# Patient Record
Sex: FEMALE | Race: BLACK OR AFRICAN AMERICAN | Employment: OTHER | URBAN - METROPOLITAN AREA
[De-identification: names, ages, dates, MRNs, and addresses within clinical notes are randomized per-mention and may not be internally consistent; named-entity substitution may affect disease eponyms.]

---

## 2017-09-14 ENCOUNTER — APPOINTMENT (OUTPATIENT)
Dept: GENERAL RADIOLOGY | Age: 77
DRG: 871 | End: 2017-09-14
Attending: EMERGENCY MEDICINE
Payer: MEDICARE

## 2017-09-14 ENCOUNTER — HOSPITAL ENCOUNTER (INPATIENT)
Age: 77
LOS: 3 days | Discharge: HOME HEALTH | DRG: 871 | End: 2017-09-19
Attending: EMERGENCY MEDICINE | Admitting: SURGERY
Payer: MEDICARE

## 2017-09-14 ENCOUNTER — APPOINTMENT (OUTPATIENT)
Dept: CT IMAGING | Age: 77
DRG: 871 | End: 2017-09-14
Attending: EMERGENCY MEDICINE
Payer: MEDICARE

## 2017-09-14 DIAGNOSIS — L02.91 ABSCESS: Primary | ICD-10-CM

## 2017-09-14 PROBLEM — E66.9 OBESE: Chronic | Status: ACTIVE | Noted: 2017-09-14

## 2017-09-14 PROBLEM — N18.6 ESRD (END STAGE RENAL DISEASE) (HCC): Status: ACTIVE | Noted: 2017-09-14

## 2017-09-14 PROBLEM — L02.211 ABDOMINAL WALL ABSCESS: Status: ACTIVE | Noted: 2017-09-14

## 2017-09-14 PROBLEM — I10 HTN (HYPERTENSION): Chronic | Status: ACTIVE | Noted: 2017-09-14

## 2017-09-14 PROBLEM — N18.6 ESRD (END STAGE RENAL DISEASE) (HCC): Chronic | Status: ACTIVE | Noted: 2017-09-14

## 2017-09-14 PROBLEM — D72.829 LEUKOCYTOSIS: Status: ACTIVE | Noted: 2017-09-14

## 2017-09-14 PROBLEM — R79.89 ELEVATED LACTIC ACID LEVEL: Status: ACTIVE | Noted: 2017-09-14

## 2017-09-14 PROBLEM — I10 HTN (HYPERTENSION): Status: ACTIVE | Noted: 2017-09-14

## 2017-09-14 LAB
ALBUMIN SERPL-MCNC: 3.3 G/DL (ref 3.5–5)
ALBUMIN/GLOB SERPL: 0.6 {RATIO} (ref 1.1–2.2)
ALP SERPL-CCNC: 117 U/L (ref 45–117)
ALT SERPL-CCNC: 14 U/L (ref 12–78)
ANION GAP SERPL CALC-SCNC: 14 MMOL/L (ref 5–15)
AST SERPL-CCNC: 23 U/L (ref 15–37)
ATRIAL RATE: 98 BPM
BASOPHILS # BLD: 0 K/UL (ref 0–0.1)
BASOPHILS NFR BLD: 0 % (ref 0–1)
BILIRUB SERPL-MCNC: 0.9 MG/DL (ref 0.2–1)
BUN SERPL-MCNC: 11 MG/DL (ref 6–20)
BUN/CREAT SERPL: 3 (ref 12–20)
CALCIUM SERPL-MCNC: 8.4 MG/DL (ref 8.5–10.1)
CALCULATED P AXIS, ECG09: 42 DEGREES
CALCULATED R AXIS, ECG10: -10 DEGREES
CALCULATED T AXIS, ECG11: 120 DEGREES
CHLORIDE SERPL-SCNC: 97 MMOL/L (ref 97–108)
CO2 SERPL-SCNC: 28 MMOL/L (ref 21–32)
CREAT SERPL-MCNC: 4.25 MG/DL (ref 0.55–1.02)
DIAGNOSIS, 93000: NORMAL
DIFFERENTIAL METHOD BLD: ABNORMAL
EOSINOPHIL # BLD: 0.2 K/UL (ref 0–0.4)
EOSINOPHIL NFR BLD: 1 % (ref 0–7)
ERYTHROCYTE [DISTWIDTH] IN BLOOD BY AUTOMATED COUNT: 15.1 % (ref 11.5–14.5)
GLOBULIN SER CALC-MCNC: 5.6 G/DL (ref 2–4)
GLUCOSE SERPL-MCNC: 104 MG/DL (ref 65–100)
HCT VFR BLD AUTO: 36.3 % (ref 35–47)
HGB BLD-MCNC: 12.2 G/DL (ref 11.5–16)
LACTATE SERPL-SCNC: 2.5 MMOL/L (ref 0.4–2)
LYMPHOCYTES # BLD: 2 K/UL (ref 0.8–3.5)
LYMPHOCYTES NFR BLD: 13 % (ref 12–49)
MCH RBC QN AUTO: 35.7 PG (ref 26–34)
MCHC RBC AUTO-ENTMCNC: 33.6 G/DL (ref 30–36.5)
MCV RBC AUTO: 106.1 FL (ref 80–99)
MONOCYTES # BLD: 1.9 K/UL (ref 0–1)
MONOCYTES NFR BLD: 12 % (ref 5–13)
NEUTS SEG # BLD: 11.4 K/UL (ref 1.8–8)
NEUTS SEG NFR BLD: 74 % (ref 32–75)
P-R INTERVAL, ECG05: 134 MS
PLATELET # BLD AUTO: 228 K/UL (ref 150–400)
POTASSIUM SERPL-SCNC: 3.7 MMOL/L (ref 3.5–5.1)
PROT SERPL-MCNC: 8.9 G/DL (ref 6.4–8.2)
Q-T INTERVAL, ECG07: 394 MS
QRS DURATION, ECG06: 108 MS
QTC CALCULATION (BEZET), ECG08: 503 MS
RBC # BLD AUTO: 3.42 M/UL (ref 3.8–5.2)
RBC MORPH BLD: ABNORMAL
SODIUM SERPL-SCNC: 139 MMOL/L (ref 136–145)
VENTRICULAR RATE, ECG03: 98 BPM
WBC # BLD AUTO: 15.5 K/UL (ref 3.6–11)

## 2017-09-14 PROCEDURE — 99218 HC RM OBSERVATION: CPT

## 2017-09-14 PROCEDURE — 99285 EMERGENCY DEPT VISIT HI MDM: CPT

## 2017-09-14 PROCEDURE — 87040 BLOOD CULTURE FOR BACTERIA: CPT | Performed by: EMERGENCY MEDICINE

## 2017-09-14 PROCEDURE — 96365 THER/PROPH/DIAG IV INF INIT: CPT

## 2017-09-14 PROCEDURE — 93005 ELECTROCARDIOGRAM TRACING: CPT

## 2017-09-14 PROCEDURE — 85025 COMPLETE CBC W/AUTO DIFF WBC: CPT | Performed by: EMERGENCY MEDICINE

## 2017-09-14 PROCEDURE — 74011250637 HC RX REV CODE- 250/637: Performed by: INTERNAL MEDICINE

## 2017-09-14 PROCEDURE — 36415 COLL VENOUS BLD VENIPUNCTURE: CPT | Performed by: EMERGENCY MEDICINE

## 2017-09-14 PROCEDURE — 74011000258 HC RX REV CODE- 258: Performed by: EMERGENCY MEDICINE

## 2017-09-14 PROCEDURE — 80053 COMPREHEN METABOLIC PANEL: CPT | Performed by: EMERGENCY MEDICINE

## 2017-09-14 PROCEDURE — 74011250637 HC RX REV CODE- 250/637: Performed by: PHYSICIAN ASSISTANT

## 2017-09-14 PROCEDURE — 74011250636 HC RX REV CODE- 250/636: Performed by: EMERGENCY MEDICINE

## 2017-09-14 PROCEDURE — 71020 XR CHEST PA LAT: CPT

## 2017-09-14 PROCEDURE — 74176 CT ABD & PELVIS W/O CONTRAST: CPT

## 2017-09-14 PROCEDURE — 83605 ASSAY OF LACTIC ACID: CPT | Performed by: EMERGENCY MEDICINE

## 2017-09-14 PROCEDURE — 87186 SC STD MICRODIL/AGAR DIL: CPT | Performed by: EMERGENCY MEDICINE

## 2017-09-14 PROCEDURE — 87077 CULTURE AEROBIC IDENTIFY: CPT | Performed by: EMERGENCY MEDICINE

## 2017-09-14 PROCEDURE — 74011250636 HC RX REV CODE- 250/636: Performed by: PHYSICIAN ASSISTANT

## 2017-09-14 PROCEDURE — 87205 SMEAR GRAM STAIN: CPT | Performed by: EMERGENCY MEDICINE

## 2017-09-14 RX ORDER — CALCIUM ACETATE 667 MG/1
1 CAPSULE ORAL
Status: DISCONTINUED | OUTPATIENT
Start: 2017-09-14 | End: 2017-09-19 | Stop reason: HOSPADM

## 2017-09-14 RX ORDER — ISOSORBIDE MONONITRATE 30 MG/1
60 TABLET, EXTENDED RELEASE ORAL
Status: DISCONTINUED | OUTPATIENT
Start: 2017-09-15 | End: 2017-09-19 | Stop reason: HOSPADM

## 2017-09-14 RX ORDER — HYDRALAZINE HYDROCHLORIDE 20 MG/ML
20 INJECTION INTRAMUSCULAR; INTRAVENOUS
Status: DISCONTINUED | OUTPATIENT
Start: 2017-09-14 | End: 2017-09-19 | Stop reason: HOSPADM

## 2017-09-14 RX ORDER — HEPARIN SODIUM 5000 [USP'U]/ML
5000 INJECTION, SOLUTION INTRAVENOUS; SUBCUTANEOUS ONCE
Status: COMPLETED | OUTPATIENT
Start: 2017-09-14 | End: 2017-09-14

## 2017-09-14 RX ORDER — ONDANSETRON 2 MG/ML
4 INJECTION INTRAMUSCULAR; INTRAVENOUS
Status: DISCONTINUED | OUTPATIENT
Start: 2017-09-14 | End: 2017-09-19 | Stop reason: HOSPADM

## 2017-09-14 RX ORDER — VENLAFAXINE HYDROCHLORIDE 37.5 MG/1
37.5 CAPSULE, EXTENDED RELEASE ORAL DAILY
Status: DISCONTINUED | OUTPATIENT
Start: 2017-09-15 | End: 2017-09-19 | Stop reason: HOSPADM

## 2017-09-14 RX ORDER — IBUPROFEN 200 MG
1 TABLET ORAL DAILY
Status: DISCONTINUED | OUTPATIENT
Start: 2017-09-14 | End: 2017-09-14

## 2017-09-14 RX ORDER — HYDRALAZINE HYDROCHLORIDE 25 MG/1
25 TABLET, FILM COATED ORAL 3 TIMES DAILY
Status: DISCONTINUED | OUTPATIENT
Start: 2017-09-14 | End: 2017-09-19 | Stop reason: HOSPADM

## 2017-09-14 RX ORDER — SODIUM CHLORIDE 0.9 % (FLUSH) 0.9 %
5-10 SYRINGE (ML) INJECTION AS NEEDED
Status: DISCONTINUED | OUTPATIENT
Start: 2017-09-14 | End: 2017-09-19 | Stop reason: HOSPADM

## 2017-09-14 RX ORDER — ALPRAZOLAM 0.5 MG/1
0.5 TABLET ORAL
Status: DISCONTINUED | OUTPATIENT
Start: 2017-09-14 | End: 2017-09-19 | Stop reason: HOSPADM

## 2017-09-14 RX ORDER — DIPHENHYDRAMINE HYDROCHLORIDE 50 MG/ML
12.5 INJECTION, SOLUTION INTRAMUSCULAR; INTRAVENOUS
Status: DISCONTINUED | OUTPATIENT
Start: 2017-09-14 | End: 2017-09-19 | Stop reason: HOSPADM

## 2017-09-14 RX ORDER — NALOXONE HYDROCHLORIDE 0.4 MG/ML
0.4 INJECTION, SOLUTION INTRAMUSCULAR; INTRAVENOUS; SUBCUTANEOUS AS NEEDED
Status: DISCONTINUED | OUTPATIENT
Start: 2017-09-14 | End: 2017-09-19 | Stop reason: HOSPADM

## 2017-09-14 RX ORDER — DOCUSATE SODIUM 100 MG/1
100 CAPSULE, LIQUID FILLED ORAL 2 TIMES DAILY
Status: DISCONTINUED | OUTPATIENT
Start: 2017-09-14 | End: 2017-09-19 | Stop reason: HOSPADM

## 2017-09-14 RX ORDER — HYDROMORPHONE HYDROCHLORIDE 1 MG/ML
0.5 INJECTION, SOLUTION INTRAMUSCULAR; INTRAVENOUS; SUBCUTANEOUS
Status: DISCONTINUED | OUTPATIENT
Start: 2017-09-14 | End: 2017-09-19 | Stop reason: HOSPADM

## 2017-09-14 RX ORDER — SODIUM CHLORIDE 0.9 % (FLUSH) 0.9 %
5-10 SYRINGE (ML) INJECTION EVERY 8 HOURS
Status: DISCONTINUED | OUTPATIENT
Start: 2017-09-14 | End: 2017-09-19 | Stop reason: HOSPADM

## 2017-09-14 RX ADMIN — DOCUSATE SODIUM 100 MG: 100 CAPSULE ORAL at 17:35

## 2017-09-14 RX ADMIN — HYDROMORPHONE HYDROCHLORIDE 0.5 MG: 1 INJECTION, SOLUTION INTRAMUSCULAR; INTRAVENOUS; SUBCUTANEOUS at 20:22

## 2017-09-14 RX ADMIN — HYDRALAZINE HYDROCHLORIDE 25 MG: 25 TABLET, FILM COATED ORAL at 21:31

## 2017-09-14 RX ADMIN — HEPARIN SODIUM 5000 UNITS: 5000 INJECTION, SOLUTION INTRAVENOUS; SUBCUTANEOUS at 17:36

## 2017-09-14 RX ADMIN — ALPRAZOLAM 0.5 MG: 0.5 TABLET ORAL at 21:38

## 2017-09-14 RX ADMIN — SODIUM CHLORIDE 3 G: 900 INJECTION, SOLUTION INTRAVENOUS at 13:48

## 2017-09-14 RX ADMIN — HYDRALAZINE HYDROCHLORIDE 25 MG: 25 TABLET, FILM COATED ORAL at 17:36

## 2017-09-14 RX ADMIN — SODIUM CHLORIDE 500 ML: 900 INJECTION, SOLUTION INTRAVENOUS at 13:47

## 2017-09-14 RX ADMIN — Medication 10 ML: at 21:32

## 2017-09-14 RX ADMIN — VANCOMYCIN HYDROCHLORIDE 1250 MG: 1 INJECTION, POWDER, LYOPHILIZED, FOR SOLUTION INTRAVENOUS at 15:01

## 2017-09-14 NOTE — ED NOTES
Per Dr. Heydi Gil do not give patient 30ml/kg fluid resuscitation protocol. Pt to receive total of 500ml d/t being a hemodialysis patient.

## 2017-09-14 NOTE — IP AVS SNAPSHOT
Tanja Hoffmann 
 
 
 [address] 100[address] [facility] 
[phone] Patient: Gordy Thompson MRN: [id number] RQC:[id number] You are allergic to the following Allergen Reactions Iron Anaphylaxis Recent Documentation Height Weight Breastfeeding? BMI OB Status Smoking Status 1.6 m 77.1 kg No 30.11 kg/m2 Menopause Current Every Day Smoker Unresulted Labs Order Current Status CULTURE, BLOOD Preliminary result CULTURE, BLOOD Preliminary result CULTURE, BLOOD, PAIRED Preliminary result Emergency Contacts Name Discharge Info Relation Home Work Mobile Linda Bates N/A  AT THIS TIME [6] Other Relative [6] [phone] About your hospitalization You were admitted on:  September 14, 2017 You last received care in the:  [facility] 4M POST SURG ORT 2 You were discharged on:  September 19, 2017 Unit phone number:  [phone] Why you were hospitalized Your primary diagnosis was:  Not on File Your diagnoses also included:  Abdominal Wall Abscess, Esrd (End Stage Renal Disease) (Hcc), Htn (Hypertension), Leukocytosis, Elevated Lactic Acid Level, Obese, Bacteremia, Abdominal Abscess (Hcc) Providers Seen During Your Hospitalizations Provider Role Specialty Primary office phone Ashley Epperson MD Attending Provider Emergency Medicine [phone] Arleen Cardenas MD Attending Provider General Surgery [phone] Your Primary Care Physician (PCP) Primary Care Physician Office Phone Office Fax [place] Maisha [phone] Follow-up Information Follow up With Details Comments Contact Info [facility]  [address] 
[phone] Gill Cruz MD On 9/27/2017 2:00PM [address] [facility] [address] 1007 [facility] 
[phone] Darryl Gomes MD Schedule an appointment as soon as possible for a visit in 2 weeks  [address] 100[address] [facility] 
[phone] Casey Loyd MD  As needed for knee pain  [address] [facility] [address] 1007 [facility] 
[phone] Current Discharge Medication List  
  
START taking these medications Dose & Instructions Dispensing Information Comments Morning Noon Evening Bedtime  
 amoxicillin-clavulanate 500-125 mg per tablet Commonly known as:  AUGMENTIN Your last dose was: Your next dose is:    
   
   
 Dose:  1 Tab Take 1 Tab by mouth daily. Quantity:  10 Tab Refills:  0 CONTINUE these medications which have NOT CHANGED Dose & Instructions Dispensing Information Comments Morning Noon Evening Bedtime AMBIEN 5 mg tablet Generic drug:  zolpidem Your last dose was: Your next dose is:    
   
   
 Dose:  5 mg Take 5 mg by mouth nightly as needed for Sleep. Refills:  0  
     
   
   
   
  
 calcium carbonate 200 mg calcium (500 mg) Chew Commonly known as:  TUMS Your last dose was: Your next dose is:    
   
   
 Dose:  1 Tab Take 1 Tab by mouth daily. Refills:  0 DIALYVITE 800 PO Your last dose was: Your next dose is: Take  by mouth. Refills:  0  
     
   
   
   
  
 EFFEXOR XR 37.5 mg capsule Generic drug:  venlafaxine-SR Your last dose was: Your next dose is: Take  by mouth daily. Refills:  0  
     
   
   
   
  
 hydrALAZINE 50 mg tablet Commonly known as:  APRESOLINE Your last dose was: Your next dose is:    
   
   
 Dose:  25 mg Take 25 mg by mouth three (3) times daily. Refills:  0  
     
   
   
   
  
 isosorbide mononitrate ER 60 mg CR tablet Commonly known as:  IMDUR Your last dose was: Your next dose is: Take  by mouth every morning. Refills:  0 PHOSLO 667 mg Cap Generic drug:  calcium acetate Your last dose was: Your next dose is: Take  by mouth three (3) times daily (with meals). Refills:  0  
     
   
   
   
  
 XANAX 0.5 mg tablet Generic drug:  ALPRAZolam  
   
Your last dose was: Your next dose is: Take  by mouth. Refills:  0 Where to Get Your Medications Information on where to get these meds will be given to you by the nurse or doctor. ! Ask your nurse or doctor about these medications  
  amoxicillin-clavulanate 500-125 mg per tablet Discharge Instructions Patient Discharge Instructions Arturo Elke / [id number] : 1940 Admitted 2017 Discharged: 2017 Take Home Medications · It is important that you take the medication exactly as they are prescribed. · Keep your medication in the bottles provided by the pharmacist and keep a list of the medication names, dosages, and times to be taken in your wallet. InvitedHome Activation Thank you for enrolling in [address] Please follow the instructions below to securely access your online medical record. InvitedHome allows you to send messages to your doctor, view your test results, renew your prescriptions, schedule appointments, and more. How Do I Sign Up? 1. In your internet browser, go to [email or web address]. 2. Click on the First Time User? Click Here link in the Sign In box. You will see the New Member Sign Up page. 3. Enter your InvitedHome Access Code exactly as it appears below. You will not need to use this code after youve completed the sign-up process. If you do not sign up before the expiration date, you must request a new code. InvitedHome Access Code: [email or web address] Expires: 2017 10:26 AM  
 
 4. Enter the last four digits of your Social Security Number (xxxx) and Date of Birth (mm/dd/yyyy) as indicated and click Submit. You will be taken to the next sign-up page. 5. Create a Wandrian ID. This will be your Wandrian login ID and cannot be changed, so think of one that is secure and easy to remember. 6. Create a Wandrian password. You can change your password at any time. 7. Enter your Password Reset Question and Answer. This can be used at a later time if you forget your password. 8. Enter your e-mail address. You will receive e-mail notification when new information is available in [address] 9. Click Sign Up. You can now view your medical record. Additional Information Remember, Wandrian is NOT to be used for urgent needs. For medical emergencies, dial 911. Now available from your iPhone and Android! · Do not take other medications without consulting your doctor. · Take Tylenol as needed for pain. What to do at [facility] Recommended diet: Renal Diet Recommended activity: As tolerated Wound care: Cover abdominal wound with dry dressing if there is any drainage. Follow up with Dr. Yue Shah in 2 weeks. Call [facility] to make an appointment. Follow up with Dr. Jose Alfredo Menon as needed for knee pain. Follow up with your family doctor for management of your chronic medical problems. Call Dr. Yue Shah or go to the ER if you develop worsening pain, fever, vomiting, substantial or persistent increase in wound drainage, or any other symptoms that concern you. Discharge Orders None Wandrian Announcement We are excited to announce that we are making your provider's discharge notes available to you in Wandrian. You will see these notes when they are completed and signed by the physician that discharged you from your recent hospital stay.   If you have any questions or concerns about any information you see in Vaddio, please call the Health Information Department where you were seen or reach out to your Primary Care Provider for more information about your plan of care. Introducing [facility]! [address] introduces Vaddio patient portal. Now you can access parts of your medical record, email your doctor's office, and request medication refills online. 1. In your internet browser, go to [email or web address] 2. Click on the First Time User? Click Here link in the Sign In box. You will see the New Member Sign Up page. 3. Enter your Vaddio Access Code exactly as it appears below. You will not need to use this code after youve completed the sign-up process. If you do not sign up before the expiration date, you must request a new code. · Vaddio Access Code: [email or web address] Expires: 12/13/2017 10:26 AM 
 
4. Enter the last four digits of your Social Security Number (xxxx) and Date of Birth (mm/dd/yyyy) as indicated and click Submit. You will be taken to the next sign-up page. 5. Create a Vaddio ID. This will be your Vaddio login ID and cannot be changed, so think of one that is secure and easy to remember. 6. Create a Vaddio password. You can change your password at any time. 7. Enter your Password Reset Question and Answer. This can be used at a later time if you forget your password. 8. Enter your e-mail address. You will receive e-mail notification when new information is available in [address] 9. Click Sign Up. You can now view and download portions of your medical record. 10. Click the Download Summary menu link to download a portable copy of your medical information. If you have questions, please visit the Frequently Asked Questions section of the Vaddio website. Remember, Vaddio is NOT to be used for urgent needs. For medical emergencies, dial 911. Now available from your iPhone and Android! General Information Please provide this summary of care documentation to your next provider. Patient Signature:  ____________________________________________________________ Date:  ____________________________________________________________  
  
[place] Provider Signature:  ____________________________________________________________ Date:  ____________________________________________________________

## 2017-09-14 NOTE — PROGRESS NOTES
[facility] Dosing Services: Antimicrobial Stewardship Daily Doc    Antibiotic dosing of vancomycin by Dr. Jericho Alvarez  Indication:  Day of Therapy 1      Vancomycin therapy:  Vancomycin 15 mg/kg (HD patient) x 1, approximately 1250 mg IV x 1. Pharmacy will continue to follow and adjust as necessary based on repeat doses and continuation of orders. Other Antimicrobial   (not dosed by pharmacist) Unasyn 3g IV x 1   Cultures    Serum Creatinine Lab Results   Component Value Date/Time    Creatinine 4.25 09/14/2017 10:37 AM         Creatinine Clearance Estimated Creatinine Clearance: 10.9 mL/min (based on Cr of 4.25).      Temp Temp: 98.2 °F (36.8 °C)       WBC Lab Results   Component Value Date/Time    WBC 15.5 09/14/2017 10:37 AM        H/H Lab Results   Component Value Date/Time    HGB 12.2 09/14/2017 10:37 AM        Platelets    Lab Results   Component Value Date/Time    PLATELET 816 17/73/1420 10:37 AM              Pharmacist Hubert Bassett PHARMD Contact information: [phone]

## 2017-09-14 NOTE — ED NOTES
TRANSFER - OUT REPORT:    Verbal report given to Noe Scott RN(name) on Rachelle Oliveros  being transferred to [facility] (unit) for routine progression of care       Report consisted of patients Situation, Background, Assessment and   Recommendations(SBAR). Information from the following report(s) SBAR, Kardex, ED Summary and MAR was reviewed with the receiving nurse. Lines:   Peripheral IV 09/14/17 Left Antecubital (Active)   Site Assessment Clean, dry, & intact 9/14/2017 10:47 AM   Phlebitis Assessment 0 9/14/2017 10:47 AM   Infiltration Assessment 0 9/14/2017 10:47 AM   Dressing Status Clean, dry, & intact 9/14/2017 10:47 AM   Dressing Type Transparent 9/14/2017 10:47 AM   Hub Color/Line Status Pink 9/14/2017 10:47 AM       Peripheral IV 09/14/17 Left Forearm (Active)   Site Assessment Clean, dry, & intact 9/14/2017  1:37 PM   Phlebitis Assessment 0 9/14/2017  1:37 PM   Infiltration Assessment 0 9/14/2017  1:37 PM   Dressing Status Clean, dry, & intact 9/14/2017  1:37 PM   Dressing Type Transparent;Tape 9/14/2017  1:37 PM   Hub Color/Line Status Blue;Flushed;Patent 9/14/2017  1:37 PM   Action Taken Blood drawn 9/14/2017  1:37 PM        Opportunity for questions and clarification was provided. Patient transported with:   Monitor, 2 saline locks.

## 2017-09-14 NOTE — PROGRESS NOTES
[facility] Dosing Services: Antimicrobial Stewardship Progress Note  Consult for antibiotic dosing of Vancomycin by Donnie SONI  Zosyn dose change per renal P&T protocol  Pharmacist reviewed antibiotic appropriateness for 68year old female  for indication of Abdominal wall abscess  Day of Therapy: 1    Plan:  Vancomycin therapy:  Loading dose: Vancomycin 1250 mg IV x1 dose given in ED  Maintenance dose: Vancomycin 500 mg IVPB  after each HD session  Dose calculated to approximate a therapeutic trough of 15-20 mcg/mL. Last trough level / Plan for level: next thur am  Pharmacy to follow daily and will make changes to dose and/or frequency based on clinical status.     Non-Kinetic Antimicrobial Dosing:   Current Regimen:  Zosyn 3.375 gm IV q8hr  Recommendation: Changed to Zosyn 3.375 gm IV q12hr  Other Antimicrobial  (not dosed by pharmacist)   none   Cultures     9/14/2017: Blood: pending  9/14/2017: Blood: pending  9/14/2017: Abdominal wound: GPC, GNR pending   Serum Creatinine     Lab Results   Component Value Date/Time    Creatinine 4.25 09/14/2017 10:37 AM       Creatinine Clearance HD     Temp   98.5 °F (36.9 °C)    WBC   Lab Results   Component Value Date/Time    WBC 15.5 09/14/2017 10:37 AM       H/H   Lab Results   Component Value Date/Time    HGB 12.2 09/14/2017 10:37 AM        Platelets   Lab Results   Component Value Date/Time    PLATELET 614 65/73/6527 10:37 AM        Pharmacist: Signed Jeffrey Eastman Contact information: [phone]

## 2017-09-14 NOTE — PROGRESS NOTES
Primary Nurse Nadeem Sloan RN and Kalia Sanchez RN performed a dual skin assessment on this patient Impairment noted- see wound doc flow sheet  Cullen score is 21, patient came in with drainage from abdomin, related to abscess, no measurable area noted, no other open areas noted on skin.

## 2017-09-14 NOTE — ED NOTES
Timeout completed with [facility]. EMTALA signed by all parties and original sent with [facility]. Pt denies pain at this time and all VS stable. Pt sent with personal belongings. Pt's daughter Flor Jones notified of transfer as requested by pt.

## 2017-09-14 NOTE — PROGRESS NOTES
[facility] Dosing Services: Antimicrobial Stewardship Progress Note    Dosing of Zosyn by NAE Dickinson  Pharmacist reviewed antibiotic appropriateness for 68year old , female  for indication of SSTI  Day of Therapy 1    Plan:    Non-Kinetic Antimicrobial Dosing:   Current Regimen:  Zosyn 2.25g every 12 hours  Recommendation: Zosyn 3.375g every 12 hours (HD patient)  Other Antimicrobial  (not dosed by pharmacist)   Noen   Cultures     9/14 Wound Cx Gram (+) cocci, and Gram (-) rods   Serum Creatinine     Lab Results   Component Value Date/Time    Creatinine 4.25 09/14/2017 10:37 AM       Creatinine Clearance Estimated Creatinine Clearance: 10.9 mL/min (based on Cr of 4.25).      Temp   98.5 °F (36.9 °C)    WBC   Lab Results   Component Value Date/Time    WBC 15.5 09/14/2017 10:37 AM       H/H   Lab Results   Component Value Date/Time    HGB 12.2 09/14/2017 10:37 AM        Platelets   Lab Results   Component Value Date/Time    PLATELET 931 67/43/0204 10:37 AM        Isacc Wadsworth PharmD, BCPS  Contact information:

## 2017-09-14 NOTE — ED PROVIDER NOTES
HPI Comments: 68 y.o. female with past medical history significant for ESRD on hemodialysis who presents from [facility] with chief complaint of wound check. For the past week has had drainage from prior surgical scar of the abdomen. Denies fever, chills, nausea or vomiting. Has had a cough for over a week and recently finished a Z-pack. Seen at Dialysis clinic today and sent for evaluation of wound. There are no other acute medical concerns at this time. Social hx: smoker    PCP: Brie Jaramillo MD        Patient is a 68 y.o. female presenting with wound check. The history is provided by the patient and the EMS personnel. Wound Check    Pertinent negatives include no back pain. Past Medical History:   Diagnosis Date    Arthritis     Chronic kidney disease     Dr Miguel Saunders Hypertension     Ill-defined condition     Dialysis T, Th, S       Past Surgical History:   Procedure Laterality Date    HX COLOSTOMY      left abdomen    HX VASCULAR ACCESS      left upper arm fistula         History reviewed. No pertinent family history. Social History     Social History    Marital status:      Spouse name: N/A    Number of children: N/A    Years of education: N/A     Occupational History    Not on file. Social History Main Topics    Smoking status: Current Every Day Smoker     Packs/day: 0.25    Smokeless tobacco: Not on file    Alcohol use No    Drug use: No    Sexual activity: Not on file     Other Topics Concern    Not on file     Social History Narrative         ALLERGIES: Iron    Review of Systems   Constitutional: Negative for chills and fever. HENT: Negative for ear pain and sore throat. Eyes: Negative for pain. Respiratory: Negative for chest tightness and shortness of breath. Cardiovascular: Negative for chest pain and leg swelling. Gastrointestinal: Negative for abdominal pain, nausea and vomiting. Genitourinary: Negative for dysuria and flank pain. Musculoskeletal: Negative for back pain. Skin: Negative for rash. Neurological: Negative for headaches. All other systems reviewed and are negative. Vitals:    09/14/17 1025   BP: 130/49   Pulse: 98   Resp: 20   Temp: 98.2 °F (36.8 °C)   SpO2: 98%   Weight: 77.5 kg (170 lb 13.7 oz)   Height: 5' 3\" (1.6 m)            Physical Exam   Constitutional: She appears well-developed and well-nourished. HENT:   Head: Normocephalic and atraumatic. Mouth/Throat: Oropharynx is clear and moist.   Eyes: Conjunctivae and EOM are normal. Pupils are equal, round, and reactive to light. No scleral icterus. Neck: Neck supple. No tracheal deviation present. Cardiovascular: Normal rate, regular rhythm, normal heart sounds and intact distal pulses. Pulmonary/Chest: Effort normal and breath sounds normal. No respiratory distress. Abdominal: Soft. She exhibits no distension. There is no tenderness. There is no rebound and no guarding. Genitourinary:   Genitourinary Comments: deferred   Musculoskeletal: She exhibits no edema. Neurological: She is alert. Skin: Skin is warm and dry. No erythema. Purulent drainage from prior surgical scar. Ostomy pink, viable. Psychiatric: She has a normal mood and affect. Nursing note and vitals reviewed. MDM  Number of Diagnoses or Management Options  Abscess:     ED Course       Procedures    12:56 PM  Spoke to Dr. Boateng Pale: will admit patient     2:14 PM  IVF discontinued due to concerns for volume overload. 12:56 PM  Patient is being admitted to the hospital.  The results of their tests and reasons for their admission have been discussed with them and/or available family. They convey agreement and understanding for the need to be admitted and for their admission diagnosis. Consultation has been made with the inpatient physician specialist for hospitalization.     LABORATORY TESTS:  Recent Results (from the past 12 hour(s))   CBC WITH AUTOMATED DIFF Collection Time: 09/14/17 10:37 AM   Result Value Ref Range    WBC 15.5 (H) 3.6 - 11.0 K/uL    RBC 3.42 (L) 3.80 - 5.20 M/uL    HGB 12.2 11.5 - 16.0 g/dL    HCT 36.3 35.0 - 47.0 %    .1 (H) 80.0 - 99.0 FL    MCH 35.7 (H) 26.0 - 34.0 PG    MCHC 33.6 30.0 - 36.5 g/dL    RDW 15.1 (H) 11.5 - 14.5 %    PLATELET 325 440 - 277 K/uL    NEUTROPHILS 74 32 - 75 %    LYMPHOCYTES 13 12 - 49 %    MONOCYTES 12 5 - 13 %    EOSINOPHILS 1 0 - 7 %    BASOPHILS 0 0 - 1 %    ABS. NEUTROPHILS 11.4 (H) 1.8 - 8.0 K/UL    ABS. LYMPHOCYTES 2.0 0.8 - 3.5 K/UL    ABS. MONOCYTES 1.9 (H) 0.0 - 1.0 K/UL    ABS. EOSINOPHILS 0.2 0.0 - 0.4 K/UL    ABS. BASOPHILS 0.0 0.0 - 0.1 K/UL    DF SMEAR SCANNED      RBC COMMENTS NORMOCYTIC, NORMOCHROMIC     METABOLIC PANEL, COMPREHENSIVE    Collection Time: 09/14/17 10:37 AM   Result Value Ref Range    Sodium 139 136 - 145 mmol/L    Potassium 3.7 3.5 - 5.1 mmol/L    Chloride 97 97 - 108 mmol/L    CO2 28 21 - 32 mmol/L    Anion gap 14 5 - 15 mmol/L    Glucose 104 (H) 65 - 100 mg/dL    BUN 11 6 - 20 MG/DL    Creatinine 4.25 (H) 0.55 - 1.02 MG/DL    BUN/Creatinine ratio 3 (L) 12 - 20      GFR est AA 12 (L) >60 ml/min/1.73m2    GFR est non-AA 10 (L) >60 ml/min/1.73m2    Calcium 8.4 (L) 8.5 - 10.1 MG/DL    Bilirubin, total 0.9 0.2 - 1.0 MG/DL    ALT (SGPT) 14 12 - 78 U/L    AST (SGOT) 23 15 - 37 U/L    Alk. phosphatase 117 45 - 117 U/L    Protein, total 8.9 (H) 6.4 - 8.2 g/dL    Albumin 3.3 (L) 3.5 - 5.0 g/dL    Globulin 5.6 (H) 2.0 - 4.0 g/dL    A-G Ratio 0.6 (L) 1.1 - 2.2     LACTIC ACID    Collection Time: 09/14/17 10:37 AM   Result Value Ref Range    Lactic acid 2.5 (HH) 0.4 - 2.0 MMOL/L       IMAGING RESULTS:  CT ABD PELV WO CONT   Final Result      XR CHEST PA LAT   Final Result        Xr Chest Pa Lat    Result Date: 9/14/2017  EXAM:  XR CHEST PA LAT INDICATION: cough COMPARISON: 9/7/2016. FINDINGS:  Unchanged appearance of the chest including increased, hazy interstitial markings diffusely.  No new lobar consolidation, pleural effusion, or pneumothorax. .  The cardiomediastinal silhouette is normal.  No acute or aggressive osseous lesion. .  Atherosclerotic disease of the aorta. A left upper extremity vascular stent appears grossly unchanged. IMPRESSION: 1. Unchanged appearance of the chest including increased hazy interstitial markings. This appearance could be seen with chronic edema, fibrosis, or interstitial lung diseases. 2.  No new lobar consolidation to suggest a superimposed pneumonia. Ct Abd Pelv Wo Cont    Result Date: 9/14/2017  INDICATION: abscess COMPARISON: CONTRAST:  None. TECHNIQUE: Thin axial images were obtained through the abdomen and pelvis. Coronal and sagittal reconstructions were generated. Oral contrast was not administered. CT dose reduction was achieved through use of a standardized protocol tailored for this examination and automatic exposure control for dose modulation. Adaptive statistical iterative reconstruction (ASIR) was utilized. The absence of intravenous contrast material reduces the sensitivity for evaluation of the solid parenchymal organs of the abdomen. FINDINGS: LUNG BASES: Clear. INCIDENTALLY IMAGED HEART AND MEDIASTINUM: Unremarkable. LIVER: No mass or biliary dilatation. GALLBLADDER: There is suspicion of gallstones. SPLEEN: No mass. PANCREAS: No mass or ductal dilatation. ADRENALS: Unremarkable. KIDNEYS/URETERS: There is marked atrophy of the kidneys. STOMACH: Unremarkable. SMALL BOWEL: No dilatation or wall thickening. COLON: There is an ostomy in the left abdomen. There is wall paramedian hernias containing fat bilaterally. In the midline in the abdominal wall extending to the muscle, there is an area of enhancement with an air-fluid level which measures 2.3 x 1.2 cm. This does not appear to extend intra-abdominally. APPENDIX: Not identified PERITONEUM: No ascites or pneumoperitoneum. RETROPERITONEUM: No lymphadenopathy or aortic aneurysm.  REPRODUCTIVE ORGANS: Patient status post hysterectomy. There is a 2.3 cm left ovarian cyst. URINARY BLADDER: No mass or calculus. BONES: There are changes of metabolic bone disease. There are severe degenerative changes right hip. ADDITIONAL COMMENTS: N/A     IMPRESSION: 1. In the midline, there is an area of enhancement in the abdominal wall extending to the muscular layer which contains a small air-fluid level and measures 2.3 x 1.2 cm may represent a small abscess without evidence of intra-abdominal extension. 2. There is an ostomy in the left abdomen. 3. There is marked atrophy of the kidneys. 4. There is suspicion of gallstones. 5. There is metabolic bone disease. There are severe degenerative changes in the right hip. MEDICATIONS GIVEN:  Medications   ampicillin-sulbactam (UNASYN) 3 g in 0.9% sodium chloride (MBP/ADV) 100 mL (3 g IntraVENous New Bag 9/14/17 1348)   sodium chloride (NS) flush 5-10 mL (not administered)   vancomycin (VANCOCIN) 1,250 mg in 0.9% sodium chloride 250 mL IVPB (not administered)       IMPRESSION:  1. Abscess        PLAN:  1. Admit to general surgery    Total critical care time spent exclusive of procedures:  33 minutes      Melvin Gil MD

## 2017-09-14 NOTE — CONSULTS
[address]  [phone]    Hospitalist Consult Note      NAME:  Lee Marrero   :   1940   MRN:  [id number]     Requesting Physician Adeola Delgado MD   Reason for Consult:  Medical management      PCP:  Tonie Malone MD     Date/Time:  2017 4:17 PM       Assessment:      Active Problems:    Abdominal wall abscess (2017)      ESRD (end stage renal disease) ([facility] [address] (2017)      HTN (hypertension) (2017)      Leukocytosis (2017)      Elevated lactic acid level (2017)      Obese (2017)            Plan: Active Problems:    HTN (hypertension) (2017)   - BP intermittently elevated at [facility]   - follow on usual meds for now   - PRN hydralazine ordered      [facility] (2017)   - consult Renal for regular dialysis, I have ordered      Leukocytosis (2017)   - follow on antibiotics for infection as above, patient is NOT septic at this time      Elevated lactic acid level (2017)   - mildly elevated, recheck in AM      Obese (2017)   - counseled on weight loss        Abdominal wall abscess (2017)   - management per Surgery   - received vancomycin and Unasyn at [facility], no antibiotics currently ordered, defer to Surgery but vancomycin and Zosyn would be appropriate pending cultures          Subjective:     CHIEF COMPLAINT: wound     HISTORY OF PRESENT ILLNESS:     Ms. Awilda Juarez is a 68 y.o.  female who is admitted to the General Surgery Service with wound infection and subcutaneous abscess. We are asked to evaluate for medical management.   Ms. Awilda Juarez is complaining of wound: for the past week, constant, at the lower aspect of her surgical scar, associated with pain and drainage      Past Medical History:   Diagnosis Date    Arthritis     Chronic kidney disease     Dr Kermit Nichole Hypertension     Ill-defined condition     Dialysis T, Th, S    Obese 9/14/2017        Past Surgical History:   Procedure Laterality Date    HX COLOSTOMY      left abdomen    HX VASCULAR ACCESS      left upper arm fistula       Social History   Substance Use Topics    Smoking status: Current Every Day Smoker     Packs/day: 0.25    Smokeless tobacco: Not on file    Alcohol use No        Family History   Problem Relation Age of Onset    Hypertension Other      multiple family members        Allergies   Allergen Reactions    Iron Anaphylaxis        Prior to Admission medications    Medication Sig Start Date End Date Taking? Authorizing Provider   zolpidem (AMBIEN) 5 mg tablet Take 5 mg by mouth nightly as needed for Sleep. Yes Greta Chung MD   FOLIC ACID/VIT BCOMP,C (DIALYVITE 800 PO) Take  by mouth. Yes Greta Chung MD   venlafaxine-SR (EFFEXOR XR) 37.5 mg capsule Take  by mouth daily. Yes Greta Chung MD   hydrALAZINE (APRESOLINE) 50 mg tablet Take 25 mg by mouth three (3) times daily. Yes Greta Chung MD   isosorbide mononitrate ER (IMDUR) 60 mg CR tablet Take  by mouth every morning. Yes Greta Chung MD   calcium acetate (PHOSLO) 667 mg cap Take  by mouth three (3) times daily (with meals). Yes Greta Chung MD   calcium carbonate (TUMS) 200 mg calcium (500 mg) chew Take 1 Tab by mouth daily. Yes Greta Chung MD   ALPRAZolam Shania Yusra) 0.5 mg tablet Take  by mouth.    Yes Greta Chung MD       Current Facility-Administered Medications   Medication Dose Route Frequency    sodium chloride (NS) flush 5-10 mL  5-10 mL IntraVENous PRN    sodium chloride (NS) flush 5-10 mL  5-10 mL IntraVENous Q8H    HYDROmorphone (PF) (DILAUDID) injection 0.5 mg  0.5 mg IntraVENous Q4H PRN    naloxone (NARCAN) injection 0.4 mg  0.4 mg IntraVENous PRN    ondansetron (ZOFRAN) injection 4 mg  4 mg IntraVENous Q4H PRN    diphenhydrAMINE (BENADRYL) injection 12.5 mg  12.5 mg IntraVENous Q4H PRN    docusate sodium (COLACE) capsule 100 mg  100 mg Oral BID  ALPRAZolam (XANAX) tablet 0.5 mg  0.5 mg Oral QID PRN    calcium acetate (PHOSLO) capsule 667 mg  1 Cap Oral TID WITH MEALS    hydrALAZINE (APRESOLINE) tablet 25 mg  25 mg Oral TID    [START ON 9/15/2017] isosorbide mononitrate ER (IMDUR) tablet 60 mg  60 mg Oral 7am    [START ON 9/15/2017] venlafaxine-SR (EFFEXOR-XR) capsule 37.5 mg  37.5 mg Oral DAILY    hydrALAZINE (APRESOLINE) 20 mg/mL injection 20 mg  20 mg IntraVENous Q6H PRN    [START ON 9/15/2017] piperacillin-tazobactam (ZOSYN) 3.375 g in 0.9% sodium chloride (MBP/ADV) 100 mL  3.375 g IntraVENous Q12H    heparin (porcine) injection 5,000 Units  5,000 Units SubCUTAneous ONCE    [START ON 9/16/2017] vancomycin (VANCOCIN) 500 mg in 0.9% sodium chloride (MBP/ADV) 100 mL  500 mg IntraVENous Rx Dosing/Monitoring    And    Vancomycin reminder: Give Vancomycin after each HD session   Other DIALYSIS TUE, THU & SAT         Review of Systems:  (bold if positive, if negative)    Gen:  Eyes:  ENT:  CVS:  Pulm:  GI:    :    MS:  Skin:  abscess, woundPsych:  Endo:    Hem:  Renal:    Neuro:            Objective:      VITALS:    Vital signs reviewed; most recent are:    Visit Vitals    /68 (BP 1 Location: Left arm, BP Patient Position: At rest)    Pulse (!) 101    Temp 98.5 °F (36.9 °C)    Resp 18    Ht 5' 3\" (1.6 m)    Wt 77.5 kg (170 lb 13.7 oz)    SpO2 99%    BMI 30.27 kg/m2     SpO2 Readings from Last 6 Encounters:   09/14/17 99%   09/07/16 99%   07/09/15 98%            Intake/Output Summary (Last 24 hours) at 09/14/17 1730  Last data filed at 09/14/17 1417   Gross per 24 hour   Intake            143.2 ml   Output                0 ml   Net            143.2 ml            Exam:     Physical Exam:    Gen:  Well-developed, obese, in no acute distress  HEENT:  Pink conjunctivae, PERRL, hearing intact to voice, moist mucous membranes  Neck:  Supple, without masses, thyroid non-tender  Resp:  No accessory muscle use, clear breath sounds without wheezes rales or rhonchi  Card:  No murmurs, normal S1, S2 without thrills, bruits or peripheral edema  Abd:  Soft, non-tender, non-distended, normoactive bowel sounds are present, no palpable organomegaly and no detectable hernias  Lymph:  No cervical or inguinal adenopathy  Musc:  No cyanosis or clubbing  Skin:  Abdominal wound at lower aspect of old healed incision with foul smelling, yellow drianage  Neuro:  Cranial nerves are grossly intact, no focal motor weakness, follows commands appropriately  Psych:  Good insight, oriented to person, place and time, alert       Preoperative Labs:    Recent Labs      09/14/17   1037   WBC  15.5*   HGB  12.2   HCT  36.3   PLT  228     Recent Labs      09/14/17   1037   NA  139   K  3.7   CL  97   CO2  28   GLU  104*   BUN  11   CREA  4.25*   CA  8.4*   ALB  3.3*   TBILI  0.9   SGOT  23   ALT  14     No results found for: GLUCPOC  No results for input(s): PH, PCO2, PO2, HCO3, FIO2 in the last 72 hours. No results for input(s): INR in the last 72 hours.     No lab exists for component: INREXT, INREXT    No results found for: SDES  Lab Results   Component Value Date/Time    Culture result: PENDING 09/14/2017 10:37 AM       ___________________________________________________    Attending Physician: Warren Gtz MD

## 2017-09-14 NOTE — H&P
Surgery History and Physical    Subjective:      Scott Atkins is a 68 y.o. female who presented to the [facility] ED at the directed of her dialysis clinic for evaluation of a draining abdominal wound. Patient states she developed drainage about a week ago. She denies N/V, fever, chills, abdominal pain. 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 CT shows small fluid collection in the midline abdominal wall. Past Medical History:   Diagnosis Date    Arthritis     Chronic kidney disease     Dr Kirsten Lemus Hypertension     Ill-defined condition     Dialysis T, Th, S    Obese 9/14/2017     Past Surgical History:   Procedure Laterality Date    HX COLOSTOMY      left abdomen    HX VASCULAR ACCESS      left upper arm fistula      History reviewed. No pertinent family history.   Social History     Social History    Marital status:      Spouse name: N/A    Number of children: N/A    Years of education: N/A     Social History Main Topics    Smoking status: Current Every Day Smoker     Packs/day: 0.25    Smokeless tobacco: None    Alcohol use No    Drug use: No    Sexual activity: Not Asked     Other Topics Concern    None     Social History Narrative      Current Facility-Administered Medications   Medication Dose Route Frequency    sodium chloride (NS) flush 5-10 mL  5-10 mL IntraVENous PRN    sodium chloride (NS) flush 5-10 mL  5-10 mL IntraVENous Q8H    HYDROmorphone (PF) (DILAUDID) injection 0.5 mg  0.5 mg IntraVENous Q4H PRN    naloxone (NARCAN) injection 0.4 mg  0.4 mg IntraVENous PRN    ondansetron (ZOFRAN) injection 4 mg  4 mg IntraVENous Q4H PRN    diphenhydrAMINE (BENADRYL) injection 12.5 mg  12.5 mg IntraVENous Q4H PRN    docusate sodium (COLACE) capsule 100 mg  100 mg Oral BID    ALPRAZolam (XANAX) tablet 0.5 mg  0.5 mg Oral QID PRN    calcium acetate (PHOSLO) capsule 667 mg  1 Cap Oral TID WITH MEALS    hydrALAZINE (APRESOLINE) tablet 25 mg  25 mg Oral TID    [START ON 9/15/2017] isosorbide mononitrate ER (IMDUR) tablet 60 mg  60 mg Oral 7am    [START ON 9/15/2017] venlafaxine-SR (EFFEXOR-XR) capsule 37.5 mg  37.5 mg Oral DAILY    hydrALAZINE (APRESOLINE) 20 mg/mL injection 20 mg  20 mg IntraVENous Q6H PRN      Allergies   Allergen Reactions    Iron Anaphylaxis       Review of Systems:REVIEW OF SYSTEMS:     []     Unable to obtain  ROS due to  []    mental status change  []    sedated   []    intubated   []    Total of 12 systems reviewed as follows:    Constitutional: neg for fevers, chills, weight loss, malaise  Eyes: negative for blurry vision  Ears, nose, mouth, throat, and face: negative for sore throat  Respiratory: negative for SOB  Cardiovascular: negative for CP  Gastrointestinal: negative for nausea, vomiting, abdominal pain, diarrhea, constipation, melena, hematochezia. + for abdominal wound drainage   Genitourinary: negative for dysuria  Integument/breast: neg for skin rash  Hematologic/lymphatic: neg for bruising  Musculoskeletal: negative for muscle aches  Neurological: no dizziness or h/a    Objective:      Patient Vitals for the past 8 hrs:   BP Temp Pulse Resp SpO2 Height Weight   17 1628 139/68 98.5 °F (36.9 °C) (!) 101 18 99 % - -   17 1507 116/53 98.5 °F (36.9 °C) 99 19 92 % - -   17 1351 - - 99 18 98 % - -   17 1315 114/82 - (!) 102 15 98 % - -   17 1300 (!) 135/97 - (!) 109 20 96 % - -   17 1256 - - (!) 101 14 96 % - -   17 1245 (!) 113/29 - - - 97 % - -   17 1215 117/88 - - - 98 % - -   17 1200 (!) 143/98 - - - 97 % - -   17 1130 114/55 - - - 97 % - -   17 1121 129/84 - - - - - -   17 1045 110/67 - - - 97 % - -   17 1036 108/45 - - - 97 % - -   17 1030 129/66 - - - 99 % - -   17 1025 130/49 98.2 °F (36.8 °C) 98 20 98 % 5' 3\" (1.6 m) 77.5 kg (170 lb 13.7 oz)   17 1018 130/49 - - - 98 % - -       Temp (24hrs), Av.4 °F (36.9 °C), Min:98.2 °F (36.8 °C), Max:98.5 °F (36.9 °C)      Physical Exam:  General:  Alert, cooperative, no distress, appears stated age. Eyes:  Conjunctivae/corneas clear. Nose: Nares normal. Septum midline   Mouth/Throat: Lips, mucosa, and tongue normal.    Neck: Supple, symmetrical, trachea midline   Lungs:   Clear to auscultation bilaterally. Heart:  Regular rate and rhythm   Abdomen:   Soft, obese, midline surgical scar is deep in an abdominal cleft. There is a pinpoint wound with some tan drainage and there is chronic scaring of the abdominal wound. Ostomy functioning. Abdomen nontender   Extremities: Extremities normal, atraumatic, no cyanosis   Skin: Skin color, texture, turgor normal. No rashes or lesions   Neuro: Alert, oriented, speech clear     Labs: Recent Labs      09/14/17   1037   WBC  15.5*   HGB  12.2   HCT  36.3   PLT  228     Recent Labs      09/14/17   1037   NA  139   K  3.7   CL  97   CO2  28   GLU  104*   BUN  11   CREA  4.25*   CA  8.4*   ALB  3.3*   TBILI  0.9   SGOT  23   ALT  14     No results for input(s): INR in the last 72 hours. No lab exists for component: INREXT      Assessment and Plan:     Abdominal wound/fluid collection    CT was reviewed with radiology. Unclear if this is an abdominal wall abscess vs low-output enterocutaneous fistula. Will get repeat CT with oral contrast in the am. May have diet tonight, NPO p MN, continue ABX, hospitalist consult for medical management. Further plan pending imaging results.        Signed By: NAE Chacon     September 14, 2017

## 2017-09-15 ENCOUNTER — APPOINTMENT (OUTPATIENT)
Dept: CT IMAGING | Age: 77
DRG: 871 | End: 2017-09-15
Attending: PHYSICIAN ASSISTANT
Payer: MEDICARE

## 2017-09-15 PROBLEM — R78.81 BACTEREMIA: Status: ACTIVE | Noted: 2017-09-15

## 2017-09-15 LAB
ANION GAP SERPL CALC-SCNC: 11 MMOL/L (ref 5–15)
BASOPHILS # BLD: 0 K/UL (ref 0–0.1)
BASOPHILS NFR BLD: 0 % (ref 0–1)
BUN SERPL-MCNC: 17 MG/DL (ref 6–20)
BUN/CREAT SERPL: 3 (ref 12–20)
CALCIUM SERPL-MCNC: 7.8 MG/DL (ref 8.5–10.1)
CHLORIDE SERPL-SCNC: 100 MMOL/L (ref 97–108)
CO2 SERPL-SCNC: 29 MMOL/L (ref 21–32)
CREAT SERPL-MCNC: 6.1 MG/DL (ref 0.55–1.02)
EOSINOPHIL # BLD: 0.3 K/UL (ref 0–0.4)
EOSINOPHIL NFR BLD: 2 % (ref 0–7)
ERYTHROCYTE [DISTWIDTH] IN BLOOD BY AUTOMATED COUNT: 15.7 % (ref 11.5–14.5)
GLUCOSE SERPL-MCNC: 93 MG/DL (ref 65–100)
HCT VFR BLD AUTO: 35.3 % (ref 35–47)
HGB BLD-MCNC: 11.2 G/DL (ref 11.5–16)
LACTATE SERPL-SCNC: 2.4 MMOL/L (ref 0.4–2)
LYMPHOCYTES # BLD: 3.1 K/UL (ref 0.8–3.5)
LYMPHOCYTES NFR BLD: 25 % (ref 12–49)
MCH RBC QN AUTO: 33.6 PG (ref 26–34)
MCHC RBC AUTO-ENTMCNC: 31.7 G/DL (ref 30–36.5)
MCV RBC AUTO: 106 FL (ref 80–99)
MONOCYTES # BLD: 1.6 K/UL (ref 0–1)
MONOCYTES NFR BLD: 13 % (ref 5–13)
NEUTS SEG # BLD: 7.3 K/UL (ref 1.8–8)
NEUTS SEG NFR BLD: 60 % (ref 32–75)
PLATELET # BLD AUTO: 212 K/UL (ref 150–400)
POTASSIUM SERPL-SCNC: 4.2 MMOL/L (ref 3.5–5.1)
RBC # BLD AUTO: 3.33 M/UL (ref 3.8–5.2)
SODIUM SERPL-SCNC: 140 MMOL/L (ref 136–145)
WBC # BLD AUTO: 12.3 K/UL (ref 3.6–11)

## 2017-09-15 PROCEDURE — 74011000258 HC RX REV CODE- 258: Performed by: PHYSICIAN ASSISTANT

## 2017-09-15 PROCEDURE — 77030010522

## 2017-09-15 PROCEDURE — 80048 BASIC METABOLIC PNL TOTAL CA: CPT | Performed by: PHYSICIAN ASSISTANT

## 2017-09-15 PROCEDURE — 74011250637 HC RX REV CODE- 250/637: Performed by: SURGERY

## 2017-09-15 PROCEDURE — 36415 COLL VENOUS BLD VENIPUNCTURE: CPT | Performed by: PHYSICIAN ASSISTANT

## 2017-09-15 PROCEDURE — 74011250636 HC RX REV CODE- 250/636: Performed by: PHYSICIAN ASSISTANT

## 2017-09-15 PROCEDURE — 74011250637 HC RX REV CODE- 250/637: Performed by: INTERNAL MEDICINE

## 2017-09-15 PROCEDURE — 99218 HC RM OBSERVATION: CPT

## 2017-09-15 PROCEDURE — 77030010541

## 2017-09-15 PROCEDURE — 87040 BLOOD CULTURE FOR BACTERIA: CPT | Performed by: INTERNAL MEDICINE

## 2017-09-15 PROCEDURE — 74011636320 HC RX REV CODE- 636/320: Performed by: RADIOLOGY

## 2017-09-15 PROCEDURE — 83605 ASSAY OF LACTIC ACID: CPT | Performed by: INTERNAL MEDICINE

## 2017-09-15 PROCEDURE — 85025 COMPLETE CBC W/AUTO DIFF WBC: CPT | Performed by: PHYSICIAN ASSISTANT

## 2017-09-15 PROCEDURE — 77030011641 HC PASTE OST ADH BMS -A

## 2017-09-15 PROCEDURE — 74011250637 HC RX REV CODE- 250/637: Performed by: PHYSICIAN ASSISTANT

## 2017-09-15 PROCEDURE — 74176 CT ABD & PELVIS W/O CONTRAST: CPT

## 2017-09-15 PROCEDURE — 77030012856

## 2017-09-15 RX ORDER — HEPARIN SODIUM 5000 [USP'U]/ML
5000 INJECTION, SOLUTION INTRAVENOUS; SUBCUTANEOUS EVERY 12 HOURS
Status: DISCONTINUED | OUTPATIENT
Start: 2017-09-15 | End: 2017-09-19 | Stop reason: HOSPADM

## 2017-09-15 RX ORDER — OXYCODONE HYDROCHLORIDE 5 MG/1
5 TABLET ORAL
Status: DISCONTINUED | OUTPATIENT
Start: 2017-09-15 | End: 2017-09-19 | Stop reason: HOSPADM

## 2017-09-15 RX ADMIN — DOCUSATE SODIUM 100 MG: 100 CAPSULE ORAL at 17:47

## 2017-09-15 RX ADMIN — DOCUSATE SODIUM 100 MG: 100 CAPSULE ORAL at 08:23

## 2017-09-15 RX ADMIN — HYDRALAZINE HYDROCHLORIDE 25 MG: 25 TABLET, FILM COATED ORAL at 21:53

## 2017-09-15 RX ADMIN — HYDROMORPHONE HYDROCHLORIDE 0.5 MG: 1 INJECTION, SOLUTION INTRAMUSCULAR; INTRAVENOUS; SUBCUTANEOUS at 02:38

## 2017-09-15 RX ADMIN — CALCIUM ACETATE 667 MG: 667 CAPSULE ORAL at 14:30

## 2017-09-15 RX ADMIN — HYDRALAZINE HYDROCHLORIDE 25 MG: 25 TABLET, FILM COATED ORAL at 17:48

## 2017-09-15 RX ADMIN — Medication 10 ML: at 17:50

## 2017-09-15 RX ADMIN — PIPERACILLIN SODIUM AND TAZOBACTAM SODIUM 3.38 G: 3; .375 INJECTION, POWDER, LYOPHILIZED, FOR SOLUTION INTRAVENOUS at 02:31

## 2017-09-15 RX ADMIN — PIPERACILLIN SODIUM AND TAZOBACTAM SODIUM 3.38 G: 3; .375 INJECTION, POWDER, LYOPHILIZED, FOR SOLUTION INTRAVENOUS at 14:28

## 2017-09-15 RX ADMIN — HEPARIN SODIUM 5000 UNITS: 5000 INJECTION, SOLUTION INTRAVENOUS; SUBCUTANEOUS at 14:29

## 2017-09-15 RX ADMIN — CALCIUM ACETATE 667 MG: 667 CAPSULE ORAL at 17:47

## 2017-09-15 RX ADMIN — CALCIUM ACETATE 667 MG: 667 CAPSULE ORAL at 08:22

## 2017-09-15 RX ADMIN — HYDRALAZINE HYDROCHLORIDE 25 MG: 25 TABLET, FILM COATED ORAL at 08:21

## 2017-09-15 RX ADMIN — ISOSORBIDE MONONITRATE 60 MG: 30 TABLET ORAL at 07:28

## 2017-09-15 RX ADMIN — DIATRIZOATE MEGLUMINE AND DIATRIZOATE SODIUM 30 ML: 660; 100 LIQUID ORAL; RECTAL at 07:28

## 2017-09-15 RX ADMIN — VENLAFAXINE HYDROCHLORIDE 37.5 MG: 37.5 CAPSULE, EXTENDED RELEASE ORAL at 08:21

## 2017-09-15 RX ADMIN — Medication 10 ML: at 21:52

## 2017-09-15 RX ADMIN — Medication 10 ML: at 07:29

## 2017-09-15 RX ADMIN — OXYCODONE HYDROCHLORIDE 5 MG: 5 TABLET ORAL at 17:48

## 2017-09-15 NOTE — PROGRESS NOTES
9/15/2017  11:04 AM    CM met with pt for assessment. Pt lives in a private residence with her son. Upon discharge, pt hopes for family to transport her home. If family is not available for transport, contact [employer] transport at [phone]. Address and contact information confirmed. PCP confirmed as Dr. Gwyn Lopez. Pt receives dialysis with [facility] Tuesdays, Thursdays, and Saturdays. OBS state and OBS Medicare letters provided and signed. Copy of OBS letters provided to the pt. [facility] OBS letters put in chart. No discharge services indicated at this time. CM will follow for needs. Caroline Harding MA    Care Management Interventions  PCP Verified by CM: Yes (Dr. Gwyn Lopez)  Mode of Transport at Discharge: Other (see comment) (Family or [employer] )  MyChart Signup: No  Discharge Durable Medical Equipment: No  Physical Therapy Consult: No  Occupational Therapy Consult: No  Speech Therapy Consult: No  Current Support Network: Other (Lives with Son)  Confirm Follow Up Transport: Family (If family is not available, [facility] will transport)  Plan discussed with Pt/Family/Caregiver:  Yes

## 2017-09-15 NOTE — PROGRESS NOTES
Bedside and Verbal shift change report given to [address] (oncoming nurse) by BAYRON Thornton (offgoing nurse). Report given with SBAR, Kardex, OR Summary, Intake/Output, MAR and Recent Results.

## 2017-09-15 NOTE — PROGRESS NOTES
Problem: Pain  Goal: *Control of Pain  Outcome: Progressing Towards Goal  Asks for medication, repositioning when has pain.

## 2017-09-15 NOTE — WOUND CARE
Wound care nurse consult per MD to assess mid line abdominal fistula and manage drainage. Alert, no distress, family at bedside. Assessment  LLQ ostomy appliance intact, soft stool, patient competent in care. Mid line incision scar raised, hypopigmented, divoted area at umbilicus deep in skin fold, small pinpoint open area drainage thick tan drainage, susannah wound intact. Treatment  Abdominal area cleansed, pediatric pouch applied over opening to manage drainage. Staff advised. Supplies gathered for patient for ostomy care, left at bedside. Will follow, plan of care and treatment discussed with A Damir SONI and staff nurse. Reconsult as needed.   Cristhian Radford

## 2017-09-15 NOTE — PROGRESS NOTES
Problem: Falls - Risk of  Goal: *Absence of Falls  Document Sumit Fall Risk and appropriate interventions in the flowsheet.    Outcome: Progressing Towards Goal  Fall Risk Interventions:  Mobility Interventions: Bed/chair exit alarm, Patient to call before getting OOB, Utilize walker, cane, or other assitive device           Medication Interventions: Bed/chair exit alarm, Patient to call before getting OOB     Elimination Interventions: Bed/chair exit alarm, Call light in reach, Toileting schedule/hourly rounds, Patient to call for help with toileting needs

## 2017-09-15 NOTE — PROGRESS NOTES
General Surgery Daily Progress Note    Patient: Manasa Finn MRN: [id number]  SSN: [id number]    YOB: 1940  Age: 68 y.o.   Sex: female      Admit Date: 9/14/2017    Subjective:   No new symptoms, denies abdominal pain, tolerating diet     Current Facility-Administered Medications   Medication Dose Route Frequency    sodium chloride (NS) flush 5-10 mL  5-10 mL IntraVENous PRN    sodium chloride (NS) flush 5-10 mL  5-10 mL IntraVENous Q8H    HYDROmorphone (PF) (DILAUDID) injection 0.5 mg  0.5 mg IntraVENous Q4H PRN    naloxone (NARCAN) injection 0.4 mg  0.4 mg IntraVENous PRN    ondansetron (ZOFRAN) injection 4 mg  4 mg IntraVENous Q4H PRN    diphenhydrAMINE (BENADRYL) injection 12.5 mg  12.5 mg IntraVENous Q4H PRN    docusate sodium (COLACE) capsule 100 mg  100 mg Oral BID    ALPRAZolam (XANAX) tablet 0.5 mg  0.5 mg Oral QID PRN    calcium acetate (PHOSLO) capsule 667 mg  1 Cap Oral TID WITH MEALS    hydrALAZINE (APRESOLINE) tablet 25 mg  25 mg Oral TID    isosorbide mononitrate ER (IMDUR) tablet 60 mg  60 mg Oral 7am    venlafaxine-SR (EFFEXOR-XR) capsule 37.5 mg  37.5 mg Oral DAILY    hydrALAZINE (APRESOLINE) 20 mg/mL injection 20 mg  20 mg IntraVENous Q6H PRN    piperacillin-tazobactam (ZOSYN) 3.375 g in 0.9% sodium chloride (MBP/ADV) 100 mL  3.375 g IntraVENous Q12H    [START ON 9/16/2017] vancomycin (VANCOCIN) 500 mg in 0.9% sodium chloride (MBP/ADV) 100 mL  500 mg IntraVENous Rx Dosing/Monitoring    And    Vancomycin reminder: Give Vancomycin after each HD session   Other DIALYSIS [facility]        Objective:      09/13 1901 - 09/15 0700  In: 143.2 [I.V.:143.2]  Out: -   Patient Vitals for the past 8 hrs:   BP Temp Pulse Resp SpO2   09/15/17 0821 120/78 - 88 - -   09/15/17 0247 110/70 98 °F (36.7 °C) 93 18 99 %       Physical Exam:  General: Alert, cooperative, NAD  Lungs: Unlabored  Heart:  Regular rate and  rhythm  Abdomen: Soft, small midline abdominal wound draining tan drainage. Extremities: Warm, moves all, no edema  Skin:  Warm and dry, no rash    Labs: Recent Labs      09/15/17   0222   WBC  12.3*   HGB  11.2*   HCT  35.3   PLT  212     Recent Labs      09/15/17   0222  09/14/17   1037   NA  140  139   K  4.2  3.7   CL  100  97   CO2  29  28   GLU  93  104*   BUN  17  11   CREA  6.10*  4.25*   CA  7.8*  8.4*   ALB   --   3.3*   TBILI   --   0.9   SGOT   --   23   ALT   --   14       Assessment / Plan:   · Abdominal wound  · CT w/ oral contrast this am to evaluate for EC fistula vs abdominal wall abscess. · NPO until imaging has been reviewed. · Appreciate hospitalist and nephrology assistance.

## 2017-09-15 NOTE — PROGRESS NOTES
Robbie Joanna [facility] [address], [place], [address] [facility]  [phone]      Medical Progress Note      NAME: Marla Angel   :  1940  MRM:  [id number]    Date/Time: 9/15/2017  10:16 AM         Subjective:     Chief Complaint:  Drainage: abdominal wound, continues, moderate, foul smelling, cloudy, serous fluid    ROS:  (bold if positive, if negative)                        Tolerating PT  Tolerating Diet          Objective:       Vitals:          Last 24hrs VS reviewed since prior progress note.  Most recent are:    Visit Vitals    /78    Pulse 88    Temp 98 °F (36.7 °C)    Resp 18    Ht 5' 3\" (1.6 m)    Wt 77.5 kg (170 lb 13.7 oz)    SpO2 99%    BMI 30.27 kg/m2     SpO2 Readings from Last 6 Encounters:   09/15/17 99%   16 99%   07/09/15 98%          Intake/Output Summary (Last 24 hours) at 09/15/17 1016  Last data filed at 17 1417   Gross per 24 hour   Intake            143.2 ml   Output                0 ml   Net            143.2 ml          Exam:     Physical Exam:    Gen:  Well-developed, obese, in no acute distress  HEENT:  Pink conjunctivae, PERRL, hearing intact to voice, moist mucous membranes  Neck:  Supple, without masses, thyroid non-tender  Resp:  No accessory muscle use, clear breath sounds without wheezes rales or rhonchi  Card:  No murmurs, normal S1, S2 without thrills, bruits or peripheral edema  Abd:  Soft, non-tender, non-distended, normoactive bowel sounds are present, no palpable organomegaly and no detectable hernias, abdominal scar with wound at inferior aspect with drainage  Lymph:  No cervical or inguinal adenopathy  Musc:  No cyanosis or clubbing  Skin:  No rashes or ulcers, skin turgor is good  Neuro:  Cranial nerves are grossly intact, no focal motor weakness, follows commands appropriately  Psych:  Good insight, oriented to person, place and time, alert    Medications Reviewed: (see below)    Lab Data Reviewed: (see below)    ______________________________________________________________________    Medications:     Current Facility-Administered Medications   Medication Dose Route Frequency    sodium chloride (NS) flush 5-10 mL  5-10 mL IntraVENous PRN    sodium chloride (NS) flush 5-10 mL  5-10 mL IntraVENous Q8H    HYDROmorphone (PF) (DILAUDID) injection 0.5 mg  0.5 mg IntraVENous Q4H PRN    naloxone (NARCAN) injection 0.4 mg  0.4 mg IntraVENous PRN    ondansetron (ZOFRAN) injection 4 mg  4 mg IntraVENous Q4H PRN    diphenhydrAMINE (BENADRYL) injection 12.5 mg  12.5 mg IntraVENous Q4H PRN    docusate sodium (COLACE) capsule 100 mg  100 mg Oral BID    ALPRAZolam (XANAX) tablet 0.5 mg  0.5 mg Oral QID PRN    calcium acetate (PHOSLO) capsule 667 mg  1 Cap Oral TID WITH MEALS    hydrALAZINE (APRESOLINE) tablet 25 mg  25 mg Oral TID    isosorbide mononitrate ER (IMDUR) tablet 60 mg  60 mg Oral 7am    venlafaxine-SR (EFFEXOR-XR) capsule 37.5 mg  37.5 mg Oral DAILY    hydrALAZINE (APRESOLINE) 20 mg/mL injection 20 mg  20 mg IntraVENous Q6H PRN    piperacillin-tazobactam (ZOSYN) 3.375 g in 0.9% sodium chloride (MBP/ADV) 100 mL  3.375 g IntraVENous Q12H    [START ON 9/16/2017] vancomycin (VANCOCIN) 500 mg in 0.9% sodium chloride (MBP/ADV) 100 mL  500 mg IntraVENous Rx Dosing/Monitoring    And    Vancomycin reminder: Give Vancomycin after each HD session   Other DIALYSIS [facility]            Lab Review:     Recent Labs      09/15/17   0222  09/14/17   1037   WBC  12.3*  15.5*   HGB  11.2*  12.2   HCT  35.3  36.3   PLT  212  228     Recent Labs      09/15/17   0222  09/14/17   1037   NA  140  139   K  4.2  3.7   CL  100  97   CO2  29  28   GLU  93  104*   BUN  17  11   CREA  6.10*  4.25*   CA  7.8*  8.4*   ALB   --   3.3*   TBILI   --   0.9   SGOT   --   23   ALT   --   14     No results found for: GLUCPOC  No results for input(s): PH, PCO2, PO2, HCO3, FIO2 in the last 72 hours.   No results for input(s): INR in the last 72 hours. No lab exists for component: INREXT  No results found for: SDES  Lab Results   Component Value Date/Time    Culture result: NO GROWTH AFTER 14 HOURS 09/14/2017 01:37 PM    Culture result:  09/14/2017 01:37 PM     ONE OF TWO BOTTLES HAS BEEN FLAGGED POSITIVE BY INSTRUMENT. BOTTLE HAS BEEN SENT TO [facility] TO ASSESS FOR POSSIBLE GROWTH. Culture result: REMAINING BOTTLE(S) HAS/HAVE NO GROWTH SO FAR 09/14/2017 01:37 PM            Assessment:     Active Problems:    Abdominal wall abscess (9/14/2017)      ESRD (end stage renal disease) ([facility] [address] (9/14/2017)      HTN (hypertension) (9/14/2017)      Leukocytosis (9/14/2017)      Elevated lactic acid level (9/14/2017)      Obese (9/14/2017)           Plan:      Active Problems:    HTN (hypertension) (9/14/2017)   - BP okay, follow on home meds      ESRD (end stage renal disease) ([facility] [address] (9/14/2017)   - Renal consulted      Leukocytosis (9/14/2017)   - WBC down slightly, follow on antibiotics      Elevated lactic acid level (9/14/2017)   - mildly elevated, down a little without IV fluids (I presume due to ESRD)   - NOT septic      Obese (9/14/2017)   - counseled on weight loss      Abdominal wall abscess (9/14/2017)   - management per Surgery   - CT to look for possible EC fistula   - on vancomycin and Zosyn currently, cultures pending      Total time spent with patient: 25 minutes                  Care Plan discussed with: Patient, Care Manager and Nursing Staff    Discussed:  Code Status, Care Plan and D/C Planning    Prophylaxis:  SCD's    Disposition:   PT, OT, RN           ___________________________________________________    Attending Physician: West Reilly MD

## 2017-09-16 LAB
ANION GAP SERPL CALC-SCNC: 14 MMOL/L (ref 5–15)
BASOPHILS # BLD: 0 K/UL (ref 0–0.1)
BASOPHILS NFR BLD: 0 % (ref 0–1)
BUN SERPL-MCNC: 25 MG/DL (ref 6–20)
BUN/CREAT SERPL: 3 (ref 12–20)
CALCIUM SERPL-MCNC: 8.1 MG/DL (ref 8.5–10.1)
CHLORIDE SERPL-SCNC: 98 MMOL/L (ref 97–108)
CO2 SERPL-SCNC: 26 MMOL/L (ref 21–32)
CREAT SERPL-MCNC: 7.93 MG/DL (ref 0.55–1.02)
EOSINOPHIL # BLD: 0.3 K/UL (ref 0–0.4)
EOSINOPHIL NFR BLD: 2 % (ref 0–7)
ERYTHROCYTE [DISTWIDTH] IN BLOOD BY AUTOMATED COUNT: 15.7 % (ref 11.5–14.5)
GLUCOSE SERPL-MCNC: 86 MG/DL (ref 65–100)
HCT VFR BLD AUTO: 35.7 % (ref 35–47)
HGB BLD-MCNC: 11.4 G/DL (ref 11.5–16)
LYMPHOCYTES # BLD: 2.1 K/UL (ref 0.8–3.5)
LYMPHOCYTES NFR BLD: 17 % (ref 12–49)
MCH RBC QN AUTO: 34.2 PG (ref 26–34)
MCHC RBC AUTO-ENTMCNC: 31.9 G/DL (ref 30–36.5)
MCV RBC AUTO: 107.2 FL (ref 80–99)
MONOCYTES # BLD: 2.1 K/UL (ref 0–1)
MONOCYTES NFR BLD: 17 % (ref 5–13)
NEUTS SEG # BLD: 8.1 K/UL (ref 1.8–8)
NEUTS SEG NFR BLD: 64 % (ref 32–75)
PLATELET # BLD AUTO: 220 K/UL (ref 150–400)
POTASSIUM SERPL-SCNC: 5.2 MMOL/L (ref 3.5–5.1)
RBC # BLD AUTO: 3.33 M/UL (ref 3.8–5.2)
RBC MORPH BLD: ABNORMAL
SODIUM SERPL-SCNC: 138 MMOL/L (ref 136–145)
WBC # BLD AUTO: 12.6 K/UL (ref 3.6–11)

## 2017-09-16 PROCEDURE — 5A1D60Z PERFORMANCE OF URINARY FILTRATION, MULTIPLE: ICD-10-PCS | Performed by: SURGERY

## 2017-09-16 PROCEDURE — 74011250637 HC RX REV CODE- 250/637: Performed by: SURGERY

## 2017-09-16 PROCEDURE — 74011250637 HC RX REV CODE- 250/637: Performed by: PHYSICIAN ASSISTANT

## 2017-09-16 PROCEDURE — 90935 HEMODIALYSIS ONE EVALUATION: CPT

## 2017-09-16 PROCEDURE — 65270000029 HC RM PRIVATE

## 2017-09-16 PROCEDURE — 74011250637 HC RX REV CODE- 250/637: Performed by: INTERNAL MEDICINE

## 2017-09-16 PROCEDURE — 80048 BASIC METABOLIC PNL TOTAL CA: CPT | Performed by: PHYSICIAN ASSISTANT

## 2017-09-16 PROCEDURE — 85025 COMPLETE CBC W/AUTO DIFF WBC: CPT | Performed by: PHYSICIAN ASSISTANT

## 2017-09-16 PROCEDURE — 74011000258 HC RX REV CODE- 258: Performed by: PHYSICIAN ASSISTANT

## 2017-09-16 PROCEDURE — 74011250636 HC RX REV CODE- 250/636: Performed by: PHYSICIAN ASSISTANT

## 2017-09-16 PROCEDURE — 36415 COLL VENOUS BLD VENIPUNCTURE: CPT | Performed by: PHYSICIAN ASSISTANT

## 2017-09-16 PROCEDURE — 99218 HC RM OBSERVATION: CPT

## 2017-09-16 RX ADMIN — HYDRALAZINE HYDROCHLORIDE 25 MG: 25 TABLET, FILM COATED ORAL at 21:52

## 2017-09-16 RX ADMIN — VENLAFAXINE HYDROCHLORIDE 37.5 MG: 37.5 CAPSULE, EXTENDED RELEASE ORAL at 11:23

## 2017-09-16 RX ADMIN — HYDRALAZINE HYDROCHLORIDE 25 MG: 25 TABLET, FILM COATED ORAL at 11:23

## 2017-09-16 RX ADMIN — CALCIUM ACETATE 667 MG: 667 CAPSULE ORAL at 17:54

## 2017-09-16 RX ADMIN — DOCUSATE SODIUM 100 MG: 100 CAPSULE ORAL at 11:23

## 2017-09-16 RX ADMIN — Medication 10 ML: at 17:55

## 2017-09-16 RX ADMIN — Medication 10 ML: at 06:30

## 2017-09-16 RX ADMIN — HYDROMORPHONE HYDROCHLORIDE 0.5 MG: 1 INJECTION, SOLUTION INTRAMUSCULAR; INTRAVENOUS; SUBCUTANEOUS at 11:38

## 2017-09-16 RX ADMIN — HEPARIN SODIUM 5000 UNITS: 5000 INJECTION, SOLUTION INTRAVENOUS; SUBCUTANEOUS at 17:53

## 2017-09-16 RX ADMIN — ISOSORBIDE MONONITRATE 60 MG: 30 TABLET ORAL at 06:26

## 2017-09-16 RX ADMIN — PIPERACILLIN SODIUM AND TAZOBACTAM SODIUM 3.38 G: 3; .375 INJECTION, POWDER, LYOPHILIZED, FOR SOLUTION INTRAVENOUS at 18:38

## 2017-09-16 RX ADMIN — ALPRAZOLAM 0.5 MG: 0.5 TABLET ORAL at 20:24

## 2017-09-16 RX ADMIN — VANCOMYCIN HYDROCHLORIDE 500 MG: 500 INJECTION, POWDER, LYOPHILIZED, FOR SOLUTION INTRAVENOUS at 21:52

## 2017-09-16 RX ADMIN — PIPERACILLIN SODIUM AND TAZOBACTAM SODIUM 3.38 G: 3; .375 INJECTION, POWDER, LYOPHILIZED, FOR SOLUTION INTRAVENOUS at 01:46

## 2017-09-16 RX ADMIN — OXYCODONE HYDROCHLORIDE 5 MG: 5 TABLET ORAL at 02:50

## 2017-09-16 RX ADMIN — HYDRALAZINE HYDROCHLORIDE 25 MG: 25 TABLET, FILM COATED ORAL at 17:54

## 2017-09-16 RX ADMIN — CALCIUM ACETATE 667 MG: 667 CAPSULE ORAL at 11:23

## 2017-09-16 RX ADMIN — HEPARIN SODIUM 5000 UNITS: 5000 INJECTION, SOLUTION INTRAVENOUS; SUBCUTANEOUS at 01:33

## 2017-09-16 NOTE — PROGRESS NOTES
Orders received. Patient just starting dialysis. Will attempt to see later in the day. Nursing aware.

## 2017-09-16 NOTE — PROGRESS NOTES
Robbie Marshall [address] [place] [address]  [address] [facility], [address] [place] [address]  [phone]      Medical Progress Note      NAME: Philip Manning   :  1940  MRM:  [id number]    Date/Time: 2017  9:45 AM          Subjective:     Chief Complaint:  Drainage: from abdominal wound, hasn't had any since Wound Care placed ostomy bag over it    ROS:  (bold if positive, if negative)                        Tolerating PT  Tolerating Diet          Objective:       Vitals:          Last 24hrs VS reviewed since prior progress note.  Most recent are:    Visit Vitals    /69 (BP 1 Location: Left arm, BP Patient Position: Sitting)    Pulse 94    Temp 97.8 °F (36.6 °C)    Resp 20    Ht 5' 3\" (1.6 m)    Wt 77.5 kg (170 lb 13.7 oz)    SpO2 (!) 8%    BMI 30.27 kg/m2     SpO2 Readings from Last 6 Encounters:   17 (!) 8%   16 99%   07/09/15 98%        No intake or output data in the 24 hours ending 17 0945       Exam:     Physical Exam:    Gen:  Well-developed, obese, in no acute distress  HEENT:  Pink conjunctivae, PERRL, hearing intact to voice, moist mucous membranes  Neck:  Supple, without masses, thyroid non-tender  Resp:  No accessory muscle use, clear breath sounds without wheezes rales or rhonchi  Card:  No murmurs, normal S1, S2 without thrills, bruits or peripheral edema  Abd:  Soft, non-tender, non-distended, normoactive bowel sounds are present, no palpable organomegaly and no detectable hernias, abdominal scar with wound at inferior aspect with drainage  Lymph:  No cervical or inguinal adenopathy  Musc:  No cyanosis or clubbing  Skin:  No rashes or ulcers, skin turgor is good  Neuro:  Cranial nerves are grossly intact, no focal motor weakness, follows commands appropriately  Psych:  Good insight, oriented to person, place and time, alert    Medications Reviewed: (see below)    Lab Data Reviewed: (see below)    ______________________________________________________________________    Medications:     Current Facility-Administered Medications   Medication Dose Route Frequency    heparin (porcine) injection 5,000 Units  5,000 Units SubCUTAneous Q12H    oxyCODONE IR (ROXICODONE) tablet 5 mg  5 mg Oral Q6H PRN    sodium chloride (NS) flush 5-10 mL  5-10 mL IntraVENous PRN    sodium chloride (NS) flush 5-10 mL  5-10 mL IntraVENous Q8H    HYDROmorphone (PF) (DILAUDID) injection 0.5 mg  0.5 mg IntraVENous Q4H PRN    naloxone (NARCAN) injection 0.4 mg  0.4 mg IntraVENous PRN    ondansetron (ZOFRAN) injection 4 mg  4 mg IntraVENous Q4H PRN    diphenhydrAMINE (BENADRYL) injection 12.5 mg  12.5 mg IntraVENous Q4H PRN    docusate sodium (COLACE) capsule 100 mg  100 mg Oral BID    ALPRAZolam (XANAX) tablet 0.5 mg  0.5 mg Oral QID PRN    calcium acetate (PHOSLO) capsule 667 mg  1 Cap Oral TID WITH MEALS    hydrALAZINE (APRESOLINE) tablet 25 mg  25 mg Oral TID    isosorbide mononitrate ER (IMDUR) tablet 60 mg  60 mg Oral 7am    venlafaxine-SR (EFFEXOR-XR) capsule 37.5 mg  37.5 mg Oral DAILY    hydrALAZINE (APRESOLINE) 20 mg/mL injection 20 mg  20 mg IntraVENous Q6H PRN    piperacillin-tazobactam (ZOSYN) 3.375 g in 0.9% sodium chloride (MBP/ADV) 100 mL  3.375 g IntraVENous Q12H    vancomycin (VANCOCIN) 500 mg in 0.9% sodium chloride (MBP/ADV) 100 mL  500 mg IntraVENous Rx Dosing/Monitoring    And    Vancomycin reminder: Give Vancomycin after each HD session   Other DIALYSIS TUE, THU & SAT            Lab Review:     Recent Labs      09/16/17   0259  09/15/17   0222  09/14/17   1037   WBC  12.6*  12.3*  15.5*   HGB  11.4*  11.2*  12.2   HCT  35.7  35.3  36.3   PLT  220  212  228     Recent Labs      09/16/17   0259  09/15/17   0222  09/14/17   1037   NA  138  140  139   K  5.2*  4.2  3.7   CL  98  100  97   CO2  26  29  28   GLU  86  93  104*   BUN  25*  17  11   CREA  7.93*  6.10*  4.25*   CA  8.1*  7.8*  8.4* ALB   --    --   3.3*   TBILI   --    --   0.9   SGOT   --    --   23   ALT   --    --   14     No results found for: GLUCPOC  No results for input(s): PH, PCO2, PO2, HCO3, FIO2 in the last 72 hours. No results for input(s): INR in the last 72 hours. No lab exists for component: INREXT, INREXT  No results found for: SDES  Lab Results   Component Value Date/Time    Culture result: NO GROWTH AFTER 14 HOURS 09/15/2017 04:57 PM    Culture result: NO GROWTH 2 DAYS 09/14/2017 01:37 PM    Culture result:  09/14/2017 01:37 PM     STAPHYLOCOCCUS SPECIES, COAGULASE NEGATIVE GROWING IN 1 OF 2 BOTTLES DRAWN (NO SITE INDICATED) PLATES HELD 3 DAYS. CALL MICROBIOLOGY LAB IF SENSITIVITIES ARE NEEDED. Culture result: REMAINING BOTTLE(S) HAS/HAVE NO GROWTH SO FAR 09/14/2017 01:37 PM            Assessment:     Active Problems:    Abdominal wall abscess (9/14/2017)      ESRD (end stage renal disease) ([facility] [address] (9/14/2017)      HTN (hypertension) (9/14/2017)      Leukocytosis (9/14/2017)      Elevated lactic acid level (9/14/2017)      Obese (9/14/2017)      Bacteremia (9/15/2017)           Plan:      Active Problems:    HTN (hypertension) (9/14/2017)   - BP okay, follow on home meds      ESRD (end stage renal disease) ([facility] [address] (9/14/2017)   - Renal following, dialysis per them      Leukocytosis (9/14/2017)   - WBC down slightly overall, follow on antibiotics      Elevated lactic acid level (9/14/2017)   - mildly elevated, down a little without IV fluids   - NOT septic      Obese (9/14/2017)   - counseled on weight loss      Abdominal wall abscess (9/14/2017)   - management per Surgery   - 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   - on vancomycin and Zosyn currently, wound culture with GNRs   - initial blood culture with CNS, likely contaminant, repeat pending, doubt true bacteremia   - ostomy bag placed over sinus tract and it is empty without drainage around, unclear if the adhesive has maybe sealed the sinus tract and fluid is accumulating subcutaneously or if the drainage has stopped      Total time spent with patient: 25 minutes                  Care Plan discussed with: Patient, Care Manager and Nursing Staff    Discussed:  Code Status, Care Plan and D/C Planning    Prophylaxis:  SCD's    Disposition:  HH PT, OT, RN           ___________________________________________________    Attending Physician: Marylene Police, MD

## 2017-09-16 NOTE — PROGRESS NOTES
Second attempt to see patient but she is still receiving dialysis and now has orders for dopplers due to suspected DVT. PT will defer until tomorrow. Nursing notified.

## 2017-09-16 NOTE — PROGRESS NOTES
General Surgery Daily Progress Note    Patient: Elo Mahoney MRN: [id number]  SSN: [id number]    YOB: 1940  Age: 68 y.o. Sex: female      Admit Date: 9/14/2017    Subjective:   Patient denies any abd pain. She is passing flatus and had a BM.     Current Facility-Administered Medications   Medication Dose Route Frequency    heparin (porcine) injection 5,000 Units  5,000 Units SubCUTAneous Q12H    oxyCODONE IR (ROXICODONE) tablet 5 mg  5 mg Oral Q6H PRN    sodium chloride (NS) flush 5-10 mL  5-10 mL IntraVENous PRN    sodium chloride (NS) flush 5-10 mL  5-10 mL IntraVENous Q8H    HYDROmorphone (PF) (DILAUDID) injection 0.5 mg  0.5 mg IntraVENous Q4H PRN    naloxone (NARCAN) injection 0.4 mg  0.4 mg IntraVENous PRN    ondansetron (ZOFRAN) injection 4 mg  4 mg IntraVENous Q4H PRN    diphenhydrAMINE (BENADRYL) injection 12.5 mg  12.5 mg IntraVENous Q4H PRN    docusate sodium (COLACE) capsule 100 mg  100 mg Oral BID    ALPRAZolam (XANAX) tablet 0.5 mg  0.5 mg Oral QID PRN    calcium acetate (PHOSLO) capsule 667 mg  1 Cap Oral TID WITH MEALS    hydrALAZINE (APRESOLINE) tablet 25 mg  25 mg Oral TID    isosorbide mononitrate ER (IMDUR) tablet 60 mg  60 mg Oral 7am    venlafaxine-SR (EFFEXOR-XR) capsule 37.5 mg  37.5 mg Oral DAILY    hydrALAZINE (APRESOLINE) 20 mg/mL injection 20 mg  20 mg IntraVENous Q6H PRN    piperacillin-tazobactam (ZOSYN) 3.375 g in 0.9% sodium chloride (MBP/ADV) 100 mL  3.375 g IntraVENous Q12H    vancomycin (VANCOCIN) 500 mg in 0.9% sodium chloride (MBP/ADV) 100 mL  500 mg IntraVENous Rx Dosing/Monitoring    And    Vancomycin reminder: Give Vancomycin after each HD session   Other DIALYSIS TUE, THU & SAT        Objective:         Patient Vitals for the past 8 hrs:   BP Temp Pulse Resp SpO2   09/16/17 0825 112/69 97.8 °F (36.6 °C) 94 20 (!) 8 %   09/16/17 0627 143/59 - 86 - -       Physical Exam:  General: Alert, cooperative, no distress, appears stated age.  Neck:  Supple, symmetrical, trachea midline, no adenopathy, thyroid: no                           enlargement/tenderness/nodules, no carotid bruit and no JVD. Lungs: Clear to auscultation bilaterally. Heart:  Regular rate and rhythm, S1, S2 normal, no murmur, click, rub or gallop. Abdomen: Drainage is minimal Soft, non-tender. Bowel sounds normal. No masses,  No organomegaly. Extremities: Extremities normal, atraumatic, no cyanosis or edema. Skin:  Skin color, texture, turgor normal. No rashes or lesions    Labs: Recent Labs      09/16/17   0259   WBC  12.6*   HGB  11.4*   HCT  35.7   PLT  220     Recent Labs      09/16/17   0259   09/14/17   1037   NA  138   < >  139   K  5.2*   < >  3.7   CL  98   < >  97   CO2  26   < >  28   GLU  86   < >  104*   BUN  25*   < >  11   CREA  7.93*   < >  4.25*   CA  8.1*   < >  8.4*   ALB   --    --   3.3*   TBILI   --    --   0.9   SGOT   --    --   23   ALT   --    --   14    < > = values in this interval not displayed. X-ray   Assessment / Plan:   · Low suspicion for EC fistula  · Advance to full liquids  · Her K is high - waiting dialysis today  · C/o left calf pain.  For duplex today    Active Problems:    Abdominal wall abscess (9/14/2017)      ESRD (end stage renal disease) ([facility] [address] (9/14/2017)      HTN (hypertension) (9/14/2017)      Leukocytosis (9/14/2017)      Elevated lactic acid level (9/14/2017)      Obese (9/14/2017)      Bacteremia (9/15/2017)

## 2017-09-16 NOTE — PROGRESS NOTES
Bedside and Verbal shift change report given to Garcia Chand RN (oncoming nurse) by Zully Lawson RN (offgoing nurse). Report included the following information SBAR, MAR and Recent Results.

## 2017-09-17 LAB
BACTERIA SPEC CULT: ABNORMAL
GLUCOSE BLD STRIP.AUTO-MCNC: 87 MG/DL (ref 65–100)
GRAM STN SPEC: ABNORMAL
LACTATE SERPL-SCNC: 1.1 MMOL/L (ref 0.4–2)
SERVICE CMNT-IMP: ABNORMAL
SERVICE CMNT-IMP: NORMAL

## 2017-09-17 PROCEDURE — 74011250636 HC RX REV CODE- 250/636: Performed by: PHYSICIAN ASSISTANT

## 2017-09-17 PROCEDURE — 97162 PT EVAL MOD COMPLEX 30 MIN: CPT

## 2017-09-17 PROCEDURE — 65270000029 HC RM PRIVATE

## 2017-09-17 PROCEDURE — 83605 ASSAY OF LACTIC ACID: CPT | Performed by: INTERNAL MEDICINE

## 2017-09-17 PROCEDURE — 93971 EXTREMITY STUDY: CPT

## 2017-09-17 PROCEDURE — 74011250637 HC RX REV CODE- 250/637: Performed by: SURGERY

## 2017-09-17 PROCEDURE — 74011000258 HC RX REV CODE- 258: Performed by: PHYSICIAN ASSISTANT

## 2017-09-17 PROCEDURE — 82962 GLUCOSE BLOOD TEST: CPT

## 2017-09-17 PROCEDURE — 74011250637 HC RX REV CODE- 250/637: Performed by: INTERNAL MEDICINE

## 2017-09-17 PROCEDURE — 36415 COLL VENOUS BLD VENIPUNCTURE: CPT | Performed by: INTERNAL MEDICINE

## 2017-09-17 RX ADMIN — HYDRALAZINE HYDROCHLORIDE 25 MG: 25 TABLET, FILM COATED ORAL at 08:23

## 2017-09-17 RX ADMIN — Medication 10 ML: at 06:14

## 2017-09-17 RX ADMIN — PIPERACILLIN SODIUM AND TAZOBACTAM SODIUM 3.38 G: 3; .375 INJECTION, POWDER, LYOPHILIZED, FOR SOLUTION INTRAVENOUS at 14:27

## 2017-09-17 RX ADMIN — HYDROMORPHONE HYDROCHLORIDE 0.5 MG: 1 INJECTION, SOLUTION INTRAMUSCULAR; INTRAVENOUS; SUBCUTANEOUS at 06:41

## 2017-09-17 RX ADMIN — HYDRALAZINE HYDROCHLORIDE 25 MG: 25 TABLET, FILM COATED ORAL at 21:07

## 2017-09-17 RX ADMIN — HYDRALAZINE HYDROCHLORIDE 25 MG: 25 TABLET, FILM COATED ORAL at 16:26

## 2017-09-17 RX ADMIN — VENLAFAXINE HYDROCHLORIDE 37.5 MG: 37.5 CAPSULE, EXTENDED RELEASE ORAL at 08:22

## 2017-09-17 RX ADMIN — CALCIUM ACETATE 667 MG: 667 CAPSULE ORAL at 11:44

## 2017-09-17 RX ADMIN — HEPARIN SODIUM 5000 UNITS: 5000 INJECTION, SOLUTION INTRAVENOUS; SUBCUTANEOUS at 02:08

## 2017-09-17 RX ADMIN — Medication 10 ML: at 16:26

## 2017-09-17 RX ADMIN — Medication 10 ML: at 21:08

## 2017-09-17 RX ADMIN — ISOSORBIDE MONONITRATE 60 MG: 30 TABLET ORAL at 06:09

## 2017-09-17 RX ADMIN — CALCIUM ACETATE 667 MG: 667 CAPSULE ORAL at 08:22

## 2017-09-17 RX ADMIN — HYDROMORPHONE HYDROCHLORIDE 0.5 MG: 1 INJECTION, SOLUTION INTRAMUSCULAR; INTRAVENOUS; SUBCUTANEOUS at 23:27

## 2017-09-17 RX ADMIN — OXYCODONE HYDROCHLORIDE 5 MG: 5 TABLET ORAL at 11:44

## 2017-09-17 RX ADMIN — HEPARIN SODIUM 5000 UNITS: 5000 INJECTION, SOLUTION INTRAVENOUS; SUBCUTANEOUS at 14:27

## 2017-09-17 RX ADMIN — ALPRAZOLAM 0.5 MG: 0.5 TABLET ORAL at 21:07

## 2017-09-17 RX ADMIN — CALCIUM ACETATE 667 MG: 667 CAPSULE ORAL at 16:26

## 2017-09-17 RX ADMIN — PIPERACILLIN SODIUM AND TAZOBACTAM SODIUM 3.38 G: 3; .375 INJECTION, POWDER, LYOPHILIZED, FOR SOLUTION INTRAVENOUS at 02:08

## 2017-09-17 RX ADMIN — HYDROMORPHONE HYDROCHLORIDE 0.5 MG: 1 INJECTION, SOLUTION INTRAMUSCULAR; INTRAVENOUS; SUBCUTANEOUS at 02:08

## 2017-09-17 NOTE — PROCEDURES
[address]  *** FINAL REPORT ***    Name: Sravanthi Ramírez  MRN: [id number]    Inpatient  : 20 Mar 1940  HIS Order #: [id number]  [address] [facility] Visit #: [id number]  Date: 17 Sep 2017    TYPE OF TEST: Peripheral Venous Testing    REASON FOR TEST  Pain in limb    Left Leg:-  Deep venous thrombosis:           No  Superficial venous thrombosis:    No  Deep venous insufficiency:        Yes  Superficial venous insufficiency: No      INTERPRETATION/FINDINGS  PROCEDURE:  LEFT LOWER EXTREMITY VENOUS DUPLEX . Evaluation of lower  extremity veins with ultrasound (B-mode imaging, pulsed Doppler, color   Doppler). Includes the common femoral, deep femoral, femoral,  popliteal, posterior tibial, peroneal, and great saphenous veins. Other veins, for example the gastrocnemius and soleal veins, may also  be visualized. FINDINGS: Margarite Liter scale and color flow duplex images of the veins in the  left lower extremity demonstrate normal compressibility, spontaneous  and augmented flow profiles, and absence of filling defects throughout   the deep and superficial veins in the left lower extremity. CONCLUSION: Left lower extremity venous duplex negative for deep  venous thrombosis or thrombophlebitis. Right common femoral vein is  thrombus free. NOTE: Avascular structure was seen in the left  popliteal fossa consistent with a baker's cyst measuring 2.23 x 2.36  cm. Venous refulx observed in the left common femoral vein. ADDITIONAL COMMENTS    I have personally reviewed the data relevant to the interpretation of  this  study. TECHNOLOGIST: Liliane Sinha. Radu  Signed: 2017 11:36:08 AM    PHYSICIAN: Karishma Sebastian.  Jose Delaney MD  Signed: 2017 8:04:40 AM

## 2017-09-17 NOTE — PROGRESS NOTES
Problem: Mobility Impaired (Adult and Pediatric)  Goal: *Acute Goals and Plan of Care (Insert Text)  Physical Therapy Goals  Initiated 9/17/2017  1. Patient will transfer from bed to chair and chair to bed with modified independence using the least restrictive device within 7 day(s). 2. Patient will perform sit to stand with modified independence within 7 day(s). 3. Patient will ambulate with modified independence for 300 feet with the least restrictive device within 7 day(s). 4. Patient will ascend/descend 2 stairs with 1 handrail(s) with supervision/set-up within 7 day(s). PHYSICAL THERAPY EVALUATION  Patient: Jacquie Zabala (10 y.o. female)  Date: 9/17/2017  Primary Diagnosis: Abdominal wall abscess  Abdominal abscess [facility])        Precautions: Fall risk         ASSESSMENT :  Based on the objective data described below, the patient presents with decreased balance, gait disturbance and overall decreased activity tolerance since admission for abdominal wall abcess. Patient lives with her son in a one level home with 2 OBED. PTA she was mod indep with a SPC and able to perform all of her basic self-care independently. Patient is on dialysis four days a week and has an aide who comes to her home 5 days a week to prep meals and perform other household assistance. Today, patient exhibited mild gait dysfunction with use of her SPC with two episodes of minor LOB that required CGA/MIN A from evaluator to assist to maintain upright. Patient's Tinetti score of 19/28 indicates moderate fall risk. Patient would benefit from skilled PT in the acute setting to work to improve gait and functional mobility tolerance. Patient educated to call for assistance prior to getting out of bed to go to the bathroom but cleared to transfer from bed to chair ad godfrey. Recommend HHPT upon discharge to work on improved mobility indep and endurance as well as to complete home safety evaluation.   Patient may benefit from trial with RW versus SPC to see if balance and endurance improved as well. Patient will benefit from skilled intervention to address the above impairments. Patients rehabilitation potential is considered to be Excellent  Factors which may influence rehabilitation potential include:   [ ]         None noted  [ ]         Mental ability/status  [X]         Medical condition  [ ]         Home/family situation and support systems  [ ]         Safety awareness  [ ]         Pain tolerance/management  [ ]         Other:        PLAN :  Recommendations and Planned Interventions:  [ ]           Bed Mobility Training             [X]    Neuromuscular Re-Education  [X]           Transfer Training                   [ ]    Orthotic/Prosthetic Training  [X]           Gait Training                         [ ]    Modalities  [X]           Therapeutic Exercises           [ ]    Edema Management/Control  [X]           Therapeutic Activities            [X]    Patient and Family Training/Education  [ ]           Other (comment):     Frequency/Duration: Patient will be followed by physical therapy  4 times a week to address goals. Discharge Recommendations: Home Health  Further Equipment Recommendations for Discharge: Possible RW       SUBJECTIVE:   Patient stated I get around okay most of the time, I'm just beat lately.       OBJECTIVE DATA SUMMARY:   HISTORY:    Past Medical History:   Diagnosis Date    Arthritis      Chronic kidney disease       Dr Facundo Blanca Hypertension      Ill-defined condition       Dialysis T, Th, S    Obese 9/14/2017     Past Surgical History:   Procedure Laterality Date    HX COLOSTOMY         left abdomen    HX VASCULAR ACCESS         left upper arm fistula     Prior Level of Function/Home Situation:  Patient lives with son in one level home with 2 OBED, patient used SPC at [facility] level [facility] and was indep with self-care. Had home health aide 5 days a week for iADL assistance.        Personal factors and/or comorbidities impacting plan of care: Patient received dialysis 4 days per week. EXAMINATION/PRESENTATION/DECISION MAKING:   Critical Behavior:  Neurologic State: Alert  Orientation Level: Oriented X4  Cognition: Appropriate decision making     Hearing: Auditory  Auditory Impairment: None  Skin:  Ostomy bag from abdomen  Edema: None  Range Of Motion:  AROM: Within functional limits                       Strength:    Strength: Generally decreased, functional                    Tone & Sensation:   Tone: Normal              Sensation: Intact               Coordination:  Coordination: Within functional limits  Vision:      Functional Mobility:  Bed Mobility:  Rolling: Modified independent  Supine to Sit: Modified independent  Sit to Supine: Modified independent  Scooting: Modified independent  Transfers:  Sit to Stand: Supervision  Stand to Sit: Supervision        Bed to Chair: Supervision              Balance:   Sitting: Intact; Without support  Standing: Intact; With support  Standing - Static: Good  Standing - Dynamic : Fair  Ambulation/Gait Training:  Distance (ft): 125 Feet (ft)  Assistive Device: Cane, straight;Gait belt  Ambulation - Level of Assistance: Contact guard assistance        Gait Abnormalities: Decreased step clearance        Base of Support: Widened     Speed/Maryanne: Pace decreased (<100 feet/min)  Step Length: Right shortened;Left shortened                                           Stairs:   NT                       Therapeutic Exercises:   Deferred     Functional Measure:  Tinetti test:      Sitting Balance: 1  Arises: 1  Attempts to Rise: 2  Immediate Standing Balance: 2  Standing Balance: 1  Nudged: 1  Eyes Closed: 0  Turn 360 Degrees - Continuous/Discontinuous: 1  Turn 360 Degrees - Steady/Unsteady: 1  Sitting Down: 1  Balance Score: 11  Indication of Gait: 1  R Step Length/Height: 1  L Step Length/Height: 1  R Foot Clearance: 1  L Foot Clearance: 1  Step Symmetry: 0  Step Continuity: 1  Path: 1  Trunk: 1  Walking Time: 0  Gait Score: 8  Total Score: 19         Tinetti Test and G-code impairment scale:  Percentage of Impairment CH     0%    CI     1-19% CJ     20-39% CK     40-59% CL     60-79% CM     80-99% CN      100%   Tinetti  Score 0-28 28 23-27 17-22 12-16 6-11 1-5 0          Tinetti Tool Score Risk of Falls  <19 = High Fall Risk  19-24 = Moderate Fall Risk  25-28 = Low Fall Risk  Tinetti ME. Performance-Oriented Assessment of Mobility Problems in Elderly Patients. [facility]; [id number]. (Scoring Description: PT Bulletin Feb. 10, 1993)     Older adults: Nacho Badillo et al, 2009; n = 1000 [facility] elderly evaluated with ABC, BRICE, ADL, and IADL)  · Mean BRICE score for males aged 69-68 years = 26.21(3.40)  · Mean BRICE score for females age 69-68 years = 25.16(4.30)  · Mean BRICE score for males over 80 years = 23.29(6.02)  · Mean BRICE score for females over 80 years = 17.20(8.32)            G codes: In compliance with CMSs Claims Based Outcome Reporting, the following G-code set was chosen for this patient based on their primary functional limitation being treated: The outcome measure chosen to determine the severity of the functional limitation was the Tinetti with a score of 19/28 which was correlated with the impairment scale.       · Mobility - Walking and Moving Around:               - CURRENT STATUS:    CJ - 20%-39% impaired, limited or restricted               - GOAL STATUS:           CI - 1%-19% impaired, limited or restricted               - D/C STATUS:                       ---------------To be determined---------------      Physical Therapy Evaluation Charge Determination   History Examination Presentation Decision-Making   HIGH Complexity :3+ comorbidities / personal factors will impact the outcome/ POC  MEDIUM Complexity : 3 Standardized tests and measures addressing body structure, function, activity limitation and / or participation in recreation  MEDIUM Complexity : Evolving with changing characteristics  Other outcome measures Tinetti 19/28  MEDIUM      Based on the above components, the patient evaluation is determined to be of the following complexity level: MEDIUM     Pain:  Pain Scale 1: Numeric (0 - 10)  Pain Intensity 1: 8  Pain Location 1: Leg  Pain Orientation 1: Left  Pain Description 1: Aching  Pain Intervention(s) 1: Medication (see MAR)  Activity Tolerance:   Reduced from baseline, 125' ambulation     Please refer to the flowsheet for vital signs taken during this treatment. After treatment:   [X]         Patient left in no apparent distress sitting up in chair  [ ]         Patient left in no apparent distress in bed  [X]         Call bell left within reach  [X]         Nursing notified  [ ]         Caregiver present  [ ]         Bed alarm activated      COMMUNICATION/EDUCATION:   The patients plan of care was discussed with: Registered Nurse.  [X]         Fall prevention education was provided and the patient/caregiver indicated understanding. [ ]         Patient/family have participated as able in goal setting and plan of care. [X]         Patient/family agree to work toward stated goals and plan of care. [ ]         Patient understands intent and goals of therapy, but is neutral about his/her participation. [ ]         Patient is unable to participate in goal setting and plan of care.      Thank you for this referral.  Melissa Crowell, PT   Time Calculation: 25 mins

## 2017-09-17 NOTE — PROGRESS NOTES
General Surgery Daily Progress Note    Patient: Montey Boast MRN: [id number]  SSN: [id number]    YOB: 1940  Age: 68 y.o. Sex: female      Admit Date: 9/14/2017    Subjective:   Patient denies any abd pain.      Current Facility-Administered Medications   Medication Dose Route Frequency    heparin (porcine) injection 5,000 Units  5,000 Units SubCUTAneous Q12H    oxyCODONE IR (ROXICODONE) tablet 5 mg  5 mg Oral Q6H PRN    sodium chloride (NS) flush 5-10 mL  5-10 mL IntraVENous PRN    sodium chloride (NS) flush 5-10 mL  5-10 mL IntraVENous Q8H    HYDROmorphone (PF) (DILAUDID) injection 0.5 mg  0.5 mg IntraVENous Q4H PRN    naloxone (NARCAN) injection 0.4 mg  0.4 mg IntraVENous PRN    ondansetron (ZOFRAN) injection 4 mg  4 mg IntraVENous Q4H PRN    diphenhydrAMINE (BENADRYL) injection 12.5 mg  12.5 mg IntraVENous Q4H PRN    docusate sodium (COLACE) capsule 100 mg  100 mg Oral BID    ALPRAZolam (XANAX) tablet 0.5 mg  0.5 mg Oral QID PRN    calcium acetate (PHOSLO) capsule 667 mg  1 Cap Oral TID WITH MEALS    hydrALAZINE (APRESOLINE) tablet 25 mg  25 mg Oral TID    isosorbide mononitrate ER (IMDUR) tablet 60 mg  60 mg Oral 7am    venlafaxine-SR (EFFEXOR-XR) capsule 37.5 mg  37.5 mg Oral DAILY    hydrALAZINE (APRESOLINE) 20 mg/mL injection 20 mg  20 mg IntraVENous Q6H PRN    piperacillin-tazobactam (ZOSYN) 3.375 g in 0.9% sodium chloride (MBP/ADV) 100 mL  3.375 g IntraVENous Q12H    vancomycin (VANCOCIN) 500 mg in 0.9% sodium chloride (MBP/ADV) 100 mL  500 mg IntraVENous Rx Dosing/Monitoring    And    Vancomycin reminder: Give Vancomycin after each HD session   Other DIALYSIS TUE, THU & SAT        Objective:      09/15 1901 - 09/17 0700  In: 200 [I.V.:200]  Out: -   Patient Vitals for the past 8 hrs:   BP Temp Pulse Resp SpO2   09/17/17 0816 123/56 98.4 °F (36.9 °C) 88 16 95 %   09/17/17 0609 118/66 - 92 - -   09/17/17 0421 95/59 98.7 °F (37.1 °C) 96 17 94 %       Physical Exam:  General: Alert, cooperative, no distress, appears stated age. RESP                unlabored  Abdomen: Drainage is minimal Soft, non-tender. No erythema  Extremities: Extremities no edema. Skin:  No rashes on exposed skin    Labs:   Recent Labs      09/16/17 0259   WBC  12.6*   HGB  11.4*   HCT  35.7   PLT  220     Recent Labs      09/16/17 0259   09/14/17   1037   NA  138   < >  139   K  5.2*   < >  3.7   CL  98   < >  97   CO2  26   < >  28   GLU  86   < >  104*   BUN  25*   < >  11   CREA  7.93*   < >  4.25*   CA  8.1*   < >  8.4*   ALB   --    --   3.3*   TBILI   --    --   0.9   SGOT   --    --   23   ALT   --    --   14    < > = values in this interval not displayed. X-ray   Assessment / Plan:   · Low suspicion for EC fistula  ·  full liquids  ·   · C/o calf pain.  For duplex today-- I n process    Active Problems:    Abdominal wall abscess (9/14/2017)      ESRD (end stage renal disease) ([address] (9/14/2017)      HTN (hypertension) (9/14/2017)      Leukocytosis (9/14/2017)      Elevated lactic acid level (9/14/2017)      Obese (9/14/2017)      Bacteremia (9/15/2017)      Abdominal abscess ([address] (9/16/2017)

## 2017-09-17 NOTE — PROGRESS NOTES
Bedside shift change report given to Sarahi Delaney (oncoming nurse) by Antony Corado (offgoing nurse). Report included the following information SBAR, Kardex, Procedure Summary, MAR and Recent Results.

## 2017-09-17 NOTE — PROGRESS NOTES
Physical Therapy Note:    Third attempt made today to see patient for PT evaluation. Patient with orders for dopplers to bilateral LEs for suspected DVT. Will defer at this time until those are complete and patient cleared or appropriate for mobility evaluation.     Gerardo Oviedo MS, PT

## 2017-09-17 NOTE — ROUTINE PROCESS
Orthopedic & Surgical Nursing Communication Tool      7:27 AM  9/17/2017     Bedside and Verbal shift change report given to Alvin Steward RN (incoming nurse) by Braxton Whtye RN (outgoing nurse) on Steve Corrales a 68 y.o. female who was admitted on 9/14/2017 10:17 AM. Report included the following information SBAR, Kardex, Intake/Output, MAR, Accordion and Recent Results. Pt resting         Opportunity for questions and clarifications were given to the incoming nurse. Patient's bed is in low position, side rails x2, door open PRN, call bell within reach and patient not in distress.     Braxton Whyte RN

## 2017-09-17 NOTE — PROGRESS NOTES
Bedside shift change report given to Miguelina Alvarado (oncoming nurse) by Sathya Back (offgoing nurse). Report included the following information SBAR, Kardex, Procedure Summary, MAR and Recent Results.

## 2017-09-18 PROBLEM — R79.89 ELEVATED LACTIC ACID LEVEL: Status: RESOLVED | Noted: 2017-09-14 | Resolved: 2017-09-18

## 2017-09-18 LAB
ANION GAP SERPL CALC-SCNC: 14 MMOL/L (ref 5–15)
BUN SERPL-MCNC: 19 MG/DL (ref 6–20)
BUN/CREAT SERPL: 2 (ref 12–20)
CALCIUM SERPL-MCNC: 8.1 MG/DL (ref 8.5–10.1)
CHLORIDE SERPL-SCNC: 97 MMOL/L (ref 97–108)
CO2 SERPL-SCNC: 26 MMOL/L (ref 21–32)
CREAT SERPL-MCNC: 7.83 MG/DL (ref 0.55–1.02)
ERYTHROCYTE [DISTWIDTH] IN BLOOD BY AUTOMATED COUNT: 15.4 % (ref 11.5–14.5)
GLUCOSE SERPL-MCNC: 99 MG/DL (ref 65–100)
HCT VFR BLD AUTO: 33.6 % (ref 35–47)
HGB BLD-MCNC: 10.7 G/DL (ref 11.5–16)
MCH RBC QN AUTO: 33.8 PG (ref 26–34)
MCHC RBC AUTO-ENTMCNC: 31.8 G/DL (ref 30–36.5)
MCV RBC AUTO: 106 FL (ref 80–99)
PLATELET # BLD AUTO: 193 K/UL (ref 150–400)
POTASSIUM SERPL-SCNC: 3.9 MMOL/L (ref 3.5–5.1)
RBC # BLD AUTO: 3.17 M/UL (ref 3.8–5.2)
SODIUM SERPL-SCNC: 137 MMOL/L (ref 136–145)
WBC # BLD AUTO: 10.5 K/UL (ref 3.6–11)

## 2017-09-18 PROCEDURE — 97530 THERAPEUTIC ACTIVITIES: CPT

## 2017-09-18 PROCEDURE — 74011000258 HC RX REV CODE- 258: Performed by: INTERNAL MEDICINE

## 2017-09-18 PROCEDURE — 74011000258 HC RX REV CODE- 258: Performed by: PHYSICIAN ASSISTANT

## 2017-09-18 PROCEDURE — 74011250637 HC RX REV CODE- 250/637: Performed by: SURGERY

## 2017-09-18 PROCEDURE — 74011250637 HC RX REV CODE- 250/637: Performed by: INTERNAL MEDICINE

## 2017-09-18 PROCEDURE — 65270000029 HC RM PRIVATE

## 2017-09-18 PROCEDURE — 80048 BASIC METABOLIC PNL TOTAL CA: CPT | Performed by: PHYSICIAN ASSISTANT

## 2017-09-18 PROCEDURE — 77030032490 HC SLV COMPR SCD KNE COVD -B

## 2017-09-18 PROCEDURE — 74011250637 HC RX REV CODE- 250/637: Performed by: PHYSICIAN ASSISTANT

## 2017-09-18 PROCEDURE — 85027 COMPLETE CBC AUTOMATED: CPT | Performed by: PHYSICIAN ASSISTANT

## 2017-09-18 PROCEDURE — 36415 COLL VENOUS BLD VENIPUNCTURE: CPT | Performed by: PHYSICIAN ASSISTANT

## 2017-09-18 PROCEDURE — 74011250636 HC RX REV CODE- 250/636: Performed by: INTERNAL MEDICINE

## 2017-09-18 PROCEDURE — 97116 GAIT TRAINING THERAPY: CPT

## 2017-09-18 PROCEDURE — 74011250636 HC RX REV CODE- 250/636: Performed by: PHYSICIAN ASSISTANT

## 2017-09-18 RX ADMIN — HYDROMORPHONE HYDROCHLORIDE 0.5 MG: 1 INJECTION, SOLUTION INTRAMUSCULAR; INTRAVENOUS; SUBCUTANEOUS at 20:43

## 2017-09-18 RX ADMIN — OXYCODONE HYDROCHLORIDE 5 MG: 5 TABLET ORAL at 22:07

## 2017-09-18 RX ADMIN — HYDRALAZINE HYDROCHLORIDE 25 MG: 25 TABLET, FILM COATED ORAL at 22:04

## 2017-09-18 RX ADMIN — VENLAFAXINE HYDROCHLORIDE 37.5 MG: 37.5 CAPSULE, EXTENDED RELEASE ORAL at 09:20

## 2017-09-18 RX ADMIN — HEPARIN SODIUM 5000 UNITS: 5000 INJECTION, SOLUTION INTRAVENOUS; SUBCUTANEOUS at 04:24

## 2017-09-18 RX ADMIN — OXYCODONE HYDROCHLORIDE 5 MG: 5 TABLET ORAL at 12:33

## 2017-09-18 RX ADMIN — HEPARIN SODIUM 5000 UNITS: 5000 INJECTION, SOLUTION INTRAVENOUS; SUBCUTANEOUS at 13:28

## 2017-09-18 RX ADMIN — ALPRAZOLAM 0.5 MG: 0.5 TABLET ORAL at 20:44

## 2017-09-18 RX ADMIN — ISOSORBIDE MONONITRATE 60 MG: 30 TABLET ORAL at 07:10

## 2017-09-18 RX ADMIN — HYDRALAZINE HYDROCHLORIDE 25 MG: 25 TABLET, FILM COATED ORAL at 09:20

## 2017-09-18 RX ADMIN — HYDRALAZINE HYDROCHLORIDE 25 MG: 25 TABLET, FILM COATED ORAL at 15:57

## 2017-09-18 RX ADMIN — SODIUM CHLORIDE 3 G: 900 INJECTION, SOLUTION INTRAVENOUS at 18:06

## 2017-09-18 RX ADMIN — Medication 10 ML: at 07:10

## 2017-09-18 RX ADMIN — CALCIUM ACETATE 667 MG: 667 CAPSULE ORAL at 12:33

## 2017-09-18 RX ADMIN — PIPERACILLIN SODIUM AND TAZOBACTAM SODIUM 3.38 G: 3; .375 INJECTION, POWDER, LYOPHILIZED, FOR SOLUTION INTRAVENOUS at 04:21

## 2017-09-18 RX ADMIN — Medication 10 ML: at 13:29

## 2017-09-18 RX ADMIN — CALCIUM ACETATE 667 MG: 667 CAPSULE ORAL at 18:06

## 2017-09-18 RX ADMIN — CALCIUM ACETATE 667 MG: 667 CAPSULE ORAL at 09:20

## 2017-09-18 NOTE — CDMP QUERY
1. There is noted documentation of \"Elevated Lactic Acid\" for this patient. To more accurately reflect severity of illness, could this be further specified as:     =>Mild Lactic acidosis POA   =>Other Explanation of clinical findings  =>Unable to Determine (no explanation of clinical findings)    The medical record reflects the following clinical findings, treatment, and risk factors:    Risk Factors: Abd wall abscess    Clinical Indicators: 9/14 LA 2.5 9/15 LA 2.4    Treatment: Monitor labs; 500 cc IVB, IV vanc , ampicillin    Please clarify and document your clinical opinion in the progress notes and discharge summary including the definitive and/or presumptive diagnosis, (suspected or probable), related to the above clinical findings. Please include clinical findings supporting your diagnosis.     Thank you,  Dominick Manning, MSN, [facility] [place] [address]

## 2017-09-18 NOTE — PROGRESS NOTES
9/18/2017   2:47 PM  [facility] can accept Pt. For Hh on D/C, I notified Pt. CM to follow. Mitch Slot    1:26 PM  Received call from [facility] [employer], they can not accept Pt. As they are out of network, I notified Pt. Sent referral to [facility]. CM to follow. Mitch Slot    12:58 PM  [facility] Pt. For [facility] [place] on D/C. CM to follow. Mitch Slot    11:55 AM  EMR reviewed, Pt is recommending HH PT/OT on D/C. I met w/ Pt to discuss [place] referral she would like referral to [facility].   I sent referral via [facility], need order from MD. YOUNG to follow and assist.  Mitch Slot  Case Management

## 2017-09-18 NOTE — CDMP QUERY
2. Please clarify if this patient is being treated/managed for:    =>Sepsis POA in the setting of Abd abscess, 2/4 SIRS criteria, mild lactic acidosis; Received 500 cc IVB, IV vanc, ampicillin  =>Other Explanation of clinical findings  =>Unable to Determine (no explanation of clinical findings)    The medical record reflects the following clinical findings, treatment, and risk factors:    Risk Factors: Abd abscess POA    Clinical Indicators: SIRS criteria POA: HR  WBC 15.5 LA 2.5     Treatment:  cc in ED (hemodialysis patient, not given 30 cc/kg) IV vanc, ampicillin, monitor labs, VS    Please clarify and document your clinical opinion in the progress notes and discharge summary including the definitive and/or presumptive diagnosis, (suspected or probable), related to the above clinical findings. Please include clinical findings supporting your diagnosis.     Thank you,  Myriam Truong, MSN, [facility] [place] [address]

## 2017-09-18 NOTE — PROGRESS NOTES
General Surgery Daily Progress Note    Patient: Elo Mahoney MRN: [id number]  SSN: [id number]    YOB: 1940  Age: 68 y.o. Sex: female      Admit Date: 9/14/2017    Subjective:   No new symptoms, denies abdominal pain, tolerating diet. C/o leg pain.  Doppler negative except for possible bakers cyst     Current Facility-Administered Medications   Medication Dose Route Frequency    ampicillin-sulbactam (UNASYN) 1.5 g in 0.9% sodium chloride (MBP/ADV) 50 mL  1.5 g IntraVENous BID    heparin (porcine) injection 5,000 Units  5,000 Units SubCUTAneous Q12H    oxyCODONE IR (ROXICODONE) tablet 5 mg  5 mg Oral Q6H PRN    sodium chloride (NS) flush 5-10 mL  5-10 mL IntraVENous PRN    sodium chloride (NS) flush 5-10 mL  5-10 mL IntraVENous Q8H    HYDROmorphone (PF) (DILAUDID) injection 0.5 mg  0.5 mg IntraVENous Q4H PRN    naloxone (NARCAN) injection 0.4 mg  0.4 mg IntraVENous PRN    ondansetron (ZOFRAN) injection 4 mg  4 mg IntraVENous Q4H PRN    diphenhydrAMINE (BENADRYL) injection 12.5 mg  12.5 mg IntraVENous Q4H PRN    docusate sodium (COLACE) capsule 100 mg  100 mg Oral BID    ALPRAZolam (XANAX) tablet 0.5 mg  0.5 mg Oral QID PRN    calcium acetate (PHOSLO) capsule 667 mg  1 Cap Oral TID WITH MEALS    hydrALAZINE (APRESOLINE) tablet 25 mg  25 mg Oral TID    isosorbide mononitrate ER (IMDUR) tablet 60 mg  60 mg Oral 7am    venlafaxine-SR (EFFEXOR-XR) capsule 37.5 mg  37.5 mg Oral DAILY    hydrALAZINE (APRESOLINE) 20 mg/mL injection 20 mg  20 mg IntraVENous Q6H PRN        Objective:      09/16 1901 - 09/18 0700  In: 400 [I.V.:400]  Out: -   Patient Vitals for the past 8 hrs:   BP Temp Pulse Resp SpO2 Weight   09/18/17 0738 142/70 97.9 °F (36.6 °C) 87 16 97 % -   09/18/17 0710 137/75 - 85 - - -   09/18/17 0529 - - - - - 77.1 kg (169 lb 15.6 oz)   09/18/17 0335 117/70 98.5 °F (36.9 °C) 79 15 95 % -       Physical Exam:  General: Alert, cooperative, NAD  Lungs: Unlabored  Heart:  Regular rate and  rhythm  Abdomen: Soft, small midline abdominal wound, no drainage in collection bag  Extremities: Warm, moves all, no edema  Skin:  Warm and dry, no rash    Labs:   Recent Labs      09/16/17   0259   WBC  12.6*   HGB  11.4*   HCT  35.7   PLT  220     Recent Labs      09/16/17 0259   NA  138   K  5.2*   CL  98   CO2  26   GLU  86   BUN  25*   CREA  7.93*   CA  8.1*       Assessment / Plan:   · Abdominal wound  · CT w/ oral contrast suggested possible EC fistula but there has been virtually no drainage from wound even with diet advancement  · ID consult for ABX recommendations  · Can f/u as outpatient with ortho for leg pain and possible bakers cyst   · Anticipate d/c once ABX plan is finalized

## 2017-09-18 NOTE — PROGRESS NOTES
Robbie Marshall [facility] 79  [address] [facility], [address] [place] [address]  [phone]      Medical Progress Note      NAME: Jesse Mariano   :  1940  MRM:  [id number]    Date/Time: 2017  9:36 AM            Subjective:     Chief Complaint:  Drainage: from abdominal wound, hasn't had any since Wound Care placed ostomy bag over it    ROS:  (bold if positive, if negative)                        Tolerating PT  Tolerating Diet          Objective:       Vitals:          Last 24hrs VS reviewed since prior progress note.  Most recent are:    Visit Vitals    /70 (BP 1 Location: Left arm, BP Patient Position: At rest)    Pulse 87    Temp 97.9 °F (36.6 °C)    Resp 16    Ht 5' 3\" (1.6 m)    Wt 77.1 kg (169 lb 15.6 oz)    SpO2 97%    BMI 30.11 kg/m2     SpO2 Readings from Last 6 Encounters:   17 97%   16 99%   07/09/15 98%            Intake/Output Summary (Last 24 hours) at 17 0936  Last data filed at 17 0630   Gross per 24 hour   Intake              200 ml   Output                0 ml   Net              200 ml          Exam:     Physical Exam:    Gen:  Well-developed, obese, in no acute distress  HEENT:  Pink conjunctivae, PERRL, hearing intact to voice, moist mucous membranes  Neck:  Supple, without masses, thyroid non-tender  Resp:  No accessory muscle use, clear breath sounds without wheezes rales or rhonchi  Card:  No murmurs, normal S1, S2 without thrills, bruits or peripheral edema  Abd:  Soft, non-tender, non-distended, normoactive bowel sounds are present, no palpable organomegaly and no detectable hernias, abdominal scar with wound at inferior aspect with drainage  Lymph:  No cervical or inguinal adenopathy  Musc:  No cyanosis or clubbing  Skin:  No rashes or ulcers, skin turgor is good  Neuro:  Cranial nerves are grossly intact, no focal motor weakness, follows commands appropriately  Psych:  Good insight, oriented to person, place and time, alert    Medications Reviewed: (see below)    Lab Data Reviewed: (see below)    ______________________________________________________________________    Medications:     Current Facility-Administered Medications   Medication Dose Route Frequency    ampicillin-sulbactam (UNASYN) 1.5 g in 0.9% sodium chloride (MBP/ADV) 50 mL  1.5 g IntraVENous BID    heparin (porcine) injection 5,000 Units  5,000 Units SubCUTAneous Q12H    oxyCODONE IR (ROXICODONE) tablet 5 mg  5 mg Oral Q6H PRN    sodium chloride (NS) flush 5-10 mL  5-10 mL IntraVENous PRN    sodium chloride (NS) flush 5-10 mL  5-10 mL IntraVENous Q8H    HYDROmorphone (PF) (DILAUDID) injection 0.5 mg  0.5 mg IntraVENous Q4H PRN    naloxone (NARCAN) injection 0.4 mg  0.4 mg IntraVENous PRN    ondansetron (ZOFRAN) injection 4 mg  4 mg IntraVENous Q4H PRN    diphenhydrAMINE (BENADRYL) injection 12.5 mg  12.5 mg IntraVENous Q4H PRN    docusate sodium (COLACE) capsule 100 mg  100 mg Oral BID    ALPRAZolam (XANAX) tablet 0.5 mg  0.5 mg Oral QID PRN    calcium acetate (PHOSLO) capsule 667 mg  1 Cap Oral TID WITH MEALS    hydrALAZINE (APRESOLINE) tablet 25 mg  25 mg Oral TID    isosorbide mononitrate ER (IMDUR) tablet 60 mg  60 mg Oral 7am    venlafaxine-SR (EFFEXOR-XR) capsule 37.5 mg  37.5 mg Oral DAILY    hydrALAZINE (APRESOLINE) 20 mg/mL injection 20 mg  20 mg IntraVENous Q6H PRN            Lab Review:     Recent Labs      09/16/17 0259   WBC  12.6*   HGB  11.4*   HCT  35.7   PLT  220     Recent Labs      09/16/17 0259   NA  138   K  5.2*   CL  98   CO2  26   GLU  86   BUN  25*   CREA  7.93*   CA  8.1*     Lab Results   Component Value Date/Time    Glucose (POC) 87 09/17/2017 08:49 PM     No results for input(s): PH, PCO2, PO2, HCO3, FIO2 in the last 72 hours. No results for input(s): INR in the last 72 hours.     No lab exists for component: INREXT, INREXT  No results found for: SDES  Lab Results   Component Value Date/Time    Culture result: NO GROWTH 3 DAYS 09/15/2017 04:57 PM    Culture result: NO GROWTH 4 DAYS 09/14/2017 01:37 PM    Culture result:  09/14/2017 01:37 PM     STAPHYLOCOCCUS SPECIES, COAGULASE NEGATIVE GROWING IN 1 OF 2 BOTTLES DRAWN (NO SITE INDICATED) PLATES HELD 3 DAYS. CALL MICROBIOLOGY LAB IF SENSITIVITIES ARE NEEDED. Culture result: REMAINING BOTTLE(S) HAS/HAVE NO GROWTH SO FAR 09/14/2017 01:37 PM            Assessment:     Active Problems:    Abdominal wall abscess (9/14/2017)      ESRD (end stage renal disease) ([address] (9/14/2017)      HTN (hypertension) (9/14/2017)      Leukocytosis (9/14/2017)      Elevated lactic acid level (9/14/2017)      Obese (9/14/2017)      Bacteremia (9/15/2017)      Abdominal abscess ([address] (9/16/2017)           Plan:      Active Problems:    HTN (hypertension) (9/14/2017)   - BP okay, follow on home meds      ESRD (end stage renal disease) ([address] (9/14/2017)   - Renal following, dialysis per them      Leukocytosis (9/14/2017)   - WBC down slightly overall, follow on antibiotics      Elevated lactic acid level (9/14/2017)   - was mildly elevated, now resolved   - NOT septic      Obese (9/14/2017)   - counseled on weight loss      Abdominal wall abscess (9/14/2017)   - management per Surgery, they do not feel strongly that there is an EC fistula   - there hasn't been any significant drainage over the weekend, ever since ostomy bag was placed over the sinus tract   - on vancomycin and Zosyn currently, wound culture with GNRs   - initial blood culture with CNS, likely contaminant, doubt true bacteremia, repeat cultures NGTD      Total time spent with patient: 15 minutes                  Care Plan discussed with: Patient, Care Manager and Nursing Staff    Discussed:  Code Status, Care Plan and D/C Planning    Prophylaxis:  SCD's    Disposition:  HH PT, OT, RN           ___________________________________________________    Attending Physician: Arthur Sher MD

## 2017-09-18 NOTE — PROGRESS NOTES
Problem: Mobility Impaired (Adult and Pediatric)  Goal: *Acute Goals and Plan of Care (Insert Text)  Physical Therapy Goals  Initiated 9/17/2017  1. Patient will transfer from bed to chair and chair to bed with modified independence using the least restrictive device within 7 day(s). 2. Patient will perform sit to stand with modified independence within 7 day(s). 3. Patient will ambulate with modified independence for 300 feet with the least restrictive device within 7 day(s). 4. Patient will ascend/descend 2 stairs with 1 handrail(s) with supervision/set-up within 7 day(s). PHYSICAL THERAPY TREATMENT  Patient: Rachelle Oliveros (20 y.o. female)  Date: 9/18/2017  Diagnosis: Abdominal wall abscess  Abdominal abscess (HCC) <principal problem not specified>       Precautions:        ASSESSMENT:  Based on the objective data described below, patient tolerated treatment very well. Patient already up in the chair by nurse , sit to stand SBA, ambualte with rolling walker on the 4th floor hallwaty CGA, decreased pace no loss of balance, need some standing stop to rest. Patient report of pain on her left leg during ambulation, positive left leg Deep venous insufficiency. patient asked to go back to bed, was up in the chair early this morning and would like to go back and rest on bed. Assisted supine on bed with CGA. Notified  Nurse who agreed to monitor patient . Progression toward goals:  [X]    Improving appropriately and progressing toward goals  [ ]    Improving slowly and progressing toward goals  [ ]    Not making progress toward goals and plan of care will be adjusted       PLAN:  Patient continues to benefit from skilled intervention to address the above impairments. Continue treatment per established plan of care. Discharge Recommendations:  Home Health  Further Equipment Recommendations for Discharge:  Already has DME's       SUBJECTIVE:   Patient stated I have been up on this chair.       OBJECTIVE DATA SUMMARY:   Critical Behavior:  Neurologic State: Alert  Orientation Level: Oriented X4  Cognition: Appropriate decision making, Appropriate for age attention/concentration, Appropriate safety awareness, Follows commands     Functional Mobility Training:  Bed Mobility:  Rolling: Stand-by asssistance  Supine to Sit: Stand-by asssistance  Sit to Supine: Stand-by asssistance  Scooting: Stand-by asssistance        Transfers:  Sit to Stand: Stand-by asssistance  Stand to Sit: Stand-by asssistance  Stand Pivot Transfers: Stand-by asssistance     Bed to Chair: Contact guard assistance                    Balance:  Sitting: Intact  Standing: Intact; With support  Standing - Static: Fair  Standing - Dynamic : Fair  Ambulation/Gait Training:  Distance (ft): 60 Feet (ft)  Assistive Device: Cane, straight;Gait belt  Ambulation - Level of Assistance: Contact guard assistance     Gait Description (WDL): Exceptions to WDL  Gait Abnormalities: Step to gait; Path deviations        Base of Support: Widened     Speed/Maryanne: Pace decreased (<100 feet/min); Delayed  Step Length: Right shortened;Left shortened                 Therapeutic Exercises:    Instructed patient to continue active range of motion exercise on both legs while up on chair or on bed. Pain:  Pain Scale 1: Numeric (0 - 10)  Pain Intensity 1: 0  Pain Location 1: Leg  Pain Orientation 1: Left  Pain Description 1: Aching  Pain Intervention(s) 1: Medication (see MAR)  Activity Tolerance:   Good. Please refer to the flowsheet for vital signs taken during this treatment. After treatment:   [X]    Patient left in no apparent distress sitting up in chair  [X]    Patient left in no apparent distress in bed  [X]    Call bell left within reach  [X]    Nursing notified  [ ]    Caregiver present  [ ]    Bed alarm activated      COMMUNICATION/COLLABORATION:   The patients plan of care was discussed with: Registered Nurse and patient     Aldair Coreas, PT,WCC.    Time Calculation: 24 mins

## 2017-09-18 NOTE — PROGRESS NOTES
88 [address]  YOB: 1940          Assessment & Plan:   ESRD   · HD tomorrow and TTS    Abd wall abscess  · On unasyn  · Surgery following    HTN  · Controlled       Subjective:   CC: f/u ESRD  HPI: For HD tomorrow. On unasyn. Feels her abd wall abscess is improving. ROS: +malaise, no n/v, some sob with exertion  Current Facility-Administered Medications   Medication Dose Route Frequency    ampicillin-sulbactam (UNASYN) 1.5 g in 0.9% sodium chloride (MBP/ADV) 50 mL  1.5 g IntraVENous BID    heparin (porcine) injection 5,000 Units  5,000 Units SubCUTAneous Q12H    oxyCODONE IR (ROXICODONE) tablet 5 mg  5 mg Oral Q6H PRN    sodium chloride (NS) flush 5-10 mL  5-10 mL IntraVENous PRN    sodium chloride (NS) flush 5-10 mL  5-10 mL IntraVENous Q8H    HYDROmorphone (PF) (DILAUDID) injection 0.5 mg  0.5 mg IntraVENous Q4H PRN    naloxone (NARCAN) injection 0.4 mg  0.4 mg IntraVENous PRN    ondansetron (ZOFRAN) injection 4 mg  4 mg IntraVENous Q4H PRN    diphenhydrAMINE (BENADRYL) injection 12.5 mg  12.5 mg IntraVENous Q4H PRN    docusate sodium (COLACE) capsule 100 mg  100 mg Oral BID    ALPRAZolam (XANAX) tablet 0.5 mg  0.5 mg Oral QID PRN    calcium acetate (PHOSLO) capsule 667 mg  1 Cap Oral TID WITH MEALS    hydrALAZINE (APRESOLINE) tablet 25 mg  25 mg Oral TID    isosorbide mononitrate ER (IMDUR) tablet 60 mg  60 mg Oral 7am    venlafaxine-SR (EFFEXOR-XR) capsule 37.5 mg  37.5 mg Oral DAILY    hydrALAZINE (APRESOLINE) 20 mg/mL injection 20 mg  20 mg IntraVENous Q6H PRN          Objective:     Vitals:  Blood pressure 142/70, pulse 87, temperature 97.9 °F (36.6 °C), resp. rate 16, height 5' 3\" (1.6 m), weight 77.1 kg (169 lb 15.6 oz), SpO2 97 %.   Temp (24hrs), Av.4 °F (36.9 °C), Min:97.9 °F (36.6 °C), Max:98.6 °F (37 °C)      Intake and Output:     1 -  0700  In: 400 [I.V.:400]  Out: - Physical Exam:               GENERAL ASSESSMENT: NAD  CHEST: CTA  HEART: S1S2  ABDOMEN: Soft,NT  EXTREMITY: no EDEMA        ECG/rhythm:    Data Review      No results for input(s): TNIPOC in the last 72 hours. No lab exists for component: ITNL   No results for input(s): CPK, CKMB, TROIQ in the last 72 hours. Recent Labs      09/16/17   0259   NA  138   K  5.2*   CL  98   CO2  26   BUN  25*   CREA  7.93*   GLU  86   CA  8.1*   WBC  12.6*   HGB  11.4*   HCT  35.7   PLT  220      No results for input(s): INR, PTP, APTT in the last 72 hours. No lab exists for component: INREXT  Needs: urine analysis, urine sodium, protein and creatinine  No results found for: ROMAN BAUM        : Chyna Castorena MD  9/18/2017        [address] [place] [address] [facility]:  [email or web address]  Cher Leventhal office:  [address] [place] [address], [address] [place] [address],[address]  [address]  Phone: [phone]  Fax :     [phone]    [address] [place] [address] office:  [address], [address] [facility]  Phone - [phone]  Fax - [phone]

## 2017-09-18 NOTE — CONSULTS
Robbie Marshall [address], [address] [place], [place] [address]e   [address] [place] [address]       Name:  Andra Goodman   MR#:  [id number]   :  1940   Account #:  [de-identified]    Date of Consultation:  2017   Date of Adm:  2017       REQUESTING PHYSICIAN: Dr. Miguel Nunes: Noa Carl Abdominal wound drainage. HISTORY OF PRESENT ILLNESS: The patient is a 80-year-old   female with a history of end-stage renal disease on dialysis,   hypertension and obesity, who was admitted to [place] [address] on 2017 with the aforementioned complaint. The patient   has a history of a left lower quadrant ostomy. Approximately a week or   2 ago, she noticed some drainage in the midline. At first, she thought   this was related to a leak from the ostomy bag; however, after   changing the bag, she noticed that the leak continued. She was   admitted for further evaluation and treatment. Workup has revealed a   small midline abscess. Cultures are growing mixed skin radha,   Escherichia coli and Klebsiella pneumoniae. He is currently on   vancomycin and piperacillin/tazobactam, and the infectious diseases   service has been asked to assist with antibiotic management. PAST MEDICAL HISTORY: End-stage renal disease on dialysis,   hypertension and obesity. PAST SURGICAL HISTORY: Colostomy and left upper extremity   fistula. SOCIAL HISTORY: She is a current tobacco smoker. No alcohol or   illicit drug use. FAMILY HISTORY: Hypertension. ALLERGIES: IRON. REVIEW OF SYSTEMS: As per the HPI. Remainder of 12-system   review of systems is unremarkable. LABORATORY DATA: White blood cell count was 15,000 at the time   of admission, it is 12,000 today and platelet count 042,925. Blood   culture from 2017 is growing coagulase-negative   Staphylococcus in 1/2 bottles.  Cultures from the wound are growing   mixed skin radha, a pan sensitive Escherichia coli and Klebsiella   pneumoniae. PHYSICAL EXAMINATION   VITAL SIGNS: Temperature is 97.9, maximum temperature is less   than 100, heart rate 87 beats per minute, blood pressure 142/70,   oxygen saturation 95% on room air. GENERAL: Alert, no acute distress. HEENT: Normocephalic, atraumatic. Pupils equal and reactive to light. No oropharyngeal lesions noted. HEART: Regular rate and rhythm. No murmurs, gallops or rubs. PULMONARY: Clear to auscultation bilaterally. ABDOMEN: Soft, nontender, nondistended. There is a left lower   quadrant ostomy bag. There is a small open tract at the midline   incision. No drainage noted. EXTREMITIES: No clubbing, cyanosis or edema. SKIN: No rashes. ASSESSMENT AND PLAN   1. Abdominal wall abscess. Etiology is unclear. The patient has   undergone CT scan x2, and this is not felt to represent an   enterocutaneous fistula, per the general surgery team. Escherichia   coli, Klebsiella pneumoniae and mixed skin radha are growing from   cultures. Will narrow the antibiotics to Unasyn. At the time of   discharge, antibiotics may be escalated to Augmentin. This will need to   be dosed based on the patient's renal function. We will plan on a 2-  week course of antibiotics. If drainage continues, the patient may need   to undergo further workup for possible low output enterocutaneous   fistula. 2. Coagulase-negative Staphylococcus isolated from blood culture. This represents a contaminant. No further workup or treatment plan. 3. End-stage renal disease on dialysis. 4. Leukocytosis. This is likely due to the abdominal abscess. The   patient remains afebrile. The white blood cell count is trending down. Continue to follow. Thank you for allowing me to participate in the care of this patient.         DO TOM Degroot / Janie Toledo   D:  09/18/2017   09:32   T:  09/18/2017   09:56   Job #:  [id number]

## 2017-09-19 VITALS
WEIGHT: 169.97 LBS | TEMPERATURE: 97.8 F | SYSTOLIC BLOOD PRESSURE: 121 MMHG | HEART RATE: 95 BPM | BODY MASS INDEX: 30.12 KG/M2 | HEIGHT: 63 IN | OXYGEN SATURATION: 95 % | DIASTOLIC BLOOD PRESSURE: 60 MMHG | RESPIRATION RATE: 18 BRPM

## 2017-09-19 PROCEDURE — 74011250637 HC RX REV CODE- 250/637: Performed by: SURGERY

## 2017-09-19 PROCEDURE — 74011250637 HC RX REV CODE- 250/637: Performed by: INTERNAL MEDICINE

## 2017-09-19 PROCEDURE — 90935 HEMODIALYSIS ONE EVALUATION: CPT

## 2017-09-19 PROCEDURE — 74011250636 HC RX REV CODE- 250/636: Performed by: PHYSICIAN ASSISTANT

## 2017-09-19 RX ORDER — AMOXICILLIN AND CLAVULANATE POTASSIUM 500; 125 MG/1; MG/1
1 TABLET, FILM COATED ORAL DAILY
Status: DISCONTINUED | OUTPATIENT
Start: 2017-09-19 | End: 2017-09-19 | Stop reason: HOSPADM

## 2017-09-19 RX ORDER — AMOXICILLIN AND CLAVULANATE POTASSIUM 500; 125 MG/1; MG/1
1 TABLET, FILM COATED ORAL DAILY
Qty: 10 TAB | Refills: 0 | Status: SHIPPED | OUTPATIENT
Start: 2017-09-19 | End: 2018-07-21 | Stop reason: CLARIF

## 2017-09-19 RX ORDER — AMOXICILLIN AND CLAVULANATE POTASSIUM 500; 125 MG/1; MG/1
1 TABLET, FILM COATED ORAL EVERY 12 HOURS
Status: DISCONTINUED | OUTPATIENT
Start: 2017-09-19 | End: 2017-09-19

## 2017-09-19 RX ADMIN — OXYCODONE HYDROCHLORIDE 5 MG: 5 TABLET ORAL at 09:52

## 2017-09-19 RX ADMIN — VENLAFAXINE HYDROCHLORIDE 37.5 MG: 37.5 CAPSULE, EXTENDED RELEASE ORAL at 12:04

## 2017-09-19 RX ADMIN — HEPARIN SODIUM 5000 UNITS: 5000 INJECTION, SOLUTION INTRAVENOUS; SUBCUTANEOUS at 03:03

## 2017-09-19 RX ADMIN — CALCIUM ACETATE 667 MG: 667 CAPSULE ORAL at 12:04

## 2017-09-19 RX ADMIN — HYDRALAZINE HYDROCHLORIDE 25 MG: 25 TABLET, FILM COATED ORAL at 12:04

## 2017-09-19 RX ADMIN — ISOSORBIDE MONONITRATE 60 MG: 30 TABLET ORAL at 12:06

## 2017-09-19 NOTE — PROGRESS NOTES
[facility] Infectious Diseases Progress Note  Pita Flower MD,             Rd Nunez MD,          Tita Francis DO     [address] [place] [address]    [address], [address]  [phone]  Fax    2017      Assessment & Plan:   1. Abdominal wall abscess. General surgery following. Not thought to represent an enterocutaneous fistula. Ecoli, Klebsiella pneumoniae and mixed skin radha are growing from. Deescalate abx to PO augmentin 500mg po q day X 10 days through 17. If drainage continues at end of abx therapy, the patient may need to undergo further workup for possible low output enterocutaneous fistula. 2. Coagulase-negative Staphylococcus isolated from blood culture. This represents a contaminant. No further workup or treatment plan. 3. End-stage renal disease on dialysis. 4. Leukocytosis. This is likely due to the abdominal abscess. The patient remains afebrile. The white blood cell count is trending down. Continue to follow. Subjective:     No complaints. Seen on HD    Objective:     Vitals:   Visit Vitals    BP (P) 125/54    Pulse (P) 73    Temp 98.1 °F (36.7 °C)    Resp 18    Ht 5' 3\" (1.6 m)    Wt 77.1 kg (169 lb 15.6 oz)    SpO2 95%    Breastfeeding No    BMI 30.11 kg/m2        Tmax:  Temp (24hrs), Av.4 °F (36.9 °C), Min:97.7 °F (36.5 °C), Max:98.8 °F (37.1 °C)      Exam:   Patient is intubated:  no    Physical Examination:   GENERAL ASSESSMENT: NAD  HEENT:Nontraumatic  NCAT  CHEST: CTA  anteriorly  HEART: S1S2 RRR  ABDOMEN: Soft,NT,ND  :Castillo:   EXTREMITY: no edema  NEURO:Grossly non focal    Labs:        No lab exists for component: ITNL   No results for input(s): CPK, CKMB, TROIQ in the last 72 hours. Recent Labs      17   1057   NA  137   K  3.9   CL  97   CO2  26   BUN  19   CREA  7.83*   GLU  99   WBC  10.5   HGB  10.7*   HCT  33.6*   PLT  193     No results for input(s): INR, PTP, APTT in the last 72 hours.     No lab exists for component: INREXT  Needs: urine analysis, urine sodium, protein and creatinine  No results found for: BAUTISTA, CREAU      Cultures:     No results found for: SDES  Lab Results   Component Value Date/Time    Culture result: NO GROWTH 4 DAYS 09/15/2017 04:57 PM    Culture result: NO GROWTH 5 DAYS 09/14/2017 01:37 PM    Culture result:  09/14/2017 01:37 PM     STAPHYLOCOCCUS SPECIES, COAGULASE NEGATIVE GROWING IN 1 OF 2 BOTTLES DRAWN (NO SITE INDICATED) PLATES HELD 3 DAYS. CALL MICROBIOLOGY LAB IF SENSITIVITIES ARE NEEDED.     Culture result: REMAINING BOTTLE(S) HAS/HAVE NO GROWTH SO FAR 09/14/2017 01:37 PM       Radiology:     Medications       Current Facility-Administered Medications   Medication Dose Route Frequency Last Dose    ampicillin-sulbactam (UNASYN) 3 g in 0.9% sodium chloride (MBP/ADV) 100 mL  3 g IntraVENous DAILY 3 g at 09/18/17 1806    heparin (porcine) injection 5,000 Units  5,000 Units SubCUTAneous Q12H 5,000 Units at 09/19/17 0303    oxyCODONE IR (ROXICODONE) tablet 5 mg  5 mg Oral Q6H PRN 5 mg at 09/18/17 2207    sodium chloride (NS) flush 5-10 mL  5-10 mL IntraVENous PRN      sodium chloride (NS) flush 5-10 mL  5-10 mL IntraVENous Q8H Stopped at 09/18/17 2200    HYDROmorphone (PF) (DILAUDID) injection 0.5 mg  0.5 mg IntraVENous Q4H PRN 0.5 mg at 09/18/17 2043    naloxone (NARCAN) injection 0.4 mg  0.4 mg IntraVENous PRN      ondansetron (ZOFRAN) injection 4 mg  4 mg IntraVENous Q4H PRN      diphenhydrAMINE (BENADRYL) injection 12.5 mg  12.5 mg IntraVENous Q4H PRN      docusate sodium (COLACE) capsule 100 mg  100 mg Oral  mg at 09/16/17 1123    ALPRAZolam (XANAX) tablet 0.5 mg  0.5 mg Oral QID PRN 0.5 mg at 09/18/17 2044    calcium acetate (PHOSLO) capsule 667 mg  1 Cap Oral TID WITH MEALS 667 mg at 09/18/17 1806    hydrALAZINE (APRESOLINE) tablet 25 mg  25 mg Oral TID 25 mg at 09/18/17 2204    isosorbide mononitrate ER (IMDUR) tablet 60 mg  60 mg Oral 7am 60 mg at 09/18/17 0710    venlafaxine-SR (EFFEXOR-XR) capsule 37.5 mg  37.5 mg Oral DAILY 37.5 mg at 09/18/17 0920    hydrALAZINE (APRESOLINE) 20 mg/mL injection 20 mg  20 mg IntraVENous Q6H PRN             Case discussed with:      Richard Yang DO

## 2017-09-19 NOTE — PROGRESS NOTES
9/19/17  CM notified [facility] of pt's discharge today. They are able to accept with [facility] 9/20/17.   Power More LCSW

## 2017-09-19 NOTE — PROGRESS NOTES
General Surgery Daily Progress Note    Patient: Jesse Mariano MRN: [id number]  SSN: [id number]    YOB: 1940  Age: 68 y.o. Sex: female      Admit Date: 9/14/2017    Subjective:   Denies pain, tolerating diet. Receiving dialysis currently.      Current Facility-Administered Medications   Medication Dose Route Frequency    amoxicillin-clavulanate (AUGMENTIN) 500-125 mg per tablet 1 Tab  1 Tab Oral DAILY    heparin (porcine) injection 5,000 Units  5,000 Units SubCUTAneous Q12H    oxyCODONE IR (ROXICODONE) tablet 5 mg  5 mg Oral Q6H PRN    sodium chloride (NS) flush 5-10 mL  5-10 mL IntraVENous PRN    sodium chloride (NS) flush 5-10 mL  5-10 mL IntraVENous Q8H    HYDROmorphone (PF) (DILAUDID) injection 0.5 mg  0.5 mg IntraVENous Q4H PRN    naloxone (NARCAN) injection 0.4 mg  0.4 mg IntraVENous PRN    ondansetron (ZOFRAN) injection 4 mg  4 mg IntraVENous Q4H PRN    diphenhydrAMINE (BENADRYL) injection 12.5 mg  12.5 mg IntraVENous Q4H PRN    docusate sodium (COLACE) capsule 100 mg  100 mg Oral BID    ALPRAZolam (XANAX) tablet 0.5 mg  0.5 mg Oral QID PRN    calcium acetate (PHOSLO) capsule 667 mg  1 Cap Oral TID WITH MEALS    hydrALAZINE (APRESOLINE) tablet 25 mg  25 mg Oral TID    isosorbide mononitrate ER (IMDUR) tablet 60 mg  60 mg Oral 7am    venlafaxine-SR (EFFEXOR-XR) capsule 37.5 mg  37.5 mg Oral DAILY    hydrALAZINE (APRESOLINE) 20 mg/mL injection 20 mg  20 mg IntraVENous Q6H PRN        Objective:      09/17 1901 - 09/19 0700  In: 200 [I.V.:200]  Out: -   Patient Vitals for the past 8 hrs:   BP Temp Pulse Resp SpO2   09/19/17 0950 135/64 - 93 - -   09/19/17 0920 135/61 - 96 - -   09/19/17 0850 125/54 - 73 - -   09/19/17 0820 147/66 - 87 - -   09/19/17 0750 149/55 98.1 °F (36.7 °C) 87 18 -   09/19/17 0738 135/51 - 67 - -   09/19/17 0325 147/72 98.8 °F (37.1 °C) 96 19 95 %       Physical Exam:  General: Alert, cooperative, NAD  Lungs: Unlabored  Heart:  Regular rate and rhythm  Abdomen: Soft, small midline abdominal wound, no drainage   Extremities: Warm, moves all, no edema  Skin:  Warm and dry, no rash    Labs:   Recent Labs      09/18/17   1057   WBC  10.5   HGB  10.7*   HCT  33.6*   PLT  193     Recent Labs      09/18/17   1057   NA  137   K  3.9   CL  97   CO2  26   GLU  99   BUN  19   CREA  7.83*   CA  8.1*       Assessment / Plan:   · Abdominal wound  · CT w/ oral contrast suggested possible EC fistula but there has been virtually no drainage from wound even with diet advancement  · ID consult for ABX recommendations.  Augmentin 500mg daily x 10 days   · Can f/u as outpatient with ortho for leg pain and possible bakers cyst.   · Discharge today following dialysis

## 2017-09-19 NOTE — DISCHARGE INSTRUCTIONS
Patient Discharge Instructions    Philip Manning / [id number] : 1940    Admitted 2017 Discharged: 2017     Take Home Medications       · It is important that you take the medication exactly as they are prescribed. · Keep your medication in the bottles provided by the pharmacist and keep a list of the medication names, dosages, and times to be taken in your wallet. MyChart Activation    Thank you for enrolling in [address] Please follow the instructions below to securely access your online medical record. [employer] allows you to send messages to your doctor, view your test results, renew your prescriptions, schedule appointments, and more. How Do I Sign Up? 1. In your internet browser, go to [email or web address]. 2. Click on the First Time User? Click Here link in the Sign In box. You will see the New Member Sign Up page. 3. Enter your [employer] Access Code exactly as it appears below. You will not need to use this code after youve completed the sign-up process. If you do not sign up before the expiration date, you must request a new code. [employer] Access Code: [email or web address]  Expires: 2017 10:26 AM     4. Enter the last four digits of your Social Security Number (xxxx) and Date of Birth (mm/dd/yyyy) as indicated and click Submit. You will be taken to the next sign-up page. 5. Create a [employer] ID. This will be your [employer] login ID and cannot be changed, so think of one that is secure and easy to remember. 6. Create a [employer] password. You can change your password at any time. 7. Enter your Password Reset Question and Answer. This can be used at a later time if you forget your password. 8. Enter your e-mail address. You will receive e-mail notification when new information is available in [address] 9. Click Sign Up. You can now view your medical record. Additional Information    Remember, [employer] is NOT to be used for urgent needs.  For medical emergencies, dial 911. Now available from your iPhone and Android! · Do not take other medications without consulting your doctor. · Take Tylenol as needed for pain. What to do at Home    Recommended diet: Renal Diet    Recommended activity: As tolerated    Wound care: Cover abdominal wound with dry dressing if there is any drainage. Follow up with Dr. Jocelyn Dang in 2 weeks. Call [facility] to make an appointment. Follow up with Dr. Levada Cockayne as needed for knee pain. Follow up with your family doctor for management of your chronic medical problems. Call Dr. Jocelyn Dang or go to the ER if you develop worsening pain, fever, vomiting, substantial or persistent increase in wound drainage, or any other symptoms that concern you.

## 2017-09-19 NOTE — PROGRESS NOTES
Discharge instructions reviewed with and copy given to patient along with prescriptions. Patient verbalizes understanding of instructions with no questions offered at this time.

## 2017-09-19 NOTE — ROUTINE PROCESS
Orthopedic & Surgical Nursing Communication Tool      7:35 AM  9/19/2017     Bedside and Verbal shift change report given to [facility] AT FRANCISCA HOLLEY RN (incoming nurse) by Johnathan Lambert RN (outgoing nurse) on Cisco Plummer a 68 y.o. female who was admitted on 9/14/2017 10:17 AM. Report included the following information SBAR, Kardex, Intake/Output, MAR, Accordion, Recent Results and Med Rec Status. Pt watching TV         Opportunity for questions and clarifications were given to the incoming nurse. Patient's bed is in low position, side rails x2, door open PRN, call bell within reach and patient not in distress.     Johnathan Lambert RN

## 2017-09-19 NOTE — PROGRESS NOTES
88 [address] Mina  YOB: 1940          Assessment & Plan:   ESRD   · Seen on HD. Tolerating well. Abd wall abscess  · Better  · Changing to PO abx    HTN  · Controlled       Subjective:   CC: f/u ESRD  HPI: Seen on HD. Angi well. Current Facility-Administered Medications   Medication Dose Route Frequency    amoxicillin-clavulanate (AUGMENTIN) 500-125 mg per tablet 1 Tab  1 Tab Oral DAILY    heparin (porcine) injection 5,000 Units  5,000 Units SubCUTAneous Q12H    oxyCODONE IR (ROXICODONE) tablet 5 mg  5 mg Oral Q6H PRN    sodium chloride (NS) flush 5-10 mL  5-10 mL IntraVENous PRN    sodium chloride (NS) flush 5-10 mL  5-10 mL IntraVENous Q8H    HYDROmorphone (PF) (DILAUDID) injection 0.5 mg  0.5 mg IntraVENous Q4H PRN    naloxone (NARCAN) injection 0.4 mg  0.4 mg IntraVENous PRN    ondansetron (ZOFRAN) injection 4 mg  4 mg IntraVENous Q4H PRN    diphenhydrAMINE (BENADRYL) injection 12.5 mg  12.5 mg IntraVENous Q4H PRN    docusate sodium (COLACE) capsule 100 mg  100 mg Oral BID    ALPRAZolam (XANAX) tablet 0.5 mg  0.5 mg Oral QID PRN    calcium acetate (PHOSLO) capsule 667 mg  1 Cap Oral TID WITH MEALS    hydrALAZINE (APRESOLINE) tablet 25 mg  25 mg Oral TID    isosorbide mononitrate ER (IMDUR) tablet 60 mg  60 mg Oral 7am    venlafaxine-SR (EFFEXOR-XR) capsule 37.5 mg  37.5 mg Oral DAILY    hydrALAZINE (APRESOLINE) 20 mg/mL injection 20 mg  20 mg IntraVENous Q6H PRN          Objective:     Vitals:  Blood pressure (P) 131/55, pulse (P) 80, temperature 98.1 °F (36.7 °C), resp. rate 18, height 5' 3\" (1.6 m), weight 77.1 kg (169 lb 15.6 oz), SpO2 95 %, not currently breastfeeding.   Temp (24hrs), Av.4 °F (36.9 °C), Min:97.7 °F (36.5 °C), Max:98.8 °F (37.1 °C)      Intake and Output:      1901 -  0700  In: 200 [I.V.:200]  Out: -     Physical Exam:               GENERAL ASSESSMENT: NAD  EXTREMITY: no EDEMA        ECG/rhythm:    Data Review      No results for input(s): TNIPOC in the last 72 hours. No lab exists for component: ITNL   No results for input(s): CPK, CKMB, TROIQ in the last 72 hours. Recent Labs      09/18/17   1057   NA  137   K  3.9   CL  97   CO2  26   BUN  19   CREA  7.83*   GLU  99   CA  8.1*   WBC  10.5   HGB  10.7*   HCT  33.6*   PLT  193      No results for input(s): INR, PTP, APTT in the last 72 hours. No lab exists for component: INREXT, INREXT  Needs: urine analysis, urine sodium, protein and creatinine  No results found for: BAUTISTA, CREAU        : Andrews Adamson MD  9/19/2017        [facility]:  [email or web address]  Darren Hornyamini office:  [address] [place] [address], [address] [place] [address],[address]  [place], [id number] [place] [address]  Phone: [phone]  Fax :     [phone]    [facility] office:  [address] [facility], [address]  Phone - [phone]  Fax - [phone]

## 2017-09-19 NOTE — DISCHARGE SUMMARY
Surgery Discharge Summary     Patient ID:  Stephanie Rivero  [id number]  25 y.o.  1940    Admit date: 9/14/2017    Discharge date and time: 9/19/2017    Admission Diagnoses:    Patient Active Problem List   Diagnosis Code    Abdominal wall abscess L02. 80    ESRD (end stage renal disease) ([facility] N18.6    HTN (hypertension) I10    Leukocytosis D72.829    Obese E66.9    Bacteremia R78.81    Abdominal abscess ([facility] K65.1       Discharge Diagnoses: There are no discharge diagnoses documented for the most recent discharge. Patient Active Problem List   Diagnosis Code    Abdominal wall abscess L02. 80    ESRD (end stage renal disease) ([facility] N18.6    HTN (hypertension) I10    Leukocytosis D72.829    Obese E66.9    Bacteremia R78.81    Abdominal abscess (HCC) K65.1       Procedures for this admission: [facility] Hospital Course: Ms. Akila Crain presented to the [facility] ED with c/o draining abdominal wound. Patient has distant hx of hemicolectomy w/ colostomy. About a week prior to admission she developed small midline abdominal wound with some drainage. CT imaging showed small fluid collection in the abdominal wall concerning for abscess vs. EC fistula. Patient was admitted and started on antibiotics. CT w/ oral contrast was obtained and suggested possible fistula however wound ceased draining even with diet advancement, decreasing suspicion of fistula. ID was consulted for ABX recommendations. Patient was discharged home on PO Augmentin with plan for office follow up in 2 weeks. Disposition: home    Discharged Condition : stable    Instructions: Follow-up in office  in 2 weeks.               Signed:  NAE Anguiano  9/19/2017  11:57 AM

## 2017-09-19 NOTE — DIALYSIS
[facility]                         [phone]  Vitals Pre Post Assessment Pre Post   BP 1375/51 121/44 LOC AXOx3 AXOx3   HR 67 111 Lungs CTA CTA   Temp 98.1 97.8 Cardiac RRR RRR   Resp 18 18 Skin Warm and dry Warm and dry   Weight 77.1kg -3k Edema Generalized Generalized      Pain Denies Denies     Orders   Duration: Start: 5413 End: 1120 Total: 3.5hrs   Dialyzer: Revaclear   K Bath: 3   Ca Bath: 2.5   Na / Bicarb: 140/350   Target Fluid Removal: 3k     Access   Type & Location: (EMIGDIO) AVG   Comments:                            + Thrill/ + Bruit. Cannulated with 15 G needles x 2 sites. Skin warm dry and intact around AVG. Secured and ready for treatment. Labs   Hep B status / date: Immune/68/03/16/2017   Obtained/Reviewed  Critical Results Called Reviewed  N/A     Meds Given   Name Dose Route   None ordered. Total Liters Process: 71.4   Net Fluid Removed: 3000 mL + prime and rinse      Comments   Time Out Done: Yes   Primary Nurse Rpt Pre: HOSP SHERLY LÓPEZ RN   Primary Nurse Rpt Post: HOSP SHERLY LÓPEZ RN   Pt Education: Procedural   Care Plan: Continue HD as prsecribed. Tx Summary: Patient tolerated treatment well for 3.5 hours. Blood pressure remained stable throughout treatment. All possible blood returned back to patient without any problems. 15 G needles pulled x 2 sites. Hemostasis achieved through hand held pressure x 5 minutes per site. Left patient in bed in stable condition and reported off to primary nurse. Patient due for discharge after dialysis.    Dialyzer Lot# [id number]  Tubing Lot#- [id number]

## 2017-09-20 LAB
BACTERIA SPEC CULT: ABNORMAL
BACTERIA SPEC CULT: ABNORMAL
BACTERIA SPEC CULT: NORMAL
BACTERIA SPEC CULT: NORMAL
SERVICE CMNT-IMP: ABNORMAL
SERVICE CMNT-IMP: NORMAL
SERVICE CMNT-IMP: NORMAL

## 2018-07-21 ENCOUNTER — HOSPITAL ENCOUNTER (INPATIENT)
Age: 78
LOS: 3 days | Discharge: HOME | DRG: 286 | End: 2018-07-24
Attending: EMERGENCY MEDICINE | Admitting: INTERNAL MEDICINE
Payer: MEDICARE

## 2018-07-21 ENCOUNTER — APPOINTMENT (OUTPATIENT)
Dept: GENERAL RADIOLOGY | Age: 78
DRG: 286 | End: 2018-07-21
Attending: EMERGENCY MEDICINE
Payer: MEDICARE

## 2018-07-21 DIAGNOSIS — E87.6 HYPOKALEMIA: ICD-10-CM

## 2018-07-21 DIAGNOSIS — I48.91 ATRIAL FIBRILLATION WITH RAPID VENTRICULAR RESPONSE (HCC): Primary | ICD-10-CM

## 2018-07-21 PROBLEM — J81.1 PULMONARY EDEMA: Status: ACTIVE | Noted: 2018-07-21

## 2018-07-21 LAB
ALBUMIN SERPL-MCNC: 3 G/DL (ref 3.5–5)
ALBUMIN/GLOB SERPL: 0.6 {RATIO} (ref 1.1–2.2)
ALP SERPL-CCNC: 118 U/L (ref 45–117)
ALT SERPL-CCNC: 23 U/L (ref 12–78)
ANION GAP SERPL CALC-SCNC: 8 MMOL/L (ref 5–15)
APTT PPP: 29.9 SEC (ref 22.1–32)
AST SERPL-CCNC: 26 U/L (ref 15–37)
BASOPHILS # BLD: 0 K/UL (ref 0–0.1)
BASOPHILS NFR BLD: 0 % (ref 0–1)
BILIRUB SERPL-MCNC: 0.9 MG/DL (ref 0.2–1)
BUN SERPL-MCNC: 9 MG/DL (ref 6–20)
BUN/CREAT SERPL: 3 (ref 12–20)
CALCIUM SERPL-MCNC: 8.3 MG/DL (ref 8.5–10.1)
CHLORIDE SERPL-SCNC: 100 MMOL/L (ref 97–108)
CO2 SERPL-SCNC: 30 MMOL/L (ref 21–32)
CREAT SERPL-MCNC: 2.97 MG/DL (ref 0.55–1.02)
DIFFERENTIAL METHOD BLD: ABNORMAL
EOSINOPHIL # BLD: 0.2 K/UL (ref 0–0.4)
EOSINOPHIL NFR BLD: 2 % (ref 0–7)
ERYTHROCYTE [DISTWIDTH] IN BLOOD BY AUTOMATED COUNT: 18.7 % (ref 11.5–14.5)
GLOBULIN SER CALC-MCNC: 5 G/DL (ref 2–4)
GLUCOSE SERPL-MCNC: 83 MG/DL (ref 65–100)
HCT VFR BLD AUTO: 37.7 % (ref 35–47)
HGB BLD-MCNC: 12 G/DL (ref 11.5–16)
IMM GRANULOCYTES # BLD: 0 K/UL (ref 0–0.04)
IMM GRANULOCYTES NFR BLD AUTO: 0 % (ref 0–0.5)
LYMPHOCYTES # BLD: 2.3 K/UL (ref 0.8–3.5)
LYMPHOCYTES NFR BLD: 26 % (ref 12–49)
MCH RBC QN AUTO: 33.2 PG (ref 26–34)
MCHC RBC AUTO-ENTMCNC: 31.8 G/DL (ref 30–36.5)
MCV RBC AUTO: 104.4 FL (ref 80–99)
MONOCYTES # BLD: 1 K/UL (ref 0–1)
MONOCYTES NFR BLD: 11 % (ref 5–13)
NEUTS SEG # BLD: 5.6 K/UL (ref 1.8–8)
NEUTS SEG NFR BLD: 61 % (ref 32–75)
NRBC # BLD: 0 K/UL (ref 0–0.01)
NRBC BLD-RTO: 0 PER 100 WBC
PLATELET # BLD AUTO: 211 K/UL (ref 150–400)
PMV BLD AUTO: 11.2 FL (ref 8.9–12.9)
POTASSIUM SERPL-SCNC: 2.9 MMOL/L (ref 3.5–5.1)
PROT SERPL-MCNC: 8 G/DL (ref 6.4–8.2)
RBC # BLD AUTO: 3.61 M/UL (ref 3.8–5.2)
SODIUM SERPL-SCNC: 138 MMOL/L (ref 136–145)
THERAPEUTIC RANGE,PTTT: NORMAL SECS (ref 58–77)
TROPONIN I SERPL-MCNC: 0.3 NG/ML
TROPONIN I SERPL-MCNC: <0.05 NG/ML
TSH SERPL DL<=0.05 MIU/L-ACNC: 2.02 UIU/ML (ref 0.36–3.74)
WBC # BLD AUTO: 9.1 K/UL (ref 3.6–11)

## 2018-07-21 PROCEDURE — 74011250636 HC RX REV CODE- 250/636: Performed by: INTERNAL MEDICINE

## 2018-07-21 PROCEDURE — 80053 COMPREHEN METABOLIC PANEL: CPT | Performed by: EMERGENCY MEDICINE

## 2018-07-21 PROCEDURE — 85730 THROMBOPLASTIN TIME PARTIAL: CPT | Performed by: EMERGENCY MEDICINE

## 2018-07-21 PROCEDURE — 94760 N-INVAS EAR/PLS OXIMETRY 1: CPT

## 2018-07-21 PROCEDURE — 85025 COMPLETE CBC W/AUTO DIFF WBC: CPT | Performed by: EMERGENCY MEDICINE

## 2018-07-21 PROCEDURE — 74011250637 HC RX REV CODE- 250/637: Performed by: INTERNAL MEDICINE

## 2018-07-21 PROCEDURE — 65660000000 HC RM CCU STEPDOWN

## 2018-07-21 PROCEDURE — 71046 X-RAY EXAM CHEST 2 VIEWS: CPT

## 2018-07-21 PROCEDURE — 74011000250 HC RX REV CODE- 250: Performed by: EMERGENCY MEDICINE

## 2018-07-21 PROCEDURE — 93306 TTE W/DOPPLER COMPLETE: CPT

## 2018-07-21 PROCEDURE — 93306 TTE W/DOPPLER COMPLETE: CPT | Performed by: INTERNAL MEDICINE

## 2018-07-21 PROCEDURE — 74011250637 HC RX REV CODE- 250/637: Performed by: EMERGENCY MEDICINE

## 2018-07-21 PROCEDURE — 84443 ASSAY THYROID STIM HORMONE: CPT | Performed by: EMERGENCY MEDICINE

## 2018-07-21 PROCEDURE — 74011250636 HC RX REV CODE- 250/636: Performed by: EMERGENCY MEDICINE

## 2018-07-21 PROCEDURE — 36415 COLL VENOUS BLD VENIPUNCTURE: CPT | Performed by: EMERGENCY MEDICINE

## 2018-07-21 PROCEDURE — 96374 THER/PROPH/DIAG INJ IV PUSH: CPT

## 2018-07-21 PROCEDURE — 93005 ELECTROCARDIOGRAM TRACING: CPT

## 2018-07-21 PROCEDURE — 99285 EMERGENCY DEPT VISIT HI MDM: CPT

## 2018-07-21 PROCEDURE — 84484 ASSAY OF TROPONIN QUANT: CPT | Performed by: EMERGENCY MEDICINE

## 2018-07-21 RX ORDER — POTASSIUM CHLORIDE 750 MG/1
40 TABLET, FILM COATED, EXTENDED RELEASE ORAL
Status: COMPLETED | OUTPATIENT
Start: 2018-07-21 | End: 2018-07-21

## 2018-07-21 RX ORDER — HEPARIN SODIUM 5000 [USP'U]/ML
60 INJECTION, SOLUTION INTRAVENOUS; SUBCUTANEOUS ONCE
Status: COMPLETED | OUTPATIENT
Start: 2018-07-21 | End: 2018-07-21

## 2018-07-21 RX ORDER — HYDRALAZINE HYDROCHLORIDE 25 MG/1
50 TABLET, FILM COATED ORAL 2 TIMES DAILY
Status: DISCONTINUED | OUTPATIENT
Start: 2018-07-21 | End: 2018-07-24 | Stop reason: HOSPADM

## 2018-07-21 RX ORDER — DILTIAZEM HYDROCHLORIDE 5 MG/ML
10 INJECTION INTRAVENOUS
Status: COMPLETED | OUTPATIENT
Start: 2018-07-21 | End: 2018-07-21

## 2018-07-21 RX ORDER — OMEPRAZOLE 20 MG/1
20 CAPSULE, DELAYED RELEASE ORAL DAILY
COMMUNITY
End: 2018-08-10

## 2018-07-21 RX ORDER — PANTOPRAZOLE SODIUM 40 MG/1
40 TABLET, DELAYED RELEASE ORAL
Status: DISCONTINUED | OUTPATIENT
Start: 2018-07-22 | End: 2018-07-24 | Stop reason: HOSPADM

## 2018-07-21 RX ORDER — SODIUM CHLORIDE 0.9 % (FLUSH) 0.9 %
5-10 SYRINGE (ML) INJECTION EVERY 8 HOURS
Status: DISCONTINUED | OUTPATIENT
Start: 2018-07-21 | End: 2018-07-24 | Stop reason: HOSPADM

## 2018-07-21 RX ORDER — CALCIUM CARBONATE 200(500)MG
400 TABLET,CHEWABLE ORAL 2 TIMES DAILY WITH MEALS
Status: DISCONTINUED | OUTPATIENT
Start: 2018-07-21 | End: 2018-07-24 | Stop reason: HOSPADM

## 2018-07-21 RX ORDER — HYDROCODONE BITARTRATE AND ACETAMINOPHEN 5; 325 MG/1; MG/1
1 TABLET ORAL
Status: DISCONTINUED | OUTPATIENT
Start: 2018-07-21 | End: 2018-07-24 | Stop reason: HOSPADM

## 2018-07-21 RX ORDER — HEPARIN SODIUM 10000 [USP'U]/100ML
12-25 INJECTION, SOLUTION INTRAVENOUS
Status: DISCONTINUED | OUTPATIENT
Start: 2018-07-21 | End: 2018-07-21

## 2018-07-21 RX ORDER — POTASSIUM CHLORIDE 7.45 MG/ML
10 INJECTION INTRAVENOUS
Status: COMPLETED | OUTPATIENT
Start: 2018-07-21 | End: 2018-07-22

## 2018-07-21 RX ORDER — CARVEDILOL 12.5 MG/1
12.5 TABLET ORAL DAILY
Status: ON HOLD | COMMUNITY
End: 2018-07-24

## 2018-07-21 RX ORDER — ASPIRIN 325 MG
325 TABLET ORAL DAILY
Status: DISCONTINUED | OUTPATIENT
Start: 2018-07-22 | End: 2018-07-22

## 2018-07-21 RX ORDER — ZOLPIDEM TARTRATE 5 MG/1
5 TABLET ORAL
Status: DISCONTINUED | OUTPATIENT
Start: 2018-07-21 | End: 2018-07-24 | Stop reason: HOSPADM

## 2018-07-21 RX ORDER — ACETAMINOPHEN 325 MG/1
650 TABLET ORAL
Status: DISCONTINUED | OUTPATIENT
Start: 2018-07-21 | End: 2018-07-24 | Stop reason: HOSPADM

## 2018-07-21 RX ORDER — CARVEDILOL 12.5 MG/1
12.5 TABLET ORAL 2 TIMES DAILY
Status: DISCONTINUED | OUTPATIENT
Start: 2018-07-21 | End: 2018-07-24 | Stop reason: HOSPADM

## 2018-07-21 RX ORDER — NAPROXEN SODIUM 220 MG
440 TABLET ORAL 2 TIMES DAILY
COMMUNITY
End: 2018-07-24

## 2018-07-21 RX ORDER — ISOSORBIDE MONONITRATE 30 MG/1
30 TABLET, EXTENDED RELEASE ORAL DAILY
Status: DISCONTINUED | OUTPATIENT
Start: 2018-07-22 | End: 2018-07-24 | Stop reason: HOSPADM

## 2018-07-21 RX ORDER — SODIUM CHLORIDE 0.9 % (FLUSH) 0.9 %
5-10 SYRINGE (ML) INJECTION AS NEEDED
Status: DISCONTINUED | OUTPATIENT
Start: 2018-07-21 | End: 2018-07-24 | Stop reason: HOSPADM

## 2018-07-21 RX ADMIN — HEPARIN SODIUM 12 UNITS/KG/HR: 10000 INJECTION, SOLUTION INTRAVENOUS at 13:26

## 2018-07-21 RX ADMIN — POTASSIUM CHLORIDE 40 MEQ: 750 TABLET, EXTENDED RELEASE ORAL at 12:13

## 2018-07-21 RX ADMIN — Medication 10 ML: at 15:47

## 2018-07-21 RX ADMIN — POTASSIUM CHLORIDE 10 MEQ: 10 INJECTION, SOLUTION INTRAVENOUS at 12:20

## 2018-07-21 RX ADMIN — HYDRALAZINE HYDROCHLORIDE 50 MG: 25 TABLET, FILM COATED ORAL at 18:01

## 2018-07-21 RX ADMIN — HEPARIN SODIUM 3900 UNITS: 5000 INJECTION, SOLUTION INTRAVENOUS; SUBCUTANEOUS at 13:18

## 2018-07-21 RX ADMIN — ZOLPIDEM TARTRATE 5 MG: 5 TABLET ORAL at 21:38

## 2018-07-21 RX ADMIN — ANTACID TABLETS 400 MG: 500 TABLET, CHEWABLE ORAL at 18:01

## 2018-07-21 RX ADMIN — CARVEDILOL 12.5 MG: 12.5 TABLET, FILM COATED ORAL at 13:15

## 2018-07-21 RX ADMIN — HYDROCODONE BITARTRATE AND ACETAMINOPHEN 1 TABLET: 5; 325 TABLET ORAL at 19:47

## 2018-07-21 RX ADMIN — CARVEDILOL 12.5 MG: 12.5 TABLET, FILM COATED ORAL at 18:01

## 2018-07-21 RX ADMIN — DILTIAZEM HYDROCHLORIDE 10 MG: 5 INJECTION INTRAVENOUS at 11:00

## 2018-07-21 RX ADMIN — SODIUM CHLORIDE 500 ML: 900 INJECTION, SOLUTION INTRAVENOUS at 12:20

## 2018-07-21 RX ADMIN — Medication 10 ML: at 21:38

## 2018-07-21 RX ADMIN — DILTIAZEM HYDROCHLORIDE 10 MG: 5 INJECTION INTRAVENOUS at 10:33

## 2018-07-21 NOTE — ED NOTES
Called Dr. Carmen Pope to clarify discontinued heparin drip order. Dr. Carmen Pope reported that since the pt did not want to start Coumadin that the heparin drip could also be discontinued.

## 2018-07-21 NOTE — PROGRESS NOTES
TRANSFER - IN REPORT:    1:46 PM Verbal report received from [facility], RN(name) on [address] [place] [address]  being received from ED(unit) for routine progression of care      Report consisted of patients Situation, Background, Assessment and   Recommendations(SBAR). Information from the following report(s) SBAR, Kardex, Procedure Summary, Intake/Output, MAR, Recent Results, Med Rec Status and Cardiac Rhythm AFIB was reviewed with the receiving nurse. Opportunity for questions and clarification was provided. Assessment completed upon patients arrival to unit and care assumed. 5:44 PM  Dr. Flako Ha notified of elevated troponin of 0.30. Orders received to draw a repeat in 6 hours. 8:09 PM  Bedside and Verbal shift change report given to Silvestre Stout RN (oncoming nurse) by Crispin Soriano RN (offgoing nurse). Report included the following information SBAR, Kardex, Procedure Summary, Intake/Output, MAR, Recent Results, Med Rec Status and Cardiac Rhythm NSR.

## 2018-07-21 NOTE — ED NOTES
TRANSFER - OUT REPORT:    Verbal report given to Cindy(name) on Baudilio Bailon  being transferred to [facility](unit) for routine progression of care       Report consisted of patients Situation, Background, Assessment and   Recommendations(SBAR). Information from the following report(s) SBAR, ED Summary, MAR, Recent Results and Cardiac Rhythm afib was reviewed with the receiving nurse. Lines:   Peripheral IV 07/21/18 Left Forearm (Active)   Site Assessment Clean, dry, & intact 7/21/2018 12:32 PM   Phlebitis Assessment 0 7/21/2018 12:32 PM   Infiltration Assessment 0 7/21/2018 12:32 PM   Dressing Status Clean, dry, & intact 7/21/2018 12:32 PM   Dressing Type Tape;Transparent 7/21/2018 12:32 PM   Hub Color/Line Status Blue;Flushed;Patent 7/21/2018 12:32 PM   Action Taken Blood drawn 7/21/2018 12:32 PM   Alcohol Cap Used Yes 7/21/2018 12:32 PM       Peripheral IV 07/21/18 Left Hand (Active)   Site Assessment Clean, dry, & intact 7/21/2018  1:05 PM   Phlebitis Assessment 0 7/21/2018  1:05 PM   Infiltration Assessment 0 7/21/2018  1:05 PM   Dressing Status Clean, dry, & intact 7/21/2018  1:05 PM   Dressing Type Tape;Transparent 7/21/2018  1:05 PM   Hub Color/Line Status Yellow; Flushed;Patent 7/21/2018  1:05 PM   Action Taken Blood drawn 7/21/2018  1:05 PM   Alcohol Cap Used Yes 7/21/2018  1:05 PM        Opportunity for questions and clarification was provided.       Patient transported with:   Monitor  Registered Nurse

## 2018-07-21 NOTE — PROGRESS NOTES
[facility]: MED RECONCILIATION    Comments/Recommendations:   · Verified allergies as documented: Iron. Per pt, only allergic to PO formulation, not IV - allergic to excipient? · Med rec completed with patient who was able to report on how she takes her medications when prompted with the name. · She primarily fills all medications at [facility] except Carvedilol and hydralazine at [facility]. She also uses [facility] if needed. · She gets dialysis on T,TH, Sat at [facility] in [place] ([phone]) - she received dialysis this morning  · Last dose of omeprazole was few days since she ran out of medication. She reports medication taken once daily but appears to filled for BID dosing, but based on last fill date, daily administration would make sense  · Of note: She reports that she thinks she takes carvedilol once a day, clarified with Amilcarita, dosing is 12.5 mg BID    Medications added:     · Aleve - per pt tylenol does not relieve pain, counseled against use    Medications removed:    · Alprazolam  · Venlafaxine    Medications adjusted:    · Tums changed from 1 tab daily to 2 tabs BID with meals  · Hydralazine changed from 25 mg TID to 50 mg BID  · Isosorbide clarified as half-tab (30 mg) daily    Information obtained from: Patient, pharmacy, Rx Query    Allergies: Iron    Prior to Admission Medications:  Medication Documentation Review Audit       Reviewed by Trinity Arreaga [facility] (Pharmacist) on 07/21/18 at 1324         Medication Sig Documenting Provider Last Dose Status Taking?      calcium carbonate (TUMS) 200 mg calcium (500 mg) chew Take 2 Tabs by mouth two (2) times daily (with meals). Greta Chung MD 7/20/2018 PM Active Yes    carvedilol (COREG) 12.5 mg tablet Take 12.5 mg by mouth daily.  Greta Chung MD 7/20/2018 Unknown time Active Yes             Med Note (Cooper Auguste   Sat Jul 21, 2018  1:24 PM): Filled as BID dosing, but patient believes she takes it once a day only, fills at [facility] FOLIC ACID/VIT BCOMP,C (DIALYVITE 800 PO) Take 1 Tab by mouth daily. Greta Chung MD 7/20/2018 AM Active Yes    hydrALAZINE (APRESOLINE) 50 mg tablet Take 50 mg by mouth two (2) times a day. Greta Chung MD 7/20/2018 HS Active Yes    isosorbide mononitrate ER (IMDUR) 60 mg CR tablet Take 30 mg by mouth daily. Half-tab Greta Chung MD 7/20/2018 AM Active Yes    naproxen sodium (ALEVE) 220 mg tablet Take 440 mg by mouth two (2) times a day. Historical Provider 7/20/2018 HS Active Yes    omeprazole (PRILOSEC) 20 mg capsule Take 20 mg by mouth daily. Greta Chung MD Few days ago Active Yes    zolpidem (AMBIEN) 5 mg tablet Take 5 mg by mouth nightly as needed for Sleep.  Greta Chung MD 7/20/2018 HS Active Yes                    Thank you,  Tere Morales, [facility]          Contact: [phone]

## 2018-07-21 NOTE — IP AVS SNAPSHOT
[address] [facility] [place], [facility] 
 
 
 [address] [place] [address] 
[phone] Patient: Khushboo Duvall MRN: [id number] ICY:2/93/6598 A check james indicates which time of day the medication should be taken. My Medications START taking these medications Instructions Each Dose to Equal  
 Morning Noon Evening Bedtime  
 apixaban 5 mg tablet Commonly known as:  Lillian Can Your last dose was:  9:30 pm 7/23 Notes to Patient:  Blood thinner to help prevent stroke and heart attack due to your abnormal heart rhythm (afib) Take 1 Tab by mouth two (2) times a day. 5 mg CHANGE how you take these medications Instructions Each Dose to Equal  
 Morning Noon Evening Bedtime  
 carvedilol 12.5 mg tablet Commonly known as:  Hedy Grajeda What changed:  when to take this Your last dose was:  6 pm 7/23 Notes to Patient: This dosage has been increased Take 1 Tab by mouth two (2) times daily (with meals). 12.5 mg  
    
  
   
   
  
   
  
  
CONTINUE taking these medications Instructions Each Dose to Equal  
 Morning Noon Evening Bedtime AMBIEN 5 mg tablet Generic drug:  zolpidem Your last dose was:  10 pm 7/22 Take 5 mg by mouth nightly as needed for Sleep.  
 5 mg  
    
   
   
   
  
  
 calcium carbonate 200 mg calcium (500 mg) Chew Commonly known as:  TUMS Your last dose was:  6 pm 7/23 Take 2 Tabs by mouth two (2) times daily (with meals). 2 Tab DIALYVITE 800 PO Your last dose was:  9:45 am 7/23 Take 1 Tab by mouth daily. 1 Tab  
    
  
   
   
   
  
 hydrALAZINE 50 mg tablet Commonly known as:  APRESOLINE Your last dose was:  6 pm 7/23 Take 50 mg by mouth two (2) times a day. 50 mg  
    
  
   
   
  
   
  
 isosorbide mononitrate ER 60 mg CR tablet Commonly known as:  IMDUR Your last dose was:  9:45 am 7/23 Take 30 mg by mouth daily. Half-tab 30 mg  
    
  
   
   
   
  
 omeprazole 20 mg capsule Commonly known as:  PRILOSEC Your last dose was:  6 am 7/24 (was substituted with Protonix while in the hospital) Take 20 mg by mouth daily. 20 mg  
    
  
   
   
   
  
  
STOP taking these medications ALEVE 220 mg tablet Generic drug:  naproxen sodium Where to Get Your Medications Information on where to get these meds will be given to you by the nurse or doctor. ! Ask your nurse or doctor about these medications  
  apixaban 5 mg tablet  
 carvedilol 12.5 mg tablet

## 2018-07-21 NOTE — CONSULTS
[address]  MR#: [id number]  : 1940  ACCOUNT #: [de-identified]   DATE OF SERVICE: 2018    REASON FOR CONSULTATION:  ESRD. HISTORY OF PRESENT ILLNESS:  This is a 75-year-old -American female with the following past medical history:  1. Status post right upper extremity AV graft. 2.  End-stage renal disease, on hemodialysis (Tuesday, Thursday, Saturday, [facility]). 3.  Hypertension. The patient is a 75-year-old -American female who dialyzes on a Tuesday, Thursday, Saturday schedule. Near the end of her dialysis session today, her heart rate was noted to run into the 140 range, improved to about 114 at the time of the phone call from the dialysis unit to myself. I spoke with the dialysis nurse. She indicated that the patient was doing reasonably well, but had persistent tachycardia. It was recommended that she go to the emergency room for evaluation post-dialysis. In the emergency room, she was found to have atrial fibrillation with a rapid ventricular response, and she has been admitted to the hospital.  She says that she is doing well. She notes no shortness of breath, no chest pain. She tells me that she did have a headache at the time of onset of her tachycardia at the dialysis unit and was given 2 acetaminophen tablets, apparently with some improvement. PAST MEDICAL HISTORY:  As above. PHYSICAL EXAMINATION:  GENERAL:  A pleasant elderly -American female in bed. VITAL SIGNS:  Most recent blood pressure 114/61, pulse 91, respirations are 21, temperature 97.8. HEENT:  Head is normocephalic. Eyes show no scleral icterus. NECK:  Appears supple. LUNGS:  Clear to auscultation. CARDIOVASCULAR:  Shows a rhythm which appears to be regular. There is no pericardial friction rub. ABDOMEN:  Soft. EXTREMITIES:  Show no thigh edema.   There is a good bruit noted at the right upper extremity arteriovenous graft. NEUROLOGIC:  She is awake, alert, answers appropriately. LABORATORY DATA:  White blood cell count 9.1, hematocrit is 37.7, platelet count is 922,173. Sodium 138, potassium 2.9, chloride and bicarbonate are 100 and 30 with a BUN and creatinine of 9 and 2.97, calcium is 8.3. ASSESSMENT AND PLAN:  The patient's heart has atrial fibrillation with an rapid ventricular response, now improved. She is a bit hypokalemic and is receiving potassium chloride. Would watch her potassium closely. I have contact [facility]. Will plan to dialyze her on Tuesday should she remain hospitalized. Otherwise, I will be available tomorrow and [facility] returns to the hospital on Monday. Thanks for the consult.       Bharat Skelton MD       CGA / CN  D: 07/21/2018 14:29     T: 07/21/2018 14:45  JOB #: [id number]

## 2018-07-21 NOTE — H&P
[facility]    Hospitalist Admission Note      NAME:  Bradley Thompson   :   1940   MRN:  [id number]     PCP:  Vanessa Geronimo MD     Date/Time:  2018 1:36 PM          Subjective:     CHIEF COMPLAINT: Irregular HR     HISTORY OF PRESENT ILLNESS:     Ms. Sachin Tate is a 66 y.o.  female with a hx of ESRD on HD, HTN who presented to the Emergency Department complaining of irregular HR. Patient was at HD this AM and upon finishing, patient was found to have a fast, irregular HR. Patient denies any CP, SOB, or palpitations at that time, just a slight HA. Brought to ED where she was found to be in Afib with RVR, a new diagnosis for her. We will admit for further management. Past Medical History:   Diagnosis Date    Arthritis     Chronic kidney disease     Dr Silva Zarco Hypertension     Ill-defined condition     Dialysis T, Th, S    Obese 2017        Past Surgical History:   Procedure Laterality Date    HX COLOSTOMY      left abdomen    HX VASCULAR ACCESS      left upper arm fistula       Social History   Substance Use Topics    Smoking status: Current Every Day Smoker     Packs/day: 0.25    Smokeless tobacco: Not on file    Alcohol use No        Family History   Problem Relation Age of Onset    Hypertension Other      multiple family members      Family hx cannot be fully assessed, due to the admitting conditions    Allergies   Allergen Reactions    Iron Anaphylaxis     Per pt, to PO formulation only, can tolerate IV formulation  Allergy to excipient? Prior to Admission medications    Medication Sig Start Date End Date Taking? Authorizing Provider   carvedilol (COREG) 12.5 mg tablet Take 12.5 mg by mouth daily. Yes Greta Chung MD   omeprazole (PRILOSEC) 20 mg capsule Take 20 mg by mouth daily. Yes Greta Chung MD   naproxen sodium (ALEVE) 220 mg tablet Take 440 mg by mouth two (2) times a day.    Yes Historical Provider   zolpidem (AMBIEN) 5 mg tablet Take 5 mg by mouth nightly as needed for Sleep. Yes Greta Chung MD   FOLIC ACID/VIT BCOMP,C (DIALYVITE 800 PO) Take 1 Tab by mouth daily. Yes Greta Chung MD   hydrALAZINE (APRESOLINE) 50 mg tablet Take 50 mg by mouth two (2) times a day. Yes Greta Chung MD   isosorbide mononitrate ER (IMDUR) 60 mg CR tablet Take 30 mg by mouth daily. Half-tab   Yes Greta Chung MD   calcium carbonate (TUMS) 200 mg calcium (500 mg) chew Take 2 Tabs by mouth two (2) times daily (with meals). Yes Greta Chung MD       Review of Systems:  (bold if positive, if negative)    Gen:  Eyes:  ENT:  CVS:  Pulm:  GI:    :    MS:  Skin:  Psych:  Endo:    Hem:  Renal:    Neuro:  headache      Objective:      VITALS:    Vital signs reviewed; most recent are:    Visit Vitals    /55    Pulse 86    Temp 97.8 °F (36.6 °C)    Resp 26    Ht 5' 4\" (1.626 m)    Wt 65 kg (143 lb 4.8 oz)    SpO2 98%    BMI 24.6 kg/m2     SpO2 Readings from Last 6 Encounters:   07/21/18 98%   09/19/17 95%   09/07/16 99%   07/09/15 98%        No intake or output data in the 24 hours ending 07/21/18 1336     Exam:     Physical Exam:    Gen:  Elderly, frail in no acute distress  HEENT:  Pink conjunctivae, PERRL, hearing intact to voice, moist mucous membranes  Neck:  Supple, without masses, thyroid non-tender  Resp:  No accessory muscle use, clear breath sounds without wheezes rales or rhonchi  Card:   Tachy, irregular, No murmurs, normal S1, S2 without thrills, bruits or peripheral edema  Abd:  Soft, non-tender, non-distended, normoactive bowel sounds are present, no palpable organomegaly and no detectable hernias  Lymph:  No cervical or inguinal adenopathy  Musc:  No cyanosis or clubbing  Skin:  No rashes or ulcers, skin turgor is good  Neuro:  Cranial nerves are grossly intact, no focal motor weakness, follows commands appropriately  Psych:  Good insight, oriented to person, place and time, alert     Labs:    Recent Labs      07/21/18   1029   WBC 9. 1   HGB  12.0   HCT  37.7   PLT  211     Recent Labs      07/21/18   1029   NA  138   K  2.9*   CL  100   CO2  30   GLU  83   BUN  9   CREA  2.97*   CA  8.3*   ALB  3.0*   TBILI  0.9   SGOT  26   ALT  23     Lab Results   Component Value Date/Time    Glucose (POC) 87 09/17/2017 08:49 PM     No results for input(s): PH, PCO2, PO2, HCO3, FIO2 in the last 72 hours. No results for input(s): INR in the last 72 hours. No lab exists for component: INREXT  All Micro Results     None        ECG reviewed. Atrial fibrillation with RVR, incomplete LBBB    I have reviewed previous records       Assessment and Plan: Active Problems:      Atrial fibrillation with rapid ventricular response. Asymptomatic. Appears to be a new process. Controlled with IV dilt in ED. Wean dilt gtt to home coreg and titrate this as needed. Has CHADSVasc score of at least 4 (~6% stroke/tia risk per year) but DECLINES anticoagulation with coumadin (really her only option). She is agreeable to daily ASA which may provide some protection. Cardiology consult. Check TTE. Serial Jennifer. Pulmonary edema (7/21/2018). Mild. Noted on CXR. Likely a result of Afib. No on oxygen or SOB so would hold off on diuretics/additional UF. ESRD (end stage renal disease). HD TTS. Nephrology consult    HTN (hypertension) (9/14/2017). Controlled at present. Continue home anti-hypertensives    Obese (9/14/2017).  Counseled on weight loss      Telemetry reviewed:   AFIB    Risk of deterioration: high      Total time spent with patient: [address] discussed with: Patient, Family and Nursing Staff    Discussed:  Care Plan and D/C Planning       ___________________________________________________    Attending Physician: Nikolai Madrigal DO

## 2018-07-21 NOTE — ED NOTES
Bedside and Verbal shift change report given to Chapis Liu (oncoming nurse) by Sruthi Lepe (offgoing nurse). Report included the following information SBAR, ED Summary, MAR, Recent Results and Cardiac Rhythm afib.

## 2018-07-21 NOTE — ED NOTES
Re-assessment: Pt's HR controlled at rate in 80s. Cardizem drip held, provider notified. IV line infiltrated, new line started. Pt in afib on monitor.

## 2018-07-21 NOTE — IP AVS SNAPSHOT
[address] [facility] [address] 
 
 
 [address] [address] [facility] 
[phone] Patient: Cyndi Dugan MRN: [id number] DEN:[birth year] About your hospitalization You were admitted on:  July 21, 2018 You last received care in the:  [facility] 3 PROG CARE TELE 1 You were discharged on:  July 24, 2018 Why you were hospitalized Your primary diagnosis was:  Not on File Your diagnoses also included:  Atrial Fibrillation With Rapid Ventricular Response (Hcc), Esrd (End Stage Renal Disease) (Hcc), Htn (Hypertension), Obese, Pulmonary Edema, Severe Left Ventricular Systolic Dysfunction Follow-up Information Follow up With Details Comments Contact Info Arthurine Cranker, MD On 7/30/2018 9:45am [address] [facility] 
[phone] Norma Lackey MD   [address] [facility] [address] [facility] 
[phone] Discharge Orders None A check james indicates which time of day the medication should be taken. My Medications START taking these medications Instructions Each Dose to Equal  
 Morning Noon Evening Bedtime  
 apixaban 5 mg tablet Commonly known as:  Gaby Minus Your last dose was:  9:30 pm 7/23 Notes to Patient:  Blood thinner to help prevent stroke and heart attack due to your abnormal heart rhythm (afib) Take 1 Tab by mouth two (2) times a day. 5 mg CHANGE how you take these medications Instructions Each Dose to Equal  
 Morning Noon Evening Bedtime  
 carvedilol 12.5 mg tablet Commonly known as:  Mansi Michaels What changed:  when to take this Your last dose was:  6 pm 7/23 Notes to Patient: This dosage has been increased Take 1 Tab by mouth two (2) times daily (with meals). 12.5 mg  
    
  
   
   
  
   
  
  
CONTINUE taking these medications Instructions Each Dose to Equal  
 Morning Noon Evening Bedtime AMBIEN 5 mg tablet Generic drug:  zolpidem Your last dose was:  10 pm  Take 5 mg by mouth nightly as needed for Sleep.  
 5 mg  
    
   
   
   
  
  
 calcium carbonate 200 mg calcium (500 mg) Chew Commonly known as:  TUMS Your last dose was:  6 pm  Take 2 Tabs by mouth two (2) times daily (with meals). 2 Tab DIALYVITE 800 PO Your last dose was:  9:45 am  Take 1 Tab by mouth daily. 1 Tab  
    
  
   
   
   
  
 hydrALAZINE 50 mg tablet Commonly known as:  APRESOLINE Your last dose was:  6 pm  Take 50 mg by mouth two (2) times a day. 50 mg  
    
  
   
   
  
   
  
 isosorbide mononitrate ER 60 mg CR tablet Commonly known as:  IMDUR Your last dose was:  9:45 am  Take 30 mg by mouth daily. Half-tab 30 mg  
    
  
   
   
   
  
 omeprazole 20 mg capsule Commonly known as:  PRILOSEC Your last dose was:  6 am  (was substituted with Protonix while in the hospital) Take 20 mg by mouth daily. 20 mg  
    
  
   
   
   
  
  
STOP taking these medications ALEVE 220 mg tablet Generic drug:  naproxen sodium Where to Get Your Medications Information on where to get these meds will be given to you by the nurse or doctor. ! Ask your nurse or doctor about these medications  
  apixaban 5 mg tablet  
 carvedilol 12.5 mg tablet Discharge Instructions Cardiology Follow up with Kenyon Rosales MD on 2018 at 9:45am. 
[address] [place] [address] 
[phone] HOSPITALIST DISCHARGE INSTRUCTIONS 
NAME: Ryland Lopez :  1940 MRN:  [id number] Date/Time:  2018 10:50 AM 
 
ADMIT DATE: 2018 DISCHARGE DATE: 2018 ADMITTING DIAGNOSIS: 
Atrial fibrillation Congestive heart failure DISCHARGE DIAGNOSIS: 
As above MEDICATIONS: 
  
 · It is important that you take the medication exactly as they are prescribed. · Keep your medication in the bottles provided by the pharmacist and keep a list of the medication names, dosages, and times to be taken in your wallet. · Do not take other medications without consulting your doctor. Pain Management: per above medications What to do at [facility] Recommended diet:  Cardiac diet; 2g sodium restriction Recommended activity: Activity as tolerated If you experience any of the following symptoms then please call your primary care physician or return to the emergency room if you cannot get hold of your doctor: 
Fever, chills, nausea, vomiting, diarrhea, change in mentation, falling, bleeding, shortness of breath, chest pain Follow Up: 
Dr. Morenita Glass MD  you are to call and set up an appointment to see them in 2 weeks. Follow-up Information Follow up With Details Comments Contact Info Russell Andre MD On 7/30/2018 9:45am [address]  [address] [facility] 
[phone] Morenita Glass MD   [place] [address] [address] [facility] 
[phone] Information obtained by : 
I understand that if any problems occur once I am at home I am to contact my physician. I understand and acknowledge receipt of the instructions indicated above. Physician's or R.N.'s Signature                                                                  Date/Time Patient or Representative Signature                                                          Date/Time MyChart Announcement  We are excited to announce that we are making your provider's discharge notes available to you in [employer]. You will see these notes when they are completed and signed by the physician that discharged you from your recent hospital stay. If you have any questions or concerns about any information you see in [employer], please call the Health Information Department where you were seen or reach out to your Primary Care Provider for more information about your plan of care. Introducing [facility]! [employer] introduces [employer] patient portal. Now you can access parts of your medical record, email your doctor's office, and request medication refills online. 1. In your internet browser, go to [email or web address] 2. Click on the First Time User? Click Here link in the Sign In box. You will see the New Member Sign Up page. 3. Enter your [employer] Access Code exactly as it appears below. You will not need to use this code after youve completed the sign-up process. If you do not sign up before the expiration date, you must request a new code. · [employer] Access Code: [email or web address] Expires: 10/19/2018 10:10 AM 
 
4. Enter the last four digits of your Social Security Number (xxxx) and Date of Birth (mm/dd/yyyy) as indicated and click Submit. You will be taken to the next sign-up page. 5. Create a [employer] ID. This will be your [employer] login ID and cannot be changed, so think of one that is secure and easy to remember. 6. Create a [employer] password. You can change your password at any time. 7. Enter your Password Reset Question and Answer. This can be used at a later time if you forget your password. 8. Enter your e-mail address. You will receive e-mail notification when new information is available in [address] 9. Click Sign Up. You can now view and download portions of your medical record. 10. Click the Download Summary menu link to download a portable copy of your medical information. If you have questions, please visit the Frequently Asked Questions section of the MyChart website. Remember, [employer] is NOT to be used for urgent needs. For medical emergencies, dial 911. Now available from your iPhone and Android! Introducing Brandon Bonilla As a [facility] patient, I wanted to make you aware of our electronic visit tool called Brandon Bonilla. [employer] allows you to connect within minutes with a medical provider 24 hours a day, seven days a week via a mobile device or tablet or logging into a secure website from your computer. You can access Brandon Bonilla from anywhere in the [place]. A virtual visit might be right for you when you have a simple condition and feel like you just dont want to get out of bed, or cant get away from work for an appointment, when your regular [facility] provider is not available (evenings, weekends or holidays), or when youre out of town and need minor care. Electronic visits cost only $49 and if the [employer] provider determines a prescription is needed to treat your condition, one can be electronically transmitted to a nearby pharmacy*. Please take a moment to enroll today if you have not already done so. The enrollment process is free and takes just a few minutes. To enroll, please download the [employer] ina to your tablet or phone, or visit [email or web address] to enroll on your computer. And, as an [address] [place] [address] patient with a [employer] account, the results of your visits will be scanned into your electronic medical record and your primary care provider will be able to view the scanned results. We urge you to continue to see your regular [facility] provider for your ongoing medical care.   And while your primary care provider may not be the one available when you seek a Brandon Bonilla virtual visit, the peace of mind you get from getting a real diagnosis real time can be priceless. For more information on Brandon Bonilla, view our Frequently Asked Questions (FAQs) at [email or web address]. Sincerely, 
 
Rica Dillon MD 
Chief Medical Officer [employer] *:  certain medications cannot be prescribed via Brandon Glasspao Unresulted Labs-Please follow up with your PCP about these lab tests Order Current Status HEPATITIS PANEL, ACUTE In process Providers Seen During Your Hospitalization Provider Specialty Primary office phone Manuela Multani MD Emergency Medicine [phone] Tab Bacon DO Internal Medicine [phone] Roe Simon MD Internal Medicine [phone] Your Primary Care Physician (PCP) Primary Care Physician Office Phone Office Fax Luis Josue [phone] You are allergic to the following Allergen Reactions Iron Anaphylaxis Per pt, to PO formulation only, can tolerate IV formulation Allergy to excipient? Recent Documentation Height Weight BMI OB Status Smoking Status 1.626 m 66.6 kg 25.22 kg/m2 Menopause Current Every Day Smoker Emergency Contacts Name Discharge Info Relation Home Work Mobile JanaLinda DISCHARGE CAREGIVER [3] Other Relative [6] [phone] [facility] DISCHARGE CAREGIVER [3] Sister [23] [phone] Patient Belongings The following personal items are in your possession at time of discharge: 
  Dental Appliances: None         Home Medications: None   Jewelry: None  Clothing: At bedside    Other Valuables: None Discharge Instructions Attachments/References MEFS - APIXABAN (ELIQUIS) - (BY MOUTH) (ENGLISH) Patient Handouts Apixaban (Eliquis) - (By mouth) Why this medicine is used:  
Treats and prevents blood clots. Contact a nurse or doctor right away if you have: 
· Sudden or severe headache · Back pain, numbness, tingling, weakness in your legs or feet · Loss of bladder or bowel control · Bloody vomit or vomit that looks like coffee grounds; bloody or black, tarry stools · Bleeding that does not stop or bruises that do not heal  
 
Common side effects: · Minor bleeding or bruising © 2017 [address] Information is for End User's use only and may not be sold, redistributed or otherwise used for commercial purposes. Please provide this summary of care documentation to your next provider. Signatures-by signing, you are acknowledging that this After Visit Summary has been reviewed with you and you have received a copy. Patient Signature:  ____________________________________________________________ Date:  ____________________________________________________________  
  
Robert Izquierdo Provider Signature:  ____________________________________________________________ Date:  ____________________________________________________________

## 2018-07-21 NOTE — ED PROVIDER NOTES
HPI Comments: Pt. Presents to the ER with complaints of tachycardia. Pt. Had been at dialysis. They had finished the dialysis session and checked her heart rate. It was found to be elevated, so she was sent to the ER. Pt. States that she cannot feel that he HR is faster and it does not feel irregular. Pt. Thinks that she might have atrial fibrillation, but she is not sure. She does not remember her heart racing in the past though. No fevers/chills. No n/v/d.  NO SOB or CP. NO other complaints. Pt. Was planning on going to the market after dialysis before she was sent to the ER. Patient is a 66 y.o. female presenting with palpitations. Palpitations    Pertinent negatives include no fever, no chest pain, no abdominal pain, no nausea, no vomiting, no back pain, no dizziness, no weakness, no cough and no shortness of breath. Past Medical History:   Diagnosis Date    Arthritis     Chronic kidney disease     Dr Gibson Carry Hypertension     Ill-defined condition     Dialysis T, Th, S    Obese 9/14/2017       Past Surgical History:   Procedure Laterality Date    HX COLOSTOMY      left abdomen    HX VASCULAR ACCESS      left upper arm fistula         Family History:   Problem Relation Age of Onset    Hypertension Other      multiple family members       Social History     Social History    Marital status:      Spouse name: N/A    Number of children: N/A    Years of education: N/A     Occupational History    Not on file. Social History Main Topics    Smoking status: Current Every Day Smoker     Packs/day: 0.25    Smokeless tobacco: Not on file    Alcohol use No    Drug use: No    Sexual activity: Not on file     Other Topics Concern    Not on file     Social History Narrative         ALLERGIES: Iron    Review of Systems   Constitutional: Negative for chills and fever. HENT: Negative for rhinorrhea and sore throat.     Respiratory: Negative for cough and shortness of breath. Cardiovascular: Positive for palpitations. Negative for chest pain. Gastrointestinal: Negative for abdominal pain, diarrhea, nausea and vomiting. Genitourinary: Negative for dysuria and urgency. Musculoskeletal: Negative for arthralgias and back pain. Skin: Negative for rash. Neurological: Negative for dizziness, weakness and light-headedness. Vitals:    07/21/18 1011   BP: 93/78   Pulse: (!) 143   Resp: 22   Temp: 97.8 °F (36.6 °C)   Weight: 65 kg (143 lb 4.8 oz)   Height: 5' 4\" (1.626 m)            Physical Exam     Vital signs reviewed. Nursing notes reviewed. Const:  No acute distress, well developed, well nourished  Head:  Atraumatic, normocephalic  Eyes:  PERRL, conjunctiva normal, no scleral icterus  Neck:  Supple, trachea midline  Cardiovascular:  RRR, no murmurs, no gallops, no rubs, tachycardic, irregularly irregular  Resp:  No resp distress, no increased work of breathing, no wheezes, no rhonchi, no rales,  Abd:  Soft, non-tender, non-distended, no rebound, no guarding, no CVA tenderness  :  Deferred  MSK:  No pedal edema, normal ROM  Neuro:  Alert and oriented x3, no cranial nerve defect  Skin:  Warm, dry, intact  Psych: normal mood and affect, behavior is normal, judgement and thought content is normal        MDM  Number of Diagnoses or Management Options  Atrial fibrillation with rapid ventricular response ([address]):      Amount and/or Complexity of Data Reviewed  Clinical lab tests: ordered and reviewed  Tests in the radiology section of CPT®: ordered and reviewed  Review and summarize past medical records: yes    Critical Care  Total time providing critical care: 30-74 minutes (35 min.)    Patient Progress  Patient progress: stable    ED Course     Pt. Presents to the ER with tachycardia. Pt. Found to be in afib with RVR. It is unclear if she has a history of the same. Pt. Seems to think that she may have a history of afib. I have started her on diltiazem. Pt.  To be evaluated for admission by the hospitalist.      Procedures      EKG interpretation: (Preliminary)  Rhythm: atrial fib, tachycardic; and irregular. Rate (approx.): 147; Axis: left axis deviation; P wave: absent; QRS interval: prolonged; ST/T wave: non-specific changes; Other findings: afib with RVR.

## 2018-07-22 PROBLEM — I51.9 SEVERE LEFT VENTRICULAR SYSTOLIC DYSFUNCTION: Status: ACTIVE | Noted: 2018-07-22

## 2018-07-22 LAB
ANION GAP SERPL CALC-SCNC: 6 MMOL/L (ref 5–15)
BASOPHILS # BLD: 0 K/UL (ref 0–0.1)
BASOPHILS NFR BLD: 0 % (ref 0–1)
BUN SERPL-MCNC: 16 MG/DL (ref 6–20)
BUN/CREAT SERPL: 4 (ref 12–20)
CALCIUM SERPL-MCNC: 8.1 MG/DL (ref 8.5–10.1)
CHLORIDE SERPL-SCNC: 104 MMOL/L (ref 97–108)
CO2 SERPL-SCNC: 30 MMOL/L (ref 21–32)
CREAT SERPL-MCNC: 4.34 MG/DL (ref 0.55–1.02)
DIFFERENTIAL METHOD BLD: ABNORMAL
EOSINOPHIL # BLD: 0.1 K/UL (ref 0–0.4)
EOSINOPHIL NFR BLD: 2 % (ref 0–7)
ERYTHROCYTE [DISTWIDTH] IN BLOOD BY AUTOMATED COUNT: 18.6 % (ref 11.5–14.5)
GLUCOSE SERPL-MCNC: 100 MG/DL (ref 65–100)
HCT VFR BLD AUTO: 34.5 % (ref 35–47)
HGB BLD-MCNC: 10.7 G/DL (ref 11.5–16)
IMM GRANULOCYTES # BLD: 0 K/UL (ref 0–0.04)
IMM GRANULOCYTES NFR BLD AUTO: 0 % (ref 0–0.5)
LYMPHOCYTES # BLD: 2.1 K/UL (ref 0.8–3.5)
LYMPHOCYTES NFR BLD: 30 % (ref 12–49)
MAGNESIUM SERPL-MCNC: 1.7 MG/DL (ref 1.6–2.4)
MCH RBC QN AUTO: 32.9 PG (ref 26–34)
MCHC RBC AUTO-ENTMCNC: 31 G/DL (ref 30–36.5)
MCV RBC AUTO: 106.2 FL (ref 80–99)
MONOCYTES # BLD: 1 K/UL (ref 0–1)
MONOCYTES NFR BLD: 15 % (ref 5–13)
NEUTS SEG # BLD: 3.6 K/UL (ref 1.8–8)
NEUTS SEG NFR BLD: 53 % (ref 32–75)
NRBC # BLD: 0 K/UL (ref 0–0.01)
NRBC BLD-RTO: 0 PER 100 WBC
PHOSPHATE SERPL-MCNC: 3.2 MG/DL (ref 2.6–4.7)
PLATELET # BLD AUTO: 169 K/UL (ref 150–400)
PMV BLD AUTO: 10.3 FL (ref 8.9–12.9)
POTASSIUM SERPL-SCNC: 3.9 MMOL/L (ref 3.5–5.1)
RBC # BLD AUTO: 3.25 M/UL (ref 3.8–5.2)
SODIUM SERPL-SCNC: 140 MMOL/L (ref 136–145)
TROPONIN I SERPL-MCNC: 0.29 NG/ML
WBC # BLD AUTO: 6.9 K/UL (ref 3.6–11)

## 2018-07-22 PROCEDURE — 65660000000 HC RM CCU STEPDOWN

## 2018-07-22 PROCEDURE — 36415 COLL VENOUS BLD VENIPUNCTURE: CPT | Performed by: INTERNAL MEDICINE

## 2018-07-22 PROCEDURE — 74011250636 HC RX REV CODE- 250/636: Performed by: INTERNAL MEDICINE

## 2018-07-22 PROCEDURE — 83735 ASSAY OF MAGNESIUM: CPT | Performed by: INTERNAL MEDICINE

## 2018-07-22 PROCEDURE — 80048 BASIC METABOLIC PNL TOTAL CA: CPT | Performed by: INTERNAL MEDICINE

## 2018-07-22 PROCEDURE — 77030012856

## 2018-07-22 PROCEDURE — 84100 ASSAY OF PHOSPHORUS: CPT | Performed by: INTERNAL MEDICINE

## 2018-07-22 PROCEDURE — 84484 ASSAY OF TROPONIN QUANT: CPT | Performed by: INTERNAL MEDICINE

## 2018-07-22 PROCEDURE — 74011250637 HC RX REV CODE- 250/637: Performed by: INTERNAL MEDICINE

## 2018-07-22 PROCEDURE — 85025 COMPLETE CBC W/AUTO DIFF WBC: CPT | Performed by: INTERNAL MEDICINE

## 2018-07-22 RX ORDER — BUTALBITAL, ACETAMINOPHEN AND CAFFEINE 50; 325; 40 MG/1; MG/1; MG/1
1 TABLET ORAL
Status: DISPENSED | OUTPATIENT
Start: 2018-07-22 | End: 2018-07-22

## 2018-07-22 RX ORDER — HEPARIN SODIUM 5000 [USP'U]/ML
5000 INJECTION, SOLUTION INTRAVENOUS; SUBCUTANEOUS EVERY 8 HOURS
Status: DISCONTINUED | OUTPATIENT
Start: 2018-07-22 | End: 2018-07-23

## 2018-07-22 RX ORDER — HEPARIN SODIUM 5000 [USP'U]/ML
5000 INJECTION, SOLUTION INTRAVENOUS; SUBCUTANEOUS EVERY 8 HOURS
Status: DISCONTINUED | OUTPATIENT
Start: 2018-07-22 | End: 2018-07-22

## 2018-07-22 RX ADMIN — Medication 10 ML: at 21:58

## 2018-07-22 RX ADMIN — ASPIRIN 325 MG: 325 TABLET ORAL at 08:36

## 2018-07-22 RX ADMIN — Medication 10 ML: at 06:00

## 2018-07-22 RX ADMIN — ISOSORBIDE MONONITRATE 30 MG: 30 TABLET, EXTENDED RELEASE ORAL at 08:35

## 2018-07-22 RX ADMIN — CARVEDILOL 12.5 MG: 12.5 TABLET, FILM COATED ORAL at 08:36

## 2018-07-22 RX ADMIN — HEPARIN SODIUM 5000 UNITS: 5000 INJECTION, SOLUTION INTRAVENOUS; SUBCUTANEOUS at 21:58

## 2018-07-22 RX ADMIN — CARVEDILOL 12.5 MG: 12.5 TABLET, FILM COATED ORAL at 17:25

## 2018-07-22 RX ADMIN — ANTACID TABLETS 400 MG: 500 TABLET, CHEWABLE ORAL at 17:25

## 2018-07-22 RX ADMIN — HYDRALAZINE HYDROCHLORIDE 50 MG: 25 TABLET, FILM COATED ORAL at 08:36

## 2018-07-22 RX ADMIN — HYDRALAZINE HYDROCHLORIDE 50 MG: 25 TABLET, FILM COATED ORAL at 17:25

## 2018-07-22 RX ADMIN — HEPARIN SODIUM 5000 UNITS: 5000 INJECTION INTRAVENOUS; SUBCUTANEOUS at 14:37

## 2018-07-22 RX ADMIN — HYDROCODONE BITARTRATE AND ACETAMINOPHEN 1 TABLET: 5; 325 TABLET ORAL at 06:23

## 2018-07-22 RX ADMIN — NEPHROCAP 1 CAPSULE: 1 CAP ORAL at 08:36

## 2018-07-22 RX ADMIN — PANTOPRAZOLE SODIUM 40 MG: 40 TABLET, DELAYED RELEASE ORAL at 06:23

## 2018-07-22 RX ADMIN — ZOLPIDEM TARTRATE 5 MG: 5 TABLET ORAL at 21:58

## 2018-07-22 RX ADMIN — Medication 10 ML: at 14:39

## 2018-07-22 RX ADMIN — ANTACID TABLETS 400 MG: 500 TABLET, CHEWABLE ORAL at 08:36

## 2018-07-22 NOTE — PROGRESS NOTES
SHIFT CHANGE:   Bedside and Verbal shift change report given to Jena CEBALLOS (oncoming nurse) by Devin Campos (offgoing nurse). Report included the following information SBAR, Kardex, MAR and Recent Results. END OF SHIFT REPORT:    Patient transferred to regular telemetry room. Bedside and Verbal shift change report given to Angela Lima (oncoming nurse) by Poornima Roche (offgoing nurse). Report included the following information SBAR, Kardex, Intake/Output, MAR and Cardiac Rhythm sinus rhythm.

## 2018-07-22 NOTE — PROGRESS NOTES
SHIFT CHANGE:  7:15 AM Report received from Manuela Camargo RN. SBAR, Kardex, Intake/Output, MAR, Recent Results, Med Rec Status and Cardiac Rhythm NSR were discussed. SHIFT SUMMARY:  7:54 AM  Patient states she doesn't feel well this morning. PRN Norco given earlier and she said it does help but the headache has been bothering her for over a week. VSS. Will monitor. 8:42 AM  Patient brought her home walker to the hospital and it was left down in the ED. ED was called yesterday evening and stated they would bring it up to her. As of this morning the patient's walker still has not made it up to her room. ED called again to locate the walker. They are looking for it, have called security and will call me back. 11:00 AM  One time dose of Fioricet ordered. Patient not complaining of headache at this time. Held dose for now. 2:41 PM  Colostomy changed per patient request.    7:42 PM  Clarified orders for Eliquis with Dr. Jyotsna Moore. First dose is scheduled for tonight and heparin had been discontinued by pharmacy. Dr. Jyotsna Moore would like to hold off on the eliquis tonight in case of possible cath tomorrow. Orders adjusted. END OF SHIFT REPORT:  7:30 PM  Bedside and Verbal shift change report given to Manuela Camargo RN (oncoming nurse) by Tha Estrella RN (offgoing nurse). Report included the following information SBAR, Kardex, Procedure Summary, Intake/Output, MAR, Recent Results, Med Rec Status and Cardiac Rhythm NSR.

## 2018-07-22 NOTE — PROGRESS NOTES
Robbie Murrayelsen [facility] 79  [address] [facility], [address] [place] [address]  [phone]      Medical Progress Note      NAME: Bradley Thompson   :  1940  MRM:  [id number]    Date/Time: 2018  11:27 AM       Assessment and Plan: Active Problems:    Atrial fibrillation with rapid ventricular response. Converted to sinus after Dilt IV x 2 in ED. On home coreg with rate controlled. Discussed anticoagulation for stroke prevention and patient/family declined any anticoagulation beyond aspirin (coumadin being only remaining option). TTE and Jennifer discussed below. Cardiology consult pending    Severe left ventricular systolic dysfunction / Elevated troponin / Pulmonary edema. Jennifer downtrending. Entirely asymptomatic, no hypoxia, no edema, no chest pain. Unclear but LVEF reduction appears to be new (saw Dr. Nick Palencia x 1 for pre-op clearance last year, will need records). Cardiology consult to discuss med management and ischemic work-up. Continue ASA, BB, imdur. Check FLP and consider aspirin. Anuric so volume management likely by UF. ESRD (end stage renal disease). Nephrology following. HD TTS    HTN (hypertension). BP low normal. Continue home anti-hypertensives and monitor    Obese. Counseled on weight loss         Subjective:     Chief Complaint:  Patient seen and examined. Chart reviewed. Patient reports HA but otherwise denies chest pain/SOB/swelling    ROS:  (bold if positive, if negative)      Tolerating PT  Tolerating Diet        Objective:     Last 24hrs VS reviewed since prior progress note.  Most recent are:    Visit Vitals    /76 (BP 1 Location: Left arm, BP Patient Position: Sitting)    Pulse 89    Temp 97.6 °F (36.4 °C)    Resp 21    Ht 5' 4\" (1.626 m)    Wt 65.2 kg (143 lb 10.1 oz)    SpO2 99%    BMI 24.65 kg/m2     SpO2 Readings from Last 6 Encounters:   18 99%   17 95%   16 99%   07/09/15 98%          Intake/Output Summary (Last 24 hours) at 07/22/18 1127  Last data filed at 07/21/18 1415   Gross per 24 hour   Intake                0 ml   Output               50 ml   Net              -50 ml        Physical Exam:    Gen:  Elderly, frail, in no acute distress  HEENT:  Pink conjunctivae, PERRL, hearing intact to voice, moist mucous membranes  Neck:  Supple, without masses, thyroid non-tender  Resp:  No accessory muscle use, clear breath sounds without wheezes rales or rhonchi  Card:  No murmurs, normal S1, S2 without thrills, bruits or peripheral edema  Abd:  Soft, non-tender, non-distended, normoactive bowel sounds are present, no palpable organomegaly and no detectable hernias  Lymph:  No cervical or inguinal adenopathy  Musc:  No cyanosis or clubbing  Skin:  No rashes or ulcers, skin turgor is good  Neuro:  Cranial nerves are grossly intact, no focal motor weakness, follows commands appropriately  Psych:  Good insight, oriented to person, place and time, alert    Telemetry reviewed:   normal sinus rhythm  __________________________________________________________________  Medications Reviewed: (see below)  Medications:     Current Facility-Administered Medications   Medication Dose Route Frequency    butalbital-acetaminophen-caffeine (FIORICET, ESGIC) -40 mg per tablet 1 Tab  1 Tab Oral NOW    sodium chloride (NS) flush 5-10 mL  5-10 mL IntraVENous Q8H    sodium chloride (NS) flush 5-10 mL  5-10 mL IntraVENous PRN    acetaminophen (TYLENOL) tablet 650 mg  650 mg Oral Q4H PRN    HYDROcodone-acetaminophen (NORCO) 5-325 mg per tablet 1 Tab  1 Tab Oral Q4H PRN    carvedilol (COREG) tablet 12.5 mg  12.5 mg Oral BID    aspirin (ASPIRIN) tablet 325 mg  325 mg Oral DAILY    pantoprazole (PROTONIX) tablet 40 mg  40 mg Oral ACB    isosorbide mononitrate ER (IMDUR) tablet 30 mg  30 mg Oral DAILY    hydrALAZINE (APRESOLINE) tablet 50 mg  50 mg Oral BID    B complex-vitaminC-folic acid (NEPHROCAP) cap  1 Cap Oral DAILY    calcium carbonate (TUMS) chewable tablet 400 mg [elemental]  400 mg Oral BID WITH MEALS    zolpidem (AMBIEN) tablet 5 mg  5 mg Oral QHS PRN        Lab Data Reviewed: (see below)  Lab Review:     Recent Labs      07/22/18   0015  07/21/18   1029   WBC  6.9  9.1   HGB  10.7*  12.0   HCT  34.5*  37.7   PLT  169  211     Recent Labs      07/22/18   0015  07/21/18   1029   NA  140  138   K  3.9  2.9*   CL  104  100   CO2  30  30   GLU  100  83   BUN  16  9   CREA  4.34*  2.97*   CA  8.1*  8.3*   MG  1.7   --    PHOS  3.2   --    ALB   --   3.0*   TBILI   --   0.9   SGOT   --   26   ALT   --   23     Lab Results   Component Value Date/Time    Glucose (POC) 87 09/17/2017 08:49 PM     No results for input(s): PH, PCO2, PO2, HCO3, FIO2 in the last 72 hours. No results for input(s): INR in the last 72 hours. No lab exists for component: INREXT  All Micro Results     None          I have reviewed notes of prior 24hr.     Other pertinent lab: None    Total time spent with patient: [address] [place] [address] discussed with: Patient, Care Manager, Nursing Staff and >50% of time spent in counseling and coordination of care    Discussed:  Care Plan and D/C Planning    Prophylaxis:  Hep SQ    Disposition:   PT, OT, RN           ___________________________________________________    Attending Physician: Erasmo Parkinson DO

## 2018-07-22 NOTE — PROGRESS NOTES
1930  Bedside and Verbal shift change report given to Jena cross (oncoming nurse) by Simran Whitaker (offgoing nurse). Report included the following information SBAR, Kardex, Procedure Summary and Intake/Output. Shift Summary:  Patient remains anuric, slept well though did complain of persistent head ache while awake. Pain medicine given as prescribed. 0730  Bedside and Verbal shift change report given to Ami Calderón. (oncoming nurse) by Inessa Mcpherson (offgoing nurse). Report included the following information SBAR, Kardex, Intake/Output and MAR.

## 2018-07-22 NOTE — PROGRESS NOTES
Physical Therapy  Order received and acknowledged. Spoke with nursing prior to attempting to see the patient for PT evaluation/treatment this day and ok to proceed. Patient stating \"I don't feel like doing this right now I am so out of it\". We will follow this patient and attempt again as our schedule allows. Thank you.     Carolyn Ca, PT, DPT, CLT

## 2018-07-22 NOTE — CONSULTS
HISTORY OF PRESENTING ILLNESS Amari Pascual is a 66 y.o. female with history of hypertension and end-stage renal disease on dialysis who presents with tachycardia that was noted upon completion of her dialysis. She was found to be in newly diagnosed atrial fibrillation with rapid ventricular response for which she was given IV Cardizem. Echocardiogram demonstrated a cardiomyopathy with a left ventricular ejection fraction of 10-15%. Chest x-ray demonstrated interstitial edema. Cardiac enzymes were negative initially however have risen to 0.30. EKG demonstrated atrial fibrillation with incomplete left bundle branch block. She was also noted to have hypokalemia with a potassium of 2.9. She reports being told in the past that she has a weakened LV function and underwent cardiac testing late last year however she cannot recall to what extent her testing was performed. She denies chest pain, SOB, edema. She is asymptomatic from the newly diagnosed AF. ACTIVE PROBLEM LIST Patient Active Problem List  
 Diagnosis Date Noted  Atrial fibrillation with rapid ventricular response [facility] 07/21/2018  Pulmonary edema 07/21/2018  Abdominal abscess 09/16/2017  Bacteremia 09/15/2017  Abdominal wall abscess 09/14/2017  ESRD (end stage renal disease) ([facility] 09/14/2017  
 HTN (hypertension) 09/14/2017  Leukocytosis 09/14/2017  Obese 09/14/2017 MEDICATIONS Current Facility-Administered Medications Medication Dose Route Frequency  butalbital-acetaminophen-caffeine (FIORICET, ESGIC) -40 mg per tablet 1 Tab  1 Tab Oral NOW  sodium chloride (NS) flush 5-10 mL  5-10 mL IntraVENous Q8H  
 sodium chloride (NS) flush 5-10 mL  5-10 mL IntraVENous PRN  
 acetaminophen (TYLENOL) tablet 650 mg  650 mg Oral Q4H PRN  
 HYDROcodone-acetaminophen (NORCO) 5-325 mg per tablet 1 Tab  1 Tab Oral Q4H PRN  
 carvedilol (COREG) tablet 12.5 mg  12.5 mg Oral BID  aspirin (ASPIRIN) tablet 325 mg  325 mg Oral DAILY  pantoprazole (PROTONIX) tablet 40 mg  40 mg Oral ACB  isosorbide mononitrate ER (IMDUR) tablet 30 mg  30 mg Oral DAILY  hydrALAZINE (APRESOLINE) tablet 50 mg  50 mg Oral BID  B complex-vitaminC-folic acid (NEPHROCAP) cap  1 Cap Oral DAILY  calcium carbonate (TUMS) chewable tablet 400 mg [elemental]  400 mg Oral BID WITH MEALS  zolpidem (AMBIEN) tablet 5 mg  5 mg Oral QHS PRN  
  
 
 
 PAST MEDICAL HISTORY Past Medical History:  
Diagnosis Date  Arthritis  Chronic kidney disease Dr Seven Goodwin  Hypertension  Ill-defined condition Dialysis Aleah Paul Obese 9/14/2017 PAST SURGICAL HISTORY Past Surgical History:  
Procedure Laterality Date  HX COLOSTOMY    
 left abdomen  HX VASCULAR ACCESS    
 left upper arm fistula ALLERGIES Allergies Allergen Reactions  Iron Anaphylaxis Per pt, to PO formulation only, can tolerate IV formulation Allergy to excipient? FAMILY HISTORY Family History Problem Relation Age of Onset  Hypertension Other   
  multiple family members  
 negative for cardiac disease SOCIAL HISTORY Social History Social History  Marital status:  Spouse name: N/A  
 Number of children: N/A  
 Years of education: N/A Social History Main Topics  Smoking status: Current Every Day Smoker Packs/day: 0.25  Smokeless tobacco: None  Alcohol use No  
 Drug use: No  
 Sexual activity: Not Asked Other Topics Concern  None Social History Narrative MEDICATIONS Current Facility-Administered Medications Medication Dose Route Frequency  butalbital-acetaminophen-caffeine (FIORICET, ESGIC) -40 mg per tablet 1 Tab  1 Tab Oral NOW  sodium chloride (NS) flush 5-10 mL  5-10 mL IntraVENous Q8H  
 sodium chloride (NS) flush 5-10 mL  5-10 mL IntraVENous PRN  
 acetaminophen (TYLENOL) tablet 650 mg  650 mg Oral Q4H PRN  
 HYDROcodone-acetaminophen (NORCO) 5-325 mg per tablet 1 Tab  1 Tab Oral Q4H PRN  
 carvedilol (COREG) tablet 12.5 mg  12.5 mg Oral BID  aspirin (ASPIRIN) tablet 325 mg  325 mg Oral DAILY  pantoprazole (PROTONIX) tablet 40 mg  40 mg Oral ACB  isosorbide mononitrate ER (IMDUR) tablet 30 mg  30 mg Oral DAILY  hydrALAZINE (APRESOLINE) tablet 50 mg  50 mg Oral BID  B complex-vitaminC-folic acid (NEPHROCAP) cap  1 Cap Oral DAILY  calcium carbonate (TUMS) chewable tablet 400 mg [elemental]  400 mg Oral BID WITH MEALS  zolpidem (AMBIEN) tablet 5 mg  5 mg Oral QHS PRN I have reviewed the nurses notes, vitals, problem list, allergy list, medical history, family, social history and medications. REVIEW OF SYMPTOMS General: Pt denies excessive weight gain or loss. Pt is able to conduct ADL's HEENT: Denies blurred vision, headaches, hearing loss, epistaxis and difficulty swallowing. Respiratory: Denies cough, congestion, shortness of breath, FRAUSTO, wheezing or stridor. Cardiovascular: Denies precordial pain, palpitations, edema or PND Gastrointestinal: Denies poor appetite, indigestion, abdominal pain or blood in stool Genitourinary: Denies hematuria, dysuria, increased urinary frequency Musculoskeletal: Denies joint pain or swelling from muscles or joints Neurologic: Denies tremor, paresthesias, headache, or sensory motor disturbance Psychiatric: Denies confusion, insomnia, depression Integumentray: Denies rash, itching or ulcers. Hematologic: Denies easy bruising, bleeding PHYSICAL EXAMINATION Vitals:  
 07/22/18 6072 07/22/18 5364 07/22/18 8107 07/22/18 8874 BP:  122/66  127/76 Pulse:  84 81 89 Resp:  17  21 Temp:  97.8 °F (36.6 °C)  97.6 °F (36.4 °C) SpO2:  97%  99% Weight: 143 lb 10.1 oz (65.2 kg) Height:      
 
General: Well developed, in no acute distress. HEENT: No jaundice, oral mucosa moist, no oral ulcers Neck: Supple, no stiffness, no lymphadenopathy, supple Heart:  Normal S1/S2 negative S3 or S4. Regular, no murmur, gallop or rub, no jugular venous distention Respiratory: Clear bilaterally x 4, no wheezing or rales Abdomen:   Soft, non-tender, bowel sounds are active.  
Extremities:  No edema, normal cap refill, no cyanosis. Musculoskeletal: No clubbing, no deformities Neuro: A&Ox3, speech clear, gait stable, cooperative, no focal neurologic deficits Skin: Skin color is normal. No rashes or lesions. Non diaphoretic, moist. 
Vascular: 2+ pulses symmetric in all extremities DIAGNOSTIC DATA EKG: Atrial fibrillation with rapid ventricular response and incomplete left bundle branch block versus aberrancy LABORATORY DATA Lab Results Component Value Date/Time WBC 6.9 07/22/2018 12:15 AM  
 HGB 10.7 (L) 07/22/2018 12:15 AM  
 HCT 34.5 (L) 07/22/2018 12:15 AM  
 PLATELET 900 41/21/7359 12:15 AM  
 .2 (H) 07/22/2018 12:15 AM  
  
Lab Results Component Value Date/Time Sodium 140 07/22/2018 12:15 AM  
 Potassium 3.9 07/22/2018 12:15 AM  
 Chloride 104 07/22/2018 12:15 AM  
 CO2 30 07/22/2018 12:15 AM  
 Anion gap 6 07/22/2018 12:15 AM  
 Glucose 100 07/22/2018 12:15 AM  
 BUN 16 07/22/2018 12:15 AM  
 Creatinine 4.34 (H) 07/22/2018 12:15 AM  
 BUN/Creatinine ratio 4 (L) 07/22/2018 12:15 AM  
 GFR est AA 12 (L) 07/22/2018 12:15 AM  
 GFR est non-AA 10 (L) 07/22/2018 12:15 AM  
 Calcium 8.1 (L) 07/22/2018 12:15 AM  
 Bilirubin, total 0.9 07/21/2018 10:29 AM  
 AST (SGOT) 26 07/21/2018 10:29 AM  
 Alk. phosphatase 118 (H) 07/21/2018 10:29 AM  
 Protein, total 8.0 07/21/2018 10:29 AM  
 Albumin 3.0 (L) 07/21/2018 10:29 AM  
 Globulin 5.0 (H) 07/21/2018 10:29 AM  
 A-G Ratio 0.6 (L) 07/21/2018 10:29 AM  
 ALT (SGPT) 23 07/21/2018 10:29 AM  
  
 
 
 ASSESSMENT 1. Atrial fibrillation 2. Cardiomyopathy A. Left ventricular ejection fraction 10-15% 3.   End-stage renal disease on dialysis 4. Hypertension 5. Obesity PLAN Continue current drug regimen. Will obtain records from Dr. Caterina Lopez office to evaluate whether her LV dysfunction is new as well as whether ischemic workup has been performed in the past. Consider ICD implantation for primary prevention of sudden death. She refuses warfarin. Start eliquis 5 mg bid. Thank you, Clive Newell MD and Dr. Flako Ha for involving me in the care of this extraordinarily pleasant female. Please do not hesitate to contact me for further questions/concerns. Naveed Fitzpatrick MD 
Cardiac Electrophysiology / Cardiology [address] [place] [address] 104, [address] [facility], [address] [facility] 
[phone] / [phone] Fax   [phone] / [phone] Fax

## 2018-07-23 LAB
ANION GAP SERPL CALC-SCNC: 10 MMOL/L (ref 5–15)
ATRIAL RATE: 163 BPM
BASOPHILS # BLD: 0 K/UL (ref 0–0.1)
BASOPHILS NFR BLD: 0 % (ref 0–1)
BUN SERPL-MCNC: 31 MG/DL (ref 6–20)
BUN/CREAT SERPL: 5 (ref 12–20)
CALCIUM SERPL-MCNC: 8.2 MG/DL (ref 8.5–10.1)
CALCULATED R AXIS, ECG10: -10 DEGREES
CALCULATED T AXIS, ECG11: -165 DEGREES
CHLORIDE SERPL-SCNC: 104 MMOL/L (ref 97–108)
CHOLEST SERPL-MCNC: 127 MG/DL
CO2 SERPL-SCNC: 26 MMOL/L (ref 21–32)
CREAT SERPL-MCNC: 6.29 MG/DL (ref 0.55–1.02)
DIAGNOSIS, 93000: NORMAL
DIFFERENTIAL METHOD BLD: ABNORMAL
EOSINOPHIL # BLD: 0.1 K/UL (ref 0–0.4)
EOSINOPHIL NFR BLD: 1 % (ref 0–7)
ERYTHROCYTE [DISTWIDTH] IN BLOOD BY AUTOMATED COUNT: 18.5 % (ref 11.5–14.5)
GLUCOSE SERPL-MCNC: 97 MG/DL (ref 65–100)
HCT VFR BLD AUTO: 33.4 % (ref 35–47)
HDLC SERPL-MCNC: 50 MG/DL
HDLC SERPL: 2.5 {RATIO} (ref 0–5)
HGB BLD-MCNC: 10.4 G/DL (ref 11.5–16)
IMM GRANULOCYTES # BLD: 0 K/UL (ref 0–0.04)
IMM GRANULOCYTES NFR BLD AUTO: 0 % (ref 0–0.5)
LDLC SERPL CALC-MCNC: 64 MG/DL (ref 0–100)
LIPID PROFILE,FLP: NORMAL
LYMPHOCYTES # BLD: 1.7 K/UL (ref 0.8–3.5)
LYMPHOCYTES NFR BLD: 23 % (ref 12–49)
MAGNESIUM SERPL-MCNC: 1.8 MG/DL (ref 1.6–2.4)
MCH RBC QN AUTO: 32.9 PG (ref 26–34)
MCHC RBC AUTO-ENTMCNC: 31.1 G/DL (ref 30–36.5)
MCV RBC AUTO: 105.7 FL (ref 80–99)
MONOCYTES # BLD: 1.1 K/UL (ref 0–1)
MONOCYTES NFR BLD: 15 % (ref 5–13)
NEUTS SEG # BLD: 4.4 K/UL (ref 1.8–8)
NEUTS SEG NFR BLD: 60 % (ref 32–75)
NRBC # BLD: 0 K/UL (ref 0–0.01)
NRBC BLD-RTO: 0 PER 100 WBC
PHOSPHATE SERPL-MCNC: 4.8 MG/DL (ref 2.6–4.7)
PLATELET # BLD AUTO: 164 K/UL (ref 150–400)
PMV BLD AUTO: 10.5 FL (ref 8.9–12.9)
POTASSIUM SERPL-SCNC: 4.3 MMOL/L (ref 3.5–5.1)
Q-T INTERVAL, ECG07: 344 MS
QRS DURATION, ECG06: 120 MS
QTC CALCULATION (BEZET), ECG08: 538 MS
RBC # BLD AUTO: 3.16 M/UL (ref 3.8–5.2)
SODIUM SERPL-SCNC: 140 MMOL/L (ref 136–145)
TRIGL SERPL-MCNC: 65 MG/DL (ref ?–150)
VENTRICULAR RATE, ECG03: 147 BPM
VLDLC SERPL CALC-MCNC: 13 MG/DL
WBC # BLD AUTO: 7.4 K/UL (ref 3.6–11)

## 2018-07-23 PROCEDURE — 36415 COLL VENOUS BLD VENIPUNCTURE: CPT | Performed by: INTERNAL MEDICINE

## 2018-07-23 PROCEDURE — B2111ZZ FLUOROSCOPY OF MULTIPLE CORONARY ARTERIES USING LOW OSMOLAR CONTRAST: ICD-10-PCS | Performed by: INTERNAL MEDICINE

## 2018-07-23 PROCEDURE — 74011000250 HC RX REV CODE- 250: Performed by: SPECIALIST

## 2018-07-23 PROCEDURE — 80048 BASIC METABOLIC PNL TOTAL CA: CPT | Performed by: INTERNAL MEDICINE

## 2018-07-23 PROCEDURE — 99153 MOD SED SAME PHYS/QHP EA: CPT

## 2018-07-23 PROCEDURE — 85025 COMPLETE CBC W/AUTO DIFF WBC: CPT | Performed by: INTERNAL MEDICINE

## 2018-07-23 PROCEDURE — 83735 ASSAY OF MAGNESIUM: CPT | Performed by: INTERNAL MEDICINE

## 2018-07-23 PROCEDURE — 74011250637 HC RX REV CODE- 250/637: Performed by: INTERNAL MEDICINE

## 2018-07-23 PROCEDURE — 4A023N7 MEASUREMENT OF CARDIAC SAMPLING AND PRESSURE, LEFT HEART, PERCUTANEOUS APPROACH: ICD-10-PCS | Performed by: INTERNAL MEDICINE

## 2018-07-23 PROCEDURE — 77030010541

## 2018-07-23 PROCEDURE — 65660000000 HC RM CCU STEPDOWN

## 2018-07-23 PROCEDURE — 77030004532 HC CATH ANGI DX IMP BSC -A

## 2018-07-23 PROCEDURE — 80061 LIPID PANEL: CPT | Performed by: INTERNAL MEDICINE

## 2018-07-23 PROCEDURE — 74011250636 HC RX REV CODE- 250/636: Performed by: INTERNAL MEDICINE

## 2018-07-23 PROCEDURE — 77030029065 HC DRSG HEMO QCLOT ZMED -B

## 2018-07-23 PROCEDURE — 84100 ASSAY OF PHOSPHORUS: CPT | Performed by: INTERNAL MEDICINE

## 2018-07-23 PROCEDURE — 94760 N-INVAS EAR/PLS OXIMETRY 1: CPT

## 2018-07-23 PROCEDURE — 74011250636 HC RX REV CODE- 250/636: Performed by: SPECIALIST

## 2018-07-23 PROCEDURE — C1894 INTRO/SHEATH, NON-LASER: HCPCS

## 2018-07-23 PROCEDURE — 74011636320 HC RX REV CODE- 636/320

## 2018-07-23 RX ORDER — SODIUM CHLORIDE 0.9 % (FLUSH) 0.9 %
5-10 SYRINGE (ML) INJECTION EVERY 8 HOURS
Status: DISCONTINUED | OUTPATIENT
Start: 2018-07-23 | End: 2018-07-24 | Stop reason: HOSPADM

## 2018-07-23 RX ORDER — HEPARIN SODIUM 200 [USP'U]/100ML
1000 INJECTION, SOLUTION INTRAVENOUS
Status: DISCONTINUED | OUTPATIENT
Start: 2018-07-23 | End: 2018-07-23 | Stop reason: HOSPADM

## 2018-07-23 RX ORDER — MIDAZOLAM HYDROCHLORIDE 1 MG/ML
.5-1 INJECTION, SOLUTION INTRAMUSCULAR; INTRAVENOUS
Status: DISCONTINUED | OUTPATIENT
Start: 2018-07-23 | End: 2018-07-23 | Stop reason: HOSPADM

## 2018-07-23 RX ORDER — SODIUM CHLORIDE 0.9 % (FLUSH) 0.9 %
5-10 SYRINGE (ML) INJECTION AS NEEDED
Status: DISCONTINUED | OUTPATIENT
Start: 2018-07-23 | End: 2018-07-24 | Stop reason: HOSPADM

## 2018-07-23 RX ORDER — FENTANYL CITRATE 50 UG/ML
25-100 INJECTION, SOLUTION INTRAMUSCULAR; INTRAVENOUS
Status: DISCONTINUED | OUTPATIENT
Start: 2018-07-23 | End: 2018-07-23 | Stop reason: HOSPADM

## 2018-07-23 RX ORDER — SODIUM CHLORIDE 0.9 % (FLUSH) 0.9 %
5-10 SYRINGE (ML) INJECTION EVERY 8 HOURS
Status: CANCELLED | OUTPATIENT
Start: 2018-07-23

## 2018-07-23 RX ORDER — HYDROCORTISONE SODIUM SUCCINATE 100 MG/2ML
100 INJECTION, POWDER, FOR SOLUTION INTRAMUSCULAR; INTRAVENOUS
Status: ACTIVE | OUTPATIENT
Start: 2018-07-23 | End: 2018-07-24

## 2018-07-23 RX ORDER — DIPHENHYDRAMINE HYDROCHLORIDE 50 MG/ML
25 INJECTION, SOLUTION INTRAMUSCULAR; INTRAVENOUS
Status: ACTIVE | OUTPATIENT
Start: 2018-07-23 | End: 2018-07-24

## 2018-07-23 RX ORDER — LIDOCAINE HYDROCHLORIDE 10 MG/ML
10-30 INJECTION INFILTRATION; PERINEURAL
Status: DISCONTINUED | OUTPATIENT
Start: 2018-07-23 | End: 2018-07-23 | Stop reason: HOSPADM

## 2018-07-23 RX ORDER — SODIUM CHLORIDE 0.9 % (FLUSH) 0.9 %
5-10 SYRINGE (ML) INJECTION AS NEEDED
Status: CANCELLED | OUTPATIENT
Start: 2018-07-23

## 2018-07-23 RX ADMIN — MIDAZOLAM 1 MG: 1 INJECTION INTRAMUSCULAR; INTRAVENOUS at 13:13

## 2018-07-23 RX ADMIN — HEPARIN SODIUM 1000 UNITS: 200 INJECTION, SOLUTION INTRAVENOUS at 13:04

## 2018-07-23 RX ADMIN — NEPHROCAP 1 CAPSULE: 1 CAP ORAL at 09:43

## 2018-07-23 RX ADMIN — Medication 10 ML: at 17:51

## 2018-07-23 RX ADMIN — PANTOPRAZOLE SODIUM 40 MG: 40 TABLET, DELAYED RELEASE ORAL at 06:47

## 2018-07-23 RX ADMIN — FENTANYL CITRATE 25 MCG: 50 INJECTION, SOLUTION INTRAMUSCULAR; INTRAVENOUS at 13:18

## 2018-07-23 RX ADMIN — HYDRALAZINE HYDROCHLORIDE 50 MG: 25 TABLET, FILM COATED ORAL at 09:43

## 2018-07-23 RX ADMIN — CARVEDILOL 12.5 MG: 12.5 TABLET, FILM COATED ORAL at 17:50

## 2018-07-23 RX ADMIN — LIDOCAINE HYDROCHLORIDE 8 ML: 10 INJECTION, SOLUTION INFILTRATION; PERINEURAL at 13:11

## 2018-07-23 RX ADMIN — HEPARIN SODIUM 5000 UNITS: 5000 INJECTION, SOLUTION INTRAVENOUS; SUBCUTANEOUS at 06:47

## 2018-07-23 RX ADMIN — ANTACID TABLETS 400 MG: 500 TABLET, CHEWABLE ORAL at 17:50

## 2018-07-23 RX ADMIN — ANTACID TABLETS 400 MG: 500 TABLET, CHEWABLE ORAL at 09:43

## 2018-07-23 RX ADMIN — APIXABAN 5 MG: 5 TABLET, FILM COATED ORAL at 21:21

## 2018-07-23 RX ADMIN — IOPAMIDOL 70 ML: 755 INJECTION, SOLUTION INTRAVENOUS at 13:22

## 2018-07-23 RX ADMIN — Medication 10 ML: at 17:50

## 2018-07-23 RX ADMIN — FENTANYL CITRATE 25 MCG: 50 INJECTION, SOLUTION INTRAMUSCULAR; INTRAVENOUS at 13:13

## 2018-07-23 RX ADMIN — HYDRALAZINE HYDROCHLORIDE 50 MG: 25 TABLET, FILM COATED ORAL at 17:50

## 2018-07-23 RX ADMIN — Medication 10 ML: at 21:22

## 2018-07-23 RX ADMIN — CARVEDILOL 12.5 MG: 12.5 TABLET, FILM COATED ORAL at 09:47

## 2018-07-23 RX ADMIN — Medication 10 ML: at 03:56

## 2018-07-23 RX ADMIN — MIDAZOLAM 1 MG: 1 INJECTION INTRAMUSCULAR; INTRAVENOUS at 13:18

## 2018-07-23 RX ADMIN — HYDROCODONE BITARTRATE AND ACETAMINOPHEN 1 TABLET: 5; 325 TABLET ORAL at 23:44

## 2018-07-23 RX ADMIN — ISOSORBIDE MONONITRATE 30 MG: 30 TABLET, EXTENDED RELEASE ORAL at 09:43

## 2018-07-23 NOTE — DISCHARGE INSTRUCTIONS
Cardiology Follow up with Anabel Sims MD on 2018 at 9:45am.  [address] [facility] 33  [phone]    HOSPITALIST DISCHARGE INSTRUCTIONS  NAME: Bassam Pineda   :  1940   MRN:  [id number]     Date/Time:  2018 10:50 AM    ADMIT DATE: 2018     DISCHARGE DATE: 2018     ADMITTING DIAGNOSIS:  Atrial fibrillation  Congestive heart failure     DISCHARGE DIAGNOSIS:  As above     MEDICATIONS:     · It is important that you take the medication exactly as they are prescribed. · Keep your medication in the bottles provided by the pharmacist and keep a list of the medication names, dosages, and times to be taken in your wallet. · Do not take other medications without consulting your doctor. Pain Management: per above medications    What to do at Home    Recommended diet:  Cardiac diet; 2g sodium restriction     Recommended activity: Activity as tolerated    If you experience any of the following symptoms then please call your primary care physician or return to the emergency room if you cannot get hold of your doctor:  Fever, chills, nausea, vomiting, diarrhea, change in mentation, falling, bleeding, shortness of breath, chest pain     Follow Up:  Dr. Nataliia Green MD  you are to call and set up an appointment to see them in 2 weeks. Follow-up Information     Follow up With Details Comments 40 Margarita Oropeza MD On 2018 9:45am [employer]  [phone]      Nataliia Green MD   [address]  [phone]          Information obtained by :  I understand that if any problems occur once I am at home I am to contact my physician. I understand and acknowledge receipt of the instructions indicated above.                                                                                                                                            Physician's or R.N.'s Signature Date/Time                                                                                                                                              Patient or Representative Signature                                                          Date/Time

## 2018-07-23 NOTE — PROGRESS NOTES
Shayna Pena MD, Terence Bolanos [facility] [address] 
[phone] IMPRESSION and RECOMMENDATIONS 1. CM:  Now with worsening LV function and minimally abnormal troponin. No CAD on prior cath, though this was in 2009. Likely still non-ischemic etiology, but will pursue cath today @ 12:30pm.  The risks (including but not limited to death, myocardial infarction, cerebrovascular accident, dysrhythmia, renal failure, vascular complication, allergy, and/or need for emergency surgery), benefits, and alternatives have been explained. Verbal informed consent has been obtained. Further mgmt based on cath. Continuing coreg hydralazine/nitrate. May start ACEI. 2.  PAF:  Eliquis to be started tonight for CVA prophylaxis. Refuses coumadin. I have discussed this plan with the patient. She appears to understand this plan and wishes to proceed ahead. Subjective: Pt without complaints though feels weak. Objective:  
Patient Vitals for the past 16 hrs: 
 BP Temp Pulse Resp SpO2 Weight  
07/23/18 0700 - - 87 - - -  
07/23/18 0429 - - - - - 65.6 kg (144 lb 11.2 oz)  
07/23/18 0352 128/59 97.8 °F (36.6 °C) 80 20 94 % -  
07/22/18 2351 115/66 98.3 °F (36.8 °C) 81 22 94 % -  
07/22/18 2302 - - 83 - - -  
07/22/18 2206 117/67 98 °F (36.7 °C) 82 22 95 % -  
07/22/18 1946 120/75 98 °F (36.7 °C) 83 23 96 % -  
07/22/18 1639 116/61 97.9 °F (36.6 °C) 91 24 94 % -  
 
 
HEENT Exam:   
 WNL Lung Exam:   
 The patient is not dyspneic. Breath sounds are heard equally in all lung fields. There are no wheezes, rales, rhonchi, or rubs heard on auscultation. Heart Exam: The rhythm is regular. The PMI is in the 5th intercostal space of the MCL. Apical impulse is normal. S1 is regular. S2 is physiologic. There is no S3, S4 gallop, murmur, click, or rub. Abdomen Exam:   
 Benign. Extremities Exam:  No cyanosis, clubbing, edema. Distal pulses intact. Lab Results Component Value Date/Time Glucose 97 07/23/2018 03:59 AM  
 Sodium 140 07/23/2018 03:59 AM  
 Potassium 4.3 07/23/2018 03:59 AM  
 Chloride 104 07/23/2018 03:59 AM  
 CO2 26 07/23/2018 03:59 AM  
 BUN 31 (H) 07/23/2018 03:59 AM  
 Creatinine 6.29 (H) 07/23/2018 03:59 AM  
 Calcium 8.2 (L) 07/23/2018 03:59 AM  
 
Recent Labs  
   07/23/18 
 0359  07/22/18 
 0015 WBC  7.4  6.9 HGB  10.4*  10.7* HCT  33.4*  34.5*  
PLT  164  169 Recent Labs  
   07/21/18 
 1029 SGOT  26 ALT  23 AP  118* TBILI  0.9 TP  8.0 ALB  3.0*  
GLOB  5.0* Recent Labs  
   07/21/18 
 1306 APTT  29.9 No results for input(s): CPK, CKMB, TNIPOC in the last 72 hours. No lab exists for component: Ugo Bello Recent Labs  
   07/22/18 
 0015 TROIQ  0.29* No results found for: CHOL, CHOLX, CHLST, CHOLV, HDL, LDL, LDLC, DLDLP, TGLX, TRIGL, TRIGP, CHHD, CHHDX Echo: SUMMARY: 
Left ventricle: The ventricle was dilated. Systolic function was severely 
reduced. Ejection fraction was estimated in the range of 10 % to 15 %. There was severe diffuse hypokinesis. There was akinesis of the basal-mid 
inferoseptal and apical septal wall(s). Doppler parameters were consistent 
with a reversible restrictive pattern, indicative of decreased left 
ventricular diastolic compliance and/or increased left atrial pressure 
(grade 3 diastolic dysfunction). Left atrium: The atrium was severely dilated. Atrial septum: The septum bows from left to right, consistent with 
increased left atrial pressure. Mitral valve: There was moderate annular calcification. Tricuspid valve: There was mild to moderate regurgitation.

## 2018-07-23 NOTE — ROUTINE PROCESS
Bedside and Verbal shift change report given to Dave Hatfield (oncoming nurse) by Sofia Emery RN (offgoing nurse). Report included the following information SBAR, Kardex, Intake/Output, MAR, Accordion and Recent Results.

## 2018-07-23 NOTE — PROGRESS NOTES
Robbie Marshall [facility] 79  [address] [facility], [place], [address] [place] [address]  [phone]      Medical Progress Note      NAME: Champ Alfaro   :  1940  MRM:  [id number]    Date/Time: 2018  11:27 AM       Assessment and Plan: Active Problems:    Atrial fibrillation with rapid ventricular response. Converted to sinus after Dilt IV x 2 in ED. On home coreg with rate controlled. Refuses coumadin but agrees to eliquis. Will start following cath per cardiology. Severe left ventricular systolic dysfunction / Elevated troponin / Pulmonary edema. Jennifer downtrending. Entirely asymptomatic, no hypoxia, no edema, no chest pain. Cardiology notes that last ischemic w/u was  and no CAD at that time. Likely still mediated by tachycardia. Continue BB, hydralazine, imdur. Check FLP. Aspirin or plavix if CAD/PCI done. Anuric so volume management likely by UF. ESRD (end stage renal disease). Nephrology following. HD TTS    HTN (hypertension). BP low normal. Continue home anti-hypertensives and monitor/titrate    Macrocytic anemia. Mild. Likely related to ESRD. Monitor    Obese. Counseled on weight loss         Subjective:     Chief Complaint:  Patient seen and examined. Chart reviewed. Patient states \"I just feel tired inside\"    ROS:  (bold if positive, if negative)      Tolerating PT  Tolerating Diet        Objective:     Last 24hrs VS reviewed since prior progress note.  Most recent are:    Visit Vitals    /68    Pulse 82    Temp 98 °F (36.7 °C)    Resp 18    Ht 5' 4\" (1.626 m)    Wt 65.6 kg (144 lb 11.2 oz)    SpO2 97%    BMI 24.84 kg/m2     SpO2 Readings from Last 6 Encounters:   18 97%   17 95%   16 99%   07/09/15 98%            Intake/Output Summary (Last 24 hours) at 18 0934  Last data filed at 18 0036   Gross per 24 hour   Intake              720 ml   Output                0 ml   Net              720 ml        Physical Exam:    Gen:  Elderly, frail, in no acute distress  HEENT:  Pink conjunctivae, PERRL, hearing intact to voice, moist mucous membranes  Neck:  Supple, without masses, thyroid non-tender  Resp:  No accessory muscle use, clear breath sounds without wheezes rales or rhonchi  Card:  No murmurs, normal S1, S2 without thrills, bruits or peripheral edema  Abd:  Soft, non-tender, non-distended, normoactive bowel sounds are present, no palpable organomegaly and no detectable hernias  Lymph:  No cervical or inguinal adenopathy  Musc:  No cyanosis or clubbing  Skin:  No rashes or ulcers, skin turgor is good  Neuro:  Cranial nerves are grossly intact, no focal motor weakness, follows commands appropriately  Psych:  Good insight, oriented to person, place and time, alert    Telemetry reviewed:   normal sinus rhythm  __________________________________________________________________  Medications Reviewed: (see below)  Medications:     Current Facility-Administered Medications   Medication Dose Route Frequency    apixaban (ELIQUIS) tablet 5 mg  5 mg Oral BID    heparin (porcine) injection 5,000 Units  5,000 Units SubCUTAneous Q8H    sodium chloride (NS) flush 5-10 mL  5-10 mL IntraVENous Q8H    sodium chloride (NS) flush 5-10 mL  5-10 mL IntraVENous PRN    acetaminophen (TYLENOL) tablet 650 mg  650 mg Oral Q4H PRN    HYDROcodone-acetaminophen (NORCO) 5-325 mg per tablet 1 Tab  1 Tab Oral Q4H PRN    carvedilol (COREG) tablet 12.5 mg  12.5 mg Oral BID    pantoprazole (PROTONIX) tablet 40 mg  40 mg Oral ACB    isosorbide mononitrate ER (IMDUR) tablet 30 mg  30 mg Oral DAILY    hydrALAZINE (APRESOLINE) tablet 50 mg  50 mg Oral BID    B complex-vitaminC-folic acid (NEPHROCAP) cap  1 Cap Oral DAILY    calcium carbonate (TUMS) chewable tablet 400 mg [elemental]  400 mg Oral BID WITH MEALS    zolpidem (AMBIEN) tablet 5 mg  5 mg Oral QHS PRN        Lab Data Reviewed: (see below)  Lab Review:     Recent Labs      07/23/18   0359  07/22/18   0015 07/21/18   1029   WBC  7.4  6.9  9.1   HGB  10.4*  10.7*  12.0   HCT  33.4*  34.5*  37.7   PLT  164  169  211     Recent Labs      07/23/18   0359  07/22/18   0015  07/21/18   1029   NA  140  140  138   K  4.3  3.9  2.9*   CL  104  104  100   CO2  26  30  30   GLU  97  100  83   BUN  31*  16  9   CREA  6.29*  4.34*  2.97*   CA  8.2*  8.1*  8.3*   MG  1.8  1.7   --    PHOS  4.8*  3.2   --    ALB   --    --   3.0*   TBILI   --    --   0.9   SGOT   --    --   26   ALT   --    --   23     Lab Results   Component Value Date/Time    Glucose (POC) 87 09/17/2017 08:49 PM     No results for input(s): PH, PCO2, PO2, HCO3, FIO2 in the last 72 hours. No results for input(s): INR in the last 72 hours. No lab exists for component: INREXT, INREXT  All Micro Results     None          I have reviewed notes of prior 24hr.     Other pertinent lab: None    Total time spent with patient: 36 [address] [facility] discussed with: Patient, Care Manager, Nursing Staff, Consultant/Specialist and >50% of time spent in counseling and coordination of care    Discussed:  Care Plan and D/C Planning    Prophylaxis:  Hep SQ    Disposition:   PT, OT, RN           ___________________________________________________    Attending Physician: Ulysses Patience, DO

## 2018-07-23 NOTE — PROGRESS NOTES
1:42 PM  TRANSFER - IN REPORT:    Verbal report received from [facility] RNN(name) on Rut Gomez  being received from CATH LAB(unit) for routine post - op      Report consisted of patients Situation, Background, Assessment and   Recommendations(SBAR). Information from the following report(s) Procedure Summary was reviewed with the receiving nurse. Opportunity for questions and clarification was provided. Assessment completed upon patients arrival to unit and care assumed. 2:19 PM  TRANSFER - OUT REPORT:    Verbal report given to Melyssa N [facility]  RN(name) on Rut Gomze  being transferred to [facility] (unit) for routine progression of care       Report consisted of patients Situation, Background, Assessment and   Recommendations(SBAR). Information from the following report(s) SBAR, Procedure Summary, Recent Results and Cardiac Rhythm NSR with PVC's was reviewed with the receiving nurse. Lines:   Peripheral IV 07/21/18 Left Forearm (Active)   Site Assessment Clean, dry, & intact 7/23/2018  9:49 AM   Phlebitis Assessment 0 7/23/2018  9:49 AM   Infiltration Assessment 0 7/23/2018  9:49 AM   Dressing Status Clean, dry, & intact 7/23/2018  9:49 AM   Dressing Type Tape;Transparent 7/23/2018  9:49 AM   Hub Color/Line Status Blue;Capped 7/23/2018  9:49 AM   Action Taken Open ports on tubing capped 7/23/2018  3:52 AM   Alcohol Cap Used Yes 7/23/2018  9:49 AM       Peripheral IV 07/21/18 Left Hand (Active)   Site Assessment Clean, dry, & intact 7/23/2018  9:49 AM   Phlebitis Assessment 0 7/23/2018  9:49 AM   Infiltration Assessment 0 7/23/2018  9:49 AM   Dressing Status Clean, dry, & intact 7/23/2018  9:49 AM   Dressing Type Tape;Transparent 7/23/2018  9:49 AM   Hub Color/Line Status Yellow;Capped 7/23/2018  9:49 AM   Action Taken Open ports on tubing capped 7/23/2018  3:52 AM   Alcohol Cap Used Yes 7/23/2018  9:49 AM        Opportunity for questions and clarification was provided.       Patient transported with: @ 2:25 PM     Monitor  Registered Nurse  [facility]

## 2018-07-23 NOTE — PROGRESS NOTES
Problem: Falls - Risk of  Goal: *Absence of Falls  Document Sumit Fall Risk and appropriate interventions in the flowsheet.    Outcome: Progressing Towards Goal  Fall Risk Interventions:  Mobility Interventions: Bed/chair exit alarm         Medication Interventions: Bed/chair exit alarm    Elimination Interventions: Bed/chair exit alarm    History of Falls Interventions: Bed/chair exit alarm

## 2018-07-23 NOTE — PROGRESS NOTES
2200:  Bedside and Verbal shift change report given to [address] [place] [address] Avenue, RN (oncoming nurse) by Analisa Amador RN (offgoing nurse). Report included the following information SBAR, Kardex, ED Summary, Recent Results and Cardiac Rhythm Sinus Arrythmia. Received from patient hand off report that patient may possibly be having a cardiac cath tomorrow. Patient already aware. 0000:  Educated patient about NPO status. Patient verbalized understanding. Opportunity given for questions. Water pitcher removed from bedside. 3989:  Patient given CHG bath. Linens, gown, and socks changed. 0710:  Bedside and Verbal shift change report given to Sean Monroe RN (oncoming nurse) by [address] [place] [address] Avenue, RN (offgoing nurse). Report included the following information SBAR, Kardex, ED Summary, Intake/Output, Recent Results and Cardiac Rhythm NSR/Sinus Arrythmia.

## 2018-07-23 NOTE — PROGRESS NOTES
Rcvd report from [facility] [place] by [facility]. Pt underwent a cath from R groin approach by Dr. Koby Guzman. RCA was found to be occluded, but will manage medically at this time with no further intervention. Sheath pulled at 1330; pt can sit up to 30 degrees at 1430, 45 degreees at 1500, and can sit and dangle legs off the bed at 1530. VS are stable; BP taken from L leg while in the cath lab. Will notify primary RN.

## 2018-07-23 NOTE — PROGRESS NOTES
7- Reason for Admission:  A Fib with RVR                RRAT Score:   22               Resources/supports as identified by patient/family: Has family and Medicaid PCA                 Top Challenges facing patient (as identified by patient/family and CM): Finances/Medication cost?   No                 Transportation? No              Support system or lack thereof? No                     Living arrangements? No              Self-care/ADLs/Cognition? Bathes and dresses self, has an aide 5 days/week and is alert and oriented          Current Advanced Directive/Advance Care Plan:  Full Code                          Plan for utilizing home health:  TBD                        Likelihood of readmission:  Low                 Transition of Care Plan:   Home    I met with the pt to determine potential discharge needs. She stated she lives with her son, Jose Elias Castorena ([phone]) and 15year old great-grandson in a one level house with 3 entry steps. She is independent with her bathing and dressing but has a Medicaid [address] from [place] [address] ([phone]) on M,W,F from 8:00 AM-1:00 PM and on T,Th form 10:30 AM-3:30 PM. She goes to [facility] on T,Th,S at 5:30 AM and is transported by [facility] covered by Medicaid. She has 2 sisters and nieces who live near-by and are available to assist as needed. She has a rolling walker but no other DME. Her PCP is Dr. Clive Newell and he has no nurse navigator. She has prescription drug coverage and gets her medications from [facility]. She is not anticipating any discharge needs and someone in her family will transport her home. Care Management Interventions  PCP Verified by CM:  Yes (Dr. Peter Godfrey nurse navigator)  Discharge Durable Medical Equipment: No (Has a rolling walker)  Physical Therapy Consult: Yes  Occupational Therapy Consult: Yes  Current Support Network:  (Lives with her son and great-grandson in a one level house)  Confirm Follow Up Transport: Family  Plan discussed with Pt/Family/Caregiver: Yes  Discharge Location  Discharge Placement:  (Home)    CAYDEN Hartley, CM

## 2018-07-23 NOTE — PROGRESS NOTES
[id number] [phone]  Pt is off floor for cath. 1445  Pt back on floor from cath. Pt is resting comfortably in bed. No complaints of pain. Site clean and dry. VSS. Call bell in reach. 1905  Bedside and Verbal shift change report given to Bernardo (oncoming nurse) by Alfredo Pinto RN (offgoing nurse).  Report included the following information SBAR, Kardex, Intake/Output, MAR, Recent Results and Cardiac Rhythm SA.

## 2018-07-23 NOTE — PROGRESS NOTES
Physical Therapy Note:  Pt off flr in Cath Lab. Will continue to follow for PT eval when cleared post procedure.

## 2018-07-23 NOTE — PROCEDURES
Cath:  Obstructive 1VD:     LAD p30. LCx patent. RCA p100 with left-to-right collat. No AVG. RFA mynx. Med mgmt of cardiomyopathy. Starting eliquis tonight.   F/U with Dr. Robert Rivas 7/30 @ 9:45am

## 2018-07-23 NOTE — PROGRESS NOTES
Occupational Therapy Note:    Orders acknowledged and chart reviewed. Pt currently off floor to cath lab. Will continue to follow and attempt evaluation again as able. Thank you.     [facility], OTR/L

## 2018-07-23 NOTE — PROGRESS NOTES
Robbie Paz [place] 79 [address] [facility] YOB: 1940 Assessment & Plan:  
ESRD  
· HD TTS · Next HD tomorrow · No acute indication RRT Rapid Afib · Per cards · For cath HTN 
· Controlled Hyperphos · Tums Subjective:  
CC: f/u ESRD 
HPI: Getting cardiac eval of rapid afib. For HD tomorrow. ROS: no cp/sob Current Facility-Administered Medications Medication Dose Route Frequency  apixaban (ELIQUIS) tablet 5 mg  5 mg Oral BID  heparin (porcine) injection 5,000 Units  5,000 Units SubCUTAneous Q8H  
 sodium chloride (NS) flush 5-10 mL  5-10 mL IntraVENous Q8H  
 sodium chloride (NS) flush 5-10 mL  5-10 mL IntraVENous PRN  
 acetaminophen (TYLENOL) tablet 650 mg  650 mg Oral Q4H PRN  
 HYDROcodone-acetaminophen (NORCO) 5-325 mg per tablet 1 Tab  1 Tab Oral Q4H PRN  
 carvedilol (COREG) tablet 12.5 mg  12.5 mg Oral BID  pantoprazole (PROTONIX) tablet 40 mg  40 mg Oral ACB  isosorbide mononitrate ER (IMDUR) tablet 30 mg  30 mg Oral DAILY  hydrALAZINE (APRESOLINE) tablet 50 mg  50 mg Oral BID  B complex-vitaminC-folic acid (NEPHROCAP) cap  1 Cap Oral DAILY  calcium carbonate (TUMS) chewable tablet 400 mg [elemental]  400 mg Oral BID WITH MEALS  zolpidem (AMBIEN) tablet 5 mg  5 mg Oral QHS PRN Objective:  
 
Vitals: 
Blood pressure 113/68, pulse 82, temperature 98 °F (36.7 °C), resp. rate 18, height 5' 4\" (1.626 m), weight 65.6 kg (144 lb 11.2 oz), SpO2 97 %. Temp (24hrs), Av °F (36.7 °C), Min:97.8 °F (36.6 °C), Max:98.3 °F (36.8 °C) Intake and Output: 
  
 1901 -  0700 In: 840 [P.O.:840] Out: - Physical Exam:              
GENERAL ASSESSMENT: NAD 
CHEST: CTA HEART: S1S2 ABDOMEN: Soft,NT 
EXTREMITY: no EDEMA; AVG RUE +t/b 
 
 
   
ECG/rhythm: 
 
Data Review No results for input(s): TNIPOC in the last 72 hours. No lab exists for component: ITNL Recent Labs 07/22/18 
 0015  07/21/18 
 1659  07/21/18 
 1029 TROIQ  0.29*  0.30*  <0.05 Recent Labs  
   07/23/18 
 0359  07/22/18 
 0015  07/21/18 
 1029 NA  140  140  138  
K  4.3  3.9  2.9*  
CL  104  104  100 CO2  26  30  30 BUN  31*  16  9 CREA  6.29*  4.34*  2.97* GLU  97  100  83 PHOS  4.8*  3.2   --   
MG  1.8  1.7   --   
CA  8.2*  8.1*  8.3* ALB   --    --   3.0* WBC  7.4  6.9  9.1 HGB  10.4*  10.7*  12.0 HCT  33.4*  34.5*  37.7 PLT  164  169  211 Recent Labs  
   07/21/18 
 1306 APTT  29.9 Needs: urine analysis, urine sodium, protein and creatinine No results found for: ROMAN BAUM 
 
 
 
: Nicolas Allison MD 
7/23/2018 [facility]: 
[email or web address] Rosi Turpin office: 
[address], [address] [place], [address] [facility] Phone: [phone] Fax :     [phone] [place] office: 
[address] [facility], [address] Phone - [phone] Fax - [phone]

## 2018-07-24 VITALS
HEIGHT: 64 IN | HEART RATE: 76 BPM | WEIGHT: 146.9 LBS | BODY MASS INDEX: 25.08 KG/M2 | SYSTOLIC BLOOD PRESSURE: 114 MMHG | RESPIRATION RATE: 16 BRPM | DIASTOLIC BLOOD PRESSURE: 76 MMHG | OXYGEN SATURATION: 96 % | TEMPERATURE: 98 F

## 2018-07-24 LAB
ANION GAP SERPL CALC-SCNC: 11 MMOL/L (ref 5–15)
BUN SERPL-MCNC: 37 MG/DL (ref 6–20)
BUN/CREAT SERPL: 5 (ref 12–20)
CALCIUM SERPL-MCNC: 7.7 MG/DL (ref 8.5–10.1)
CHLORIDE SERPL-SCNC: 102 MMOL/L (ref 97–108)
CO2 SERPL-SCNC: 26 MMOL/L (ref 21–32)
CREAT SERPL-MCNC: 7.84 MG/DL (ref 0.55–1.02)
GLUCOSE SERPL-MCNC: 96 MG/DL (ref 65–100)
HAV IGM SER QL: NONREACTIVE
HBV CORE IGM SER QL: NONREACTIVE
HBV SURFACE AG SER QL: 0.69 INDEX
HBV SURFACE AG SER QL: NEGATIVE
HCV AB SERPL QL IA: NONREACTIVE
HCV COMMENT,HCGAC: NORMAL
MAGNESIUM SERPL-MCNC: 1.6 MG/DL (ref 1.6–2.4)
PHOSPHATE SERPL-MCNC: 4.7 MG/DL (ref 2.6–4.7)
POTASSIUM SERPL-SCNC: 4.7 MMOL/L (ref 3.5–5.1)
SODIUM SERPL-SCNC: 139 MMOL/L (ref 136–145)
SP1: NORMAL
SP2: NORMAL
SP3: NORMAL

## 2018-07-24 PROCEDURE — 80048 BASIC METABOLIC PNL TOTAL CA: CPT | Performed by: INTERNAL MEDICINE

## 2018-07-24 PROCEDURE — 80074 ACUTE HEPATITIS PANEL: CPT | Performed by: INTERNAL MEDICINE

## 2018-07-24 PROCEDURE — 94760 N-INVAS EAR/PLS OXIMETRY 1: CPT

## 2018-07-24 PROCEDURE — 84100 ASSAY OF PHOSPHORUS: CPT | Performed by: INTERNAL MEDICINE

## 2018-07-24 PROCEDURE — 83735 ASSAY OF MAGNESIUM: CPT | Performed by: INTERNAL MEDICINE

## 2018-07-24 PROCEDURE — 5A1D70Z PERFORMANCE OF URINARY FILTRATION, INTERMITTENT, LESS THAN 6 HOURS PER DAY: ICD-10-PCS | Performed by: INTERNAL MEDICINE

## 2018-07-24 PROCEDURE — 90935 HEMODIALYSIS ONE EVALUATION: CPT

## 2018-07-24 PROCEDURE — 74011250637 HC RX REV CODE- 250/637: Performed by: INTERNAL MEDICINE

## 2018-07-24 PROCEDURE — 36415 COLL VENOUS BLD VENIPUNCTURE: CPT | Performed by: INTERNAL MEDICINE

## 2018-07-24 RX ORDER — CARVEDILOL 12.5 MG/1
12.5 TABLET ORAL 2 TIMES DAILY WITH MEALS
Qty: 60 TAB | Refills: 0 | Status: ON HOLD
Start: 2018-07-24 | End: 2019-01-01 | Stop reason: SDUPTHER

## 2018-07-24 RX ADMIN — HYDROCODONE BITARTRATE AND ACETAMINOPHEN 1 TABLET: 5; 325 TABLET ORAL at 09:16

## 2018-07-24 RX ADMIN — PANTOPRAZOLE SODIUM 40 MG: 40 TABLET, DELAYED RELEASE ORAL at 06:18

## 2018-07-24 RX ADMIN — Medication 10 ML: at 06:19

## 2018-07-24 NOTE — PROGRESS NOTES
Markel Matute MD, [facility] [place] [facility] 
[phone] IMPRESSION and RECOMMENDATIONS 1. CM:  Now with worsening LV function and minimally abnormal troponin. No CAD on prior cath, though this was in 2009. Cath showed chronically occluded RCA. Continuing coreg, hydralazine/NTG. May try to start ACEI/ARB as outpt, but BP seems a bit low for this currently. 2.  PAF:  Eliquis started for CVA prophylaxis. Refuses coumadin. Stable for discharge from a cardiac standpoint. Has F/U with Dr. Josse Chavez 7/30. Will need eval for ICD likely. I have discussed this plan with the patient. She appears to understand this plan and wishes to proceed ahead. Subjective: Pt without complaints. Objective:  
 
Patient Vitals for the past 16 hrs: 
 BP Temp Pulse Resp SpO2 Weight  
07/24/18 0700 - - 76 - - -  
07/24/18 0450 120/56 97.7 °F (36.5 °C) 77 20 96 % 66.6 kg (146 lb 14.4 oz)  
07/23/18 2334 110/59 98.5 °F (36.9 °C) 86 22 96 % -  
07/23/18 2253 - - 86 - - -  
07/23/18 2140 - - - - 95 % -  
07/23/18 1930 103/53 98.2 °F (36.8 °C) 86 20 96 % -  
07/23/18 1615 108/59 98.3 °F (36.8 °C) 79 18 95 % -  
 
 
HEENT Exam:   
 WNL Lung Exam:   
 The patient is not dyspneic. Breath sounds are heard equally in all lung fields. There are no wheezes, rales, rhonchi, or rubs heard on auscultation. Heart Exam: The rhythm is regular. The PMI is in the 5th intercostal space of the MCL. Apical impulse is normal. S1 is regular. S2 is physiologic. There is no S3, S4 gallop, murmur, click, or rub. Abdomen Exam:   
 Benign. Extremities Exam: No cyanosis, clubbing, edema. Distal pulses intact. Right groin without bruit/hematoma. Lab Results Component Value Date/Time  Glucose 96 07/24/2018 01:26 AM  
 Sodium 139 07/24/2018 01:26 AM  
 Potassium 4.7 07/24/2018 01:26 AM  
 Chloride 102 07/24/2018 01:26 AM CO2 26 07/24/2018 01:26 AM  
 BUN 37 (H) 07/24/2018 01:26 AM  
 Creatinine 7.84 (H) 07/24/2018 01:26 AM  
 Calcium 7.7 (L) 07/24/2018 01:26 AM  
 
Recent Labs  
   07/23/18 
 0359  07/22/18 
 0015 WBC  7.4  6.9 HGB  10.4*  10.7* HCT  33.4*  34.5*  
PLT  164  169 Recent Labs  
   07/21/18 
 1029 SGOT  26 ALT  23 AP  118* TBILI  0.9 TP  8.0 ALB  3.0*  
GLOB  5.0* Recent Labs  
   07/21/18 
 1306 APTT  29.9 No results for input(s): CPK, CKMB, TNIPOC in the last 72 hours. No lab exists for component: Merritt Huerta Recent Labs  
   07/22/18 
 0015 TROIQ  0.29* Lab Results Component Value Date/Time Cholesterol, total 127 07/23/2018 03:59 AM  
 HDL Cholesterol 50 07/23/2018 03:59 AM  
 LDL, calculated 64 07/23/2018 03:59 AM  
 Triglyceride 65 07/23/2018 03:59 AM  
 CHOL/HDL Ratio 2.5 07/23/2018 03:59 AM  
 
 
Echo: SUMMARY: 
Left ventricle: The ventricle was dilated. Systolic function was severely 
reduced. Ejection fraction was estimated in the range of 10 % to 15 %. There was severe diffuse hypokinesis. There was akinesis of the basal-mid 
inferoseptal and apical septal wall(s). Doppler parameters were consistent 
with a reversible restrictive pattern, indicative of decreased left 
ventricular diastolic compliance and/or increased left atrial pressure 
(grade 3 diastolic dysfunction). Left atrium: The atrium was severely dilated. Atrial septum: The septum bows from left to right, consistent with 
increased left atrial pressure. Mitral valve: There was moderate annular calcification. Tricuspid valve: There was mild to moderate regurgitation.

## 2018-07-24 NOTE — PROGRESS NOTES
Pharmacist Discharge Medication Reconciliation Discharge Provider:  Dr. Devona Goldmann Discharge Medications: My Medications START taking these medications Instructions Each Dose to Equal   Morning Noon Evening Bedtime  
 
 apixaban 5 mg tablet Commonly known as:  Aashish Shields Notes to Patient:  Blood thinner to help prevent stroke and heart attack due to your abnormal heart rhythm (afib) Take 1 Tab by mouth two (2) times a day. 5 mg CHANGE how you take these medications Instructions Each Dose to Equal   Morning Noon Evening Bedtime  
 
 carvedilol 12.5 mg tablet Commonly known as:  Carrington Plascnecia What changed:  when to take this Notes to Patient: This dosage has been increased Take 1 Tab by mouth two (2) times daily (with meals). 12.5 mg  
    
  
   
   
  
   
  
 
  
 
CONTINUE taking these medications Instructions Each Dose to Equal   Morning Noon Evening Bedtime AMBIEN 5 mg tablet Generic drug:  zolpidem Take 5 mg by mouth nightly as needed for Sleep.  
 5 mg  
    
   
   
   
  
  
 calcium carbonate 200 mg calcium (500 mg) Chew Commonly known as:  TUMS Take 2 Tabs by mouth two (2) times daily (with meals). 2 Tab DIALYVITE 800 PO Take 1 Tab by mouth daily. 1 Tab  
    
  
   
   
   
  
 hydrALAZINE 50 mg tablet Commonly known as:  APRESOLINE Take 50 mg by mouth two (2) times a day. 50 mg  
    
  
   
   
  
   
  
 isosorbide mononitrate ER 60 mg CR tablet Commonly known as:  IMDUR Take 30 mg by mouth daily. Half-tab 30 mg  
    
  
   
   
   
  
 omeprazole 20 mg capsule Commonly known as:  PRILOSEC Your last dose was:  6 am 7/24 (was substituted with Protonix while in the hospital) Take 20 mg by mouth daily. 20 mg  
    
  
   
   
   
  
 
  
 
STOP taking these medications  ALEVE 220 mg tablet Generic drug:  naproxen sodium Where to Get Your Medications Information on where to get these meds will be given to you by the nurse or doctor. ! Ask your nurse or doctor about these medications  apixaban 5 mg tablet  carvedilol 12.5 mg tablet The patient's chart, MAR, and AVS were reviewed by 
Emi Carpenter, Student Pharmacist

## 2018-07-24 NOTE — PROGRESS NOTES
Robbie Marshall ayaka Standard [address] [facility] YOB: 1940 Assessment & Plan:  
ESRD  
· HD TTS · HD today · DW [place] Rapid Afib · Per cards · s/p cath HTN 
· Controlled Hyperphos · Tums Subjective:  
CC: f/u ESRD 
HPI:  Cath showed chronically occluded RCA. . For HD today ROS: no cp/sob Current Facility-Administered Medications Medication Dose Route Frequency  sodium chloride (NS) flush 5-10 mL  5-10 mL IntraVENous Q8H  
 sodium chloride (NS) flush 5-10 mL  5-10 mL IntraVENous PRN  
 diphenhydrAMINE (BENADRYL) injection 25 mg  25 mg IntraVENous ONCE PRN  
 hydrocortisone Sod Succ (PF) (SOLU-CORTEF) injection 100 mg  100 mg IntraVENous ONCE PRN  
 apixaban (ELIQUIS) tablet 5 mg  5 mg Oral BID  sodium chloride (NS) flush 5-10 mL  5-10 mL IntraVENous Q8H  
 sodium chloride (NS) flush 5-10 mL  5-10 mL IntraVENous PRN  
 acetaminophen (TYLENOL) tablet 650 mg  650 mg Oral Q4H PRN  
 HYDROcodone-acetaminophen (NORCO) 5-325 mg per tablet 1 Tab  1 Tab Oral Q4H PRN  
 carvedilol (COREG) tablet 12.5 mg  12.5 mg Oral BID  pantoprazole (PROTONIX) tablet 40 mg  40 mg Oral ACB  isosorbide mononitrate ER (IMDUR) tablet 30 mg  30 mg Oral DAILY  hydrALAZINE (APRESOLINE) tablet 50 mg  50 mg Oral BID  B complex-vitaminC-folic acid (NEPHROCAP) cap  1 Cap Oral DAILY  calcium carbonate (TUMS) chewable tablet 400 mg [elemental]  400 mg Oral BID WITH MEALS  zolpidem (AMBIEN) tablet 5 mg  5 mg Oral QHS PRN Objective:  
 
Vitals: 
Blood pressure 117/56, pulse 80, temperature 98 °F (36.7 °C), resp. rate 16, height 5' 4\" (1.626 m), weight 66.6 kg (146 lb 14.4 oz), SpO2 96 %. Temp (24hrs), Av.2 °F (36.8 °C), Min:97.7 °F (36.5 °C), Max:98.5 °F (36.9 °C) Intake and Output: 
  
1901 -  0700 In: 240 [P.O.:240] Out: - Physical Exam:              
GENERAL ASSESSMENT: NAD 
CHEST: CTA HEART: S1S2 ABDOMEN: Soft,NT 
EXTREMITY: no EDEMA; AVG RUE +t/b 
AO3 ECG/rhythm: 
 
Data Review No results for input(s): TNIPOC in the last 72 hours. No lab exists for component: ITNL Recent Labs  
   07/22/18 
 0015  07/21/18 
 1659  07/21/18 
 1029 TROIQ  0.29*  0.30*  <0.05 Recent Labs  
   07/24/18 
 0126  07/23/18 
 0359  07/22/18 
 0015  07/21/18 
 1029 NA  139  140  140  138  
K  4.7  4.3  3.9  2.9*  
CL  102  104  104  100 CO2  26  26  30  30 BUN  37*  31*  16  9 CREA  7.84*  6.29*  4.34*  2.97* GLU  96  97  100  83 PHOS  4.7  4.8*  3.2   --   
MG  1.6  1.8  1.7   --   
CA  7.7*  8.2*  8.1*  8.3* ALB   --    --    --   3.0* WBC   --   7.4  6.9  9.1 HGB   --   10.4*  10.7*  12.0 HCT   --   33.4*  34.5*  37.7 PLT   --   164  169  211 Recent Labs  
   07/21/18 
 1306 APTT  29.9 Needs: urine analysis, urine sodium, protein and creatinine No results found for: ROMAN BAUM 
 
 
 
: Trae Jose MD 
7/24/2018 [facility]: 
[email or web address] Lorena Jaime office: 
[address] [place] [address], [address] [place], [address] [facility] Phone: [phone] Fax :     [phone] [place] office: 
[address] [facility], [address] [place] [address] [address] Phone - [phone] Fax - [phone]

## 2018-07-24 NOTE — PROGRESS NOTES
PT NOTE - patient receiving dialysis at this time. Spoke with RN who reports patient lives alone with family and although she has not witnessed patient ambulating, patient does amb with one assist and use of RW. RN reports patient states \"I am zipping out of here as soon as my dialysis is done and I can go. \" PT eval deferred at this time due to dialysis. Discharging this pm per orders and RN.

## 2018-07-24 NOTE — PROGRESS NOTES
[address] to arrange transportation, for pt to [address] [place] [address]. Cab to arrive in approx 35 minutes.

## 2018-07-24 NOTE — DIALYSIS
[facility]  [phone]    Vitals   Pre   Post   Assessment   Pre   Post     Temp  Temp: 98 °F (36.7 °C) (07/24/18 1424) 98.6 LOC  Alert and oriented x4 Alert and oriented x4      HR   Pulse (Heart Rate): 76 (07/24/18 1424) 80 Lungs   Cta Cta      B/P   BP: 105/40 (07/24/18 1424) 149/54 Cardiac   Reg rate and rhythm Reg rate and rhythm      Resp   Resp Rate: 16 (07/24/18 1424) 16 Skin   Warm and dry Warm and dry      Pain level  Pain Intensity 1: 0 (07/24/18 1326) 0 Edema  general     general            Orders:    Duration:   Start:    6142 End:    1623 Total:   2   Dialyzer:   Dialyzer/Set Up Inspection: Revaclear (07/24/18 1424)   K Bath:   Dialysate K (mEq/L): 3 (07/24/18 1424)   Ca Bath:   Dialysate CA (mEq/L): 2.5 (07/24/18 1424)   Na/Bicarb:   Dialysate NA (mEq/L): 140 (07/24/18 1424)   Target Fluid Removal:   Goal/Amount of Fluid to Remove (mL): 1500 mL (07/24/18 1424)   Access     Type & Location:   AVG URA   Labs     Obtained/Reviewed   Critical Results Called   Date when labs were drawn-  Hgb-    HGB   Date Value Ref Range Status   07/23/2018 10.4 (L) 11.5 - 16.0 g/dL Final     K-    Potassium   Date Value Ref Range Status   07/24/2018 4.7 3.5 - 5.1 mmol/L Final     Ca-   Calcium   Date Value Ref Range Status   07/24/2018 7.7 (L) 8.5 - 10.1 MG/DL Final     Bun-   BUN   Date Value Ref Range Status   07/24/2018 37 (H) 6 - 20 MG/DL Final     Creat-   Creatinine   Date Value Ref Range Status   07/24/2018 7.84 (H) 0.55 - 1.02 MG/DL Final        Medications/ Blood Products Given     Name   Dose   Route and Time                     Blood Volume Processed (BVP):    66 L Net Fluid   Removed:  1500 mL   Comments   Time Out Done: 1400    Primary Nurse Rpt Shannon Ramirez RN  Primary Nurse Rpt Maryella Monterey, RN  Pt Education:procedure and safety  Care Plan:follow orders of the nephrologist  Tx Summary:inspect fistula for thrill and bruit positive, clean with alcohol prep pad, cannulate with 15 ga needles x2, aspirate and flush well, tx started without issue. tx completed, all possible blood returned, decannulated, pressure applied to sites for approx 20 min on the arterial site 7 minutes on the venious site till hemostasis. Pt tolerated well. Report given to PCN. Admiting Diagnosis:ESRD, HTN, Pulmonary edema  Pt's previous clinic-[facility]  Consent signed - Informed Consent Verified: Yes (07/24/18 1424)  Amilcarita Consent - signed  Hepatitis Status- unknown lab drawn  Machine #- Machine Number: [id number] (07/24/18 1424)  Telemetry status-  Pre-dialysis wt. - Pre-Dialysis Weight: 66.6 kg (146 lb 13.2 oz) (07/24/18 1424)

## 2018-07-24 NOTE — PROGRESS NOTES
Specialist appointment on Monday July 30,2018 @ 9:45 a.m. With Dr. Lenny Jo.    Added to AVS.  Alvarado Leyva CM Specialist

## 2018-07-24 NOTE — PROGRESS NOTES
Occupational Therapy Note:  Orders acknowledged and chart reviewed. Patient currently receiving dialysis and unavailable for OT evaluation. Will follow-up next tx day for OT evaluation if not discharged this evening.     Nate Mari, OTR/L

## 2018-08-01 NOTE — DISCHARGE SUMMARY
Physician Discharge Summary Patient ID: 
Estefanía Harris [id number] 
89 y.o. 
1940 Admit date: 7/21/2018 Discharge date and time: 7/24/2018 Admission Diagnoses: Atrial fibrillation with rapid ventricular response ([address] Discharge Diagnoses/Hospital Course Ms. Meredith Garcia is a 67 yo female with PMH of ESRD on HD, HTN, L systolic dysfunction admitted for a-fib with RVR. She was evaluated by cardiology and converted to sinus rhythm after being given dilt in the ED. She remains rate controlled on coreg. She is refusing coumadin but willing to take Eliquis CM: LV noted to be 10 to 15% with severe diffuse hypokinesis. Cath showed a chronically occluded RCA. She is on coreg, hydralazine, nitrates. Eventually will need ACEI/ARB but BP's currently would not tolerate the addition of this. ESRD (end stage renal disease). On HD  
  
HTN (hypertension). On Imdur, hydralazine, beta blocker 
  
PCP: Katerine Garnica MD  
 
Consults: Nephrology, cardiology Discharge Exam: 
Visit Vitals  /76  Pulse 76  Temp 98 °F (36.7 °C)  Resp 16  
 Ht 5' 4\" (1.626 m)  Wt 66.6 kg (146 lb 14.4 oz)  SpO2 96%  BMI 25.22 kg/m2 Gen:  Elderly, frail, in no acute distress HEENT:  Pink conjunctivae, PERRL, hearing intact to voice, moist mucous membranes Neck:  Supple, without masses, thyroid non-tender Resp:  No accessory muscle use, clear breath sounds without wheezes rales or rhonchi 
Card:  No murmurs, normal S1, S2 without thrills, bruits or peripheral edema Abd:  Soft, non-tender, non-distended, normoactive bowel sounds are present, no palpable organomegaly and no detectable hernias Lymph:  No cervical or inguinal adenopathy Musc:  No cyanosis or clubbing Skin:  No rashes or ulcers, skin turgor is good Neuro:  Cranial nerves are grossly intact, no focal motor weakness, follows commands appropriately Psych:  Good insight, oriented to person, place and time, alert Disposition: home Patient Instructions:  
Discharge Medication List as of 7/24/2018  4:38 PM  
  
CONTINUE these medications which have CHANGED Details  
carvedilol (COREG) 12.5 mg tablet Take 1 Tab by mouth two (2) times daily (with meals). , No Print, Disp-60 Tab, R-0  
  
apixaban (ELIQUIS) 5 mg tablet Take 1 Tab by mouth two (2) times a day., Print, Disp-60 Tab, R-0  
  
  
CONTINUE these medications which have NOT CHANGED Details  
omeprazole (PRILOSEC) 20 mg capsule Take 20 mg by mouth daily. , Historical Med  
  
zolpidem (AMBIEN) 5 mg tablet Take 5 mg by mouth nightly as needed for Sleep., Historical Med FOLIC ACID/VIT BCOMP,C (DIALYVITE 800 PO) Take 1 Tab by mouth daily. , Historical Med  
  
hydrALAZINE (APRESOLINE) 50 mg tablet Take 50 mg by mouth two (2) times a day., Historical Med  
  
isosorbide mononitrate ER (IMDUR) 60 mg CR tablet Take 30 mg by mouth daily. Half-tab, Historical Med  
  
calcium carbonate (TUMS) 200 mg calcium (500 mg) chew Take 2 Tabs by mouth two (2) times daily (with meals). , Historical Med  
  
  
STOP taking these medications  
  
 naproxen sodium (ALEVE) 220 mg tablet Comments:  
Reason for Stopping:   
   
  
 
 
Activity: Activity as tolerated Diet: Cardiac Diet Wound Care: None needed Follow-up Information Follow up With Details Comments Contact Info Sully Escobar MD On 7/30/2018 9:45am [address] [place] [address] 
[phone] Shakila Pugh MD   [address] [place] [address] 
[phone] Approximate time spent in patient care on day of discharge: 35 minutes Signed: 
Claudia Ramirez MD 
8/1/2018 
2:40 PM

## 2018-08-10 ENCOUNTER — OFFICE VISIT (OUTPATIENT)
Dept: CARDIOLOGY CLINIC | Age: 78
End: 2018-08-10

## 2018-08-10 VITALS
RESPIRATION RATE: 20 BRPM | HEIGHT: 64 IN | OXYGEN SATURATION: 97 % | DIASTOLIC BLOOD PRESSURE: 62 MMHG | HEART RATE: 84 BPM | BODY MASS INDEX: 24.17 KG/M2 | WEIGHT: 141.6 LBS | SYSTOLIC BLOOD PRESSURE: 110 MMHG

## 2018-08-10 DIAGNOSIS — I48.91 ATRIAL FIBRILLATION WITH RAPID VENTRICULAR RESPONSE (HCC): Primary | ICD-10-CM

## 2018-08-10 RX ORDER — LISINOPRIL 5 MG/1
TABLET ORAL DAILY
COMMUNITY
End: 2018-09-28 | Stop reason: CLARIF

## 2018-08-10 RX ORDER — CALCIUM ACETATE 667 MG/1
CAPSULE ORAL
COMMUNITY
End: 2018-09-28 | Stop reason: CLARIF

## 2018-08-10 RX ORDER — VITAMIN E 268 MG
400 CAPSULE ORAL DAILY
COMMUNITY
End: 2019-01-01

## 2018-08-10 RX ORDER — GABAPENTIN 100 MG/1
100 CAPSULE ORAL DAILY
COMMUNITY
End: 2018-09-28 | Stop reason: CLARIF

## 2018-08-10 RX ORDER — VENLAFAXINE 37.5 MG/1
37.5 TABLET ORAL DAILY
COMMUNITY

## 2018-08-10 NOTE — PROGRESS NOTES
HISTORY OF PRESENTING ILLNESS      Malina Leggett is a 66 y.o. female with ischemic cardiomyopathy, left ventricular ejection fraction 10-15%, NYHA class II, permanent atrial fibrillation, end-stage renal disease on dialysis who is been on optimal guideline directed medical therapy. She was found to have severe RCA occlusion with collateral supply. Will auscultation was not warranted. She is referred for consideration of an ICD for primary prevention of sudden cardiac death. ACTIVE PROBLEM LIST     Patient Active Problem List    Diagnosis Date Noted    Severe left ventricular systolic dysfunction 75/06/5288    Atrial fibrillation with rapid ventricular response ([facility] 07/21/2018    Pulmonary edema 07/21/2018    Abdominal abscess 09/16/2017    Bacteremia 09/15/2017    Abdominal wall abscess 09/14/2017    ESRD (end stage renal disease) ([facility] 09/14/2017    HTN (hypertension) 09/14/2017    Leukocytosis 09/14/2017    Obese 09/14/2017           PAST MEDICAL HISTORY     Past Medical History:   Diagnosis Date    Arthritis     Chronic kidney disease     Dr Ej Gayle Hypertension     Ill-defined condition     Dialysis T, Th, S    Obese 9/14/2017           PAST SURGICAL HISTORY     Past Surgical History:   Procedure Laterality Date    HX COLOSTOMY      left abdomen    HX VASCULAR ACCESS      left upper arm fistula          ALLERGIES     Allergies   Allergen Reactions    Iron Anaphylaxis     Per pt, to PO formulation only, can tolerate IV formulation  Allergy to excipient?           FAMILY HISTORY     Family History   Problem Relation Age of Onset    Hypertension Other      multiple family members    negative for cardiac disease       SOCIAL HISTORY     Social History     Social History    Marital status:      Spouse name: N/A    Number of children: N/A    Years of education: N/A     Social History Main Topics    Smoking status: Current Every Day Smoker     Packs/day: 0.50  Smokeless tobacco: Never Used    Alcohol use No    Drug use: No    Sexual activity: Not Asked     Other Topics Concern    None     Social History Narrative         MEDICATIONS     Current Outpatient Prescriptions   Medication Sig    calcium acetate (PHOSLO) 667 mg cap Take  by mouth three (3) times daily (with meals).  venlafaxine (EFFEXOR) 37.5 mg tablet Take 37.5 mg by mouth daily.  lisinopril (PRINIVIL, ZESTRIL) 5 mg tablet Take  by mouth daily.  gabapentin (NEURONTIN) 100 mg capsule Take 100 mg by mouth daily.  vitamin E (AQUA GEMS) 400 unit capsule Take  by mouth daily.  carvedilol (COREG) 12.5 mg tablet Take 1 Tab by mouth two (2) times daily (with meals).  apixaban (ELIQUIS) 5 mg tablet Take 1 Tab by mouth two (2) times a day.  zolpidem (AMBIEN) 5 mg tablet Take 5 mg by mouth nightly as needed for Sleep.  FOLIC ACID/VIT BCOMP,C (DIALYVITE 800 PO) Take 1 Tab by mouth daily.  hydrALAZINE (APRESOLINE) 50 mg tablet Take 50 mg by mouth two (2) times a day.  isosorbide mononitrate ER (IMDUR) 60 mg CR tablet Take 30 mg by mouth daily. Half-tab    calcium carbonate (TUMS) 200 mg calcium (500 mg) chew Take 3 Tabs by mouth two (2) times daily (with meals). No current facility-administered medications for this visit. I have reviewed the nurses notes, vitals, problem list, allergy list, medical history, family, social history and medications. REVIEW OF SYMPTOMS      General: Pt denies excessive weight gain or loss. Pt is able to conduct ADL's  HEENT: Denies blurred vision, headaches, hearing loss, epistaxis and difficulty swallowing. Respiratory: Denies cough, congestion, shortness of breath, FRAUSTO, wheezing or stridor.   Cardiovascular: Denies precordial pain, palpitations, edema or PND  Gastrointestinal: Denies poor appetite, indigestion, abdominal pain or blood in stool  Genitourinary: Denies hematuria, dysuria, increased urinary frequency  Musculoskeletal: Denies joint pain or swelling from muscles or joints  Neurologic: Denies tremor, paresthesias, headache, or sensory motor disturbance  Psychiatric: Denies confusion, insomnia, depression  Integumentray: Denies rash, itching or ulcers. Hematologic: Denies easy bruising, bleeding       PHYSICAL EXAMINATION      Vitals:    08/10/18 1116   BP: 110/62   Pulse: 84   Resp: 20   SpO2: 97%   Weight: 141 lb 9.6 oz (64.2 kg)   Height: 5' 4\" (1.626 m)     General: Well developed, in no acute distress. HEENT: No jaundice, oral mucosa moist, no oral ulcers  Neck: Supple, no stiffness, no lymphadenopathy, supple  Heart:  Irregularly irregular, no murmur, gallop or rub, no jugular venous distention  Respiratory: Clear bilaterally x 4, no wheezing or rales  Abdomen:   Soft, non-tender, bowel sounds are active.   Extremities:  No edema, normal cap refill, no cyanosis. Musculoskeletal: No clubbing, no deformities  Neuro: A&Ox3, speech clear, gait stable, cooperative, no focal neurologic deficits  Skin: Skin color is normal. No rashes or lesions.  Non diaphoretic, moist.  Vascular: 2+ pulses symmetric in all extremities       DIAGNOSTIC DATA      EKG: Atrial fibrillation       LABORATORY DATA      Lab Results   Component Value Date/Time    WBC 7.4 07/23/2018 03:59 AM    HGB 10.4 (L) 07/23/2018 03:59 AM    HCT 33.4 (L) 07/23/2018 03:59 AM    PLATELET 278 89/51/1706 03:59 AM    .7 (H) 07/23/2018 03:59 AM      Lab Results   Component Value Date/Time    Sodium 139 07/24/2018 01:26 AM    Potassium 4.7 07/24/2018 01:26 AM    Chloride 102 07/24/2018 01:26 AM    CO2 26 07/24/2018 01:26 AM    Anion gap 11 07/24/2018 01:26 AM    Glucose 96 07/24/2018 01:26 AM    BUN 37 (H) 07/24/2018 01:26 AM    Creatinine 7.84 (H) 07/24/2018 01:26 AM    BUN/Creatinine ratio 5 (L) 07/24/2018 01:26 AM    GFR est AA 6 (L) 07/24/2018 01:26 AM    GFR est non-AA 5 (L) 07/24/2018 01:26 AM    Calcium 7.7 (L) 07/24/2018 01:26 AM    Bilirubin, total 0.9 07/21/2018 10:29 AM    AST (SGOT) 26 07/21/2018 10:29 AM    Alk. phosphatase 118 (H) 07/21/2018 10:29 AM    Protein, total 8.0 07/21/2018 10:29 AM    Albumin 3.0 (L) 07/21/2018 10:29 AM    Globulin 5.0 (H) 07/21/2018 10:29 AM    A-G Ratio 0.6 (L) 07/21/2018 10:29 AM    ALT (SGPT) 23 07/21/2018 10:29 AM           ASSESSMENT      1. Cardiomyopathy   A. Ischemic   B. LV ejection fraction 10-15%   C. NYHA class II  2. Coronary artery disease, native  3. End-stage renal disease on dialysis  4. Hypertension   5. Atrial fibrillation   A. Permanent          PLAN     Patient meets criteria for an ICD for primary mention of sudden cardiac death. However given her history of dialysis I fear that she is at higher risk for infection as well as vascular compromise. Although she has a QRS duration of greater than 120 ms, this QRS prolongation is borderline and given her AF I suspect patient will be poorly responsive to a biventricular system. Therefore we will have patient screened for a subcutaneous ICD. FOLLOW-UP     Post procedure    Thank you, Wade Trimble MD and Dr. Mary Carpio for allowing me to participate in the care of this extraordinarily pleasant female. Please do not hesitate to contact me for further questions/concerns.          Orlin Mcleod MD  Cardiac Electrophysiology / Cardiology    [address]  [address] [place] [address], [facility] [address] [address]    [place] Mariano  [phone] / [phone] Fax   [phone] / [phone] Fax

## 2018-08-10 NOTE — PROGRESS NOTES
Visit Vitals    /62 (BP 1 Location: Left arm, BP Patient Position: Sitting)    Pulse 84    Resp 20    Ht 5' 4\" (1.626 m)    Wt 141 lb 9.6 oz (64.2 kg)    SpO2 97%    BMI 24.31 kg/m2

## 2018-08-29 ENCOUNTER — TELEPHONE (OUTPATIENT)
Dept: CARDIOLOGY CLINIC | Age: 78
End: 2018-08-29

## 2018-08-29 NOTE — TELEPHONE ENCOUNTER
Pt would like to know if Dr. Opal Canavan decided if she needed a pacemaker. She wants to know if she would need to get one and when she would get it.    Phone: [phone]

## 2018-08-30 NOTE — TELEPHONE ENCOUNTER
Pt called stating she was supposed to have a surgery done but hasn't heard from anyone in a month. She can be reached @ [phone].      Thanks

## 2018-08-30 NOTE — TELEPHONE ENCOUNTER
Returned call, ID verified using two patient identifiers. Advised patient that Dr. Patricio Steiner does plan to implant a SQ ICD and that Sandro Bearden LPN would be contacting her to schedule the date for her procedure. Patient verbalizes understanding of all information.

## 2018-09-06 RX ORDER — SODIUM CHLORIDE 0.9 % (FLUSH) 0.9 %
5-10 SYRINGE (ML) INJECTION AS NEEDED
Status: CANCELLED | OUTPATIENT
Start: 2018-09-06

## 2018-09-06 RX ORDER — SODIUM CHLORIDE 0.9 % (FLUSH) 0.9 %
5-10 SYRINGE (ML) INJECTION EVERY 8 HOURS
Status: CANCELLED | OUTPATIENT
Start: 2018-09-06

## 2018-09-14 ENCOUNTER — HOSPITAL ENCOUNTER (OUTPATIENT)
Dept: NON INVASIVE DIAGNOSTICS | Age: 78
Discharge: HOME | End: 2018-09-14
Attending: INTERNAL MEDICINE | Admitting: INTERNAL MEDICINE
Payer: MEDICARE

## 2018-09-14 ENCOUNTER — APPOINTMENT (OUTPATIENT)
Dept: GENERAL RADIOLOGY | Age: 78
End: 2018-09-14
Attending: INTERNAL MEDICINE
Payer: MEDICARE

## 2018-09-14 ENCOUNTER — ANESTHESIA EVENT (OUTPATIENT)
Dept: SURGERY | Age: 78
End: 2018-09-14
Payer: MEDICARE

## 2018-09-14 ENCOUNTER — ANESTHESIA (OUTPATIENT)
Dept: SURGERY | Age: 78
End: 2018-09-14
Payer: MEDICARE

## 2018-09-14 VITALS
TEMPERATURE: 97.6 F | OXYGEN SATURATION: 93 % | SYSTOLIC BLOOD PRESSURE: 131 MMHG | HEIGHT: 64 IN | DIASTOLIC BLOOD PRESSURE: 70 MMHG | RESPIRATION RATE: 19 BRPM | HEART RATE: 69 BPM | BODY MASS INDEX: 24.14 KG/M2 | WEIGHT: 141.43 LBS

## 2018-09-14 PROBLEM — I50.9 HEART FAILURE (HCC): Status: ACTIVE | Noted: 2018-09-14

## 2018-09-14 LAB
ANION GAP SERPL CALC-SCNC: 11 MMOL/L (ref 5–15)
ATRIAL RATE: 83 BPM
BUN SERPL-MCNC: 24 MG/DL (ref 6–20)
BUN/CREAT SERPL: 4 (ref 12–20)
CALCIUM SERPL-MCNC: 9.2 MG/DL (ref 8.5–10.1)
CALCULATED P AXIS, ECG09: 4 DEGREES
CALCULATED R AXIS, ECG10: -17 DEGREES
CALCULATED T AXIS, ECG11: -152 DEGREES
CHLORIDE SERPL-SCNC: 102 MMOL/L (ref 97–108)
CO2 SERPL-SCNC: 27 MMOL/L (ref 21–32)
CREAT SERPL-MCNC: 5.39 MG/DL (ref 0.55–1.02)
DIAGNOSIS, 93000: NORMAL
ERYTHROCYTE [DISTWIDTH] IN BLOOD BY AUTOMATED COUNT: 18.6 % (ref 11.5–14.5)
GLUCOSE SERPL-MCNC: 95 MG/DL (ref 65–100)
HCT VFR BLD AUTO: 33.4 % (ref 35–47)
HGB BLD-MCNC: 10.6 G/DL (ref 11.5–16)
MCH RBC QN AUTO: 33.2 PG (ref 26–34)
MCHC RBC AUTO-ENTMCNC: 31.7 G/DL (ref 30–36.5)
MCV RBC AUTO: 104.7 FL (ref 80–99)
NRBC # BLD: 0 K/UL (ref 0–0.01)
NRBC BLD-RTO: 0 PER 100 WBC
P-R INTERVAL, ECG05: 140 MS
PLATELET # BLD AUTO: 168 K/UL (ref 150–400)
PMV BLD AUTO: 10.8 FL (ref 8.9–12.9)
POTASSIUM SERPL-SCNC: 4.4 MMOL/L (ref 3.5–5.1)
Q-T INTERVAL, ECG07: 414 MS
QRS DURATION, ECG06: 122 MS
QTC CALCULATION (BEZET), ECG08: 486 MS
RBC # BLD AUTO: 3.19 M/UL (ref 3.8–5.2)
SODIUM SERPL-SCNC: 140 MMOL/L (ref 136–145)
VENTRICULAR RATE, ECG03: 83 BPM
WBC # BLD AUTO: 7.5 K/UL (ref 3.6–11)

## 2018-09-14 PROCEDURE — 74011250636 HC RX REV CODE- 250/636: Performed by: INTERNAL MEDICINE

## 2018-09-14 PROCEDURE — 33249 INSJ/RPLCMT DEFIB W/LEAD(S): CPT

## 2018-09-14 PROCEDURE — 77030002933 HC SUT MCRYL J&J -A

## 2018-09-14 PROCEDURE — 77030031139 HC SUT VCRL2 J&J -A

## 2018-09-14 PROCEDURE — 77030037713 HC CLOSR DEV INCIS ZIP STRY -B

## 2018-09-14 PROCEDURE — 99218 HC RM OBSERVATION: CPT

## 2018-09-14 PROCEDURE — C1722 AICD, SINGLE CHAMBER: HCPCS | Performed by: INTERNAL MEDICINE

## 2018-09-14 PROCEDURE — 77030012935 HC DRSG AQUACEL BMS -B

## 2018-09-14 PROCEDURE — 93005 ELECTROCARDIOGRAM TRACING: CPT

## 2018-09-14 PROCEDURE — 71045 X-RAY EXAM CHEST 1 VIEW: CPT

## 2018-09-14 PROCEDURE — 80048 BASIC METABOLIC PNL TOTAL CA: CPT | Performed by: INTERNAL MEDICINE

## 2018-09-14 PROCEDURE — 77030018729 HC ELECTRD DEFIB PAD CARD -B

## 2018-09-14 PROCEDURE — 74011250637 HC RX REV CODE- 250/637: Performed by: INTERNAL MEDICINE

## 2018-09-14 PROCEDURE — 74011000250 HC RX REV CODE- 250

## 2018-09-14 PROCEDURE — 33270 INS/REP SUBQ DEFIBRILLATOR: CPT

## 2018-09-14 PROCEDURE — 74011250636 HC RX REV CODE- 250/636

## 2018-09-14 PROCEDURE — 76060000034 HC ANESTHESIA 1.5 TO 2 HR

## 2018-09-14 PROCEDURE — C1896 LEAD, AICD, NON SING/DUAL: HCPCS | Performed by: INTERNAL MEDICINE

## 2018-09-14 PROCEDURE — 85027 COMPLETE CBC AUTOMATED: CPT | Performed by: INTERNAL MEDICINE

## 2018-09-14 PROCEDURE — 36415 COLL VENOUS BLD VENIPUNCTURE: CPT | Performed by: INTERNAL MEDICINE

## 2018-09-14 PROCEDURE — 77030018547 HC SUT ETHBND1 J&J -B

## 2018-09-14 PROCEDURE — 77030019557 HC ELECTRD VES SEAL MEDT -F

## 2018-09-14 PROCEDURE — 77030018673

## 2018-09-14 PROCEDURE — 74011000250 HC RX REV CODE- 250: Performed by: INTERNAL MEDICINE

## 2018-09-14 RX ORDER — CEFAZOLIN SODIUM/WATER 2 G/20 ML
2 SYRINGE (ML) INTRAVENOUS ONCE
Status: COMPLETED | OUTPATIENT
Start: 2018-09-14 | End: 2018-09-14

## 2018-09-14 RX ORDER — HEPARIN SODIUM 200 [USP'U]/100ML
500 INJECTION, SOLUTION INTRAVENOUS ONCE
Status: COMPLETED | OUTPATIENT
Start: 2018-09-14 | End: 2018-09-14

## 2018-09-14 RX ORDER — PROPOFOL 10 MG/ML
INJECTION, EMULSION INTRAVENOUS AS NEEDED
Status: DISCONTINUED | OUTPATIENT
Start: 2018-09-14 | End: 2018-09-14 | Stop reason: HOSPADM

## 2018-09-14 RX ORDER — HYDROCODONE BITARTRATE AND ACETAMINOPHEN 5; 325 MG/1; MG/1
1 TABLET ORAL
Status: DISCONTINUED | OUTPATIENT
Start: 2018-09-14 | End: 2018-09-14 | Stop reason: HOSPADM

## 2018-09-14 RX ORDER — BUPIVACAINE HYDROCHLORIDE 5 MG/ML
20 INJECTION, SOLUTION EPIDURAL; INTRACAUDAL ONCE
Status: COMPLETED | OUTPATIENT
Start: 2018-09-14 | End: 2018-09-14

## 2018-09-14 RX ORDER — LIDOCAINE HYDROCHLORIDE AND EPINEPHRINE 10; 10 MG/ML; UG/ML
20 INJECTION, SOLUTION INFILTRATION; PERINEURAL ONCE
Status: COMPLETED | OUTPATIENT
Start: 2018-09-14 | End: 2018-09-14

## 2018-09-14 RX ORDER — SODIUM CHLORIDE 0.9 % (FLUSH) 0.9 %
5-10 SYRINGE (ML) INJECTION EVERY 8 HOURS
Status: DISCONTINUED | OUTPATIENT
Start: 2018-09-14 | End: 2018-09-14 | Stop reason: HOSPADM

## 2018-09-14 RX ORDER — SODIUM CHLORIDE 9 MG/ML
INJECTION, SOLUTION INTRAVENOUS
Status: DISCONTINUED | OUTPATIENT
Start: 2018-09-14 | End: 2018-09-14 | Stop reason: HOSPADM

## 2018-09-14 RX ORDER — CEPHALEXIN 500 MG/1
500 CAPSULE ORAL 2 TIMES DAILY
Qty: 10 CAP | Refills: 0 | Status: SHIPPED | OUTPATIENT
Start: 2018-09-14 | End: 2018-09-19

## 2018-09-14 RX ORDER — PROPOFOL 10 MG/ML
INJECTION, EMULSION INTRAVENOUS
Status: DISCONTINUED | OUTPATIENT
Start: 2018-09-14 | End: 2018-09-14 | Stop reason: HOSPADM

## 2018-09-14 RX ORDER — MIDAZOLAM HYDROCHLORIDE 1 MG/ML
INJECTION, SOLUTION INTRAMUSCULAR; INTRAVENOUS AS NEEDED
Status: DISCONTINUED | OUTPATIENT
Start: 2018-09-14 | End: 2018-09-14 | Stop reason: HOSPADM

## 2018-09-14 RX ORDER — SODIUM CHLORIDE 0.9 % (FLUSH) 0.9 %
5-10 SYRINGE (ML) INJECTION AS NEEDED
Status: DISCONTINUED | OUTPATIENT
Start: 2018-09-14 | End: 2018-09-14 | Stop reason: HOSPADM

## 2018-09-14 RX ORDER — ACETAMINOPHEN 325 MG/1
650 TABLET ORAL
Status: DISCONTINUED | OUTPATIENT
Start: 2018-09-14 | End: 2018-09-14 | Stop reason: HOSPADM

## 2018-09-14 RX ORDER — ONDANSETRON 2 MG/ML
4 INJECTION INTRAMUSCULAR; INTRAVENOUS
Status: DISCONTINUED | OUTPATIENT
Start: 2018-09-14 | End: 2018-09-14 | Stop reason: HOSPADM

## 2018-09-14 RX ORDER — EPHEDRINE SULFATE 50 MG/ML
INJECTION, SOLUTION INTRAVENOUS AS NEEDED
Status: DISCONTINUED | OUTPATIENT
Start: 2018-09-14 | End: 2018-09-14 | Stop reason: HOSPADM

## 2018-09-14 RX ORDER — CARVEDILOL 12.5 MG/1
12.5 TABLET ORAL 2 TIMES DAILY WITH MEALS
Status: DISCONTINUED | OUTPATIENT
Start: 2018-09-14 | End: 2018-09-14 | Stop reason: HOSPADM

## 2018-09-14 RX ORDER — FENTANYL CITRATE 50 UG/ML
INJECTION, SOLUTION INTRAMUSCULAR; INTRAVENOUS AS NEEDED
Status: DISCONTINUED | OUTPATIENT
Start: 2018-09-14 | End: 2018-09-14 | Stop reason: HOSPADM

## 2018-09-14 RX ORDER — BACITRACIN 50000 [IU]/1
INJECTION, POWDER, FOR SOLUTION INTRAMUSCULAR
Status: DISCONTINUED
Start: 2018-09-14 | End: 2018-09-14 | Stop reason: HOSPADM

## 2018-09-14 RX ORDER — GENTAMICIN SULFATE 80 MG/100ML
80 INJECTION, SOLUTION INTRAVENOUS ONCE
Status: COMPLETED | OUTPATIENT
Start: 2018-09-14 | End: 2018-09-14

## 2018-09-14 RX ADMIN — SODIUM CHLORIDE: 9 INJECTION, SOLUTION INTRAVENOUS at 08:49

## 2018-09-14 RX ADMIN — PROPOFOL 10 MG: 10 INJECTION, EMULSION INTRAVENOUS at 09:34

## 2018-09-14 RX ADMIN — MIDAZOLAM HYDROCHLORIDE 0.25 MG: 1 INJECTION, SOLUTION INTRAMUSCULAR; INTRAVENOUS at 09:21

## 2018-09-14 RX ADMIN — PROPOFOL 20 MG: 10 INJECTION, EMULSION INTRAVENOUS at 09:30

## 2018-09-14 RX ADMIN — MIDAZOLAM HYDROCHLORIDE 0.5 MG: 1 INJECTION, SOLUTION INTRAMUSCULAR; INTRAVENOUS at 09:50

## 2018-09-14 RX ADMIN — BUPIVACAINE HYDROCHLORIDE 200 MG: 5 INJECTION, SOLUTION EPIDURAL; INTRACAUDAL; PERINEURAL at 09:27

## 2018-09-14 RX ADMIN — GENTAMICIN SULFATE 80 MG: 80 INJECTION, SOLUTION INTRAVENOUS at 09:11

## 2018-09-14 RX ADMIN — Medication 2 G: at 08:57

## 2018-09-14 RX ADMIN — FENTANYL CITRATE 12.5 MCG: 50 INJECTION, SOLUTION INTRAMUSCULAR; INTRAVENOUS at 09:29

## 2018-09-14 RX ADMIN — FENTANYL CITRATE 12.5 MCG: 50 INJECTION, SOLUTION INTRAMUSCULAR; INTRAVENOUS at 09:17

## 2018-09-14 RX ADMIN — HEPARIN SODIUM IN SODIUM CHLORIDE 1000 UNITS: 200 INJECTION INTRAVENOUS at 09:26

## 2018-09-14 RX ADMIN — PROPOFOL 30 MG: 10 INJECTION, EMULSION INTRAVENOUS at 10:03

## 2018-09-14 RX ADMIN — HYDROCODONE BITARTRATE AND ACETAMINOPHEN 1 TABLET: 5; 325 TABLET ORAL at 10:57

## 2018-09-14 RX ADMIN — SODIUM CHLORIDE: 900 INJECTION, SOLUTION INTRAVENOUS at 09:48

## 2018-09-14 RX ADMIN — LIDOCAINE HYDROCHLORIDE,EPINEPHRINE BITARTRATE 400 MG: 10; .01 INJECTION, SOLUTION INFILTRATION; PERINEURAL at 09:24

## 2018-09-14 RX ADMIN — EPHEDRINE SULFATE 10 MG: 50 INJECTION, SOLUTION INTRAVENOUS at 09:59

## 2018-09-14 RX ADMIN — CARVEDILOL 12.5 MG: 12.5 TABLET, FILM COATED ORAL at 08:20

## 2018-09-14 RX ADMIN — PROPOFOL 15 MCG/KG/MIN: 10 INJECTION, EMULSION INTRAVENOUS at 09:04

## 2018-09-14 RX ADMIN — PROPOFOL 20 MG: 10 INJECTION, EMULSION INTRAVENOUS at 09:39

## 2018-09-14 RX ADMIN — MIDAZOLAM HYDROCHLORIDE 0.25 MG: 1 INJECTION, SOLUTION INTRAMUSCULAR; INTRAVENOUS at 09:08

## 2018-09-14 NOTE — PROCEDURES
Cardiac Electrophysiology Report PATIENT INFORMATION Patient Name: French Alberto       MRN: [id number]      Study Date: 2018 YOB: 1940        Age: 66 y.o. Gender: female Procedure:  ICD Implantation Referring Physician:  Taylor Cortes MD and Dr. Norberto Theodore STAFF Duty Name Electrophysiologist Toy Loza MD  
Monitor Anesthesia service Circulator Radha Cheng RT; Brenda Fleming RN;Lennox Spears RN  
 
 
PATIENT HISTORY Bridger García a 66 y. o. female with ischemic cardiomyopathy, left ventricular ejection fraction 10-15%, NYHA class II, permanent atrial fibrillation, end-stage renal disease on dialysis who is been on optimal guideline directed medical therapy.  She was found to have severe RCA occlusion with collateral supply.  Coronary revascularization was not warranted.  She presents for implantation of an ICD for primary prevention of sudden cardiac death. 
   
 
PROCEDURE The patient was brought to the Cardiac Electrophysiology laboratory in a post-absorptive, fasting state. Informed consent was obtained. A peripheral IV was in place. Continuous electrocardiographic, blood pressure, O2 saturation and  CO2 monitoring was initiated. Intravenous antibiotics were administered pre-operatively. Self-adhesive cardioversion patches were positioned on the chest.  Conscious sedation was effectuated according to protocol. The patient was then prepped and draped in the usual sterile fashion. A 50/50 mixture of lidocaine (1%) with epinephrine and bupivicaine (0.5%) was utilized for local anesthesia. An incision was along the mid-axillary line. Sharp and blunt dissection was carried down to the level of the pectoralis muscle. Hemostasis was maintained with electrocautery.  An incision was performed inferior to the xiphoid process and dissection was carried down to the level of the pectoralis muscle. Pockets were created and flushed with antibiotic solution. A blunt dissecting tool was used to advance the lead into position. The lead was connected to the generator and placed into the pocket. DFT testing was performed successfully. The device was secured to the pocket using O-ethibond, non-absorbable suture material. The pockets were then closed in three layers using 2-O vicryl, 3-O vicryl absorbable suture material and a zipline. The skin was closed using a sub-cuticular technique. A bio-occlusive dressing were applied to the skin. The patient remained hemodynamically stable, tolerated the procedure well and was transferred in stable condition. There were no immediate complications encountered during the procedure. No specimen were removed; there was minimal blood loss. LEAD & GENERATOR DATA  Model # Serial # Implanted [employer] [id number] Ventricular Lead [employer] [id number] FINAL PROGRAMMING  
 
VF Rate (bpm) [employer] (J)  
250 80 x 5 VT Rate (bpm) [employer] (J)  
200 80 x 5 DEFIBRILLATION THRESHOLD TESTING Successful DFT testing 53 Ohms 22.8 TTT MEDICATION SUMMARY Medication Route Unit Total  
Gentamycin IV mg 80 Ancef IV grams 2 CONCLUSIONS 1. Successful subcutaneous ICD implantation performed 2. Keflex 500 mg po bid x 5 days. 3. Wound check in EP clinic in 10 days. 4. Follow up in EP clinic in 1 month or earlier if necessary. 5. Follow up with Karmen Bauer MD and Dr. Alesha Lemus as scheduled. Thank you, Karmen Bauer MD and Dr. Alesha Lemus for involving me in the care of this extraordinarily pleasant female. Jazzy Paula MD 
Cardiac Electrophysiology / Cardiology [address], [facility] [place], [address] [place], [address]       [facility], [place] 
[phone] / [phone] Fax     [phone] / [phone] Fax

## 2018-09-14 NOTE — ANESTHESIA POSTPROCEDURE EVALUATION
Post-Anesthesia Evaluation and Assessment Patient: Lin Gustafson MRN: [id number]  SSN: [id number] YOB: 1940  Age: 66 y.o. Sex: female Cardiovascular Function/Vital Signs Visit Vitals  /66 (BP 1 Location: Left arm, BP Patient Position: At rest)  Pulse 66  Temp 36.4 °C (97.6 °F)  Resp 20  
 Ht 5' 4\" (1.626 m)  Wt 64.1 kg (141 lb 6.8 oz)  SpO2 95%  Breastfeeding No  
 BMI 24.28 kg/m2 Patient is status post MAC anesthesia for * No procedures listed *. Nausea/Vomiting: None Postoperative hydration reviewed and adequate. Pain: 
Pain Scale 1: Numeric (0 - 10) (09/14/18 1136) Pain Intensity 1: 9 (09/14/18 1100) Managed Neurological Status: At baseline Mental Status and Level of Consciousness: Arousable Pulmonary Status:  
O2 Device: Room air (09/14/18 1200) Adequate oxygenation and airway patent Complications related to anesthesia: None Post-anesthesia assessment completed. No concerns Signed By: Angie Kaba MD   
 September 14, 2018

## 2018-09-14 NOTE — PHYSICIAN ADVISORY
Letter of Status Determination:  
Recommend hospitalization status upgraded from OBSERVATION  to INPATIENT  Status Pt Name:  Opal Leone MR#  
LANDON # [id number] / 
[id number] Payor: Artem Mariee / Plan: [address] / Product Type: Medicare /   
TAISHA#  [id number] Room and Hospital  CATH/PL  @ [address] [place] [address] Hospitalization date  9/14/2018  6:42 AM  
Current Attending Physician  No att. providers found Principal diagnosis  <principal problem not specified> Cath lab Clinicals  66 y.o. y.o  female hospitalized with above diagnosis Copied from H&P \"ischemic cardiomyopathy, left ventricular ejection fraction 10-15%, NYHA class II, permanent atrial fibrillation, end-stage renal disease on dialysis \" The pt is now s/p successful implantation of subcutaneous ICD [place] (MCG) criteria Does  NOT apply STATUS DETERMINATION  This patient is at high risk of adverse events and deterioration based on documented clinical data, comorbid conditions and current acute care course. Ms. Opal Leone is expected to meet Inpatient Admission status criteria in accordance with CMS regulation Section 43 .3. Specifically, due to medical necessity the patient's stay is expected to exceed Two Midnights. It is our recommendation that this patient's hospitalization status should be upgraded from  OBSERVATION to INPATIENT status. The final decision of the patient's hospitalization status depends on the attending physician's judgment. Additional comments Payor: Artem Mariee / Plan: [address] / Product Type: Medicare /   
  
 
Betty Pena MD MPH FACP Cell: [phone] Physician Advisor [address]  
 [facility], [address] [place] [address]   
Cell  [phone]   
 
 
[id number] Johnathan Goodwin

## 2018-09-14 NOTE — PROGRESS NOTES
6:51 AM 
Patient arrived. ID and allergies verified verbally with patient. Pt voices understanding of procedure to be performed. Consent obtained. Pt prepped for procedure. Patient and family oriented to fall prevention protocol. Understanding verbalized. Yellow band and socks applied 7:57 AM 
Colostomy bag changed by patient pre procedure 8:40 AM 
TRANSFER - OUT REPORT: 
 
Verbal report given to Ted(name) on Chayo Neas  being transferred to EOP(unit) for ordered procedure Report consisted of patients Situation, Background, Assessment and  
Recommendations(SBAR). Information from the following report(s) SBAR was reviewed with the receiving nurse. Lines:  
Peripheral IV 09/14/18 Left Forearm (Active) Peripheral IV 09/14/18 Left (Active) Opportunity for questions and clarification was provided. Patient transported with: 
 Registered Nurse 10:20 AM 
TRANSFER - IN REPORT: 
 
Verbal report received from Cynthia(name) on Chayo Neas  being received from EP(unit) for routine post - op Report consisted of patients Situation, Background, Assessment and  
Recommendations(SBAR). Information from the following report(s) SBAR, Procedure Summary and MAR was reviewed with the receiving nurse. Opportunity for questions and clarification was provided. Assessment completed upon patients arrival to unit and care assumed. Pt voices understanding of post procedure bedrest instructions. [address] Discharge instructions reviewed with patient and family. Voiced understanding. Patient given copy of discharge instructions to take home. 1:24 PM 
Pt discharged off unit via wheelchair into care of family

## 2018-09-14 NOTE — DISCHARGE INSTRUCTIONS
ICD  Discharge Instructions    Please make sure you have received your Temporary ICD identification card with your discharge instructions      MEDICATIONS         Take only the medications prescribed to you at discharge. ACTIVITY         Return to your normal activity, except as noted below. o Avoid tight clothes or unnecessary pressure over your incision (such as bra straps or seat belts). If it is tender or sensitive to clothing, cover the incision with a soft dressing or pad.  o Questions about driving are individualized and should be discussed with one of the EP Physicians prior to discharge. SHOWERING         Leave the bandage over your incision for 7 days after the ICD implant. You bandage will be removed in clinic in 7 days.  It is important to keep the bandaged area clean and dry. You may shower around the site until the bandage is removed in clinic. Thereafter, you may shower after the bandage is removed, washing it gently with soap and water. Do not apply any lotions, powders, or perfumes to the incision line.  Avoid submerging your incision in water (tub baths, hot tubs, or swimming) for four weeks.  Underneath the dressing. o If you have white steri-strips over your incision (underneath the gauze dressing), they will curl up at the end and fall off, usually within 10 days. Do not pull them off.  - OR -   o You may have a different type of closure for the incision. If Dermabond Adhesive was used to close your incision, you will receive a separate instruction sheet. DISCHARGE PRECAUTIONS         Record your temperature every day, at the same time, for 3 weeks after your implant. A temperature of 100.5 F, or higher, can be the first sign of infection. This should be reported to your Doctor immediately.  You can have an MRI after 6 weeks. You must be aware that any strong magnet or magnetic field can affect your ICD.   In general, be careful of metal detectors, heavy machinery, and any area where arc-welding is performed. Avoid metal detectors such as the ones in security checkpoints at [facility] or [address] [place] [address]. When approaching a security checkpoint show your ICD Identification Card to security personnel and ask to be hand searched.  Always tell your doctor or dentist that you have an ICD. Antibiotics may be prescribed before certain procedures.  If you use a cell phone, hold it on the opposite side from where your ICD is implanted.  Your temporary identification will be given to you with these instructions. Keep your ICD card in your wallet or on your person at all times. You should receive your permanent card in 8 weeks. If you do not receive your permanent card, please call the office at [phone]. TAKING YOUR PULSE         Take your pulse the same time every day, preferably in the morning.  Sit down and rest for 5 minutes prior to taking your pulse.  Take your pulse for 1 full minute, use a clock or stop watch with a second hand.  To feel your pulse, use the first two fingers of one hand; place them on the thumb side of the wrist of the opposite hand. The pulse will be steady, regular and throbbing.  Call the office if your pulse is less than 40 beats per minute. SYMPTOMS THAT NEED TO BE REPORTED IMMEDIATELY         Temperature more than 100.4 F     Redness or warmth at the incision site, or pain for longer than the first 5 days after the implant.  Drainage from the incision site.  Swelling around the incision site.  Shortness of breath.  Rapid heart rate or palpitations.  Dizziness, lightheadedness, fainting.  Slow pulse below 40 beats per minute.  REMEMBER: If you feel something is an emergency or cannot be handled over the phone, call 911 or go to the closest emergency room.       WHAT TO DO IF YOUR ICD DELIVERS A SHOCK     · If you ICD delivers a shock, you should seek attention at the nearest emergency room, call our office or call 911.           Kendall Marroquin MD  Cardiac Electrophysiology / Cardiology    [address]  [address] [facility], [address] [facility], [address]  [address]  [phone] / [phone] Fax       [phone] / [phone] Fax

## 2018-09-14 NOTE — ANESTHESIA PREPROCEDURE EVALUATION
Anesthetic History No history of anesthetic complications Review of Systems / Medical History Patient summary reviewed and pertinent labs reviewed Pulmonary Within defined limits Smoker Neuro/Psych Cardiovascular Hypertension CHF Dysrhythmias : atrial fibrillation Exercise tolerance: <4 METS Comments: EF - 10 - 15% GI/Hepatic/Renal 
  
 
 
Renal disease: ESRD Endo/Other Arthritis Other Findings Physical Exam 
 
Airway Mallampati: II 
TM Distance: 4 - 6 cm Neck ROM: normal range of motion Mouth opening: Normal 
 
 Cardiovascular Rhythm: regular Rate: normal 
 
 
 
 Dental 
 
Dentition: Poor dentition Comments: Multiple missing and broken teeth Pulmonary Breath sounds clear to auscultation Abdominal 
 
 
 
 Other Findings Anesthetic Plan ASA: 4 Anesthesia type: MAC Anesthetic plan and risks discussed with: Patient and Family

## 2018-09-14 NOTE — IP AVS SNAPSHOT
[facility] 
 
 
 [address] [facility] 
[phone] Patient: Ryland Lopez MRN: [id number] EQ About your hospitalization You were admitted on:  2018 You last received care in the:  [facility] PACU You were discharged on:  2018 Why you were hospitalized Your primary diagnosis was:  Not on File Your diagnoses also included:  Heart Failure (Hcc) Follow-up Information Follow up With Details Comments Contact Info Clive Newell MD   [address] [facility] [address] [facility] 
[phone] Discharge Orders None A check james indicates which time of day the medication should be taken. My Medications START taking these medications Instructions Each Dose to Equal  
 Morning Noon Evening Bedtime  
 cephALEXin 500 mg capsule Commonly known as:  Mike Began Your last dose was: Your next dose is: Take 1 Cap by mouth two (2) times a day for 5 days. 500 mg CONTINUE taking these medications Instructions Each Dose to Equal  
 Morning Noon Evening Bedtime AMBIEN 5 mg tablet Generic drug:  zolpidem Your last dose was: Your next dose is: Take 5 mg by mouth nightly as needed for Sleep.  
 5 mg  
    
   
   
   
  
 apixaban 5 mg tablet Commonly known as:  Juan Alberto Gault Your last dose was: Your next dose is: Take 1 Tab by mouth two (2) times a day. 5 mg  
    
   
   
   
  
 calcium carbonate 200 mg calcium (500 mg) Chew Commonly known as:  TUMS Your last dose was: Your next dose is: Take 3 Tabs by mouth two (2) times daily (with meals). 3 Tab  
    
   
   
   
  
 carvedilol 12.5 mg tablet Commonly known as:  Rihcard Peat Your last dose was: Your next dose is: Take 1 Tab by mouth two (2) times daily (with meals). 12.5 mg  
    
   
   
   
  
 DIALYVITE 800 PO Your last dose was: Your next dose is: Take 1 Tab by mouth daily. 1 Tab  
    
   
   
   
  
 gabapentin 100 mg capsule Commonly known as:  NEURONTIN Your last dose was: Your next dose is: Take 100 mg by mouth daily. 100 mg  
    
   
   
   
  
 hydrALAZINE 50 mg tablet Commonly known as:  APRESOLINE Your last dose was: Your next dose is: Take 50 mg by mouth two (2) times a day. 50 mg  
    
   
   
   
  
 isosorbide mononitrate ER 60 mg CR tablet Commonly known as:  IMDUR Your last dose was: Your next dose is: Take 30 mg by mouth daily. Half-tab 30 mg  
    
   
   
   
  
 lisinopril 5 mg tablet Commonly known as:  Dulce Loss Your last dose was: Your next dose is: Take  by mouth daily. PHOSLO 667 mg Cap Generic drug:  calcium acetate Your last dose was: Your next dose is: Take  by mouth three (3) times daily (with meals). venlafaxine 37.5 mg tablet Commonly known as:  [facility] Your last dose was: Your next dose is: Take 37.5 mg by mouth daily. 37.5 mg  
    
   
   
   
  
 vitamin E 400 unit capsule Commonly known as:  [address] Your last dose was: Your next dose is: Take  by mouth daily. Where to Get Your Medications These medications were sent to [address] [place] [address] Drive  [address] [place] [address], [address] [place], [place] [id number] Phone:  [phone]  
  cephALEXin 500 mg capsule Discharge Instructions ICD Discharge Instructions Please make sure you have received your Temporary ICD identification card with your discharge instructions MEDICATIONS ? Take only the medications prescribed to you at discharge. ACTIVITY ? Return to your normal activity, except as noted below. o Avoid tight clothes or unnecessary pressure over your incision (such as bra straps or seat belts). If it is tender or sensitive to clothing, cover the incision with a soft dressing or pad. 
o Questions about driving are individualized and should be discussed with one of the EP Physicians prior to discharge. SHOWERING  
  
 
? Leave the bandage over your incision for 7 days after the ICD implant. You bandage will be removed in clinic in 7 days. ? It is important to keep the bandaged area clean and dry. You may shower around the site until the bandage is removed in clinic. Thereafter, you may shower after the bandage is removed, washing it gently with soap and water. Do not apply any lotions, powders, or perfumes to the incision line. ? Avoid submerging your incision in water (tub baths, hot tubs, or swimming) for four weeks. ? Underneath the dressing. o If you have white steri-strips over your incision (underneath the gauze dressing), they will curl up at the end and fall off, usually within 10 days. Do not pull them off. 
- OR -  
o You may have a different type of closure for the incision. If Dermabond Adhesive was used to close your incision, you will receive a separate instruction sheet. DISCHARGE PRECAUTIONS ? Record your temperature every day, at the same time, for 3 weeks after your implant. A temperature of 100.5 F, or higher, can be the first sign of infection. This should be reported to your Doctor immediately. ? You can have an MRI after 6 weeks. You must be aware that any strong magnet or magnetic field can affect your ICD.   In general, be careful of metal detectors, heavy machinery, and any area where arc-welding is performed. Avoid metal detectors such as the ones in security checkpoints at [facility] or [address] [place] [address]. When approaching a security checkpoint show your ICD Identification Card to security personnel and ask to be hand searched. ? Always tell your doctor or dentist that you have an ICD. Antibiotics may be prescribed before certain procedures. ? If you use a cell phone, hold it on the opposite side from where your ICD is implanted. ? Your temporary identification will be given to you with these instructions. Keep your ICD card in your wallet or on your person at all times. You should receive your permanent card in 8 weeks. If you do not receive your permanent card, please call the office at [phone]. TAKING YOUR PULSE ? Take your pulse the same time every day, preferably in the morning. ? Sit down and rest for 5 minutes prior to taking your pulse. ? Take your pulse for 1 full minute, use a clock or stop watch with a second hand. ? To feel your pulse, use the first two fingers of one hand; place them on the thumb side of the wrist of the opposite hand. The pulse will be steady, regular and throbbing. ? Call the office if your pulse is less than 40 beats per minute. SYMPTOMS THAT NEED TO BE REPORTED IMMEDIATELY ? Temperature more than 100.4 F ? Redness or warmth at the incision site, or pain for longer than the first 5 days after the implant. ? Drainage from the incision site. ? Swelling around the incision site. ? Shortness of breath. ? Rapid heart rate or palpitations. ? Dizziness, lightheadedness, fainting. ? Slow pulse below 40 beats per minute. ? REMEMBER: If you feel something is an emergency or cannot be handled over the phone, call 911 or go to the closest emergency room. WHAT TO DO IF YOUR ICD DELIVERS A SHOCK · If you ICD delivers a shock, you should seek attention at the nearest emergency room, call our office or call 911. Kendall Marroquin MD 
Cardiac Electrophysiology / Cardiology [address] 
[address] [place] [address], [facility] Thiago Rodriguez, [address]         Krystle Mariano 
[phone] / [phone] Fax       [phone] / [phone] Fax Introducing [facility]! [employer] introduces [facility] patient portal. Now you can access parts of your medical record, email your doctor's office, and request medication refills online. 1. In your internet browser, go to [email or web address] 2. Click on the First Time User? Click Here link in the Sign In box. You will see the New Member Sign Up page. 3. Enter your [facility] Access Code exactly as it appears below. You will not need to use this code after youve completed the sign-up process. If you do not sign up before the expiration date, you must request a new code. · [facility] Access Code: [email or web address] Expires: 10/19/2018 10:10 AM 
 
4. Enter the last four digits of your Social Security Number (xxxx) and Date of Birth (mm/dd/yyyy) as indicated and click Submit. You will be taken to the next sign-up page. 5. Create a [facility] ID. This will be your [facility] login ID and cannot be changed, so think of one that is secure and easy to remember. 6. Create a [facility] password. You can change your password at any time. 7. Enter your Password Reset Question and Answer. This can be used at a later time if you forget your password. 8. Enter your e-mail address. You will receive e-mail notification when new information is available in [address] 9. Click Sign Up. You can now view and download portions of your medical record. 10. Click the Download Summary menu link to download a portable copy of your medical information. If you have questions, please visit the Frequently Asked Questions section of the MyChart website. Remember, [employer] is NOT to be used for urgent needs. For medical emergencies, dial 911. Now available from your iPhone and Android! Introducing Brandon Bonilla As a [facility] patient, I wanted to make you aware of our electronic visit tool called Brandon Bonilla. [employer] allows you to connect within minutes with a medical provider 24 hours a day, seven days a week via a mobile device or tablet or logging into a secure website from your computer. You can access Brandon Bonilla from anywhere in the [place]. A virtual visit might be right for you when you have a simple condition and feel like you just dont want to get out of bed, or cant get away from work for an appointment, when your regular [facility] provider is not available (evenings, weekends or holidays), or when youre out of town and need minor care. Electronic visits cost only $49 and if the [employer] provider determines a prescription is needed to treat your condition, one can be electronically transmitted to a nearby pharmacy*. Please take a moment to enroll today if you have not already done so. The enrollment process is free and takes just a few minutes. To enroll, please download the [employer] ina to your tablet or phone, or visit [email or web address] to enroll on your computer. And, as an [address] [place] [address] patient with a [employer] account, the results of your visits will be scanned into your electronic medical record and your primary care provider will be able to view the scanned results. We urge you to continue to see your regular [facility] provider for your ongoing medical care.   And while your primary care provider may not be the one available when you seek a Brandon Bonilla virtual visit, the peace of mind you get from getting a real diagnosis real time can be priceless. For more information on Brandon Bonilla, view our Frequently Asked Questions (FAQs) at [email or web address]. Sincerely, 
 
Valentino Milks, MD 
Chief Medical Officer [address] *:  certain medications cannot be prescribed via Brandon Bonilla Providers Seen During Your Hospitalization Provider Specialty Primary office phone Mike Campa MD Cardiology [phone] Your Primary Care Physician (PCP) Primary Care Physician Office Phone Office Fax Nubia Anabel [phone] You are allergic to the following Allergen Reactions Iron Anaphylaxis Per pt, to PO formulation only, can tolerate IV formulation Allergy to excipient? Recent Documentation Height Weight Breastfeeding? BMI OB Status Smoking Status 1.626 m 64.1 kg No 24.28 kg/m2 Menopause Current Every Day Smoker Emergency Contacts Name Discharge Info Relation Home Work Mobile Linda Bates DISCHARGE CAREGIVER [3] Other Relative [6] [phone] [facility] DISCHARGE CAREGIVER [3] Sister [23] [phone] Patient Belongings The following personal items are in your possession at time of discharge: 
     Visual Aid: None Please provide this summary of care documentation to your next provider. Signatures-by signing, you are acknowledging that this After Visit Summary has been reviewed with you and you have received a copy. Patient Signature:  ____________________________________________________________ Date:  ____________________________________________________________  
  
Robert Izquierdo Provider Signature:  ____________________________________________________________ Date:  ____________________________________________________________

## 2018-09-14 NOTE — H&P
HISTORY OF PRESENTING ILLNESS  
   
Malina Franz is a 66 y.o. female with ischemic cardiomyopathy, left ventricular ejection fraction 10-15%, NYHA class II, permanent atrial fibrillation, end-stage renal disease on dialysis who is been on optimal guideline directed medical therapy. She was found to have severe RCA occlusion with collateral supply. Coronary revascularization was not warranted. She is referred for consideration of an ICD for primary prevention of sudden cardiac death. 
  
  
 ACTIVE PROBLEM LIST  
  
    
Patient Active Problem List  
  Diagnosis Date Noted  Severe left ventricular systolic dysfunction 55/97/0642  Atrial fibrillation with rapid ventricular response ([address] 07/21/2018  Pulmonary edema 07/21/2018  Abdominal abscess 09/16/2017  Bacteremia 09/15/2017  Abdominal wall abscess 09/14/2017  ESRD (end stage renal disease) ([facility] 09/14/2017  
 HTN (hypertension) 09/14/2017  Leukocytosis 09/14/2017  Obese 09/14/2017  
  
  
  
 PAST MEDICAL HISTORY  
  
    
Past Medical History:  
Diagnosis Date  Arthritis    
 Chronic kidney disease    
  Dr Gisell Sharp  Hypertension    
 Ill-defined condition    
  Dialysis [facility] Obese 9/14/2017  
  
  
  
 PAST SURGICAL HISTORY  
  
     
Past Surgical History:  
Procedure Laterality Date  HX COLOSTOMY      
  left abdomen  HX VASCULAR ACCESS      
  left upper arm fistula  
  
  
  
 ALLERGIES  
  
     
Allergies Allergen Reactions  Iron Anaphylaxis  
    Per pt, to PO formulation only, can tolerate IV formulation Allergy to excipient?  
  
  
  
FAMILY HISTORY  
  
      
Family History Problem Relation Age of Onset  Hypertension Other    
    multiple family members  
 negative for cardiac disease 
  
  
 SOCIAL HISTORY  
  
 Social History  
  
   
     
Social History  Marital status:   
    Spouse name: N/A  
 Number of children: N/A  
 Years of education: N/A  
  
 Social History Main Topics  Smoking status: Current Every Day Smoker  
    Packs/day: 0.50  Smokeless tobacco: Never Used  Alcohol use No  
 Drug use: No  
 Sexual activity: Not Asked  
  
    
Other Topics Concern  None  
  
Social History Narrative   
  
  
MEDICATIONS  
  
    
Current Outpatient Prescriptions Medication Sig  
 calcium acetate (PHOSLO) 667 mg cap Take  by mouth three (3) times daily (with meals).  venlafaxine (EFFEXOR) 37.5 mg tablet Take 37.5 mg by mouth daily.  lisinopril (PRINIVIL, ZESTRIL) 5 mg tablet Take  by mouth daily.  gabapentin (NEURONTIN) 100 mg capsule Take 100 mg by mouth daily.  vitamin E (AQUA GEMS) 400 unit capsule Take  by mouth daily.  carvedilol (COREG) 12.5 mg tablet Take 1 Tab by mouth two (2) times daily (with meals).  apixaban (ELIQUIS) 5 mg tablet Take 1 Tab by mouth two (2) times a day.  zolpidem (AMBIEN) 5 mg tablet Take 5 mg by mouth nightly as needed for Sleep.  FOLIC ACID/VIT BCOMP,C (DIALYVITE 800 PO) Take 1 Tab by mouth daily.  hydrALAZINE (APRESOLINE) 50 mg tablet Take 50 mg by mouth two (2) times a day.  isosorbide mononitrate ER (IMDUR) 60 mg CR tablet Take 30 mg by mouth daily. Half-tab  calcium carbonate (TUMS) 200 mg calcium (500 mg) chew Take 3 Tabs by mouth two (2) times daily (with meals).  
  
No current facility-administered medications for this visit.   
  
  
I have reviewed the nurses notes, vitals, problem list, allergy list, medical history, family, social history and medications. 
  
  
 REVIEW OF SYMPTOMS  
   
General: Pt denies excessive weight gain or loss. Pt is able to conduct ADL's HEENT: Denies blurred vision, headaches, hearing loss, epistaxis and difficulty swallowing. Respiratory: Denies cough, congestion, shortness of breath, FRAUSTO, wheezing or stridor. Cardiovascular: Denies precordial pain, palpitations, edema or PND Gastrointestinal: Denies poor appetite, indigestion, abdominal pain or blood in stool Genitourinary: Denies hematuria, dysuria, increased urinary frequency Musculoskeletal: Denies joint pain or swelling from muscles or joints Neurologic: Denies tremor, paresthesias, headache, or sensory motor disturbance Psychiatric: Denies confusion, insomnia, depression Integumentray: Denies rash, itching or ulcers. Hematologic: Denies easy bruising, bleeding 
  
  
 PHYSICAL EXAMINATION  
   
   
Vitals:  
  08/10/18 1116 BP: 110/62 Pulse: 84 Resp: 20 SpO2: 97% Weight: 141 lb 9.6 oz (64.2 kg) Height: 5' 4\" (1.626 m)  
  
General: Well developed, in no acute distress. HEENT: No jaundice, oral mucosa moist, no oral ulcers Neck: Supple, no stiffness, no lymphadenopathy, supple Heart:  Irregularly irregular, no murmur, gallop or rub, no jugular venous distention Respiratory: Clear bilaterally x 4, no wheezing or rales Abdomen:   Soft, non-tender, bowel sounds are active.  
Extremities:  No edema, normal cap refill, no cyanosis. Musculoskeletal: No clubbing, no deformities Neuro: A&Ox3, speech clear, gait stable, cooperative, no focal neurologic deficits Skin: Skin color is normal. No rashes or lesions. Non diaphoretic, moist. 
Vascular: 2+ pulses symmetric in all extremities 
  
  
 DIAGNOSTIC DATA  
   
EKG: Atrial fibrillation 
  
  
 LABORATORY DATA  
   
     
Lab Results Component Value Date/Time  
  WBC 7.4 07/23/2018 03:59 AM  
  HGB 10.4 (L) 07/23/2018 03:59 AM  
  HCT 33.4 (L) 07/23/2018 03:59 AM  
  PLATELET 974 97/38/8565 03:59 AM  
  .7 (H) 07/23/2018 03:59 AM  
  
     
Lab Results Component Value Date/Time  
  Sodium 139 07/24/2018 01:26 AM  
  Potassium 4.7 07/24/2018 01:26 AM  
  Chloride 102 07/24/2018 01:26 AM  
  CO2 26 07/24/2018 01:26 AM  
  Anion gap 11 07/24/2018 01:26 AM  
  Glucose 96 07/24/2018 01:26 AM  
  BUN 37 (H) 07/24/2018 01:26 AM  
   Creatinine 7.84 (H) 07/24/2018 01:26 AM  
  BUN/Creatinine ratio 5 (L) 07/24/2018 01:26 AM  
  GFR est AA 6 (L) 07/24/2018 01:26 AM  
  GFR est non-AA 5 (L) 07/24/2018 01:26 AM  
  Calcium 7.7 (L) 07/24/2018 01:26 AM  
  Bilirubin, total 0.9 07/21/2018 10:29 AM  
  AST (SGOT) 26 07/21/2018 10:29 AM  
  Alk. phosphatase 118 (H) 07/21/2018 10:29 AM  
  Protein, total 8.0 07/21/2018 10:29 AM  
  Albumin 3.0 (L) 07/21/2018 10:29 AM  
  Globulin 5.0 (H) 07/21/2018 10:29 AM  
  A-G Ratio 0.6 (L) 07/21/2018 10:29 AM  
  ALT (SGPT) 23 07/21/2018 10:29 AM  
  
  
  
 ASSESSMENT  
   
1. Cardiomyopathy A. Ischemic B. LV ejection fraction 10-15% C. NYHA class II 2. Coronary artery disease, native 3. End-stage renal disease on dialysis 4. Hypertension 5. Atrial fibrillation A. Permanent 
   
  
  
 PLAN  
  
Patient meets criteria for an ICD for primary mention of sudden cardiac death. However given her history of dialysis I fear that she is at higher risk for infection as well as vascular compromise. Although she has a QRS duration of greater than 120 ms, this QRS prolongation is borderline and given her AF I suspect patient will be poorly responsive to a biventricular system. Therefore we will have patient screened for a subcutaneous ICD.  
  
  
 FOLLOW-UP  
  
Post procedure 
  
Thank you, Karmen Bauer MD and Dr. Alesha Lemus for allowing me to participate in the care of this extraordinarily pleasant female.  Please do not hesitate to contact me for further questions/concerns.  
  
  
  
Jazzy Paula MD 
Cardiac Electrophysiology / Cardiology

## 2018-09-17 ENCOUNTER — TELEPHONE (OUTPATIENT)
Dept: CARDIOLOGY CLINIC | Age: 78
End: 2018-09-17

## 2018-09-18 ENCOUNTER — APPOINTMENT (OUTPATIENT)
Dept: GENERAL RADIOLOGY | Age: 78
End: 2018-09-18
Attending: EMERGENCY MEDICINE
Payer: MEDICARE

## 2018-09-18 ENCOUNTER — HOSPITAL ENCOUNTER (EMERGENCY)
Age: 78
Discharge: HOME | End: 2018-09-18
Attending: EMERGENCY MEDICINE | Admitting: EMERGENCY MEDICINE
Payer: MEDICARE

## 2018-09-18 VITALS
SYSTOLIC BLOOD PRESSURE: 129 MMHG | DIASTOLIC BLOOD PRESSURE: 48 MMHG | HEART RATE: 80 BPM | OXYGEN SATURATION: 100 % | WEIGHT: 139.55 LBS | BODY MASS INDEX: 23.82 KG/M2 | HEIGHT: 64 IN | TEMPERATURE: 98.1 F | RESPIRATION RATE: 18 BRPM

## 2018-09-18 DIAGNOSIS — R58 BLEEDING: Primary | ICD-10-CM

## 2018-09-18 LAB
ALBUMIN SERPL-MCNC: 3.1 G/DL (ref 3.5–5)
ALBUMIN/GLOB SERPL: 0.6 {RATIO} (ref 1.1–2.2)
ALP SERPL-CCNC: 133 U/L (ref 45–117)
ALT SERPL-CCNC: <6 U/L (ref 12–78)
ANION GAP SERPL CALC-SCNC: 11 MMOL/L (ref 5–15)
APTT PPP: 28.2 SEC (ref 22.1–32)
AST SERPL-CCNC: 20 U/L (ref 15–37)
BASOPHILS # BLD: 0 K/UL (ref 0–0.1)
BASOPHILS NFR BLD: 0 % (ref 0–1)
BILIRUB SERPL-MCNC: 0.5 MG/DL (ref 0.2–1)
BUN SERPL-MCNC: 19 MG/DL (ref 6–20)
BUN/CREAT SERPL: 4 (ref 12–20)
CALCIUM SERPL-MCNC: 8.9 MG/DL (ref 8.5–10.1)
CHLORIDE SERPL-SCNC: 100 MMOL/L (ref 97–108)
CO2 SERPL-SCNC: 27 MMOL/L (ref 21–32)
COMMENT, HOLDF: NORMAL
CREAT SERPL-MCNC: 4.73 MG/DL (ref 0.55–1.02)
DIFFERENTIAL METHOD BLD: ABNORMAL
EOSINOPHIL # BLD: 0.1 K/UL (ref 0–0.4)
EOSINOPHIL NFR BLD: 1 % (ref 0–7)
ERYTHROCYTE [DISTWIDTH] IN BLOOD BY AUTOMATED COUNT: 18.4 % (ref 11.5–14.5)
GLOBULIN SER CALC-MCNC: 4.8 G/DL (ref 2–4)
GLUCOSE SERPL-MCNC: 85 MG/DL (ref 65–100)
HCT VFR BLD AUTO: 30.5 % (ref 35–47)
HGB BLD-MCNC: 9.7 G/DL (ref 11.5–16)
IMM GRANULOCYTES # BLD: 0 K/UL (ref 0–0.04)
IMM GRANULOCYTES NFR BLD AUTO: 1 % (ref 0–0.5)
INR PPP: 1.1 (ref 0.9–1.1)
LYMPHOCYTES # BLD: 1.2 K/UL (ref 0.8–3.5)
LYMPHOCYTES NFR BLD: 14 % (ref 12–49)
MCH RBC QN AUTO: 32.8 PG (ref 26–34)
MCHC RBC AUTO-ENTMCNC: 31.8 G/DL (ref 30–36.5)
MCV RBC AUTO: 103 FL (ref 80–99)
MONOCYTES # BLD: 1.1 K/UL (ref 0–1)
MONOCYTES NFR BLD: 13 % (ref 5–13)
NEUTS SEG # BLD: 6.2 K/UL (ref 1.8–8)
NEUTS SEG NFR BLD: 72 % (ref 32–75)
NRBC # BLD: 0 K/UL (ref 0–0.01)
NRBC BLD-RTO: 0 PER 100 WBC
PLATELET # BLD AUTO: 158 K/UL (ref 150–400)
PMV BLD AUTO: 11 FL (ref 8.9–12.9)
POTASSIUM SERPL-SCNC: 3.6 MMOL/L (ref 3.5–5.1)
PROT SERPL-MCNC: 7.9 G/DL (ref 6.4–8.2)
PROTHROMBIN TIME: 11.2 SEC (ref 9–11.1)
RBC # BLD AUTO: 2.96 M/UL (ref 3.8–5.2)
SAMPLES BEING HELD,HOLD: NORMAL
SODIUM SERPL-SCNC: 138 MMOL/L (ref 136–145)
THERAPEUTIC RANGE,PTTT: NORMAL SECS (ref 58–77)
WBC # BLD AUTO: 8.7 K/UL (ref 3.6–11)

## 2018-09-18 PROCEDURE — 85025 COMPLETE CBC W/AUTO DIFF WBC: CPT | Performed by: EMERGENCY MEDICINE

## 2018-09-18 PROCEDURE — 36415 COLL VENOUS BLD VENIPUNCTURE: CPT | Performed by: EMERGENCY MEDICINE

## 2018-09-18 PROCEDURE — 99285 EMERGENCY DEPT VISIT HI MDM: CPT

## 2018-09-18 PROCEDURE — 80053 COMPREHEN METABOLIC PANEL: CPT | Performed by: EMERGENCY MEDICINE

## 2018-09-18 PROCEDURE — 85730 THROMBOPLASTIN TIME PARTIAL: CPT | Performed by: EMERGENCY MEDICINE

## 2018-09-18 PROCEDURE — 85610 PROTHROMBIN TIME: CPT | Performed by: EMERGENCY MEDICINE

## 2018-09-18 PROCEDURE — 71045 X-RAY EXAM CHEST 1 VIEW: CPT

## 2018-09-18 NOTE — ED TRIAGE NOTES
The patient had a pacemaker/defibrilator placed 5 days ago. Was at dialysis today and the staff noted the site of the pacemaker incision contiues to bleed. Sent to the ED for evaluation.

## 2018-09-18 NOTE — ED PROVIDER NOTES
HPI Comments: 66 y.o. female with past medical history significant for arthritis, hypertension, CKD, and obesity who presents via EMS with chief complaint of bleeding. Patient reports bleeding from her pacemaker site on her left chest. She denies history of bleeding problems. Patient notes that her pacemaker was placed 4 days ago. Of note, patient takes Eliquis. She has dialysis every morning and states that she had treatment this morning. Pertinent negatives include SOB. There are no other acute medical concerns at this time. Social hx: Current tobacco use (0.5 packs/day); denies alcohol use; denies illicit drug use PCP: Delwyn Heimlich, MD 
 
Note written by Ron Francois, as dictated by Sushma Banda MD 10:30 AM 
 
 
The history is provided by the patient. No  was used. Past Medical History:  
Diagnosis Date  Arthritis  Chronic kidney disease Dr Elva Meade  Hypertension  Ill-defined condition Dialysis Rissa Lopez Obese 9/14/2017 Past Surgical History:  
Procedure Laterality Date  HX COLOSTOMY    
 left abdomen  HX PACEMAKER  09/13/2018  
 placed in left abdomen.  HX VASCULAR ACCESS    
 left upper arm fistula Family History:  
Problem Relation Age of Onset  Hypertension Other   
  multiple family members Social History Social History  Marital status:  Spouse name: N/A  
 Number of children: N/A  
 Years of education: N/A Occupational History  Not on file. Social History Main Topics  Smoking status: Current Every Day Smoker Packs/day: 0.50  Smokeless tobacco: Never Used  Alcohol use No  
 Drug use: No  
 Sexual activity: Not on file Other Topics Concern  Not on file Social History Narrative ALLERGIES: Iron Review of Systems Constitutional: Negative for activity change, chills and fever. HENT: Negative for nosebleeds, sore throat, trouble swallowing and voice change. Eyes: Negative for visual disturbance. Respiratory: Negative for shortness of breath. Cardiovascular: Negative for chest pain and palpitations. Gastrointestinal: Negative for abdominal pain, constipation, diarrhea and nausea. Genitourinary: Negative for difficulty urinating, dysuria, hematuria and urgency. Musculoskeletal: Negative for back pain, neck pain and neck stiffness. Skin: Positive for wound. Negative for color change. Allergic/Immunologic: Negative for immunocompromised state. Neurological: Negative for dizziness, seizures, syncope, weakness, light-headedness, numbness and headaches. Psychiatric/Behavioral: Negative for behavioral problems, confusion, hallucinations, self-injury and suicidal ideas. Vitals:  
 09/18/18 1024 BP: 111/47 Pulse: 82 Resp: 14 Temp: 98.1 °F (36.7 °C) SpO2: 96% Weight: 63.3 kg (139 lb 8.8 oz) Height: 5' 4\" (1.626 m) Physical Exam  
Constitutional: She is oriented to person, place, and time. She appears well-developed and well-nourished. No distress. HENT:  
Head: Normocephalic and atraumatic. Eyes: Pupils are equal, round, and reactive to light. Neck: Normal range of motion. Neck supple. Cardiovascular: Normal rate, regular rhythm and normal heart sounds. Exam reveals no gallop and no friction rub. No murmur heard. Pulmonary/Chest: Effort normal and breath sounds normal. No respiratory distress. She has no wheezes. She exhibits no tenderness. Sutured left mid axillary ICD site with a stable 2 x 4 cm clot adhesive to sutures No active bleeding No erythema Abdominal: Soft. Bowel sounds are normal. She exhibits no distension. There is no tenderness. There is no rebound and no guarding. Musculoskeletal: Normal range of motion. Neurological: She is alert and oriented to person, place, and time. Skin: Skin is warm. No rash noted. She is not diaphoretic. Psychiatric: She has a normal mood and affect. Her behavior is normal. Judgment and thought content normal.  
Nursing note and vitals reviewed. Note written by Ron Ya, as dictated by Edith Tejada MD 10:30 AM 
 
 
[facility] 
 
 
ED Course This is a 66-year-old female with past medical history, review of systems, physical exam as above, presenting with complaints of bleeding from pacemaker site. Per the patient, he started after her placement, continuing over the weekend. She denies weakness, chest pain, shortness of breath. Distal exam is remarkable for well-appearing elderly female, in no acute distress, with left axillary pacemaker site covered with sutures, stable blood clot outside of the sutures approximately 2 x 4 cm. Suspect postsurgical bleeding, and the setting of anticoagulation. Bleeding stable at this time, plan to obtain CMP, CBC, coags and chest x-ray. Will consult with cardiology, and likely discharge the patient to follow up in the office. Procedures 11:36 AM 
Patient with unremarkable lab work and imaging, will consult Cardiology for follow up. CONSULT NOTE: 
12:53 Baljinder Rodrigues MD spoke with Dr. Jaimee Pereira, Consult for Cardiology. Discussed available diagnostic tests and clinical findings. Dr. Jaimee Pereira recommends having the patient f/u with Dr. Farzaneh Draper in office, and he will make an appointment.

## 2018-09-18 NOTE — ED NOTES
There is a large blood clot at the incision site below the left left breast that appears stable. No active bleeding noted at this time. New dressing applied using sterile 4x4s as directed by Dr. Baldemar Lindo. PCXR in progress.

## 2018-09-18 NOTE — DISCHARGE INSTRUCTIONS
Learning About ICDs (Implantable Cardioverter-Defibrillators)  What is an ICD (implantable cardioverter-defibrillator)? An ICD (implantable cardioverter-defibrillator) is a small, battery-powered device. It fixes serious changes in your heartbeat. ICDs are used in people who have had a life-threatening heart rhythm or are at high risk of having one. The ICD is placed under the skin of the chest. It's attached to one or two wires (called leads). Most of the time, these leads go into the heart through a vein. How does an ICD work? An ICD is always checking your heart rate and rhythm. If the ICD detects a life-threatening, rapid heart rhythm, it tries to slow the rhythm back to normal using electrical pulses. If the bad rhythm doesn't stop, the ICD sends an electric shock to the heart. This restores a normal rhythm. The device then goes back to its watchful mode. Some ICDs also can fix a heart rate that is too slow. The ICD does this without using a shock. It can send out electrical pulses to speed up a heart rate that is too slow. Your doctor will check the ICD regularly to make sure it is working right and isn't causing any problems. Your doctor will also check the battery to see if it needs to be replaced. How is an ICD placed? Your doctor will put the ICD under the skin in your chest during minor surgery. You will likely have medicine to make you feel relaxed and sleepy during the surgery. Your doctor makes a small cut (incision) in your upper chest. He or she puts one or two leads (wires) through the cut. Most of the time, the leads go into a large blood vessel in the upper chest. Then your doctor guides the leads through the blood vessel into your heart. Your doctor connects the leads to the ICD and places it in your chest. Then the incision is closed. Your doctor also programs the ICD.   Most people spend the night in the hospital, just to make sure that the device is working and that there are no problems from the surgery. How does it feel to get a shock? The shock from an ICD hurts briefly. People feel it in different ways. It's been described as feeling like a punch in the chest. But the shock is a sign that the ICD is doing its job. It's there to save your life. You won't feel any pain if the ICD uses electrical pulses to fix a heart rate that is too fast or too slow. There's no way to know how often a shock might occur. It might never happen. Not knowing when or if a shock might happen may make you nervous. Knowing what to do if you get shocked can help. Ask your doctor for an action plan. This plan will guide you step-by-step if a shock happens. What else should you know? You can live a normal life with your ICD. Here are a few tips for living well with your ICD. · Avoid strong magnetic and electrical fields. These can keep your device from working right. Most office equipment and home appliances are safe to use. Check with your doctor about which things you should use with caution and which you should stay away from. · Be sure that any doctor, dentist, or other health professional you see knows that you have an ICD. · Always carry a card that tells what kind of device you have. Wear medical alert jewelry that says you have an ICD. You can buy this at most drugstores. · If you do get a shock, follow your action plan for what to do. · You can lead an active life with an ICD. Ask your doctor what sort of activity and intensity is safe for you. As you plan for your future and your end of life, be sure to include plans for your ICD. You can make the decision to turn off your ICD as part of the medical treatment you want at the end of life. Follow-up care is a key part of your treatment and safety. Be sure to make and go to all appointments, and call your doctor if you are having problems. It's also a good idea to know your test results and keep a list of the medicines you take.   Where can you learn more? Go to http://daron-rizwana.info/. Enter F345 in the search box to learn more about \"Learning About ICDs (Implantable Cardioverter-Defibrillators). \"  Current as of: December 6, 2017  Content Version: 11.7  © 9348-0981 [employer]. Care instructions adapted under license by [employer] (which disclaims liability or warranty for this information). If you have questions about a medical condition or this instruction, always ask your healthcare professional. [place] [address] any warranty or liability for your use of this information.

## 2018-09-18 NOTE — PROGRESS NOTES
9/18/2018 11:41 AM 
Case Management Note Met with patient to discuss discharge planning. Confirmed demographics. Patient son lives with her in a single story home with 2 steps to enter. Patient uses walker and has aid 5 days a week/ 5 hrs a day. She cant get in shower but is able to wash off. She uses [employer]. Patient goes to [facility] dialysis in [place] T/ Th / Saturday. Patient follows Dr. Landry Barthel for medical management. No NN Patient sister can transport home if needed [address] [place] [address], [phone]. Date of previous inpatient admission/ ED visit? No previous ED/ or admissions listed What brought the patient back to ED? bleeding from pacemaker site Did patient decline recommended services during last admission/ ED visit (if yes, what)? Has patient seen a provider since their last inpatient admission/ED visit (if yes, when)? sees dialysis MD mendoza;u 
 
CM Interventions: 
From previous inpatient admission/ED visit: 
From current inpatient admission/ED back to dailysis and follow up with cardiologist for post op pacemaker visit. Care Management Interventions PCP Verified by CM: Yes (dr. Jere Felty No NN) Mode of Transport at Discharge: Self Transition of Care Consult (CM Consult): Discharge Planning Current Support Network: Relative's Home Confirm Follow Up Transport: Family Plan discussed with Pt/Family/Caregiver: Yes Discharge Location Discharge Placement: Unable to determine at this time [place], [address]

## 2018-09-19 ENCOUNTER — OFFICE VISIT (OUTPATIENT)
Dept: CARDIOLOGY CLINIC | Age: 78
End: 2018-09-19

## 2018-09-19 DIAGNOSIS — Z95.0 PACEMAKER: Primary | ICD-10-CM

## 2018-09-19 NOTE — PROGRESS NOTES
Patient presents for wound check post-device implantation. The dressing was removed and the site was inspected. The site appeared to be well-healing without ecchymosis/tenderness/erythema. Denies pain, fevers, discharge. Plan: 
 
Continue follow up in device clinic as planned.   
 
 
Cade Alexander MD

## 2018-09-19 NOTE — PROGRESS NOTES
HISTORY OF PRESENTING ILLNESS Dago Matias is a 66 y.o. female with with ischemic cardiomyopathy, left ventricular ejection fraction 10-15%, NYHA class II, permanent atrial fibrillation, end-stage renal disease on dialysis who is been on optimal guideline directed medical therapy. She was found to have severe RCA occlusion with collateral supply. Will auscultation was not warranted. She is referred for consideration of an ICD for primary prevention of sudden cardiac death. ACTIVE PROBLEM LIST Patient Active Problem List  
 Diagnosis Date Noted  Heart failure ([facility] [address] 09/14/2018  Severe left ventricular systolic dysfunction 47/88/6381  Atrial fibrillation with rapid ventricular response ([facility] [address] 07/21/2018  Pulmonary edema 07/21/2018  Abdominal abscess 09/16/2017  Bacteremia 09/15/2017  Abdominal wall abscess 09/14/2017  ESRD (end stage renal disease) ([facility] [address] 09/14/2017  
 HTN (hypertension) 09/14/2017  Leukocytosis 09/14/2017  Obese 09/14/2017 PAST MEDICAL HISTORY Past Medical History:  
Diagnosis Date  Arthritis  Chronic kidney disease Dr Walt Love  Hypertension  Ill-defined condition Dialysis Rudolpho Lisa Obese 9/14/2017 PAST SURGICAL HISTORY Past Surgical History:  
Procedure Laterality Date  HX COLOSTOMY    
 left abdomen  HX PACEMAKER  09/13/2018  
 placed in left abdomen.  HX VASCULAR ACCESS    
 left upper arm fistula ALLERGIES Allergies Allergen Reactions  Iron Anaphylaxis Per pt, to PO formulation only, can tolerate IV formulation Allergy to excipient? FAMILY HISTORY Family History Problem Relation Age of Onset  Hypertension Other   
  multiple family members  
 negative for cardiac disease SOCIAL HISTORY Social History Social History  Marital status:    Spouse name: N/A  
 Number of children: N/A  
 Years of education: N/A  
 
 Social History Main Topics  Smoking status: Current Every Day Smoker Packs/day: 0.50  Smokeless tobacco: Never Used  Alcohol use No  
 Drug use: No  
 Sexual activity: Not on file Other Topics Concern  Not on file Social History Narrative MEDICATIONS Current Outpatient Prescriptions Medication Sig  cephALEXin (KEFLEX) 500 mg capsule Take 1 Cap by mouth two (2) times a day for 5 days.  calcium acetate (PHOSLO) 667 mg cap Take  by mouth three (3) times daily (with meals).  venlafaxine (EFFEXOR) 37.5 mg tablet Take 37.5 mg by mouth daily.  lisinopril (PRINIVIL, ZESTRIL) 5 mg tablet Take  by mouth daily.  gabapentin (NEURONTIN) 100 mg capsule Take 100 mg by mouth daily.  vitamin E (AQUA GEMS) 400 unit capsule Take  by mouth daily.  carvedilol (COREG) 12.5 mg tablet Take 1 Tab by mouth two (2) times daily (with meals).  apixaban (ELIQUIS) 5 mg tablet Take 1 Tab by mouth two (2) times a day.  zolpidem (AMBIEN) 5 mg tablet Take 5 mg by mouth nightly as needed for Sleep.  FOLIC ACID/VIT BCOMP,C (DIALYVITE 800 PO) Take 1 Tab by mouth daily.  hydrALAZINE (APRESOLINE) 50 mg tablet Take 50 mg by mouth two (2) times a day.  isosorbide mononitrate ER (IMDUR) 60 mg CR tablet Take 30 mg by mouth daily. Half-tab  calcium carbonate (TUMS) 200 mg calcium (500 mg) chew Take 3 Tabs by mouth two (2) times daily (with meals). No current facility-administered medications for this visit. I have reviewed the nurses notes, vitals, problem list, allergy list, medical history, family, social history and medications. REVIEW OF SYMPTOMS General: Pt denies excessive weight gain or loss. Pt is able to conduct ADL's HEENT: Denies blurred vision, headaches, hearing loss, epistaxis and difficulty swallowing. Respiratory: Denies cough, congestion, shortness of breath, FRAUSTO, wheezing or stridor. Cardiovascular: Denies precordial pain, palpitations, edema or PND Gastrointestinal: Denies poor appetite, indigestion, abdominal pain or blood in stool Genitourinary: Denies hematuria, dysuria, increased urinary frequency Musculoskeletal: Denies joint pain or swelling from muscles or joints Neurologic: Denies tremor, paresthesias, headache, or sensory motor disturbance Psychiatric: Denies confusion, insomnia, depression Integumentray: Denies rash, itching or ulcers. Hematologic: Denies easy bruising, bleeding PHYSICAL EXAMINATION There were no vitals filed for this visit. General: Well developed, in no acute distress. HEENT: No jaundice, oral mucosa moist, no oral ulcers Neck: Supple, no stiffness, no lymphadenopathy, supple Heart:  Normal S1/S2 negative S3 or S4. Regular, no murmur, gallop or rub, no jugular venous distention Respiratory: Clear bilaterally x 4, no wheezing or rales Abdomen:   Soft, non-tender, bowel sounds are active.  
Extremities:  No edema, normal cap refill, no cyanosis. Musculoskeletal: No clubbing, no deformities Neuro: A&Ox3, speech clear, gait stable, cooperative, no focal neurologic deficits Skin: Skin color is normal. No rashes or lesions. Non diaphoretic, moist. 
Vascular: 2+ pulses symmetric in all extremities DIAGNOSTIC DATA EKG:  
 
 
 LABORATORY DATA Lab Results Component Value Date/Time WBC 8.7 09/18/2018 10:32 AM  
 HGB 9.7 (L) 09/18/2018 10:32 AM  
 HCT 30.5 (L) 09/18/2018 10:32 AM  
 PLATELET 546 83/50/8102 10:32 AM  
 .0 (H) 09/18/2018 10:32 AM  
  
Lab Results Component Value Date/Time  Sodium 138 09/18/2018 10:32 AM  
 Potassium 3.6 09/18/2018 10:32 AM  
 Chloride 100 09/18/2018 10:32 AM  
 CO2 27 09/18/2018 10:32 AM  
 Anion gap 11 09/18/2018 10:32 AM  
 Glucose 85 09/18/2018 10:32 AM  
 BUN 19 09/18/2018 10:32 AM  
 Creatinine 4.73 (H) 09/18/2018 10:32 AM  
 BUN/Creatinine ratio 4 (L) 09/18/2018 10:32 AM  
 GFR est AA 11 (L) 09/18/2018 10:32 AM  
 GFR est non-AA 9 (L) 09/18/2018 10:32 AM  
 Calcium 8.9 09/18/2018 10:32 AM  
 Bilirubin, total 0.5 09/18/2018 10:32 AM  
 AST (SGOT) 20 09/18/2018 10:32 AM  
 Alk. phosphatase 133 (H) 09/18/2018 10:32 AM  
 Protein, total 7.9 09/18/2018 10:32 AM  
 Albumin 3.1 (L) 09/18/2018 10:32 AM  
 Globulin 4.8 (H) 09/18/2018 10:32 AM  
 A-G Ratio 0.6 (L) 09/18/2018 10:32 AM  
 ALT (SGPT) <6 (L) 09/18/2018 10:32 AM  
  
 
 
 ASSESSMENT 1. Cardiomyopathy A. Ischemic B. LV ejection fraction 10-15% C. NYHA class II 2. Coronary artery disease, native 3. End-stage renal disease on dialysis 4. Hypertension 5. Atrial fibrillation A. Permanent PLAN  
 
 
 
 FOLLOW-UP Thank you, Francisco Bansal MD for allowing me to participate in the care of this extraordinarily pleasant female. Please do not hesitate to contact me for further questions/concerns. Papo Diggs MD 
Cardiac Electrophysiology / Cardiology [address], [address] [facility], [address] [place] [address] 
[phone] / [phone] Fax   [phone] / [phone] Fax

## 2018-09-24 ENCOUNTER — OFFICE VISIT (OUTPATIENT)
Dept: CARDIOLOGY CLINIC | Age: 78
End: 2018-09-24

## 2018-09-24 DIAGNOSIS — Z51.89 VISIT FOR WOUND CHECK: ICD-10-CM

## 2018-09-24 DIAGNOSIS — Z95.810 ICD (IMPLANTABLE CARDIOVERTER-DEFIBRILLATOR) IN PLACE: Primary | ICD-10-CM

## 2018-09-24 RX ORDER — SULFAMETHOXAZOLE AND TRIMETHOPRIM 800; 160 MG/1; MG/1
1 TABLET ORAL 2 TIMES DAILY
Qty: 10 TAB | Refills: 0 | Status: SHIPPED | OUTPATIENT
Start: 2018-09-24 | End: 2018-09-28 | Stop reason: ALTCHOICE

## 2018-09-24 NOTE — MR AVS SNAPSHOT
[address] [facility] [address] [facility] 
[phone] Patient: Paulina Nunes MRN: [id number] IJO:[id number] Visit Information Date & Time Provider Department Dept. Phone Encounter #  
 9/24/2018  9:20 AM Sushma Correa NP [facility] Kb Rodriguez [phone] [id number] Follow-up Instructions Return in about 1 month (around 10/24/2018). Follow-up and Disposition History Your Appointments 9/28/2018  2:00 PM  
ESTABLISHED PATIENT with De Wagoner MD  
[facility] [address]) Appt Note: per dr vázquez 4 day fu  
 [address] [place] [address]  
[phone]  
  
   
 [facility] [address]  
  
    
 10/15/2018  9:45 AM  
PACEMAKER with PACEMAKER, SANFORD  
[facility] (KATHRIN SCHEDULING) Appt Note: 4 wk hosp and device check  sll [address] [facility] [address] [facility]  
[phone]  
  
   
 [address] [address]  
  
    
 10/15/2018 10:00 AM  
ESTABLISHED PATIENT with De Wagoner MD  
[facility] ([address]) Appt Note: 4 wk hosp and device check  sll [address] [facility] [address] [facility]  
[address] [place] [id number] Upcoming Health Maintenance Date Due DTaP/Tdap/Td series (1 - Tdap) 3/20/1961 Shingrix Vaccine Age 50> (1 of 2) 3/20/1990 GLAUCOMA SCREENING Q2Y 3/20/2005 Bone Densitometry (Dexa) Screening 3/20/2005 Pneumococcal 65+ High/Highest Risk (1 of 2 - PCV13) 3/20/2005 MEDICARE YEARLY EXAM 3/20/2018 Influenza Age 5 to Adult 8/1/2018 Allergies as of 9/24/2018  Review Complete On: 9/24/2018 By: Sushma Correa NP Severity Noted Reaction Type Reactions Iron High 07/09/2015    Anaphylaxis Per pt, to PO formulation only, can tolerate IV formulation Allergy to excipient? Current Immunizations  Never Reviewed No immunizations on file. Not reviewed this visit You Were Diagnosed With   
  
 Codes Comments ICD (implantable cardioverter-defibrillator) in place    -  Primary ICD-10-CM: Z95.810 ICD-9-CM: V45.02 Visit for wound check     ICD-10-CM: Z51.89 ICD-9-CM: V58.89 Vitals OB Status Smoking Status Menopause Current Every Day Smoker Preferred Pharmacy Pharmacy Name Phone [address] [facility] [phone] Your Updated Medication List  
  
   
This list is accurate as of 9/24/18 11:30 AM.  Always use your most recent med list.  
  
  
  
  
 AMBIEN 5 mg tablet Generic drug:  zolpidem Take 5 mg by mouth nightly as needed for Sleep.  
  
 apixaban 5 mg tablet Commonly known as:  Sanket Stade Take 1 Tab by mouth two (2) times a day. calcium carbonate 200 mg calcium (500 mg) Chew Commonly known as:  TUMS Take 3 Tabs by mouth two (2) times daily (with meals). carvedilol 12.5 mg tablet Commonly known as:  Layman Huerta Take 1 Tab by mouth two (2) times daily (with meals). DIALYVITE 800 PO Take 1 Tab by mouth daily. gabapentin 100 mg capsule Commonly known as:  NEURONTIN Take 100 mg by mouth daily. hydrALAZINE 50 mg tablet Commonly known as:  APRESOLINE Take 50 mg by mouth two (2) times a day. isosorbide mononitrate ER 60 mg CR tablet Commonly known as:  IMDUR Take 30 mg by mouth daily. Half-tab  
  
 lisinopril 5 mg tablet Commonly known as:  [place] Take  by mouth daily. PHOSLO 667 mg Cap Generic drug:  calcium acetate Take  by mouth three (3) times daily (with meals). trimethoprim-sulfamethoxazole 160-800 mg per tablet Commonly known as:  BACTRIM DS  
 Take 1 Tab by mouth two (2) times a day for 5 days. venlafaxine 37.5 mg tablet Commonly known as:  [facility] Take 37.5 mg by mouth daily. vitamin E 400 unit capsule Commonly known as:  [address] Take  by mouth daily. Prescriptions Sent to Pharmacy Refills  
 trimethoprim-sulfamethoxazole (BACTRIM DS) 160-800 mg per tablet 0 Sig: Take 1 Tab by mouth two (2) times a day for 5 days. Class: Normal  
 Pharmacy: [address] [place] [address] #: [phone] Route: Oral  
  
Follow-up Instructions Return in about 1 month (around 10/24/2018). Introducing [facility]! [facility] introduces [employer] patient portal. Now you can access parts of your medical record, email your doctor's office, and request medication refills online. 1. In your internet browser, go to [email or web address] 2. Click on the First Time User? Click Here link in the Sign In box. You will see the New Member Sign Up page. 3. Enter your [employer] Access Code exactly as it appears below. You will not need to use this code after youve completed the sign-up process. If you do not sign up before the expiration date, you must request a new code. · [employer] Access Code: [email or web address] Expires: 10/19/2018 10:10 AM 
 
4. Enter the last four digits of your Social Security Number (xxxx) and Date of Birth (mm/dd/yyyy) as indicated and click Submit. You will be taken to the next sign-up page. 5. Create a Mavenir Systemst ID. This will be your [employer] login ID and cannot be changed, so think of one that is secure and easy to remember. 6. Create a [employer] password. You can change your password at any time. 7. Enter your Password Reset Question and Answer. This can be used at a later time if you forget your password. 8. Enter your e-mail address. You will receive e-mail notification when new information is available in [address] 9. Click Sign Up. You can now view and download portions of your medical record. 10. Click the Download Summary menu link to download a portable copy of your medical information. If you have questions, please visit the Frequently Asked Questions section of the [employer] website. Remember, [employer] is NOT to be used for urgent needs. For medical emergencies, dial 911. Now available from your iPhone and Android! Please provide this summary of care documentation to your next provider. Your primary care clinician is listed as Adele Oviedo. If you have any questions after today's visit, please call [phone].

## 2018-09-24 NOTE — PROGRESS NOTES
Patient presents for wound check post-subcutaneous ICD implantation. The dressing was removed and the site was inspected. The site is not quite approximated on the most distal portion with minor sanguinous drainage. There is hematoma formation present with tenderness which she rates a 6/10 with extra strength Tylenol. She denies fever. Plan:  Bactrim DS x5 days. Lortab 5/325 every 4-6 hours for pain.   Follow up for wound check on Friday       Aurora Bowers NP

## 2018-09-28 ENCOUNTER — OFFICE VISIT (OUTPATIENT)
Dept: CARDIOLOGY CLINIC | Age: 78
End: 2018-09-28

## 2018-09-28 ENCOUNTER — HOSPITAL ENCOUNTER (INPATIENT)
Age: 78
LOS: 3 days | Discharge: HOME | DRG: 907 | End: 2018-10-01
Attending: EMERGENCY MEDICINE | Admitting: INTERNAL MEDICINE
Payer: MEDICARE

## 2018-09-28 DIAGNOSIS — Z95.810 ICD (IMPLANTABLE CARDIOVERTER-DEFIBRILLATOR) IN PLACE: Primary | ICD-10-CM

## 2018-09-28 DIAGNOSIS — T14.8XXA HEMATOMA: Primary | ICD-10-CM

## 2018-09-28 LAB
ANION GAP SERPL CALC-SCNC: 13 MMOL/L (ref 5–15)
BASOPHILS # BLD: 0 K/UL (ref 0–0.1)
BASOPHILS NFR BLD: 0 % (ref 0–1)
BUN SERPL-MCNC: 20 MG/DL (ref 6–20)
BUN/CREAT SERPL: 4 (ref 12–20)
CALCIUM SERPL-MCNC: 9.1 MG/DL (ref 8.5–10.1)
CHLORIDE SERPL-SCNC: 100 MMOL/L (ref 97–108)
CO2 SERPL-SCNC: 27 MMOL/L (ref 21–32)
CREAT SERPL-MCNC: 4.82 MG/DL (ref 0.55–1.02)
DIFFERENTIAL METHOD BLD: ABNORMAL
EOSINOPHIL # BLD: 0.1 K/UL (ref 0–0.4)
EOSINOPHIL NFR BLD: 1 % (ref 0–7)
ERYTHROCYTE [DISTWIDTH] IN BLOOD BY AUTOMATED COUNT: 18.8 % (ref 11.5–14.5)
GLUCOSE SERPL-MCNC: 90 MG/DL (ref 65–100)
HCT VFR BLD AUTO: 25.6 % (ref 35–47)
HGB BLD-MCNC: 8 G/DL (ref 11.5–16)
IMM GRANULOCYTES # BLD: 0 K/UL (ref 0–0.04)
IMM GRANULOCYTES NFR BLD AUTO: 1 % (ref 0–0.5)
LYMPHOCYTES # BLD: 1.9 K/UL (ref 0.8–3.5)
LYMPHOCYTES NFR BLD: 23 % (ref 12–49)
MCH RBC QN AUTO: 33.3 PG (ref 26–34)
MCHC RBC AUTO-ENTMCNC: 31.3 G/DL (ref 30–36.5)
MCV RBC AUTO: 106.7 FL (ref 80–99)
MONOCYTES # BLD: 1.3 K/UL (ref 0–1)
MONOCYTES NFR BLD: 15 % (ref 5–13)
NEUTS SEG # BLD: 5.1 K/UL (ref 1.8–8)
NEUTS SEG NFR BLD: 60 % (ref 32–75)
NRBC # BLD: 0 K/UL (ref 0–0.01)
NRBC BLD-RTO: 0 PER 100 WBC
PLATELET # BLD AUTO: 224 K/UL (ref 150–400)
PMV BLD AUTO: 10.1 FL (ref 8.9–12.9)
POTASSIUM SERPL-SCNC: 4 MMOL/L (ref 3.5–5.1)
RBC # BLD AUTO: 2.4 M/UL (ref 3.8–5.2)
SODIUM SERPL-SCNC: 140 MMOL/L (ref 136–145)
WBC # BLD AUTO: 8.5 K/UL (ref 3.6–11)

## 2018-09-28 PROCEDURE — 80048 BASIC METABOLIC PNL TOTAL CA: CPT | Performed by: EMERGENCY MEDICINE

## 2018-09-28 PROCEDURE — 99283 EMERGENCY DEPT VISIT LOW MDM: CPT

## 2018-09-28 PROCEDURE — 99282 EMERGENCY DEPT VISIT SF MDM: CPT

## 2018-09-28 PROCEDURE — 74011250636 HC RX REV CODE- 250/636: Performed by: INTERNAL MEDICINE

## 2018-09-28 PROCEDURE — 85025 COMPLETE CBC W/AUTO DIFF WBC: CPT | Performed by: EMERGENCY MEDICINE

## 2018-09-28 PROCEDURE — 36415 COLL VENOUS BLD VENIPUNCTURE: CPT | Performed by: EMERGENCY MEDICINE

## 2018-09-28 PROCEDURE — 74011250637 HC RX REV CODE- 250/637: Performed by: INTERNAL MEDICINE

## 2018-09-28 PROCEDURE — 65660000000 HC RM CCU STEPDOWN

## 2018-09-28 RX ORDER — HYDRALAZINE HYDROCHLORIDE 25 MG/1
25 TABLET, FILM COATED ORAL 2 TIMES DAILY
Status: DISCONTINUED | OUTPATIENT
Start: 2018-09-28 | End: 2018-09-30

## 2018-09-28 RX ORDER — LISINOPRIL 5 MG/1
5 TABLET ORAL DAILY
Status: DISCONTINUED | OUTPATIENT
Start: 2018-09-29 | End: 2018-10-01 | Stop reason: HOSPADM

## 2018-09-28 RX ORDER — HYDROCODONE BITARTRATE AND ACETAMINOPHEN 5; 325 MG/1; MG/1
1 TABLET ORAL
COMMUNITY
End: 2019-01-01 | Stop reason: ALTCHOICE

## 2018-09-28 RX ORDER — ISOSORBIDE MONONITRATE 30 MG/1
30 TABLET, EXTENDED RELEASE ORAL DAILY
Status: DISCONTINUED | OUTPATIENT
Start: 2018-09-29 | End: 2018-10-01 | Stop reason: HOSPADM

## 2018-09-28 RX ORDER — CALCIUM ACETATE 667 MG/1
1 CAPSULE ORAL
Status: DISCONTINUED | OUTPATIENT
Start: 2018-09-28 | End: 2018-09-28

## 2018-09-28 RX ORDER — GABAPENTIN 100 MG/1
100 CAPSULE ORAL DAILY
Status: DISCONTINUED | OUTPATIENT
Start: 2018-09-29 | End: 2018-09-28

## 2018-09-28 RX ORDER — ZOLPIDEM TARTRATE 5 MG/1
5 TABLET ORAL
Status: DISCONTINUED | OUTPATIENT
Start: 2018-09-28 | End: 2018-10-01 | Stop reason: HOSPADM

## 2018-09-28 RX ORDER — CALCIUM CARBONATE 200(500)MG
600 TABLET,CHEWABLE ORAL 2 TIMES DAILY WITH MEALS
Status: DISCONTINUED | OUTPATIENT
Start: 2018-09-28 | End: 2018-10-01 | Stop reason: HOSPADM

## 2018-09-28 RX ORDER — HYDRALAZINE HYDROCHLORIDE 25 MG/1
25 TABLET, FILM COATED ORAL 2 TIMES DAILY
COMMUNITY
End: 2018-10-01

## 2018-09-28 RX ORDER — SULFAMETHOXAZOLE AND TRIMETHOPRIM 800; 160 MG/1; MG/1
1 TABLET ORAL 2 TIMES DAILY
COMMUNITY
Start: 2018-09-24 | End: 2018-10-01

## 2018-09-28 RX ORDER — OMEPRAZOLE 20 MG/1
20 CAPSULE, DELAYED RELEASE ORAL
COMMUNITY
End: 2019-01-01 | Stop reason: ALTCHOICE

## 2018-09-28 RX ORDER — CARVEDILOL 12.5 MG/1
12.5 TABLET ORAL 2 TIMES DAILY WITH MEALS
Status: DISCONTINUED | OUTPATIENT
Start: 2018-09-28 | End: 2018-10-01 | Stop reason: HOSPADM

## 2018-09-28 RX ORDER — VENLAFAXINE 37.5 MG/1
37.5 TABLET ORAL DAILY
Status: DISCONTINUED | OUTPATIENT
Start: 2018-09-29 | End: 2018-10-01 | Stop reason: HOSPADM

## 2018-09-28 RX ADMIN — ZOLPIDEM TARTRATE 5 MG: 5 TABLET ORAL at 22:27

## 2018-09-28 RX ADMIN — HYDRALAZINE HYDROCHLORIDE 25 MG: 25 TABLET, FILM COATED ORAL at 22:27

## 2018-09-28 RX ADMIN — VANCOMYCIN HYDROCHLORIDE 1000 MG: 1 INJECTION, POWDER, LYOPHILIZED, FOR SOLUTION INTRAVENOUS at 18:32

## 2018-09-28 RX ADMIN — CARVEDILOL 12.5 MG: 12.5 TABLET, FILM COATED ORAL at 22:27

## 2018-09-28 NOTE — H&P
HISTORY OF PRESENTING ILLNESS  
   
Malina Low is a 66 y.o. female with recent SICD with hematoma. She presents today for follow up evaluation of her incision site. The pocket has enlarged and there may be early wound dehiscence.  
  
  
 ACTIVE PROBLEM LIST  
  
     
Patient Active Problem List  
  Diagnosis Date Noted  Heart failure ([facility] [address] 09/14/2018  
    Priority: 1 - One  Severe left ventricular systolic dysfunction 06/96/2731  Atrial fibrillation with rapid ventricular response ([address] 07/21/2018  Pulmonary edema 07/21/2018  Abdominal abscess 09/16/2017  Bacteremia 09/15/2017  Abdominal wall abscess 09/14/2017  ESRD (end stage renal disease) ([facility] [address] 09/14/2017  
 HTN (hypertension) 09/14/2017  Leukocytosis 09/14/2017  Obese 09/14/2017  
  
  
  
 PAST MEDICAL HISTORY  
  
    
Past Medical History:  
Diagnosis Date  Arthritis    
 Chronic kidney disease    
  Dr Gaurav Don  Hypertension    
 Ill-defined condition    
  Dialysis Luisa Norton Obese 9/14/2017  
  
  
  
 PAST SURGICAL HISTORY  
  
     
Past Surgical History:  
Procedure Laterality Date  HX COLOSTOMY      
  left abdomen  HX PACEMAKER   09/13/2018  
  placed in left abdomen.  HX VASCULAR ACCESS      
  left upper arm fistula  
  
  
  
 ALLERGIES  
  
     
Allergies Allergen Reactions  Iron Anaphylaxis  
    Per pt, to PO formulation only, can tolerate IV formulation Allergy to excipient?  
  
  
  
FAMILY HISTORY  
  
      
Family History Problem Relation Age of Onset  Hypertension Other    
    multiple family members  
 negative for cardiac disease 
  
  
 SOCIAL HISTORY  
  
 Social History  
  
   
     
Social History  Marital status:   
    Spouse name: N/A  
 Number of children: N/A  
 Years of education: N/A  
  
     
Social History Main Topics  Smoking status: Current Every Day Smoker  
    Packs/day: 0.50  Smokeless tobacco: Never Used  Alcohol use No  
 Drug use: No  
 Sexual activity: Not on file  
  
    
Other Topics Concern  Not on file  
  
Social History Narrative   
  
  
MEDICATIONS  
  
    
Current Outpatient Prescriptions Medication Sig  
 trimethoprim-sulfamethoxazole (BACTRIM DS) 160-800 mg per tablet Take 1 Tab by mouth two (2) times a day for 5 days.  calcium acetate (PHOSLO) 667 mg cap Take  by mouth three (3) times daily (with meals).  venlafaxine (EFFEXOR) 37.5 mg tablet Take 37.5 mg by mouth daily.  lisinopril (PRINIVIL, ZESTRIL) 5 mg tablet Take  by mouth daily.  gabapentin (NEURONTIN) 100 mg capsule Take 100 mg by mouth daily.  vitamin E (AQUA GEMS) 400 unit capsule Take  by mouth daily.  carvedilol (COREG) 12.5 mg tablet Take 1 Tab by mouth two (2) times daily (with meals).  apixaban (ELIQUIS) 5 mg tablet Take 1 Tab by mouth two (2) times a day.  zolpidem (AMBIEN) 5 mg tablet Take 5 mg by mouth nightly as needed for Sleep.  FOLIC ACID/VIT BCOMP,C (DIALYVITE 800 PO) Take 1 Tab by mouth daily.  hydrALAZINE (APRESOLINE) 50 mg tablet Take 50 mg by mouth two (2) times a day.  isosorbide mononitrate ER (IMDUR) 60 mg CR tablet Take 30 mg by mouth daily. Half-tab  calcium carbonate (TUMS) 200 mg calcium (500 mg) chew Take 3 Tabs by mouth two (2) times daily (with meals).  
  
No current facility-administered medications for this visit.   
  
  
I have reviewed the nurses notes, vitals, problem list, allergy list, medical history, family, social history and medications. 
  
  
 REVIEW OF SYMPTOMS  
   
General: Pt denies excessive weight gain or loss. Pt is able to conduct ADL's HEENT: Denies blurred vision, headaches, hearing loss, epistaxis and difficulty swallowing. Respiratory: Denies cough, congestion, shortness of breath, FRAUSTO, wheezing or stridor. Cardiovascular: Denies precordial pain, palpitations, edema or PND Gastrointestinal: Denies poor appetite, indigestion, abdominal pain or blood in stool Genitourinary: Denies hematuria, dysuria, increased urinary frequency Musculoskeletal: Denies joint pain or swelling from muscles or joints Neurologic: Denies tremor, paresthesias, headache, or sensory motor disturbance Psychiatric: Denies confusion, insomnia, depression Integumentray: Denies rash, itching or ulcers. Hematologic: Denies easy bruising, bleeding 
  
  
 PHYSICAL EXAMINATION  
   
There were no vitals filed for this visit. General: Well developed, in no acute distress. HEENT: No jaundice, oral mucosa moist, no oral ulcers Neck: Supple, no stiffness, no lymphadenopathy, supple Heart:  Normal S1/S2 negative S3 or S4. Regular, no murmur, gallop or rub, no jugular venous distention Respiratory: Clear bilaterally x 4, no wheezing or rales Abdomen:   Soft, non-tender, bowel sounds are active.  
Extremities:  No edema, normal cap refill, no cyanosis. Musculoskeletal: No clubbing, no deformities Neuro: A&Ox3, speech clear, gait stable, cooperative, no focal neurologic deficits Skin: Skin color is normal. No rashes or lesions. Non diaphoretic, moist. 
Vascular: 2+ pulses symmetric in all extremities 
  
  
 DIAGNOSTIC DATA  
   
EKG:  
  
  
 LABORATORY DATA  
   
     
Lab Results Component Value Date/Time  
  WBC 8.7 09/18/2018 10:32 AM  
  HGB 9.7 (L) 09/18/2018 10:32 AM  
  HCT 30.5 (L) 09/18/2018 10:32 AM  
  PLATELET 007 29/05/6457 10:32 AM  
  .0 (H) 09/18/2018 10:32 AM  
  
     
Lab Results Component Value Date/Time  
  Sodium 138 09/18/2018 10:32 AM  
  Potassium 3.6 09/18/2018 10:32 AM  
  Chloride 100 09/18/2018 10:32 AM  
  CO2 27 09/18/2018 10:32 AM  
  Anion gap 11 09/18/2018 10:32 AM  
  Glucose 85 09/18/2018 10:32 AM  
  BUN 19 09/18/2018 10:32 AM  
  Creatinine 4.73 (H) 09/18/2018 10:32 AM  
  BUN/Creatinine ratio 4 (L) 09/18/2018 10:32 AM  
  GFR est AA 11 (L) 09/18/2018 10:32 AM  
   GFR est non-AA 9 (L) 09/18/2018 10:32 AM  
  Calcium 8.9 09/18/2018 10:32 AM  
  Bilirubin, total 0.5 09/18/2018 10:32 AM  
  AST (SGOT) 20 09/18/2018 10:32 AM  
  Alk. phosphatase 133 (H) 09/18/2018 10:32 AM  
  Protein, total 7.9 09/18/2018 10:32 AM  
  Albumin 3.1 (L) 09/18/2018 10:32 AM  
  Globulin 4.8 (H) 09/18/2018 10:32 AM  
  A-G Ratio 0.6 (L) 09/18/2018 10:32 AM  
  ALT (SGPT) <6 (L) 09/18/2018 10:32 AM  
  
  
  
 ASSESSMENT  
   
1. ICD 2. ESRD 3. Pocket hematoma 
  
  
  
 PLAN  
  
Hold eliquis. Plan for hematoma evacuation Monday. Admit to ST[facility]. IV Antibiotics. DC bactrim. Renal consult for assistance with dialysis orders.  
  
  
Jennifer Vela MD 
Cardiac Electrophysiology / Cardiology 
  
[address], [address] [place] [address] [address]                                                                                 [place] [address] 
[phone] / [phone] Fax                                                                  [phone] / [phone] Fax

## 2018-09-28 NOTE — ED TRIAGE NOTES
Pt is here to be evaluated for a possible infected surgical site, left chest from defibrillator placement.

## 2018-09-28 NOTE — PROGRESS NOTES
HISTORY OF PRESENTING ILLNESS      Malina Hansen is a 66 y.o. female with recent SICD with hematoma. She presents today for follow up evaluation of her incision site. The pocket has enlarged and there may be early wound dehiscence. ACTIVE PROBLEM LIST     Patient Active Problem List    Diagnosis Date Noted    Heart failure ([facility] [address] 09/14/2018     Priority: 1 - One    Severe left ventricular systolic dysfunction 28/43/3344    Atrial fibrillation with rapid ventricular response ([facility] 07/21/2018    Pulmonary edema 07/21/2018    Abdominal abscess 09/16/2017    Bacteremia 09/15/2017    Abdominal wall abscess 09/14/2017    ESRD (end stage renal disease) ([facility] [address] 09/14/2017    HTN (hypertension) 09/14/2017    Leukocytosis 09/14/2017    Obese 09/14/2017           PAST MEDICAL HISTORY     Past Medical History:   Diagnosis Date    Arthritis     Chronic kidney disease     Dr Gibson Carry Hypertension     Ill-defined condition     Dialysis T, Th, S    Obese 9/14/2017           PAST SURGICAL HISTORY     Past Surgical History:   Procedure Laterality Date    HX COLOSTOMY      left abdomen    HX PACEMAKER  09/13/2018    placed in left abdomen.  HX VASCULAR ACCESS      left upper arm fistula          ALLERGIES     Allergies   Allergen Reactions    Iron Anaphylaxis     Per pt, to PO formulation only, can tolerate IV formulation  Allergy to excipient?           FAMILY HISTORY     Family History   Problem Relation Age of Onset    Hypertension Other      multiple family members    negative for cardiac disease       SOCIAL HISTORY     Social History     Social History    Marital status:      Spouse name: N/A    Number of children: N/A    Years of education: N/A     Social History Main Topics    Smoking status: Current Every Day Smoker     Packs/day: 0.50    Smokeless tobacco: Never Used    Alcohol use No    Drug use: No    Sexual activity: Not on file     Other Topics Concern    Not on file     Social History Narrative         MEDICATIONS     Current Outpatient Prescriptions   Medication Sig    trimethoprim-sulfamethoxazole (BACTRIM DS) 160-800 mg per tablet Take 1 Tab by mouth two (2) times a day for 5 days.  calcium acetate (PHOSLO) 667 mg cap Take  by mouth three (3) times daily (with meals).  venlafaxine (EFFEXOR) 37.5 mg tablet Take 37.5 mg by mouth daily.  lisinopril (PRINIVIL, ZESTRIL) 5 mg tablet Take  by mouth daily.  gabapentin (NEURONTIN) 100 mg capsule Take 100 mg by mouth daily.  vitamin E (AQUA GEMS) 400 unit capsule Take  by mouth daily.  carvedilol (COREG) 12.5 mg tablet Take 1 Tab by mouth two (2) times daily (with meals).  apixaban (ELIQUIS) 5 mg tablet Take 1 Tab by mouth two (2) times a day.  zolpidem (AMBIEN) 5 mg tablet Take 5 mg by mouth nightly as needed for Sleep.  FOLIC ACID/VIT BCOMP,C (DIALYVITE 800 PO) Take 1 Tab by mouth daily.  hydrALAZINE (APRESOLINE) 50 mg tablet Take 50 mg by mouth two (2) times a day.  isosorbide mononitrate ER (IMDUR) 60 mg CR tablet Take 30 mg by mouth daily. Half-tab    calcium carbonate (TUMS) 200 mg calcium (500 mg) chew Take 3 Tabs by mouth two (2) times daily (with meals). No current facility-administered medications for this visit. I have reviewed the nurses notes, vitals, problem list, allergy list, medical history, family, social history and medications. REVIEW OF SYMPTOMS      General: Pt denies excessive weight gain or loss. Pt is able to conduct ADL's  HEENT: Denies blurred vision, headaches, hearing loss, epistaxis and difficulty swallowing. Respiratory: Denies cough, congestion, shortness of breath, FRAUSTO, wheezing or stridor.   Cardiovascular: Denies precordial pain, palpitations, edema or PND  Gastrointestinal: Denies poor appetite, indigestion, abdominal pain or blood in stool  Genitourinary: Denies hematuria, dysuria, increased urinary frequency  Musculoskeletal: Denies joint pain or swelling from muscles or joints  Neurologic: Denies tremor, paresthesias, headache, or sensory motor disturbance  Psychiatric: Denies confusion, insomnia, depression  Integumentray: Denies rash, itching or ulcers. Hematologic: Denies easy bruising, bleeding       PHYSICAL EXAMINATION      There were no vitals filed for this visit. General: Well developed, in no acute distress. HEENT: No jaundice, oral mucosa moist, no oral ulcers  Neck: Supple, no stiffness, no lymphadenopathy, supple  Heart:  Normal S1/S2 negative S3 or S4. Regular, no murmur, gallop or rub, no jugular venous distention  Respiratory: Clear bilaterally x 4, no wheezing or rales  Abdomen:   Soft, non-tender, bowel sounds are active.   Extremities:  No edema, normal cap refill, no cyanosis. Musculoskeletal: No clubbing, no deformities  Neuro: A&Ox3, speech clear, gait stable, cooperative, no focal neurologic deficits  Skin: Skin color is normal. No rashes or lesions. Non diaphoretic, moist.  Vascular: 2+ pulses symmetric in all extremities       DIAGNOSTIC DATA      EKG:        LABORATORY DATA      Lab Results   Component Value Date/Time    WBC 8.7 09/18/2018 10:32 AM    HGB 9.7 (L) 09/18/2018 10:32 AM    HCT 30.5 (L) 09/18/2018 10:32 AM    PLATELET 275 87/64/0012 10:32 AM    .0 (H) 09/18/2018 10:32 AM      Lab Results   Component Value Date/Time    Sodium 138 09/18/2018 10:32 AM    Potassium 3.6 09/18/2018 10:32 AM    Chloride 100 09/18/2018 10:32 AM    CO2 27 09/18/2018 10:32 AM    Anion gap 11 09/18/2018 10:32 AM    Glucose 85 09/18/2018 10:32 AM    BUN 19 09/18/2018 10:32 AM    Creatinine 4.73 (H) 09/18/2018 10:32 AM    BUN/Creatinine ratio 4 (L) 09/18/2018 10:32 AM    GFR est AA 11 (L) 09/18/2018 10:32 AM    GFR est non-AA 9 (L) 09/18/2018 10:32 AM    Calcium 8.9 09/18/2018 10:32 AM    Bilirubin, total 0.5 09/18/2018 10:32 AM    AST (SGOT) 20 09/18/2018 10:32 AM    Alk.  phosphatase 133 (H) 09/18/2018 10:32 AM    Protein, total 7.9 09/18/2018 10:32 AM    Albumin 3.1 (L) 09/18/2018 10:32 AM    Globulin 4.8 (H) 09/18/2018 10:32 AM    A-G Ratio 0.6 (L) 09/18/2018 10:32 AM    ALT (SGPT) <6 (L) 09/18/2018 10:32 AM           ASSESSMENT      1. ICD  2. ESRD  3. Pocket hematoma         PLAN     Plan for hematoma evacuation. Ad[facility] to [facility].  Antibiotics      Roland Levy MD  Cardiac Electrophysiology / Cardiology    [address]  [address] [facility], [facility], [address]  [phone] / [phone] Fax   [phone] / [phone] Fax

## 2018-09-28 NOTE — IP AVS SNAPSHOT
Inderjit Bond 
 
 
 [address] [facility] 
[phone] Patient: Jesus Montez MRN: [id number] MOJ:[id number] About your hospitalization You were admitted on:  September 28, 2018 You last received care in the:  [facility] 5M1 MED SURG 1 You were discharged on:  October 1, 2018 Why you were hospitalized Your primary diagnosis was:  Not on File Your diagnoses also included:  Hematoma Follow-up Information Follow up With Details Comments Contact Info Dominga Bernard MD On 10/15/2018 0945 AM [address] [facility] [address] [facility] 
[phone] Levi Baldwin MD   [address] [facility] 
[phone] Your Scheduled Appointments Monday October 15, 2018  9:45 AM EDT  
PACEMAKER with PACEMAKER, [place]  
[facility] (KATHRIN SCHEDULING) [address] [facility] [address] [facility]  
[phone] Monday October 15, 2018 10:00 AM EDT  
ESTABLISHED PATIENT with Dominga Bernard MD  
[facility] ([facility]) [address] [facility]  
[phone] Discharge Orders None A check james indicates which time of day the medication should be taken. My Medications START taking these medications Instructions Each Dose to Equal  
 Morning Noon Evening Bedtime  
 clindamycin 150 mg capsule Commonly known as:  CLEOCIN Your last dose was: Your next dose is: Take 1 Cap by mouth three (3) times daily for 5 days. 150 mg CHANGE how you take these medications Instructions Each Dose to Equal  
 Morning Noon Evening Bedtime  
 lisinopril 5 mg tablet Commonly known as:  Cydne Jitendra Start taking on:  10/2/2018 What changed:  how much to take Your last dose was: Your next dose is: Take 1 Tab by mouth daily. 5 mg CONTINUE taking these medications Instructions Each Dose to Equal  
 Morning Noon Evening Bedtime AMBIEN 5 mg tablet Generic drug:  zolpidem Your last dose was: Your next dose is: Take 5 mg by mouth nightly as needed for Sleep.  
 5 mg  
    
   
   
   
  
 apixaban 5 mg tablet Commonly known as:  Roslyn Emerson Your last dose was: Your next dose is: Take 1 Tab by mouth two (2) times a day. 5 mg  
    
   
   
   
  
 calcium carbonate 200 mg calcium (500 mg) Chew Commonly known as:  TUMS Your last dose was: Your next dose is: Take 3 Tabs by mouth two (2) times daily (with meals). 3 Tab  
    
   
   
   
  
 carvedilol 12.5 mg tablet Commonly known as:  Bev Louie Your last dose was: Your next dose is: Take 1 Tab by mouth two (2) times daily (with meals). 12.5 mg  
    
   
   
   
  
 DIALYVITE 800 PO Your last dose was: Your next dose is: Take 1 Tab by mouth daily. 1 Tab HYDROcodone-acetaminophen 5-325 mg per tablet Commonly known as:  Kasandra Joshi Your last dose was: Your next dose is: Take 1 Tab by mouth every six (6) hours as needed for Pain. 1 Tab  
    
   
   
   
  
 isosorbide mononitrate ER 60 mg CR tablet Commonly known as:  IMDUR Your last dose was: Your next dose is: Take 30 mg by mouth daily. Half-tab 30 mg  
    
   
   
   
  
 omeprazole 20 mg capsule Commonly known as:  PRILOSEC Your last dose was: Your next dose is: Take 20 mg by mouth Before breakfast and dinner. 20 mg  
    
   
   
   
  
 venlafaxine 37.5 mg tablet Commonly known as:  [facility] Your last dose was: Your next dose is: Take 37.5 mg by mouth daily. 37.5 mg  
    
   
   
   
  
 vitamin E 400 unit capsule Commonly known as:  [address] Your last dose was: Your next dose is: Take 400 Units by mouth daily. 400 Units STOP taking these medications BACTRIM -800 mg per tablet Generic drug:  trimethoprim-sulfamethoxazole  
   
  
 hydrALAZINE 25 mg tablet Commonly known as:  APRESOLINE  
   
  
  
ASK your doctor about these medications Instructions Each Dose to Equal  
 Morning Noon Evening Bedtime  
 trimethoprim-sulfamethoxazole 160-800 mg per tablet Commonly known as:  BACTRIM DS Your last dose was: Your next dose is: Take 1 Tab by mouth two (2) times a day for 5 days. 1 Tab Where to Get Your Medications These medications were sent to [address] [place] [address] Drive  [address] [place] [address], [address] [place] [address] Phone:  [phone]  
  clindamycin 150 mg capsule  
 lisinopril 5 mg tablet Opioid Education Prescription Opioids: What You Need to Know: 
 
Prescription opioids can be used to help relieve moderate-to-severe pain and are often prescribed following a surgery or injury, or for certain health conditions. These medications can be an important part of treatment but also come with serious risks. Opioids are strong pain medicines. Examples include hydrocodone, oxycodone, fentanyl, and morphine. Heroin is an example of an illegal opioid. It is important to work with your health care provider to make sure you are getting the safest, most effective care. WHAT ARE THE RISKS AND SIDE EFFECTS OF OPIOID USE? Prescription opioids carry serious risks of addiction and overdose, especially with prolonged use. An opioid overdose, often marked by slow breathing, can cause sudden death.   The use of prescription opioids can have a number of side effects as well, even when taken as directed. · Tolerance-meaning you might need to take more of a medication for the same pain relief · Physical dependence-meaning you have symptoms of withdrawal when the medication is stopped. Withdrawal symptoms can include nausea, sweating, chills, diarrhea, stomach cramps, and muscle aches. Withdrawal can last up to several weeks, depending on which drug you took and how long you took it. · Increased sensitivity to pain · Constipation · Nausea, vomiting, and dry mouth · Sleepiness and dizziness · Confusion · Depression · Low levels of testosterone that can result in lower sex drive, energy, and strength · Itching and sweating RISKS ARE GREATER WITH:      
· History of drug misuse, substance use disorder, or overdose · Mental health conditions (such as depression or anxiety) · Sleep apnea · Older age (72 years or older) · Pregnancy Avoid alcohol while taking prescription opioids. Also, unless specifically advised by your health care provider, medications to avoid include: · Benzodiazepines (such as Xanax or Valium) · Muscle relaxants (such as Soma or Flexeril) · Hypnotics (such as Ambien or Lunesta) · Other prescription opioids KNOW YOUR OPTIONS Talk to your health care provider about ways to manage your pain that don't involve prescription opioids. Some of these options may actually work better and have fewer risks and side effects. Consult your physician before adding or stopping any medications, treatments, or physical activity. Options may include: 
· Pain relievers such as acetaminophen, ibuprofen, and naproxen · Some medications that are also used for depression or seizures · Physical therapy and exercise · Counseling to help patients learn how to cope better with triggers of pain and stress. · Application of heat or cold compress · Massage therapy · Relaxation techniques Be Informed Make sure you know the name of your medication, how much and how often to take it, and its potential risks & side effects. IF YOU ARE PRESCRIBED OPIOIDS FOR PAIN: 
· Never take opioids in greater amounts or more often than prescribed. Remember the goal is not to be pain-free but to manage your pain at a tolerable level. · Follow up with your primary care provider to: · Work together to create a plan on how to manage your pain. · Talk about ways to help manage your pain that don't involve prescription opioids. · Talk about any and all concerns and side effects. · Help prevent misuse and abuse. · Never sell or share prescription opioids · Help prevent misuse and abuse. · Store prescription opioids in a secure place and out of reach of others (this may include visitors, children, friends, and family). · Safely dispose of unused/unwanted prescription opioids: Find your community drug take-back program or your pharmacy mail-back program, or flush them down the toilet, following guidance from the Food and Drug Administration (www.fda.gov/Drugs/ResourcesForYou). · Visit www.cdc.gov/drugoverdose to learn about the risks of opioid abuse and overdose. · If you believe you may be struggling with addiction, tell your health care provider and ask for guidance or call [address] [place], [place] [address] at [phone]-LHEW. Discharge Instructions Hold Eliquis for 3 days Resume Eliquis on  Thursday October 4th Take antibiotic with meals, recommend a yogurt daily and/or probiotic daily (this can be purchased OTC) [employer] Announcement We are excited to announce that we are making your provider's discharge notes available to you in [employer]. You will see these notes when they are completed and signed by the physician that discharged you from your recent hospital stay.   If you have any questions or concerns about any information you see in [employer], please call the Health Information Department where you were seen or reach out to your Primary Care Provider for more information about your plan of care. Introducing \[facility] [place] [facility]\"" & HEALTH SERVICES! Festus Vela introduces [employer] patient portal. Now you can access parts of your medical record, email your doctor's office, and request medication refills online. 1. In your internet browser, go to [email or web address] 2. Click on the First Time User? Click Here link in the Sign In box. You will see the New Member Sign Up page. 3. Enter your [employer] Access Code exactly as it appears below. You will not need to use this code after youve completed the sign-up process. If you do not sign up before the expiration date, you must request a new code. · [employer] Access Code: [email or web address] Expires: 10/19/2018 10:10 AM 
 
4. Enter the last four digits of your Social Security Number (xxxx) and Date of Birth (mm/dd/yyyy) as indicated and click Submit. You will be taken to the next sign-up page. 5. Create a [employer] ID. This will be your [employer] login ID and cannot be changed, so think of one that is secure and easy to remember. 6. Create a [employer] password. You can change your password at any time. 7. Enter your Password Reset Question and Answer. This can be used at a later time if you forget your password. 8. Enter your e-mail address. You will receive e-mail notification when new information is available in [address] 9. Click Sign Up. You can now view and download portions of your medical record. 10. Click the Download Summary menu link to download a portable copy of your medical information. If you have questions, please visit the Frequently Asked Questions section of the [employer] website. Remember, [employer] is NOT to be used for urgent needs. For medical emergencies, dial 911. Now available from your iPhone and Android! Introducing Brandon Bonilla As a [address] [place] [address] patient, I wanted to make you aware of our electronic visit tool called Brandon [employer]. [facility] [address] 24/7 allows you to connect within minutes with a medical provider 24 hours a day, seven days a week via a mobile device or tablet or logging into a secure website from your computer. You can access Brandon Gilbertfin from anywhere in the [place]. A virtual visit might be right for you when you have a simple condition and feel like you just dont want to get out of bed, or cant get away from work for an appointment, when your regular [address] [place] [address] provider is not available (evenings, weekends or holidays), or when youre out of town and need minor care. Electronic visits cost only $49 and if the [address] [place] [address] 24/7 provider determines a prescription is needed to treat your condition, one can be electronically transmitted to a nearby pharmacy*. Please take a moment to enroll today if you have not already done so. The enrollment process is free and takes just a few minutes. To enroll, please download the [employer] 24/7 ina to your tablet or phone, or visit [email or web address] to enroll on your computer. And, as an [address] [place] [address] patient with a [employer] account, the results of your visits will be scanned into your electronic medical record and your primary care provider will be able to view the scanned results. We urge you to continue to see your regular [address] [place] [address] provider for your ongoing medical care. And while your primary care provider may not be the one available when you seek a Brandon Glassfefin virtual visit, the peace of mind you get from getting a real diagnosis real time can be priceless. For more information on Brandon Quanfefin, view our Frequently Asked Questions (FAQs) at [email or web address]. Sincerely, 
 
Hyla Klinefelter, MD 
Chief Medical Officer Teresa Lagunas *:  certain medications cannot be prescribed via Brandon Bonilla Providers Seen During Your Hospitalization Provider Specialty Primary office phone Caitlyn Vasquez MD Emergency Medicine [phone] Jose Ryan MD Cardiology [phone] Your Primary Care Physician (PCP) Primary Care Physician Office Phone Office Fax [facility] [phone] You are allergic to the following Allergen Reactions Iron Anaphylaxis Per pt, to PO formulation only, can tolerate IV formulation Allergy to excipient? Recent Documentation Weight BMI OB Status Smoking Status 63 kg 23.84 kg/m2 Menopause Current Every Day Smoker Emergency Contacts Name Discharge Info Relation Home Work Mobile Linda Bates DISCHARGE CAREGIVER [3] Other Relative [6] [phone] [facility] DISCHARGE CAREGIVER [3] Sister [23] [phone] Sharon Julian  Other Relative [6] [phone] Patient Belongings The following personal items are in your possession at time of discharge: 
  Dental Appliances: None  Visual Aid: Glasses, With patient      Home Medications: None   Jewelry: None  Clothing: At bedside    Other Valuables: None Please provide this summary of care documentation to your next provider. Signatures-by signing, you are acknowledging that this After Visit Summary has been reviewed with you and you have received a copy. Patient Signature:  ____________________________________________________________ Date:  ____________________________________________________________  
  
Celester Rota Provider Signature:  ____________________________________________________________ Date:  ____________________________________________________________

## 2018-09-28 NOTE — ED PROVIDER NOTES
HPI Comments: Pt s/p pacemaker placement with Dr. Daly Arora presenting with blood oozing from the pocket. No fevers, no pus draining, no warmth, no increased pain. Patient is on Eloquis. Patient is a 66 y.o. female presenting with wound check. The history is provided by the patient. Wound Check This is a new problem. The problem has not changed since onset. Pain location: left chest wall. The pain is mild. Past Medical History:  
Diagnosis Date  Arthritis  Chronic kidney disease Dr Cher Porter  Hypertension  Ill-defined condition Dialysis Graylon Genna Obese 9/14/2017 Past Surgical History:  
Procedure Laterality Date  HX COLOSTOMY    
 left abdomen  HX PACEMAKER  09/13/2018  
 placed in left abdomen.  HX VASCULAR ACCESS    
 left upper arm fistula Family History:  
Problem Relation Age of Onset  Hypertension Other   
  multiple family members Social History Social History  Marital status:  Spouse name: N/A  
 Number of children: N/A  
 Years of education: N/A Occupational History  Not on file. Social History Main Topics  Smoking status: Current Every Day Smoker Packs/day: 0.50  Smokeless tobacco: Never Used  Alcohol use No  
 Drug use: No  
 Sexual activity: Not on file Other Topics Concern  Not on file Social History Narrative ALLERGIES: Iron Review of Systems Constitutional: Negative for chills and fever. Respiratory: Negative for shortness of breath. Cardiovascular: Negative for chest pain. Gastrointestinal: Negative for abdominal pain, constipation, diarrhea and vomiting. Skin: Positive for wound (to left chest wall oozing blood). Neurological: Negative for dizziness and light-headedness. All other systems reviewed and are negative. Vitals:  
 09/28/18 1635 BP: 129/59 Pulse: 77 Resp: 18 Temp: 97.7 °F (36.5 °C) SpO2: 100% Weight: 63 kg (138 lb 14.2 oz) Physical Exam  
Constitutional: She is oriented to person, place, and time. She appears well-developed. No distress. HENT:  
Head: Normocephalic and atraumatic. Eyes: Pupils are equal, round, and reactive to light. No scleral icterus. Neck: Normal range of motion. Neck supple. Cardiovascular: Normal rate and regular rhythm. Pulmonary/Chest: Effort normal. No respiratory distress. Abdominal: Soft. She exhibits no distension. There is no tenderness. There is no rebound and no guarding. Musculoskeletal: Normal range of motion. Neurological: She is alert and oriented to person, place, and time. Skin: Skin is warm and dry. She is not diaphoretic. Wound to left chest wall, oozing bright red blood, no surrounding erythema or warmth, tender and swollen Psychiatric: She has a normal mood and affect. Her behavior is normal. Thought content normal.  
Nursing note and vitals reviewed. MDM Number of Diagnoses or Management Options Diagnosis management comments: Pt w non-healing wound continuing to bleed. Vital signs wnl, no signs/symptoms of infection. Patient to be admitted to the hospital for management of the wound. ED Course Procedures Patient is being admitted to the hospital.  The results of their tests and reasons for their admission have been discussed with them and/or available family. They convey agreement and understanding for the need to be admitted and for their admission diagnosis. Consultation will be made now with the inpatient physician for hospitalization.

## 2018-09-28 NOTE — IP AVS SNAPSHOT
[facility] 
 
 
 [address] [facility] [address] [facility] 
[phone] Patient: My Baugh MRN: [id number] UAY:[id number] A check james indicates which time of day the medication should be taken. My Medications START taking these medications Instructions Each Dose to Equal  
 Morning Noon Evening Bedtime  
 clindamycin 150 mg capsule Commonly known as:  CLEOCIN Your last dose was: Your next dose is: Take 1 Cap by mouth three (3) times daily for 5 days. 150 mg CHANGE how you take these medications Instructions Each Dose to Equal  
 Morning Noon Evening Bedtime  
 lisinopril 5 mg tablet Commonly known as:  Vance Side Start taking on:  10/2/2018 What changed:  how much to take Your last dose was: Your next dose is: Take 1 Tab by mouth daily. 5 mg CONTINUE taking these medications Instructions Each Dose to Equal  
 Morning Noon Evening Bedtime AMBIEN 5 mg tablet Generic drug:  zolpidem Your last dose was: Your next dose is: Take 5 mg by mouth nightly as needed for Sleep.  
 5 mg  
    
   
   
   
  
 apixaban 5 mg tablet Commonly known as:  Rakesh Sprinkles Your last dose was: Your next dose is: Take 1 Tab by mouth two (2) times a day. 5 mg  
    
   
   
   
  
 calcium carbonate 200 mg calcium (500 mg) Chew Commonly known as:  TUMS Your last dose was: Your next dose is: Take 3 Tabs by mouth two (2) times daily (with meals). 3 Tab  
    
   
   
   
  
 carvedilol 12.5 mg tablet Commonly known as:  Navarro Bolognese Your last dose was: Your next dose is: Take 1 Tab by mouth two (2) times daily (with meals). 12.5 mg  
    
   
   
   
  
 DIALYVITE 800 PO Your last dose was: Your next dose is: Take 1 Tab by mouth daily. 1 Tab HYDROcodone-acetaminophen 5-325 mg per tablet Commonly known as:  Norm Side Your last dose was: Your next dose is: Take 1 Tab by mouth every six (6) hours as needed for Pain. 1 Tab  
    
   
   
   
  
 isosorbide mononitrate ER 60 mg CR tablet Commonly known as:  IMDUR Your last dose was: Your next dose is: Take 30 mg by mouth daily. Half-tab 30 mg  
    
   
   
   
  
 omeprazole 20 mg capsule Commonly known as:  PRILOSEC Your last dose was: Your next dose is: Take 20 mg by mouth Before breakfast and dinner. 20 mg  
    
   
   
   
  
 venlafaxine 37.5 mg tablet Commonly known as:  [facility] Your last dose was: Your next dose is: Take 37.5 mg by mouth daily. 37.5 mg  
    
   
   
   
  
 vitamin E 400 unit capsule Commonly known as:  [address] Your last dose was: Your next dose is: Take 400 Units by mouth daily. 400 Units STOP taking these medications BACTRIM -800 mg per tablet Generic drug:  trimethoprim-sulfamethoxazole  
   
  
 hydrALAZINE 25 mg tablet Commonly known as:  APRESOLINE  
   
  
  
ASK your doctor about these medications Instructions Each Dose to Equal  
 Morning Noon Evening Bedtime  
 trimethoprim-sulfamethoxazole 160-800 mg per tablet Commonly known as:  BACTRIM DS Your last dose was: Your next dose is: Take 1 Tab by mouth two (2) times a day for 5 days. 1 Tab Where to Get Your Medications These medications were sent to [address] [place] [address] Drive  [address] [place] [address], [address] [place], [place] [address] Phone:  [phone]  
  clindamycin 150 mg capsule  
 lisinopril 5 mg tablet

## 2018-09-29 LAB
ANION GAP SERPL CALC-SCNC: 12 MMOL/L (ref 5–15)
BUN SERPL-MCNC: 24 MG/DL (ref 6–20)
BUN/CREAT SERPL: 4 (ref 12–20)
CALCIUM SERPL-MCNC: 8.5 MG/DL (ref 8.5–10.1)
CHLORIDE SERPL-SCNC: 103 MMOL/L (ref 97–108)
CO2 SERPL-SCNC: 26 MMOL/L (ref 21–32)
CREAT SERPL-MCNC: 5.5 MG/DL (ref 0.55–1.02)
ERYTHROCYTE [DISTWIDTH] IN BLOOD BY AUTOMATED COUNT: 18.6 % (ref 11.5–14.5)
GLUCOSE SERPL-MCNC: 87 MG/DL (ref 65–100)
HCT VFR BLD AUTO: 22.5 % (ref 35–47)
HGB BLD-MCNC: 7.1 G/DL (ref 11.5–16)
MCH RBC QN AUTO: 33.5 PG (ref 26–34)
MCHC RBC AUTO-ENTMCNC: 31.6 G/DL (ref 30–36.5)
MCV RBC AUTO: 106.1 FL (ref 80–99)
NRBC # BLD: 0 K/UL (ref 0–0.01)
NRBC BLD-RTO: 0 PER 100 WBC
PLATELET # BLD AUTO: 190 K/UL (ref 150–400)
PMV BLD AUTO: 9.9 FL (ref 8.9–12.9)
POTASSIUM SERPL-SCNC: 3.6 MMOL/L (ref 3.5–5.1)
RBC # BLD AUTO: 2.12 M/UL (ref 3.8–5.2)
SODIUM SERPL-SCNC: 141 MMOL/L (ref 136–145)
WBC # BLD AUTO: 7.6 K/UL (ref 3.6–11)

## 2018-09-29 PROCEDURE — 36415 COLL VENOUS BLD VENIPUNCTURE: CPT | Performed by: INTERNAL MEDICINE

## 2018-09-29 PROCEDURE — 80048 BASIC METABOLIC PNL TOTAL CA: CPT | Performed by: INTERNAL MEDICINE

## 2018-09-29 PROCEDURE — 90935 HEMODIALYSIS ONE EVALUATION: CPT

## 2018-09-29 PROCEDURE — 5A1D70Z PERFORMANCE OF URINARY FILTRATION, INTERMITTENT, LESS THAN 6 HOURS PER DAY: ICD-10-PCS | Performed by: INTERNAL MEDICINE

## 2018-09-29 PROCEDURE — 74011000258 HC RX REV CODE- 258: Performed by: INTERNAL MEDICINE

## 2018-09-29 PROCEDURE — 85027 COMPLETE CBC AUTOMATED: CPT | Performed by: INTERNAL MEDICINE

## 2018-09-29 PROCEDURE — 74011250637 HC RX REV CODE- 250/637: Performed by: INTERNAL MEDICINE

## 2018-09-29 PROCEDURE — 74011250636 HC RX REV CODE- 250/636: Performed by: INTERNAL MEDICINE

## 2018-09-29 PROCEDURE — 65660000000 HC RM CCU STEPDOWN

## 2018-09-29 RX ORDER — IBUPROFEN 400 MG/1
400 TABLET ORAL
Status: DISCONTINUED | OUTPATIENT
Start: 2018-09-29 | End: 2018-10-01 | Stop reason: HOSPADM

## 2018-09-29 RX ORDER — ACETAMINOPHEN 325 MG/1
650 TABLET ORAL
Status: DISCONTINUED | OUTPATIENT
Start: 2018-09-29 | End: 2018-10-01 | Stop reason: HOSPADM

## 2018-09-29 RX ORDER — OXYCODONE AND ACETAMINOPHEN 5; 325 MG/1; MG/1
2 TABLET ORAL
Status: DISCONTINUED | OUTPATIENT
Start: 2018-09-29 | End: 2018-10-01 | Stop reason: HOSPADM

## 2018-09-29 RX ORDER — MORPHINE SULFATE 4 MG/ML
1 INJECTION, SOLUTION INTRAMUSCULAR; INTRAVENOUS
Status: DISCONTINUED | OUTPATIENT
Start: 2018-09-29 | End: 2018-10-01 | Stop reason: HOSPADM

## 2018-09-29 RX ADMIN — VANCOMYCIN HYDROCHLORIDE 500 MG: 500 INJECTION, POWDER, LYOPHILIZED, FOR SOLUTION INTRAVENOUS at 21:44

## 2018-09-29 RX ADMIN — OXYCODONE HYDROCHLORIDE AND ACETAMINOPHEN 2 TABLET: 5; 325 TABLET ORAL at 14:12

## 2018-09-29 RX ADMIN — ANTACID TABLETS 600 MG: 500 TABLET, CHEWABLE ORAL at 10:05

## 2018-09-29 RX ADMIN — NEPHROCAP 1 CAPSULE: 1 CAP ORAL at 10:05

## 2018-09-29 RX ADMIN — CARVEDILOL 12.5 MG: 12.5 TABLET, FILM COATED ORAL at 21:44

## 2018-09-29 RX ADMIN — LISINOPRIL 5 MG: 5 TABLET ORAL at 10:05

## 2018-09-29 RX ADMIN — OXYCODONE HYDROCHLORIDE AND ACETAMINOPHEN 2 TABLET: 5; 325 TABLET ORAL at 21:54

## 2018-09-29 RX ADMIN — CARVEDILOL 12.5 MG: 12.5 TABLET, FILM COATED ORAL at 10:05

## 2018-09-29 RX ADMIN — VENLAFAXINE 37.5 MG: 37.5 TABLET ORAL at 10:05

## 2018-09-29 RX ADMIN — HYDRALAZINE HYDROCHLORIDE 25 MG: 25 TABLET, FILM COATED ORAL at 10:05

## 2018-09-29 RX ADMIN — HYDRALAZINE HYDROCHLORIDE 25 MG: 25 TABLET, FILM COATED ORAL at 21:44

## 2018-09-29 RX ADMIN — ISOSORBIDE MONONITRATE 30 MG: 30 TABLET, EXTENDED RELEASE ORAL at 10:05

## 2018-09-29 NOTE — PROGRESS NOTES
TRANSFER - IN REPORT: 
 
Verbal report received from [address] (name) on [address] [place] [address]  being received from ED(unit) for routine progression of care Report consisted of patients Situation, Background, Assessment and  
Recommendations(SBAR). Information from the following report(s) SBAR, Kardex, ED Summary, Accordion and Recent Results was reviewed with the receiving nurse. Opportunity for questions and clarification was provided. Assessment completed upon patients arrival to unit and care assumed. Primary Nurse Nori Garcia and Nilay Hermosillo RN performed a dual skin assessment on this patient Impairment noted- see wound doc flow sheet Cullen score is 20

## 2018-09-29 NOTE — PROGRESS NOTES
[address] Dosing Services: Antimicrobial Stewardship Progress Note Consult for antibiotic dosing of Vancomycin by Dr. Foster Meyers Pharmacist reviewed antibiotic appropriateness for 66year old , female  for indication of skin and soft tissue infection Day of Therapy: 1 Plan: 
Vancomycin therapy: 
Start Vancomycin therapy, with loading dose of  1,000 mg IV on 9/28/2018. Follow with maintenance dose of 500 mg IV after each hemodialysis session on Tu/Th/Sa. Dose calculated to approximate a therapeutic trough of 10-15 mcg/mL. Pharmacist will follow daily and will make changes to dose and/or frequency based on clinical status. Serum Creatinine Lab Results Component Value Date/Time Creatinine 4.82 (H) 09/28/2018 04:54 PM  
   
Creatinine Clearance Estimated Creatinine Clearance: 8.3 mL/min (based on Cr of 4.82). Temp 97.7 °F (36.5 °C) WBC Lab Results Component Value Date/Time WBC 8.5 09/28/2018 04:54 PM  
   
H/H Lab Results Component Value Date/Time HGB 8.0 (L) 09/28/2018 04:54 PM  
  
 
Platelets Lab Results Component Value Date/Time  PLATELET 455 02/87/0063 04:54 PM  
  
 
Manny Long, Pharmacist

## 2018-09-29 NOTE — ROUTINE PROCESS
Bedside and Verbal shift change report given to Paty (oncoming nurse) by Jackeline Lovett (offgoing nurse). Report included the following information SBAR, Kardex, MAR, Accordion and Recent Results.

## 2018-09-29 NOTE — PROGRESS NOTES
[address] [facility] Sarah called back stating they could come at 1600.  
1046 On call returned page. Ordered Tylenol 650mg PO q6 PRN, Advil 1-2 200mg PO q6 PRN, percocet 1-2 PO tablets 5-325mg q4 PRN, morphine 1mg IV q4 PRN. 1038 Paged Dania Jose to see what time HD will occur. Called Dr. Charles Zamudio office to request something for pain. They will page the on-call.

## 2018-09-29 NOTE — DIALYSIS
[facility] Dialysis Team [place]  [phone] Vitals   Pre   Post   Assessment   Pre   Post    
Temp  Temp: 97.8 °F (36.6 °C) (09/29/18 1248)  97.9 LOC  Alert/oriented X 3 AO X 3 HR   Pulse (Heart Rate): 62 (09/29/18 1659) 68 Lungs   Clear throughout  Clear throughout B/P   BP: 138/45 (09/29/18 1659) 126/43 Cardiac   RRR  RRR Resp   Resp Rate: 18 (09/29/18 1248) 18 Skin   Warm/dry/intact  Warm/dry/ 
intact Pain level  Pain Intensity 1: 6 (09/29/18 1015) 0 Edema  None None Orders: Duration:   Start:    7775 End:    1959 Total:   3 hours Dialyzer:   Dialyzer/Set Up Inspection: [place] (09/29/18 1659) K Bath:   Dialysate K (mEq/L): 3.5 (09/29/18 1659) Ca Bath:   Dialysate CA (mEq/L): 2.5 (09/29/18 1659) Na/Bicarb:   Dialysate NA (mEq/L): 140 (09/29/18 1659) Target Fluid Removal:   Goal/Amount of Fluid to Remove (mL): 2000 mL (09/29/18 1659) Access Type & Location:   [facility] Labs Obtained/Reviewed Critical Results Called   Date when labs were drawn- 
Hgb-   
HGB Date Value Ref Range Status 09/29/2018 7.1 (L) 11.5 - 16.0 g/dL Final  
 
K-   
Potassium Date Value Ref Range Status 09/29/2018 3.6 3.5 - 5.1 mmol/L Final  
 
Ca-  
Calcium Date Value Ref Range Status 09/29/2018 8.5 8.5 - 10.1 MG/DL Final  
 
Bun-  
BUN Date Value Ref Range Status 09/29/2018 24 (H) 6 - 20 MG/DL Final  
 
Creat-  
Creatinine Date Value Ref Range Status 09/29/2018 5.50 (H) 0.55 - 1.02 MG/DL Final  
 
  
Medications/ Blood Products Given Name   Dose   Route and Time Blood Volume Processed (BVP):    74 Net Fluid Removed:  2000 mls Comments Time Out Done: 0944 Primary Nurse Rpt Pre: Marcellina Blue RN 
Primary Nurse Rpt Post:Tia CEBALLOS 
Pt Education: Procedural/Access Care Care Plan:Pt will tolerate HD with hemodynamic stability Tx Summary:Dr Tovar called prior to procedure.   No orders or consult report noted in [facility]. RUE AVG cannulated easily with (2) 15G HD needles. No redness/tenderness/edema noted. + thrill, + bruit noted. Pt tolerated HD with stable VS and assessment. All possible blood rinsed back to the paiient at the conclusion of the treatment. Arterial and venous HD needles removed and handheld pressure applied X 5 minutes for hemostasis. Admiting Diagnosis:Hematoma at Surgical Incision Site Pt's previous clinic-[facility] Consent signed - Informed Consent Verified: Yes ([id number]) [place] Consent - Obtained Hepatitis Status- /redrawn Machine #- Machine Number: [id number] ([id number]) Telemetry status-Not monitored Pre-dialysis wt. - Pre-Dialysis Weight: 67.5 kg (148 lb 13 oz) ([id number])

## 2018-09-29 NOTE — PROGRESS NOTES
Patient  Is a Tue., thurs, sat  Dialysis  Patient Notified MD Erlin Stoddard.  Md stated will wait  Until the Morning to  For patient to get orders to be dialyzed within the hospital. 
 
Madi Bonds, MSN, RN

## 2018-09-29 NOTE — ED NOTES
Spoke to [facility] acute care team regarding dialysis for patient, aware of patient and her room assignment. Informed primary nurse that the renal consult will need to be called to receive the official HD orders.   This patient sees Dr. Dory Tobar and her HD schedule is T,Th, Sat.

## 2018-09-29 NOTE — PROGRESS NOTES
[facility]: MED RECONCILIATION Comments/Recommendations: Bactrim DS - patient states that she only has one dose left to take Medications added: · Hydrocodone/APAP 5/325 1 tablet by mouth every 6 hours as needed for pain · Omeprazole 20 mg by mouth two times per day · Bactrim DS 1 tablet by mouth two times per day x 5 days Medications removed: · Calcium acetate · Gabapentin · Lisinopril Medications adjusted: · Hydralazine 25 mg by mouth two times per day Information obtained from: patient Allergies: Iron Prior to Admission Medications Prescriptions Last Dose Informant Patient Reported? Taking? FOLIC ACID/VIT BCOMP,C (DIALYVITE 800 PO) 9/28/2018 at Unknown time Self Yes Yes Sig: Take 1 Tab by mouth daily. HYDROcodone-acetaminophen (NORCO) 5-325 mg per tablet 9/28/2018 at Unknown time Self Yes Yes Sig: Take 1 Tab by mouth every six (6) hours as needed for Pain. apixaban (ELIQUIS) 5 mg tablet 9/28/2018 at AM Self No Yes Sig: Take 1 Tab by mouth two (2) times a day. calcium carbonate (TUMS) 200 mg calcium (500 mg) chew 9/28/2018 at AM Self Yes Yes Sig: Take 3 Tabs by mouth two (2) times daily (with meals). carvedilol (COREG) 12.5 mg tablet 9/28/2018 at AM Self No Yes Sig: Take 1 Tab by mouth two (2) times daily (with meals). hydrALAZINE (APRESOLINE) 25 mg tablet 9/28/2018 at AM Self Yes Yes Sig: Take 25 mg by mouth two (2) times a day. isosorbide mononitrate ER (IMDUR) 60 mg CR tablet 9/28/2018 at Unknown time Self Yes Yes Sig: Take 30 mg by mouth daily. Half-tab  
omeprazole (PRILOSEC) 20 mg capsule 9/28/2018 at AM Self Yes Yes Sig: Take 20 mg by mouth Before breakfast and dinner. trimethoprim-sulfamethoxazole (BACTRIM DS) 160-800 mg per tablet 9/28/2018 at AM Self Yes Yes Sig: Take 1 Tab by mouth two (2) times a day. venlafaxine (EFFEXOR) 37.5 mg tablet 9/28/2018 at Unknown time Self Yes Yes Sig: Take 37.5 mg by mouth daily. vitamin E (AQUA GEMS) 400 unit capsule 9/28/2018 at Unknown time Self Yes Yes Sig: Take 400 Units by mouth daily. zolpidem (AMBIEN) 5 mg tablet 9/25/2018 at PM Self Yes Yes Sig: Take 5 mg by mouth nightly as needed for Sleep.   
  
 
Normajean Fleischer, Pharmacist

## 2018-09-29 NOTE — PROGRESS NOTES
Progress Note Conor Bell MD, [facility] [address], [place], [address] [facility] Phone [phone]; Fax [phone] 
 
 
 
2018 10:19 AM  
 
Admit Date:           2018 Admit Diagnosis:  Hematoma :          1940 MRN:          [id number] ASSESSMENT/RECOMMENDATION:  
1. ICD 
-pocket hematoma  
 hold anticoagulants and antibiotics over the weekend. 2. ESRD 
-on dialysis and appreciate reeal assistance 3. Pocket hematoma WBC 7.6, H/H 7.1/22.5, K 3.6, creatinine 5.5 ,CXR ok Last 3 Recorded Weights in this Encounter  
 18 1635 Weight: 138 lb 14.2 oz (63 kg) 1901 -  0700 In: 118 [P.O.:118] Out: - SUBJECTIVE Renee Mosley a 66 y. o. female with recent SICD with hematoma. She presents today for follow up evaluation of her incision site. The pocket has enlarged and there may be early wound dehiscence. Edward Hussein reports Is experiencing some discomfort over the patient pacemaker site and required pain meds. Tran Loja Current Facility-Administered Medications Medication Dose Route Frequency  lisinopril (PRINIVIL, ZESTRIL) tablet 5 mg  5 mg Oral DAILY  venlafaxine (EFFEXOR) tablet 37.5 mg  37.5 mg Oral DAILY  carvedilol (COREG) tablet 12.5 mg  12.5 mg Oral BID WITH MEALS  calcium carbonate (TUMS) chewable tablet 600 mg [elemental]  600 mg Oral BID WITH MEALS  
 hydrALAZINE (APRESOLINE) tablet 25 mg  25 mg Oral BID  isosorbide mononitrate ER (IMDUR) tablet 30 mg  30 mg Oral DAILY  zolpidem (AMBIEN) tablet 5 mg  5 mg Oral QHS PRN  
 vancomycin (VANCOCIN) 500 mg in 0.9% sodium chloride (MBP/ADV) 100 mL  500 mg IntraVENous DIALYSIS TUE, THU & SAT  Or  
  Vancomycin 500 mg IV - Administer after hemodialysis   Other DIALYSIS TUE, THU & SAT  B complex-vitaminC-folic acid (NEPHROCAP) cap  1 Cap Oral DAILY OBJECTIVE Intake/Output Summary (Last 24 hours) at 09/29/18 1019 Last data filed at 09/29/18 8851 Gross per 24 hour Intake              118 ml Output                0 ml Net              118 ml Review of Systems - History obtained from the patient AS PER  HPI PHYSICAL EXAM  
  
 
Visit Vitals  /54 (BP 1 Location: Left leg, BP Patient Position: At rest)  Pulse 76  Temp 97.6 °F (36.4 °C)  Resp 18  Wt 138 lb 14.2 oz (63 kg)  SpO2 94%  BMI 23.84 kg/m2 Gen: Well-developed, well-nourished, in no acute distress  alert HEENT:  Pink conjunctivae, Hearing grossly normal.No scleral icterus or conjunctival, moist mucous membranes Neck: Supple,No JVD, No Carotid Bruit, Thyroid- non tender No cervical lymphadenopathy Resp: No accessory muscle use, Clear breath sounds, No rales or rhonchi 
Card: Regular Rate,Rythm, hematoma, over the pacemaker site slightly tender MSK: No cyanosis or clubbing, good capillary refill Skin: No rashes or ulcers, no bruising Neuro:  Cranial nerves are grossly intact, moving all four extremities, no focal deficit, follows commands appropriately Psych: Fair insight, oriented to person, place and time, alert, Nml Affect LE: No edema DATA REVIEW Laboratory and Imaging have been reviewed by me and are notable for No results for input(s): CPK, CKMB, TROIQ in the last 72 hours. Recent Labs  
   09/29/18 
 0413  09/28/18 
 1654 NA  141  140  
K  3.6  4.0  
CO2  26  27 BUN  24*  20  
CREA  5.50*  4.82* GLU  87  90 WBC  7.6  8.5 HGB  7.1*  8.0*  
HCT  22.5*  25.6*  
PLT  190  224 Sunil Hightower MD

## 2018-09-29 NOTE — PROGRESS NOTES
Notified by dialysis nurse at bedside that Mrs. Lokesh Rai is in hospital and that she was told by the [facility] Acute service to come to [facility] and do HD today. - we have not been consulted. - we did not ask [facility] to send a nurse. - I am confused as to how this could happen. - I have given HD nurse permission to proceed with HD today after carefully reviewing the labs and blood pressure. - I have asked the HD nurse to ask the floor nurse to obtain a consult from Dr. Sara Collins so we can see pt.

## 2018-09-29 NOTE — ROUTINE PROCESS
Bedside shift change report given to [facility] (oncoming nurse) by Izzy Fernandez (offgoing nurse). Report included the following information SBAR, Kardex, MAR and Recent Results.

## 2018-09-30 LAB
ANION GAP SERPL CALC-SCNC: 4 MMOL/L (ref 5–15)
BUN SERPL-MCNC: 12 MG/DL (ref 6–20)
BUN/CREAT SERPL: 3 (ref 12–20)
CALCIUM SERPL-MCNC: 8.7 MG/DL (ref 8.5–10.1)
CHLORIDE SERPL-SCNC: 103 MMOL/L (ref 97–108)
CO2 SERPL-SCNC: 34 MMOL/L (ref 21–32)
CREAT SERPL-MCNC: 3.58 MG/DL (ref 0.55–1.02)
ERYTHROCYTE [DISTWIDTH] IN BLOOD BY AUTOMATED COUNT: 18.9 % (ref 11.5–14.5)
GLUCOSE SERPL-MCNC: 96 MG/DL (ref 65–100)
HBV SURFACE AG SER QL: <0.1 INDEX
HBV SURFACE AG SER QL: NEGATIVE
HCT VFR BLD AUTO: 23.8 % (ref 35–47)
HGB BLD-MCNC: 7.4 G/DL (ref 11.5–16)
MCH RBC QN AUTO: 32.6 PG (ref 26–34)
MCHC RBC AUTO-ENTMCNC: 31.1 G/DL (ref 30–36.5)
MCV RBC AUTO: 104.8 FL (ref 80–99)
NRBC # BLD: 0 K/UL (ref 0–0.01)
NRBC BLD-RTO: 0 PER 100 WBC
PLATELET # BLD AUTO: 203 K/UL (ref 150–400)
PMV BLD AUTO: 10.1 FL (ref 8.9–12.9)
POTASSIUM SERPL-SCNC: 3.6 MMOL/L (ref 3.5–5.1)
RBC # BLD AUTO: 2.27 M/UL (ref 3.8–5.2)
SODIUM SERPL-SCNC: 141 MMOL/L (ref 136–145)
WBC # BLD AUTO: 6.9 K/UL (ref 3.6–11)

## 2018-09-30 PROCEDURE — 74011250637 HC RX REV CODE- 250/637: Performed by: INTERNAL MEDICINE

## 2018-09-30 PROCEDURE — 77030011641 HC PASTE OST ADH BMS -A

## 2018-09-30 PROCEDURE — 77030012856

## 2018-09-30 PROCEDURE — 85027 COMPLETE CBC AUTOMATED: CPT | Performed by: INTERNAL MEDICINE

## 2018-09-30 PROCEDURE — 65660000000 HC RM CCU STEPDOWN

## 2018-09-30 PROCEDURE — 80048 BASIC METABOLIC PNL TOTAL CA: CPT | Performed by: INTERNAL MEDICINE

## 2018-09-30 PROCEDURE — 36415 COLL VENOUS BLD VENIPUNCTURE: CPT | Performed by: INTERNAL MEDICINE

## 2018-09-30 PROCEDURE — 87340 HEPATITIS B SURFACE AG IA: CPT | Performed by: INTERNAL MEDICINE

## 2018-09-30 RX ADMIN — ISOSORBIDE MONONITRATE 30 MG: 30 TABLET, EXTENDED RELEASE ORAL at 08:11

## 2018-09-30 RX ADMIN — ZOLPIDEM TARTRATE 5 MG: 5 TABLET ORAL at 00:00

## 2018-09-30 RX ADMIN — OXYCODONE HYDROCHLORIDE AND ACETAMINOPHEN 2 TABLET: 5; 325 TABLET ORAL at 08:11

## 2018-09-30 RX ADMIN — ZOLPIDEM TARTRATE 5 MG: 5 TABLET ORAL at 22:26

## 2018-09-30 RX ADMIN — NEPHROCAP 1 CAPSULE: 1 CAP ORAL at 08:11

## 2018-09-30 RX ADMIN — LISINOPRIL 5 MG: 5 TABLET ORAL at 08:11

## 2018-09-30 RX ADMIN — CARVEDILOL 12.5 MG: 12.5 TABLET, FILM COATED ORAL at 16:47

## 2018-09-30 RX ADMIN — OXYCODONE HYDROCHLORIDE AND ACETAMINOPHEN 2 TABLET: 5; 325 TABLET ORAL at 22:26

## 2018-09-30 RX ADMIN — OXYCODONE HYDROCHLORIDE AND ACETAMINOPHEN 2 TABLET: 5; 325 TABLET ORAL at 16:47

## 2018-09-30 RX ADMIN — ANTACID TABLETS 600 MG: 500 TABLET, CHEWABLE ORAL at 16:46

## 2018-09-30 RX ADMIN — ANTACID TABLETS 600 MG: 500 TABLET, CHEWABLE ORAL at 08:11

## 2018-09-30 RX ADMIN — VENLAFAXINE 37.5 MG: 37.5 TABLET ORAL at 08:11

## 2018-09-30 RX ADMIN — CARVEDILOL 12.5 MG: 12.5 TABLET, FILM COATED ORAL at 08:11

## 2018-09-30 NOTE — PROGRESS NOTES
Bedside and Verbal shift change report given to [address] [place] [address] (oncoming nurse) by lor CEBALLOS (offgoing nurse). Report included the following information SBAR, Kardex, Intake/Output, MAR, Accordion and Recent Results.

## 2018-09-30 NOTE — CONSULTS
Consult Note Subjective:  
 
Reason for Consult:  Que Banks is a 66 y.o.  female for whom we are asked to see regarding provision of maintenance hemodialysis. The patient is followed by Dr. Subha Higginbotham on an outpatient basis and dialyzes at the [facility] - BATH unit. Requested by: Dr. Sara Collins. The patient has been admitted to the hospital for possible infection of ICD pocket  . Underwent recent SICD with hematoma. She presented 18 for follow up evaluation of her incision site. The pocket has enlarged and there may be early wound dehiscence. She was admitted and placed on abs. She reports that the L back still uncomofortable and there is now a hematoma present . The patient usually dialyzes on  TTS. Dialysis is performed via a  an upper arm AVF. Usual dialysis orders: Reveclear , 3.25 hours , 2 MEQ/L , 2.5 MEQ/L . Hypotension should be treated with saline and decrease ultrafiltration . Prior to admission, the patient was experiencing normal dialysis treatment . ROS:  A comprehensive review of systems was negative except for that written in the History of Present Illness. Objective:  
 
Patient Vitals for the past 8 hrs: 
 BP Temp Pulse Resp SpO2  
18 1052 139/73 98.3 °F (36.8 °C) 69 18 96 % 18 0837 114/51 98 °F (36.7 °C) 71 18 98 % 18 0701 - - 77 - - Temp (24hrs), Av.1 °F (36.7 °C), Min:97.8 °F (36.6 °C), Max:98.5 °F (36.9 °C) Intake and Output: 
 190 -  07 In: 118 [P.O.:118] Out:  Physical Exam: 
Visit Vitals  /73 (BP 1 Location: Left leg, BP Patient Position: At rest)  Pulse 69  Temp 98.3 °F (36.8 °C)  Resp 18  Wt 63 kg (138 lb 14.2 oz)  SpO2 96%  BMI 23.84 kg/m2 General appearance: alert, cooperative, no distress, appears stated age Head: Normocephalic, without obvious abnormality, atraumatic Neck: supple, symmetrical, trachea midline, no adenopathy, thyroid: not enlarged, symmetric, no tenderness/mass/nodules, no carotid bruit and no JVD Back: area of erythema L post chest 
Lungs: clear to auscultation bilaterally Heart: regular rate and rhythm, S1, S2 normal, no murmur, click, rub or gallop Abdomen: soft, non-tender. Bowel sounds normal. No masses,  no organomegaly Extremities: extremities normal, atraumatic, no cyanosis or edema Pulses: 2+ and symmetric Skin: Skin color, texture, turgor normal. No rashes or lesions Neurologic: Alert and oriented X 3, normal strength and tone. Normal symmetric reflexes. Normal coordination and gait Data Review: CBC:  
Lab Results Component Value Date/Time WBC 6.9 09/30/2018 01:59 AM  
 RBC 2.27 (L) 09/30/2018 01:59 AM  
 HGB 7.4 (L) 09/30/2018 01:59 AM  
 HCT 23.8 (L) 09/30/2018 01:59 AM  
 PLATELET 481 93/87/0805 01:59 AM  
, BMP:  
Lab Results Component Value Date/Time Glucose 96 09/30/2018 01:59 AM  
 Sodium 141 09/30/2018 01:59 AM  
 Potassium 3.6 09/30/2018 01:59 AM  
 Chloride 103 09/30/2018 01:59 AM  
 CO2 34 (H) 09/30/2018 01:59 AM  
 BUN 12 09/30/2018 01:59 AM  
 Creatinine 3.58 (H) 09/30/2018 01:59 AM  
 Calcium 8.7 09/30/2018 01:59 AM  
, CMP:  
Lab Results Component Value Date/Time Glucose 96 09/30/2018 01:59 AM  
 Sodium 141 09/30/2018 01:59 AM  
 Potassium 3.6 09/30/2018 01:59 AM  
 Chloride 103 09/30/2018 01:59 AM  
 CO2 34 (H) 09/30/2018 01:59 AM  
 BUN 12 09/30/2018 01:59 AM  
 Creatinine 3.58 (H) 09/30/2018 01:59 AM  
 Calcium 8.7 09/30/2018 01:59 AM  
 Anion gap 4 (L) 09/30/2018 01:59 AM  
 BUN/Creatinine ratio 3 (L) 09/30/2018 01:59 AM  
 AST (SGOT) 20 09/18/2018 10:32 AM  
 Alk. phosphatase 133 (H) 09/18/2018 10:32 AM  
 Protein, total 7.9 09/18/2018 10:32 AM  
 Albumin 3.1 (L) 09/18/2018 10:32 AM  
 Globulin 4.8 (H) 09/18/2018 10:32 AM  
 A-G Ratio 0.6 (L) 09/18/2018 10:32 AM  
, PHOS:  
Lab Results Component Value Date/Time  Phosphorus 4.7 07/24/2018 01:26 AM  
 Radiology review: CXR shows no obvious volume overload Reviewed: x-rays Assessment/Plan: The patient has ESRD. We will arrange dialysis. Please refer to doctor orders for other interventions. Ongoing daily treatments will include daily weights, strict I/O's or renal diet. Signed By: Babita Zhou MD 
                    September 30, 2018

## 2018-09-30 NOTE — PROGRESS NOTES
Progress Note Conor Bell MD, [facility] [address], [place], [address] [facility] Phone [phone]; Fax [phone] 
 
 
 
2018 7:17 AM  
 
Admit Date:           2018 Admit Diagnosis:  Hematoma :          1940 MRN:          [id number] ASSESSMENT/RECOMMENDATION:  
1. ICD 
-pocket hematoma and will plan on evacuation on Monday. We will leave n.p.o. after midnight. 
 hold anticoagulants and antibiotics over the weekend. 
  
2. ESRD 
-on dialysis and appreciate renal assistance 3. Hypotension We will hold her hydralazine for the time being. Hemoglobin is 7.4 down from 10.6 on 2018 potassium is 3.6 Last 3 Recorded Weights in this Encounter  
 18 1635 Weight: 138 lb 14.2 oz (63 kg)  1901 -  0700 In: 118 [P.O.:118] Out: 2000 SUBJECTIVE Dallin Stallworth a 66 y. o. female with recent SICD with hematoma. She presents today for follow up evaluation of her incision site. The pocket has enlarged and there may be early wound dehiscence. My Baugh reports back still uncomofortable and there is a hematoma present . Current Facility-Administered Medications Medication Dose Route Frequency  acetaminophen (TYLENOL) tablet 650 mg  650 mg Oral Q6H PRN  
 oxyCODONE-acetaminophen (PERCOCET) 5-325 mg per tablet 2 Tab  2 Tab Oral Q4H PRN  
 morphine injection 1 mg  1 mg IntraVENous Q4H PRN  
 ibuprofen (MOTRIN) tablet 400 mg  400 mg Oral Q6H PRN  
 lisinopril (PRINIVIL, ZESTRIL) tablet 5 mg  5 mg Oral DAILY  venlafaxine (EFFEXOR) tablet 37.5 mg  37.5 mg Oral DAILY  carvedilol (COREG) tablet 12.5 mg  12.5 mg Oral BID WITH MEALS  
  calcium carbonate (TUMS) chewable tablet 600 mg [elemental]  600 mg Oral BID WITH MEALS  
 hydrALAZINE (APRESOLINE) tablet 25 mg  25 mg Oral BID  isosorbide mononitrate ER (IMDUR) tablet 30 mg  30 mg Oral DAILY  zolpidem (AMBIEN) tablet 5 mg  5 mg Oral QHS PRN  
 vancomycin (VANCOCIN) 500 mg in 0.9% sodium chloride (MBP/ADV) 100 mL  500 mg IntraVENous DIALYSIS TUE, THU & SAT Or  
 Vancomycin 500 mg IV - Administer after hemodialysis   Other DIALYSIS TUE, THU & SAT  B complex-vitaminC-folic acid (NEPHROCAP) cap  1 Cap Oral DAILY OBJECTIVE Intake/Output Summary (Last 24 hours) at 09/30/18 9604 Last data filed at 09/29/18 2029 Gross per 24 hour Intake                0 ml Output             2000 ml Net            -2000 ml Review of Systems - History obtained from the patient AS PER  HPI PHYSICAL EXAM  
  
 
Visit Vitals  BP 90/43 (BP 1 Location: Left leg, BP Patient Position: At rest)  Pulse 73  Temp 97.8 °F (36.6 °C)  Resp 18  Wt 138 lb 14.2 oz (63 kg)  SpO2 97%  BMI 23.84 kg/m2 Gen: Well-developed, well-nourished, in no acute distress  alert and oriented x 3 HEENT:  Pink conjunctivae, Hearing grossly normal.No scleral icterus or conjunctival, moist mucous membranes Neck: Supple,No JVD, No Carotid Bruit, Thyroid- non tender No cervical lymphadenopathy Resp: No accessory muscle use, Clear breath sounds, No rales or rhonchi 
Card: Regular Rate,Rythm,; hematoma on left back . MSK: No cyanosis or clubbing, good capillary refill Skin: No rashes or ulcers, no bruising Neuro:  Cranial nerves are grossly intact, moving all four extremities, no focal deficit, follows commands appropriately Psych:  fair insight, oriented to person, place and time, alert, Nml Affect LE: No edema DATA REVIEW Laboratory and Imaging have been reviewed by me and are notable for No results for input(s): CPK, CKMB, TROIQ in the last 72 hours. Recent Labs  
   09/30/18 
 0159  09/29/18 
 0413  09/28/18 
 1654 NA  141  141  140  
K  3.6  3.6  4.0  
CO2  34*  26  27 BUN  12  24*  20  
CREA  3.58*  5.50*  4.82* GLU  96  87  90 WBC  6.9  7.6  8.5 HGB  7.4*  7.1*  8.0*  
HCT  23.8*  22.5*  25.6* PLT  203  190  224 Antonia Hilliard MD

## 2018-09-30 NOTE — ROUTINE PROCESS
Bedside shift change report given to [facility] (oncoming nurse) by Kael Hamlin (offgoing nurse). Report included the following information SBAR, Kardex, MAR and Recent Results.

## 2018-09-30 NOTE — PROGRESS NOTES
Problem: Falls - Risk of 
Goal: *Absence of Falls Document Josefina Be Fall Risk and appropriate interventions in the flowsheet. Outcome: Progressing Towards Goal 
Fall Risk Interventions: 
Mobility Interventions: Bed/chair exit alarm, Patient to call before getting OOB, Utilize walker, cane, or other assistive device, Utilize gait belt for transfers/ambulation Medication Interventions: Bed/chair exit alarm, Patient to call before getting OOB, Teach patient to arise slowly, Utilize gait belt for transfers/ambulation Elimination Interventions: Bed/chair exit alarm, Call light in reach, Patient to call for help with toileting needs

## 2018-10-01 ENCOUNTER — ANESTHESIA EVENT (OUTPATIENT)
Dept: NON INVASIVE DIAGNOSTICS | Age: 78
DRG: 907 | End: 2018-10-01
Payer: MEDICARE

## 2018-10-01 ENCOUNTER — ANESTHESIA (OUTPATIENT)
Dept: NON INVASIVE DIAGNOSTICS | Age: 78
DRG: 907 | End: 2018-10-01
Payer: MEDICARE

## 2018-10-01 VITALS
SYSTOLIC BLOOD PRESSURE: 99 MMHG | DIASTOLIC BLOOD PRESSURE: 45 MMHG | HEART RATE: 68 BPM | TEMPERATURE: 97.8 F | BODY MASS INDEX: 23.84 KG/M2 | RESPIRATION RATE: 18 BRPM | WEIGHT: 138.89 LBS | OXYGEN SATURATION: 98 %

## 2018-10-01 LAB
ANION GAP SERPL CALC-SCNC: 8 MMOL/L (ref 5–15)
BUN SERPL-MCNC: 21 MG/DL (ref 6–20)
BUN/CREAT SERPL: 4 (ref 12–20)
CALCIUM SERPL-MCNC: 8.8 MG/DL (ref 8.5–10.1)
CHLORIDE SERPL-SCNC: 103 MMOL/L (ref 97–108)
CO2 SERPL-SCNC: 30 MMOL/L (ref 21–32)
CREAT SERPL-MCNC: 5.37 MG/DL (ref 0.55–1.02)
ERYTHROCYTE [DISTWIDTH] IN BLOOD BY AUTOMATED COUNT: 19.2 % (ref 11.5–14.5)
GLUCOSE SERPL-MCNC: 84 MG/DL (ref 65–100)
HCT VFR BLD AUTO: 25.4 % (ref 35–47)
HGB BLD-MCNC: 7.8 G/DL (ref 11.5–16)
MCH RBC QN AUTO: 32.6 PG (ref 26–34)
MCHC RBC AUTO-ENTMCNC: 30.7 G/DL (ref 30–36.5)
MCV RBC AUTO: 106.3 FL (ref 80–99)
NRBC # BLD: 0 K/UL (ref 0–0.01)
NRBC BLD-RTO: 0 PER 100 WBC
PLATELET # BLD AUTO: 205 K/UL (ref 150–400)
PMV BLD AUTO: 10.2 FL (ref 8.9–12.9)
POTASSIUM SERPL-SCNC: 3.8 MMOL/L (ref 3.5–5.1)
RBC # BLD AUTO: 2.39 M/UL (ref 3.8–5.2)
SODIUM SERPL-SCNC: 141 MMOL/L (ref 136–145)
WBC # BLD AUTO: 6.1 K/UL (ref 3.6–11)

## 2018-10-01 PROCEDURE — 80048 BASIC METABOLIC PNL TOTAL CA: CPT | Performed by: INTERNAL MEDICINE

## 2018-10-01 PROCEDURE — 77030027744 HC PWDR HEMSTAT ARISTA ABSRB 5GM BARD -D

## 2018-10-01 PROCEDURE — 74011250636 HC RX REV CODE- 250/636: Performed by: INTERNAL MEDICINE

## 2018-10-01 PROCEDURE — 74011000250 HC RX REV CODE- 250: Performed by: INTERNAL MEDICINE

## 2018-10-01 PROCEDURE — 77030012935 HC DRSG AQUACEL BMS -B

## 2018-10-01 PROCEDURE — 74011000250 HC RX REV CODE- 250

## 2018-10-01 PROCEDURE — 0J960ZZ DRAINAGE OF CHEST SUBCUTANEOUS TISSUE AND FASCIA, OPEN APPROACH: ICD-10-PCS | Performed by: INTERNAL MEDICINE

## 2018-10-01 PROCEDURE — 10140 I&D HMTMA SEROMA/FLUID COLLJ: CPT

## 2018-10-01 PROCEDURE — 77030028698 HC BLD TISS PLSM MEDT -D

## 2018-10-01 PROCEDURE — 85027 COMPLETE CBC AUTOMATED: CPT | Performed by: INTERNAL MEDICINE

## 2018-10-01 PROCEDURE — 77030031139 HC SUT VCRL2 J&J -A

## 2018-10-01 PROCEDURE — 77030039266 HC ADH SKN EXOFIN S2SG -A

## 2018-10-01 PROCEDURE — 74011250636 HC RX REV CODE- 250/636

## 2018-10-01 PROCEDURE — 77030018729 HC ELECTRD DEFIB PAD CARD -B

## 2018-10-01 PROCEDURE — 77030019557 HC ELECTRD VES SEAL MEDT -F

## 2018-10-01 PROCEDURE — 77030002933 HC SUT MCRYL J&J -A

## 2018-10-01 PROCEDURE — 77030018673

## 2018-10-01 PROCEDURE — 77030018547 HC SUT ETHBND1 J&J -B

## 2018-10-01 PROCEDURE — 36415 COLL VENOUS BLD VENIPUNCTURE: CPT | Performed by: INTERNAL MEDICINE

## 2018-10-01 PROCEDURE — 74011250637 HC RX REV CODE- 250/637: Performed by: INTERNAL MEDICINE

## 2018-10-01 PROCEDURE — 77030037713 HC CLOSR DEV INCIS ZIP STRY -B

## 2018-10-01 RX ORDER — CLINDAMYCIN HYDROCHLORIDE 300 MG/1
300 CAPSULE ORAL 3 TIMES DAILY
Qty: 15 CAP | Refills: 0 | Status: SHIPPED | OUTPATIENT
Start: 2018-10-01 | End: 2018-10-01

## 2018-10-01 RX ORDER — KETAMINE HYDROCHLORIDE 50 MG/ML
INJECTION, SOLUTION INTRAMUSCULAR; INTRAVENOUS
Status: COMPLETED
Start: 2018-10-01 | End: 2018-10-01

## 2018-10-01 RX ORDER — SODIUM CHLORIDE 9 MG/ML
INJECTION, SOLUTION INTRAVENOUS
Status: DISCONTINUED | OUTPATIENT
Start: 2018-10-01 | End: 2018-10-01 | Stop reason: HOSPADM

## 2018-10-01 RX ORDER — ACETAMINOPHEN 325 MG/1
650 TABLET ORAL
Status: DISCONTINUED | OUTPATIENT
Start: 2018-10-01 | End: 2018-10-01 | Stop reason: HOSPADM

## 2018-10-01 RX ORDER — LISINOPRIL 5 MG/1
5 TABLET ORAL DAILY
Qty: 30 TAB | Refills: 0 | Status: SHIPPED | OUTPATIENT
Start: 2018-10-02 | End: 2018-10-29 | Stop reason: SDUPTHER

## 2018-10-01 RX ORDER — HYDROCODONE BITARTRATE AND ACETAMINOPHEN 5; 325 MG/1; MG/1
1 TABLET ORAL
Status: DISCONTINUED | OUTPATIENT
Start: 2018-10-01 | End: 2018-10-01 | Stop reason: HOSPADM

## 2018-10-01 RX ORDER — GENTAMICIN SULFATE 80 MG/100ML
80 INJECTION, SOLUTION INTRAVENOUS ONCE
Status: COMPLETED | OUTPATIENT
Start: 2018-10-01 | End: 2018-10-01

## 2018-10-01 RX ORDER — LIDOCAINE HYDROCHLORIDE AND EPINEPHRINE 10; 10 MG/ML; UG/ML
20 INJECTION, SOLUTION INFILTRATION; PERINEURAL ONCE
Status: COMPLETED | OUTPATIENT
Start: 2018-10-01 | End: 2018-10-01

## 2018-10-01 RX ORDER — FENTANYL CITRATE 50 UG/ML
INJECTION, SOLUTION INTRAMUSCULAR; INTRAVENOUS
Status: DISCONTINUED
Start: 2018-10-01 | End: 2018-10-01 | Stop reason: HOSPADM

## 2018-10-01 RX ORDER — CLINDAMYCIN HYDROCHLORIDE 150 MG/1
150 CAPSULE ORAL 3 TIMES DAILY
Qty: 15 CAP | Refills: 0 | Status: SHIPPED | OUTPATIENT
Start: 2018-10-01 | End: 2018-10-06

## 2018-10-01 RX ORDER — MIDAZOLAM HYDROCHLORIDE 1 MG/ML
INJECTION, SOLUTION INTRAMUSCULAR; INTRAVENOUS
Status: COMPLETED
Start: 2018-10-01 | End: 2018-10-01

## 2018-10-01 RX ORDER — PROPOFOL 10 MG/ML
INJECTION, EMULSION INTRAVENOUS
Status: DISCONTINUED | OUTPATIENT
Start: 2018-10-01 | End: 2018-10-01 | Stop reason: HOSPADM

## 2018-10-01 RX ORDER — LIDOCAINE HYDROCHLORIDE AND EPINEPHRINE 10; 10 MG/ML; UG/ML
INJECTION, SOLUTION INFILTRATION; PERINEURAL
Status: COMPLETED
Start: 2018-10-01 | End: 2018-10-01

## 2018-10-01 RX ORDER — HEPARIN SODIUM 200 [USP'U]/100ML
500 INJECTION, SOLUTION INTRAVENOUS ONCE
Status: COMPLETED | OUTPATIENT
Start: 2018-10-01 | End: 2018-10-01

## 2018-10-01 RX ORDER — EPHEDRINE SULFATE 50 MG/ML
INJECTION, SOLUTION INTRAVENOUS AS NEEDED
Status: DISCONTINUED | OUTPATIENT
Start: 2018-10-01 | End: 2018-10-01 | Stop reason: HOSPADM

## 2018-10-01 RX ORDER — KETAMINE HYDROCHLORIDE 50 MG/ML
INJECTION, SOLUTION INTRAMUSCULAR; INTRAVENOUS AS NEEDED
Status: DISCONTINUED | OUTPATIENT
Start: 2018-10-01 | End: 2018-10-01 | Stop reason: HOSPADM

## 2018-10-01 RX ORDER — SODIUM CHLORIDE 0.9 % (FLUSH) 0.9 %
5-10 SYRINGE (ML) INJECTION EVERY 8 HOURS
Status: DISCONTINUED | OUTPATIENT
Start: 2018-10-01 | End: 2018-10-01 | Stop reason: HOSPADM

## 2018-10-01 RX ORDER — BUPIVACAINE HYDROCHLORIDE 5 MG/ML
20 INJECTION, SOLUTION EPIDURAL; INTRACAUDAL ONCE
Status: COMPLETED | OUTPATIENT
Start: 2018-10-01 | End: 2018-10-01

## 2018-10-01 RX ORDER — MIDAZOLAM HYDROCHLORIDE 1 MG/ML
INJECTION, SOLUTION INTRAMUSCULAR; INTRAVENOUS AS NEEDED
Status: DISCONTINUED | OUTPATIENT
Start: 2018-10-01 | End: 2018-10-01 | Stop reason: HOSPADM

## 2018-10-01 RX ORDER — SODIUM CHLORIDE 0.9 % (FLUSH) 0.9 %
5-10 SYRINGE (ML) INJECTION AS NEEDED
Status: DISCONTINUED | OUTPATIENT
Start: 2018-10-01 | End: 2018-10-01 | Stop reason: HOSPADM

## 2018-10-01 RX ORDER — ONDANSETRON 2 MG/ML
4 INJECTION INTRAMUSCULAR; INTRAVENOUS
Status: DISCONTINUED | OUTPATIENT
Start: 2018-10-01 | End: 2018-10-01 | Stop reason: HOSPADM

## 2018-10-01 RX ADMIN — SODIUM CHLORIDE: 9 INJECTION, SOLUTION INTRAVENOUS at 12:32

## 2018-10-01 RX ADMIN — VANCOMYCIN HYDROCHLORIDE: 1 INJECTION, POWDER, LYOPHILIZED, FOR SOLUTION INTRAVENOUS at 13:08

## 2018-10-01 RX ADMIN — LIDOCAINE HYDROCHLORIDE AND EPINEPHRINE 400 MG: 10; 10 INJECTION, SOLUTION INFILTRATION; PERINEURAL at 12:45

## 2018-10-01 RX ADMIN — VENLAFAXINE 37.5 MG: 37.5 TABLET ORAL at 08:49

## 2018-10-01 RX ADMIN — VANCOMYCIN HYDROCHLORIDE 1 G: 1 INJECTION, POWDER, LYOPHILIZED, FOR SOLUTION INTRAVENOUS at 12:55

## 2018-10-01 RX ADMIN — EPHEDRINE SULFATE 10 MG: 50 INJECTION, SOLUTION INTRAVENOUS at 12:55

## 2018-10-01 RX ADMIN — EPHEDRINE SULFATE 5 MG: 50 INJECTION, SOLUTION INTRAVENOUS at 12:44

## 2018-10-01 RX ADMIN — KETAMINE HYDROCHLORIDE 10 MG: 50 INJECTION, SOLUTION INTRAMUSCULAR; INTRAVENOUS at 12:48

## 2018-10-01 RX ADMIN — KETAMINE HYDROCHLORIDE 20 MG: 50 INJECTION, SOLUTION INTRAMUSCULAR; INTRAVENOUS at 12:32

## 2018-10-01 RX ADMIN — GENTAMICIN SULFATE 80 MG: 80 INJECTION, SOLUTION INTRAVENOUS at 12:52

## 2018-10-01 RX ADMIN — MIDAZOLAM HYDROCHLORIDE 1 MG: 1 INJECTION, SOLUTION INTRAMUSCULAR; INTRAVENOUS at 12:32

## 2018-10-01 RX ADMIN — PROPOFOL 30 MCG/KG/MIN: 10 INJECTION, EMULSION INTRAVENOUS at 12:32

## 2018-10-01 RX ADMIN — MIDAZOLAM HYDROCHLORIDE 2 MG: 1 INJECTION, SOLUTION INTRAMUSCULAR; INTRAVENOUS at 12:46

## 2018-10-01 RX ADMIN — MIDAZOLAM HYDROCHLORIDE 1 MG: 1 INJECTION, SOLUTION INTRAMUSCULAR; INTRAVENOUS at 12:58

## 2018-10-01 RX ADMIN — HEPARIN SODIUM 1000 UNITS: 200 INJECTION, SOLUTION INTRAVENOUS at 12:45

## 2018-10-01 RX ADMIN — NEPHROCAP 1 CAPSULE: 1 CAP ORAL at 08:49

## 2018-10-01 RX ADMIN — LISINOPRIL 5 MG: 5 TABLET ORAL at 08:49

## 2018-10-01 RX ADMIN — CARVEDILOL 12.5 MG: 12.5 TABLET, FILM COATED ORAL at 08:49

## 2018-10-01 RX ADMIN — ISOSORBIDE MONONITRATE 30 MG: 30 TABLET, EXTENDED RELEASE ORAL at 08:49

## 2018-10-01 RX ADMIN — BUPIVACAINE HYDROCHLORIDE 100 MG: 5 INJECTION, SOLUTION EPIDURAL; INTRACAUDAL; PERINEURAL at 12:45

## 2018-10-01 RX ADMIN — KETAMINE HYDROCHLORIDE 10 MG: 50 INJECTION, SOLUTION INTRAMUSCULAR; INTRAVENOUS at 12:58

## 2018-10-01 NOTE — DISCHARGE INSTRUCTIONS
Hold Eliquis for 3 days  Resume Eliquis on  Thursday October 4th      Take antibiotic with meals, recommend a yogurt daily and/or probiotic daily (this can be purchased OTC)

## 2018-10-01 NOTE — PROGRESS NOTES
CM Note: No response from [employer]. They were e-mailed and called x2. No call back. Attempted to cancel transport but unable to do so. Pt was given a cab voucher per approval of manager, Shu La.   TUCKER Banks

## 2018-10-01 NOTE — ANESTHESIA PREPROCEDURE EVALUATION
Anesthetic History No history of anesthetic complications Review of Systems / Medical History Patient summary reviewed and pertinent labs reviewed Pulmonary Smoker Neuro/Psych Cardiovascular Hypertension CHF Dysrhythmias : atrial fibrillation Exercise tolerance: <4 METS Comments: EF - 10 - 15% GI/Hepatic/Renal 
  
 
 
Renal disease: ESRD Endo/Other Arthritis Other Findings Physical Exam 
 
Airway Mallampati: II 
TM Distance: 4 - 6 cm Neck ROM: normal range of motion Mouth opening: Normal 
 
 Cardiovascular Rhythm: regular Rate: normal 
 
 
 
 Dental 
 
Dentition: Poor dentition Comments: Multiple missing and broken teeth Pulmonary Breath sounds clear to auscultation Abdominal 
 
 
 
 Other Findings Anesthetic Plan ASA: 4 Anesthesia type: MAC Anesthetic plan and risks discussed with: Patient

## 2018-10-01 NOTE — DISCHARGE SUMMARY
Cardiology Discharge Summary Patient ID: 
Elias Thorne [id number] 
40 y.o. 
1940 Admit Date: 9/28/2018 Discharge Date: 10/1/2018 Admitting Physician: Lit Marsh MD  
 
Discharge Physician:[facility] MD/  Parmjit Chavez NP Admission Diagnoses: Hematoma Discharge Diagnoses: Active Problems: 
  Hematoma (9/28/2018) Discharge Condition: Good Cardiology Procedures this Admission:  hematoma evacuation SICD Consults: Nephrology Visit Vitals  BP 93/40 (BP 1 Location: Right leg, BP Patient Position: At rest)  Pulse 66  Temp 97.8 °F (36.6 °C)  Resp 13  Wt 138 lb 14.2 oz (63 kg)  SpO2 96%  BMI 23.84 kg/m2 Discharge Exam:   Refer to Today's progress note HOSPITAL COURSE: Elias Thorne is a 66 y.o. female who was admitted for SICD hematoma pocket evacuation/early wound dehiscence. IV antibiotics started and hematoma evacuation done on Monday. A/P  
1) Sub cutaneous ICD pocket hematoma  
-s/p pocket hematoma pocket evacuation 10/1/18 
-Resume Eliquis in 3 days on October 4 th 
-IV antibiotics during admission and switch to clindamycin 150 mg TID x 5 days- recommend she take with yogurt and/or probiotic.  
-wound check 10 days  
   
2. ESRD s/p HD Saturday. TTS. -on dialysis and appreciate renal assistance 
   
3.  Hypotension: holding hydralazine  
-cont coreg/imdur/lisinopril Disposition: home Future Appointments Date Time Provider Sonal Vanessa 10/15/2018 9:45 AM PACEMAKER, [facility] KATHRIN SCHED  
10/15/2018 10:00 AM Lit Marsh MD 1000 [facility] Patient Instructions:  
Current Discharge Medication List  
  
START taking these medications Details  
clindamycin (CLEOCIN) 150 mg capsule Take 1 Cap by mouth three (3) times daily for 5 days. Qty: 15 Cap, Refills: 0 CONTINUE these medications which have CHANGED Details  
lisinopril (PRINIVIL, ZESTRIL) 5 mg tablet Take 1 Tab by mouth daily. Qty: 30 Tab, Refills: 0 CONTINUE these medications which have NOT CHANGED Details HYDROcodone-acetaminophen (NORCO) 5-325 mg per tablet Take 1 Tab by mouth every six (6) hours as needed for Pain. omeprazole (PRILOSEC) 20 mg capsule Take 20 mg by mouth Before breakfast and dinner. venlafaxine (EFFEXOR) 37.5 mg tablet Take 37.5 mg by mouth daily. vitamin E (AQUA GEMS) 400 unit capsule Take 400 Units by mouth daily. carvedilol (COREG) 12.5 mg tablet Take 1 Tab by mouth two (2) times daily (with meals). Qty: 60 Tab, Refills: 0  
  
apixaban (ELIQUIS) 5 mg tablet Take 1 Tab by mouth two (2) times a day. Qty: 60 Tab, Refills: 0  
  
zolpidem (AMBIEN) 5 mg tablet Take 5 mg by mouth nightly as needed for Sleep. FOLIC ACID/VIT BCOMP,C (DIALYVITE 800 PO) Take 1 Tab by mouth daily. isosorbide mononitrate ER (IMDUR) 60 mg CR tablet Take 30 mg by mouth daily. Half-tab  
  
calcium carbonate (TUMS) 200 mg calcium (500 mg) chew Take 3 Tabs by mouth two (2) times daily (with meals). STOP taking these medications  
  
 hydrALAZINE (APRESOLINE) 25 mg tablet Comments:  
Reason for Stopping:   
   
 trimethoprim-sulfamethoxazole (BACTRIM DS) 160-800 mg per tablet Comments:  
Reason for Stopping:   
   
  
 
 
Referenced discharge instructions provided by nursing for diet and activity. Follow-up with Dr Oralia Edwards Future Appointments Date Time Provider Sonal Vanessa 10/15/2018 9:45 AM PACEMAKER, [facility] KATHRIN SCHED  
10/15/2018 10:00 AM Mayda Casper MD 1000 [facility] Signed: Sarahi Marvin NP 
10/1/2018 10:18 AM

## 2018-10-01 NOTE — PROGRESS NOTES
Bedside and Verbal shift change report given to [address] [place] [address] (oncoming nurse) by Dl Truong RN (offgoing nurse). Report included the following information SBAR, Intake/Output, MAR, Accordion and Recent Results.

## 2018-10-01 NOTE — PROGRESS NOTES
Robbie Paz Avalon 79 [address] [facility] YOB: 1940 Assessment & Plan:  
ESRD 
· HD tomorrow and TTS 
 
ICD pocket hematoma HTN 
· Meds reduced Anemia · EPO Subjective:  
CC: f/u ESRD 
HPI:  For HD tomorrow. More anemic than usual.  
ROS: no n/v/sob Current Facility-Administered Medications Medication Dose Route Frequency  acetaminophen (TYLENOL) tablet 650 mg  650 mg Oral Q6H PRN  
 oxyCODONE-acetaminophen (PERCOCET) 5-325 mg per tablet 2 Tab  2 Tab Oral Q4H PRN  
 morphine injection 1 mg  1 mg IntraVENous Q4H PRN  
 ibuprofen (MOTRIN) tablet 400 mg  400 mg Oral Q6H PRN  
 lisinopril (PRINIVIL, ZESTRIL) tablet 5 mg  5 mg Oral DAILY  venlafaxine (EFFEXOR) tablet 37.5 mg  37.5 mg Oral DAILY  carvedilol (COREG) tablet 12.5 mg  12.5 mg Oral BID WITH MEALS  calcium carbonate (TUMS) chewable tablet 600 mg [elemental]  600 mg Oral BID WITH MEALS  isosorbide mononitrate ER (IMDUR) tablet 30 mg  30 mg Oral DAILY  zolpidem (AMBIEN) tablet 5 mg  5 mg Oral QHS PRN  
 vancomycin (VANCOCIN) 500 mg in 0.9% sodium chloride (MBP/ADV) 100 mL  500 mg IntraVENous DIALYSIS TUE, THU & SAT Or  
 Vancomycin 500 mg IV - Administer after hemodialysis   Other DIALYSIS TUE, THU & SAT  B complex-vitaminC-folic acid (NEPHROCAP) cap  1 Cap Oral DAILY Objective:  
 
Vitals: 
Blood pressure 103/57, pulse 67, temperature 97.9 °F (36.6 °C), resp. rate 18, weight 63 kg (138 lb 14.2 oz), SpO2 97 %. Temp (24hrs), Av.3 °F (36.8 °C), Min:97.8 °F (36.6 °C), Max:99.4 °F (37.4 °C) Intake and Output: 
  
1 - 10/01 0700 In: -  
Out:  Physical Exam:              
GENERAL ASSESSMENT: NAD 
CHEST: CTA HEART: S1S2 ABDOMEN: Soft,NT 
EXTREMITY: no EDEMA 
 
 
   
ECG/rhythm: 
 
Data Review No results for input(s): TNIPOC in the last 72 hours. No lab exists for component: ITNL No results for input(s): CPK, CKMB, TROIQ in the last 72 hours. Recent Labs 10/01/18 
 6735  09/30/18 
 0159  09/29/18 
 0413 NA  141  141  141  
K  3.8  3.6  3.6 CL  103  103  103 CO2  30  34*  26 BUN  21*  12  24* CREA  5.37*  3.58*  5.50* GLU  84  96  87  
CA  8.8  8.7  8.5 WBC  6.1  6.9  7.6 HGB  7.8*  7.4*  7.1*  
HCT  25.4*  23.8*  22.5*  
PLT  205  203  190 No results for input(s): INR, PTP, APTT in the last 72 hours. No lab exists for component: INREXT Needs: urine analysis, urine sodium, protein and creatinine No results found for: ROMAN BAUM 
 
 
 
 
: Janae Monge MD 
10/1/2018 [facility]: 
[email or web address] [place] office: 
[address], [address] [place], [address] [place] [address] Phone: [phone] Fax :     [phone] [place] office: 
[address] [facility], [address] Phone - [phone] Fax - [phone]

## 2018-10-01 NOTE — PROGRESS NOTES
Cardiology Progress Note [address] [facility]. [address] [place], [address] Phone [phone]; Fax [phone] 
 
 
 
10/1/2018 10:00 AM  
 
Admit Date:           2018 Admit Diagnosis:  Hematoma :          1940 MRN:          [id number] ASSESSMENT/RECOMMENDATION:  
1) Sub cutaneous ICD 
-pocket hematoma pocket evacuation today. NPO 
-holding eliquis  
-IV antibiotics and switch to PO on d/c 
   
2. ESRD s/p HD saturday 
-on dialysis and appreciate renal assistance 
  
3. Hypotension: holding hydralazine  
-cont coreg/imdur/lisinopril Last 3 Recorded Weights in this Encounter  
 18 1635 Weight: 138 lb 14.2 oz (63 kg)  190 - 10/01 0700 In: -  
Out:  SUBJECTIVE 203 [facility] denies  SOB, chest pain. + pain at left axillary region around ICD. No N/V/D. Denies Fever Current Facility-Administered Medications Medication Dose Route Frequency  acetaminophen (TYLENOL) tablet 650 mg  650 mg Oral Q6H PRN  
 oxyCODONE-acetaminophen (PERCOCET) 5-325 mg per tablet 2 Tab  2 Tab Oral Q4H PRN  
 morphine injection 1 mg  1 mg IntraVENous Q4H PRN  
 ibuprofen (MOTRIN) tablet 400 mg  400 mg Oral Q6H PRN  
 lisinopril (PRINIVIL, ZESTRIL) tablet 5 mg  5 mg Oral DAILY  venlafaxine (EFFEXOR) tablet 37.5 mg  37.5 mg Oral DAILY  carvedilol (COREG) tablet 12.5 mg  12.5 mg Oral BID WITH MEALS  calcium carbonate (TUMS) chewable tablet 600 mg [elemental]  600 mg Oral BID WITH MEALS  isosorbide mononitrate ER (IMDUR) tablet 30 mg  30 mg Oral DAILY  zolpidem (AMBIEN) tablet 5 mg  5 mg Oral QHS PRN  
 vancomycin (VANCOCIN) 500 mg in 0.9% sodium chloride (MBP/ADV) 100 mL  500 mg IntraVENous DIALYSIS TUE, THU & SAT  Or  
  Vancomycin 500 mg IV - Administer after hemodialysis   Other DIALYSIS TUE, THU & SAT  B complex-vitaminC-folic acid (NEPHROCAP) cap  1 Cap Oral DAILY OBJECTIVE No intake or output data in the 24 hours ending 10/01/18 1000 Review of Systems - History obtained from the patient AS PER  HPI Telemetry NSr w freq PAC PHYSICAL EXAM  
  
 
Visit Vitals  /57 (BP 1 Location: Left leg, BP Patient Position: At rest)  Pulse 67  Temp 97.9 °F (36.6 °C)  Resp 18  Wt 138 lb 14.2 oz (63 kg)  SpO2 97%  BMI 23.84 kg/m2 Gen: Well-developed, elderly, in no acute distress  alert and oriented x 3 HEENT:  Pink conjunctivae, Hearing grossly normal.No scleral icterus or conjunctival, moist mucous membranes. Poor dentition Neck: Supple,No JVD Resp: No accessory muscle use, Clear breath sounds, No rales or rhonchi 
Card: Regular Rate,Rythm, 2/6 murmurs, no rubs or gallop. No thrills. Left side mid axillary ICD incision covered with intact bandage with a med size dry drainage-large hematoma present- area is tender/firm. Incision at xyphoid healing without redness/drainage- edges well approximated GI:          soft, non-tender. Colostomy MSK: No cyanosis or clubbing, good capillary refill Skin: No rashes or ulcers, no bruising Neuro:  Cranial nerves are grossly intact, moving all four extremities, no focal deficit, follows commands appropriately Psych:  Good insight, oriented to person, place and time, alert, Nml Affect LE: No edema. RUE AV fistula + bruit DATA REVIEW Laboratory and Imaging have been reviewed by me and are notable for No results for input(s): CPK, CKMB, TROIQ in the last 72 hours. Recent Labs 10/01/18 
 0684  09/30/18 
 0159  09/29/18 
 0413 NA  141  141  141  
K  3.8  3.6  3.6 CO2  30  34*  26 BUN  21*  12  24* CREA  5.37*  3.58*  5.50* GLU  84  96  87 WBC  6.1  6.9  7.6 HGB  7.8*  7.4*  7.1*  
 HCT  25.4*  23.8*  22.5*  
PLT  205  203  190 Fritz Trejo, NP

## 2018-10-01 NOTE — PROCEDURES
Cardiac Electrophysiology Report PATIENT INFORMATION Patient Name: Maia Cruz       MRN: [id number]      Study Date: 10/1/2018 YOB: 1940         Age: 66 y.o. Gender: female Procedure:  Pocket hematoma evacuation Referring Physician:  Talon Lama MD and Dr. Velma Bone STAFF Duty Name Electrophysiologist Neisha Augustin MD  
Monitor Anesthesia service Circulator Manual Almaz, RT; Jose Rafael Holland RN;Lennox Spears RN  
 
 
PATIENT HISTORY  
 
66year old female with recent subcutaneous ICD implantation who returns with pocket hematoma. She now presents for hematoma evacuation. PROCEDURE The patient was brought to the Cardiac Electrophysiology laboratory in a post-absorptive, fasting state. Informed consent was obtained. A peripheral IV was in place. Continuous electrocardiographic, blood pressure, O2 saturation and  CO2 monitoring was initiated. Intravenous antibiotics were administered pre-operatively. Self-adhesive cardioversion patches were positioned on the chest.  Conscious sedation was effectuated according to protocol. The patient was then prepped and draped in the usual sterile fashion. A 50/50 mixture of lidocaine (1%) with epinephrine and bupivicaine (0.5%) was utilized for local anesthesia. An incision was performed over the previous incision site. Sharp and blunt dissection was carried down to the level of the ICD generator. Hemostasis was maintained with electrocautery. Clot was removed and the pocket was flushed with antibiotic flush. Several areas of hemorrhage were encountered and cauterized. Hemostatic potato starch was injected into the pocket. The ICD generator was connected to the lead and the generator was placed into the pocket.  The device was secured to the pocket using O-ethibond, non-absorbable suture material. The pocket was then closed in three layers using 2-O vicryl, 3-O monocryl absorbable suture material and a zipline. The skin was closed using a sub-cuticular technique. A bio-occlusive dressing were applied to the skin. The patient remained hemodynamically stable, tolerated the procedure well and was transferred in stable condition. There were no immediate complications encountered during the procedure. No specimen were removed; there was minimal blood loss. DEFIBRILLATION THRESHOLD TESTING Deferred MEDICATION SUMMARY Medication Route Unit Total  
Gentamycin IV mg 80 Ancef IV grams 2 CONCLUSIONS 1. Successful hematoma evacuation 2. Oral antiobiotics x 5 days 3. Wound check in EP clinic in 10 days. 4. Follow up in EP clinic in 1 month or earlier if necessary. Kelly Noriega MD 
Cardiac Electrophysiology / Cardiology 9 [facility] [address], [facility] [address], [address] [place], [facility] [address] Aren Patterson.       [place], [address] 
[phone] / [phone] Fax     [phone] / [phone] Fax

## 2018-10-01 NOTE — PROGRESS NOTES
CM Note: 
Reason for Readmission:    hematoma RRAT Score and Risk Level:     22 Level of Readmission:    2 Care Conference scheduled:   no 
    
Resources/supports as identified by patient/family:   Son, grandsons, sister, brother and caregiver Top Challenges facing patient (as identified by patient/family and CM): Finances/Medication cost?     Has Medicare and Medicaid Transportation      [employer] Support system or lack thereof? See above Living arrangements? One level house with 2 steps Self-care/ADLs/Cognition? Has a caregiver MWF from 8a-1p. On TThSat caregiver from after dialysis until about 3:30. Current Advanced Directive/Advance Care Plan:  No and not interested at this time. Plan for utilizing home health:   None. Has a caregiver. Likelihood of additional readmission:   moderate Transition of Care Plan:    Based on readmission, the patient's previous Plan of Care 
 has been evaluated and/or modified. The current Transition of Care Plan is:     
Pt to d/c to home with [facility]. She has Rx coverage and gets her medications from [facility]. AUSTIN Barragan Miamierika Care Management Interventions PCP Verified by CM: Yes (Dr. John Callejas. No NN.) Palliative Care Criteria Met (RRAT>21 & CHF Dx)?: No 
MyChart Signup: No 
Discharge Durable Medical Equipment: No (At home: RW) Physical Therapy Consult: No 
Occupational Therapy Consult: No 
Speech Therapy Consult: No 
Current Support Network: Relative's Home (Lives with her son and grandson in a 1 story house with 2 entry steps.) Confirm Follow Up Transport: Wheelchair [place] Mar[place]armen (booked with [facility] for a medical cab) Plan discussed with Pt/Family/Caregiver: Yes Discharge Location Discharge Placement: Home with one level

## 2018-10-01 NOTE — ROUTINE PROCESS
Bedside shift change report given to [address] [place] [address] (oncoming nurse) by Star Millan (offgoing nurse). Report included the following information SBAR, Kardex, MAR and Recent Results.

## 2018-10-01 NOTE — PROGRESS NOTES
11: 20AM 
TRANSFER - IN REPORT: 
 
Verbal report received from TAWANDA RN(name) on [address] [place] [address]  being received from [facility](unit) for routine progression of care Report consisted of patients Situation, Background, Assessment and  
Recommendations(SBAR). Information from the following report(s) SBAR was reviewed with the receiving nurse. Opportunity for questions and clarification was provided. Assessment completed upon patients arrival to unit and care assumed. Anesthesia consult prior to procedure    Subcu  ICD Pocket Revision 12:24 PM 
TRANSFER - OUT REPORT: 
 
Verbal report given to ROBIN CEBALLOS(name) on [facility]  being transferred to [facility](unit) for ordered procedure Report consisted of patients Situation, Background, Assessment and  
Recommendations(SBAR). Information from the following report(s) SBAR was reviewed with the receiving nurse. Lines:  
Peripheral IV 09/28/18 Left Antecubital (Active) Site Assessment Clean, dry, & intact 10/1/2018  8:54 AM  
Phlebitis Assessment 0 10/1/2018  8:54 AM  
Infiltration Assessment 0 10/1/2018  8:54 AM  
Dressing Status Clean, dry, & intact 10/1/2018  8:54 AM  
Dressing Type Transparent 10/1/2018  8:54 AM  
Hub Color/Line Status Blue 10/1/2018  8:54 AM  
Action Taken Open ports on tubing capped 10/1/2018  1:51 AM  
Alcohol Cap Used Yes 10/1/2018  8:54 AM  
  
 
Opportunity for questions and clarification was provided. Patient transported with: 
 Registered Nurse Freddy Kidd 13:40 PM 
TRANSFER - IN REPORT: 
 
Verbal report received from TAWANDA CEBALLOS (name) on [address] [place] [address]  being received from [facility](unit) for routine post - op  Received report from CRNA Report consisted of patients Situation, Background, Assessment and  
Recommendations(SBAR). Information from the following report(s) Procedure Summary was reviewed with the receiving nurse. Opportunity for questions and clarification was provided. Assessment completed upon patients arrival to unit and care assumed. Pressure dressing applied to left lateral chest SUBCU ICD site. 2:20 PM 
TRANSFER - OUT REPORT: 
 
Verbal report given to LAYNE CEBALLOS(name) on [facility]  being transferred to [facility](unit) for routine progression of care Report consisted of patients Situation, Background, Assessment and  
Recommendations(SBAR). Information from the following report(s) SBAR, Procedure Summary, Intake/Output, Recent Results, Med Rec Status and Cardiac Rhythm NSR  was reviewed with the receiving nurse. Lines:  
Peripheral IV 09/28/18 Left Antecubital (Active) Site Assessment Clean, dry, & intact 10/1/2018  8:54 AM  
Phlebitis Assessment 0 10/1/2018  8:54 AM  
Infiltration Assessment 0 10/1/2018  8:54 AM  
Dressing Status Clean, dry, & intact 10/1/2018  8:54 AM  
Dressing Type Transparent 10/1/2018  8:54 AM  
Hub Color/Line Status Blue 10/1/2018  8:54 AM  
Action Taken Open ports on tubing capped 10/1/2018  1:51 AM  
Alcohol Cap Used Yes 10/1/2018  8:54 AM  
   
Peripheral IV 10/01/18 Left Hand (Active) Opportunity for questions and clarification was provided. Patient transported with: @ 2:45 PM 
 Monitor Registered Nurse

## 2018-10-12 ENCOUNTER — OFFICE VISIT (OUTPATIENT)
Dept: CARDIOLOGY CLINIC | Age: 78
End: 2018-10-12

## 2018-10-12 DIAGNOSIS — Z51.89 VISIT FOR WOUND CHECK: ICD-10-CM

## 2018-10-12 DIAGNOSIS — Z95.810 ICD (IMPLANTABLE CARDIOVERTER-DEFIBRILLATOR) IN PLACE: Primary | ICD-10-CM

## 2018-10-12 NOTE — PROGRESS NOTES
Patient presents for wound check post-device implantation with subsequent pocket revision d/t hematoma of Subcutaneous ICD. The dressing was removed and the site was inspected. The site appeared to be well-healing without ecchymosis/tenderness/erythema. Denies pain, fevers, discharge. She completed course of Clindamycin. Plan:    Continue follow up in device clinic as planned.        Aidan Friend NP

## 2018-10-29 DIAGNOSIS — I10 HYPERTENSION, UNSPECIFIED TYPE: Primary | Chronic | ICD-10-CM

## 2018-10-29 RX ORDER — LISINOPRIL 5 MG/1
TABLET ORAL
Qty: 30 TAB | Refills: 0 | OUTPATIENT
Start: 2018-10-29

## 2018-10-29 RX ORDER — LISINOPRIL 5 MG/1
5 TABLET ORAL DAILY
Qty: 30 TAB | Refills: 6 | Status: ON HOLD | OUTPATIENT
Start: 2018-10-29 | End: 2019-01-01

## 2018-10-29 NOTE — TELEPHONE ENCOUNTER
Requested Prescriptions     Signed Prescriptions Disp Refills    lisinopril (PRINIVIL, ZESTRIL) 5 mg tablet 30 Tab 6     Sig: Take 1 Tab by mouth daily.      Authorizing Provider: Brielle Danielle     Ordering User: Anselmo Marin

## 2018-10-31 ENCOUNTER — OFFICE VISIT (OUTPATIENT)
Dept: CARDIOLOGY CLINIC | Age: 78
End: 2018-10-31

## 2018-10-31 VITALS
HEART RATE: 60 BPM | HEIGHT: 64 IN | WEIGHT: 136.4 LBS | RESPIRATION RATE: 18 BRPM | DIASTOLIC BLOOD PRESSURE: 50 MMHG | SYSTOLIC BLOOD PRESSURE: 90 MMHG | BODY MASS INDEX: 23.29 KG/M2

## 2018-10-31 DIAGNOSIS — Z95.810 ICD (IMPLANTABLE CARDIOVERTER-DEFIBRILLATOR) IN PLACE: Primary | ICD-10-CM

## 2018-10-31 NOTE — PROGRESS NOTES
Visit Vitals  BP 90/50   Pulse 60   Resp 18   Ht 5' 4\" (1.626 m)   Wt 136 lb 6.4 oz (61.9 kg)   BMI 23.41 kg/m²

## 2018-10-31 NOTE — PROGRESS NOTES
HISTORY OF PRESENTING ILLNESS      Malina Marcelino is a 66 y. o. female with recent SICD with hematoma which required hematoma evacuation. She now presents for follow up. Her incision site is healing well. No recurrence of hematoma. Device interrogation today reveals normal device functioning. ACTIVE PROBLEM LIST     Patient Active Problem List    Diagnosis Date Noted    Heart failure ([facility] 09/14/2018     Priority: 1 - One    Hematoma 09/28/2018    Severe left ventricular systolic dysfunction 92/64/5938    Atrial fibrillation with rapid ventricular response ([address] 07/21/2018    Pulmonary edema 07/21/2018    Abdominal abscess 09/16/2017    Bacteremia 09/15/2017    Abdominal wall abscess 09/14/2017    ESRD (end stage renal disease) ([facility] 09/14/2017    HTN (hypertension) 09/14/2017    Leukocytosis 09/14/2017    Obese 09/14/2017           PAST MEDICAL HISTORY     Past Medical History:   Diagnosis Date    Arthritis     Chronic kidney disease     Dr Ashley Hernandez Hypertension     Ill-defined condition     Dialysis T, Th, S    Obese 9/14/2017           PAST SURGICAL HISTORY     Past Surgical History:   Procedure Laterality Date    HX COLOSTOMY      left abdomen    HX PACEMAKER  09/13/2018    placed in left abdomen.  HX VASCULAR ACCESS      left upper arm fistula          ALLERGIES     Allergies   Allergen Reactions    Iron Anaphylaxis     Per pt, to PO formulation only, can tolerate IV formulation  Allergy to excipient?           FAMILY HISTORY     Family History   Problem Relation Age of Onset    Hypertension Other         multiple family members    negative for cardiac disease       SOCIAL HISTORY     Social History     Socioeconomic History    Marital status:      Spouse name: Not on file    Number of children: Not on file    Years of education: Not on file    Highest education level: Not on file   Social Needs    Financial resource strain: Not on file   Prim-Joyce insecurity - worry: Not on file    Food insecurity - inability: Not on file    Transportation needs - medical: Not on file   [employer] needs - non-medical: Not on file   Occupational History    Not on file   Tobacco Use    Smoking status: Current Every Day Smoker     Packs/day: 0.50    Smokeless tobacco: Never Used   Substance and Sexual Activity    Alcohol use: No    Drug use: No    Sexual activity: Not on file   Other Topics Concern    Not on file   Social History Narrative    Not on file         MEDICATIONS     Current Outpatient Medications   Medication Sig    lisinopril (PRINIVIL, ZESTRIL) 5 mg tablet Take 1 Tab by mouth daily.  HYDROcodone-acetaminophen (NORCO) 5-325 mg per tablet Take 1 Tab by mouth every six (6) hours as needed for Pain.  omeprazole (PRILOSEC) 20 mg capsule Take 20 mg by mouth Before breakfast and dinner.  venlafaxine (EFFEXOR) 37.5 mg tablet Take 37.5 mg by mouth daily.  vitamin E (AQUA GEMS) 400 unit capsule Take 400 Units by mouth daily.  carvedilol (COREG) 12.5 mg tablet Take 1 Tab by mouth two (2) times daily (with meals).  apixaban (ELIQUIS) 5 mg tablet Take 1 Tab by mouth two (2) times a day.  zolpidem (AMBIEN) 5 mg tablet Take 5 mg by mouth nightly as needed for Sleep.  FOLIC ACID/VIT BCOMP,C (DIALYVITE 800 PO) Take 1 Tab by mouth daily.  isosorbide mononitrate ER (IMDUR) 60 mg CR tablet Take 30 mg by mouth daily. Half-tab    calcium carbonate (TUMS) 200 mg calcium (500 mg) chew Take 3 Tabs by mouth two (2) times daily (with meals). No current facility-administered medications for this visit. I have reviewed the nurses notes, vitals, problem list, allergy list, medical history, family, social history and medications. REVIEW OF SYMPTOMS      General: Pt denies excessive weight gain or loss. Pt is able to conduct ADL's  HEENT: Denies blurred vision, headaches, hearing loss, epistaxis and difficulty swallowing.   Respiratory: Denies cough, congestion, shortness of breath, FRAUSTO, wheezing or stridor. Cardiovascular: Denies precordial pain, palpitations, edema or PND  Gastrointestinal: Denies poor appetite, indigestion, abdominal pain or blood in stool  Genitourinary: Denies hematuria, dysuria, increased urinary frequency  Musculoskeletal: Denies joint pain or swelling from muscles or joints  Neurologic: Denies tremor, paresthesias, headache, or sensory motor disturbance  Psychiatric: Denies confusion, insomnia, depression  Integumentray: Denies rash, itching or ulcers. Hematologic: Denies easy bruising, bleeding       PHYSICAL EXAMINATION      There were no vitals filed for this visit. General: Well developed, in no acute distress. HEENT: No jaundice, oral mucosa moist, no oral ulcers  Neck: Supple, no stiffness, no lymphadenopathy, supple  Heart:  Normal S1/S2 negative S3 or S4. Regular, no murmur, gallop or rub, no jugular venous distention  Respiratory: Clear bilaterally x 4, no wheezing or rales  Abdomen:   Soft, non-tender, bowel sounds are active.   Extremities:  No edema, normal cap refill, no cyanosis. Musculoskeletal: No clubbing, no deformities  Neuro: A&Ox3, speech clear, gait stable, cooperative, no focal neurologic deficits  Skin: Skin color is normal. No rashes or lesions.  Non diaphoretic, moist.  Vascular: 2+ pulses symmetric in all extremities       DIAGNOSTIC DATA      EKG:        LABORATORY DATA      Lab Results   Component Value Date/Time    WBC 6.1 10/01/2018 04:27 AM    HGB 7.8 (L) 10/01/2018 04:27 AM    HCT 25.4 (L) 10/01/2018 04:27 AM    PLATELET 248 92/01/4370 04:27 AM    .3 (H) 10/01/2018 04:27 AM      Lab Results   Component Value Date/Time    Sodium 141 10/01/2018 04:27 AM    Potassium 3.8 10/01/2018 04:27 AM    Chloride 103 10/01/2018 04:27 AM    CO2 30 10/01/2018 04:27 AM    Anion gap 8 10/01/2018 04:27 AM    Glucose 84 10/01/2018 04:27 AM    BUN 21 (H) 10/01/2018 04:27 AM    Creatinine 5.37 (H) 10/01/2018 04:27 AM    BUN/Creatinine ratio 4 (L) 10/01/2018 04:27 AM    GFR est AA 9 (L) 10/01/2018 04:27 AM    GFR est non-AA 8 (L) 10/01/2018 04:27 AM    Calcium 8.8 10/01/2018 04:27 AM    Bilirubin, total 0.5 09/18/2018 10:32 AM    AST (SGOT) 20 09/18/2018 10:32 AM    Alk. phosphatase 133 (H) 09/18/2018 10:32 AM    Protein, total 7.9 09/18/2018 10:32 AM    Albumin 3.1 (L) 09/18/2018 10:32 AM    Globulin 4.8 (H) 09/18/2018 10:32 AM    A-G Ratio 0.6 (L) 09/18/2018 10:32 AM    ALT (SGPT) <6 (L) 09/18/2018 10:32 AM           ASSESSMENT      1. ICD   A. Subcutaneous  2. ESRD  3. Pocket hematoma       PLAN     Continue follow-up with Dr. Romeo Alarcon. Continue follow-up in device clinic. Thank you, Montserrat Harkins MD and Dr. Romeo Alarcon for allowing me to participate in the care of this extraordinarily pleasant female. Please do not hesitate to contact me for further questions/concerns.          Concetta Montez MD  Cardiac Electrophysiology / Cardiology    [facility]  [facility], [address]    [facility]  [phone] / [phone] Fax   [phone] / [phone] Fax

## 2019-01-01 ENCOUNTER — OFFICE VISIT (OUTPATIENT)
Dept: FAMILY MEDICINE CLINIC | Age: 79
End: 2019-01-01

## 2019-01-01 ENCOUNTER — HOSPITAL ENCOUNTER (OUTPATIENT)
Dept: LAB | Age: 79
Discharge: HOME | End: 2019-10-11
Payer: MEDICARE

## 2019-01-01 ENCOUNTER — APPOINTMENT (OUTPATIENT)
Dept: GENERAL RADIOLOGY | Age: 79
DRG: 226 | End: 2019-01-01
Attending: EMERGENCY MEDICINE
Payer: MEDICARE

## 2019-01-01 ENCOUNTER — APPOINTMENT (OUTPATIENT)
Dept: GENERAL RADIOLOGY | Age: 79
DRG: 871 | End: 2019-01-01
Attending: EMERGENCY MEDICINE
Payer: MEDICARE

## 2019-01-01 ENCOUNTER — TELEPHONE (OUTPATIENT)
Dept: CARDIOLOGY CLINIC | Age: 79
End: 2019-01-01

## 2019-01-01 ENCOUNTER — APPOINTMENT (OUTPATIENT)
Dept: ULTRASOUND IMAGING | Age: 79
End: 2019-01-01
Attending: PHYSICIAN ASSISTANT
Payer: MEDICARE

## 2019-01-01 ENCOUNTER — PATIENT OUTREACH (OUTPATIENT)
Dept: CASE MANAGEMENT | Age: 79
End: 2019-01-01

## 2019-01-01 ENCOUNTER — OFFICE VISIT (OUTPATIENT)
Dept: CARDIOLOGY CLINIC | Age: 79
End: 2019-01-01

## 2019-01-01 ENCOUNTER — ANESTHESIA EVENT (OUTPATIENT)
Dept: CARDIAC CATH/INVASIVE PROCEDURES | Age: 79
DRG: 226 | End: 2019-01-01
Payer: MEDICARE

## 2019-01-01 ENCOUNTER — APPOINTMENT (OUTPATIENT)
Dept: GENERAL RADIOLOGY | Age: 79
End: 2019-01-01
Attending: PHYSICIAN ASSISTANT
Payer: MEDICARE

## 2019-01-01 ENCOUNTER — TELEPHONE (OUTPATIENT)
Dept: FAMILY MEDICINE CLINIC | Age: 79
End: 2019-01-01

## 2019-01-01 ENCOUNTER — HOSPITAL ENCOUNTER (INPATIENT)
Age: 79
LOS: 9 days | Discharge: NURSING, REHAB OR LONG-TERM CARE | DRG: 226 | End: 2019-04-12
Attending: EMERGENCY MEDICINE | Admitting: HOSPITALIST
Payer: MEDICARE

## 2019-01-01 ENCOUNTER — APPOINTMENT (OUTPATIENT)
Dept: GENERAL RADIOLOGY | Age: 79
DRG: 226 | End: 2019-01-01
Attending: INTERNAL MEDICINE
Payer: MEDICARE

## 2019-01-01 ENCOUNTER — HOSPITAL ENCOUNTER (EMERGENCY)
Age: 79
Discharge: HOME | End: 2019-02-07
Attending: STUDENT IN AN ORGANIZED HEALTH CARE EDUCATION/TRAINING PROGRAM
Payer: MEDICARE

## 2019-01-01 ENCOUNTER — HOSPITAL ENCOUNTER (EMERGENCY)
Age: 79
Discharge: NURSING, REHAB OR LONG-TERM CARE | End: 2019-04-23
Attending: EMERGENCY MEDICINE
Payer: MEDICARE

## 2019-01-01 ENCOUNTER — ANESTHESIA (OUTPATIENT)
Dept: CARDIAC CATH/INVASIVE PROCEDURES | Age: 79
DRG: 226 | End: 2019-01-01
Payer: MEDICARE

## 2019-01-01 ENCOUNTER — CLINICAL SUPPORT (OUTPATIENT)
Dept: CARDIOLOGY CLINIC | Age: 79
End: 2019-01-01

## 2019-01-01 ENCOUNTER — HOSPITAL ENCOUNTER (INPATIENT)
Age: 79
LOS: 1 days | DRG: 871 | End: 2019-11-12
Attending: EMERGENCY MEDICINE | Admitting: FAMILY MEDICINE
Payer: MEDICARE

## 2019-01-01 ENCOUNTER — HOSPITAL ENCOUNTER (OUTPATIENT)
Dept: GENERAL RADIOLOGY | Age: 79
Discharge: HOME | End: 2019-10-11
Payer: MEDICARE

## 2019-01-01 ENCOUNTER — HOSPITAL ENCOUNTER (OUTPATIENT)
Dept: MRI IMAGING | Age: 79
Discharge: HOME | End: 2019-03-08
Attending: ORTHOPAEDIC SURGERY
Payer: MEDICARE

## 2019-01-01 ENCOUNTER — APPOINTMENT (OUTPATIENT)
Dept: GENERAL RADIOLOGY | Age: 79
DRG: 226 | End: 2019-01-01
Attending: ORTHOPAEDIC SURGERY
Payer: MEDICARE

## 2019-01-01 ENCOUNTER — HOSPITAL ENCOUNTER (OUTPATIENT)
Dept: CT IMAGING | Age: 79
Discharge: HOME | End: 2019-03-08
Attending: ORTHOPAEDIC SURGERY
Payer: MEDICARE

## 2019-01-01 ENCOUNTER — DOCUMENTATION ONLY (OUTPATIENT)
Dept: CASE MANAGEMENT | Age: 79
End: 2019-01-01

## 2019-01-01 ENCOUNTER — HOSPITAL ENCOUNTER (OUTPATIENT)
Dept: MRI IMAGING | Age: 79
Discharge: HOME | End: 2019-03-11
Attending: ORTHOPAEDIC SURGERY
Payer: MEDICARE

## 2019-01-01 ENCOUNTER — APPOINTMENT (OUTPATIENT)
Dept: NON INVASIVE DIAGNOSTICS | Age: 79
DRG: 226 | End: 2019-01-01
Attending: HOSPITALIST
Payer: MEDICARE

## 2019-01-01 VITALS
SYSTOLIC BLOOD PRESSURE: 93 MMHG | HEIGHT: 64 IN | DIASTOLIC BLOOD PRESSURE: 35 MMHG | WEIGHT: 152.56 LBS | RESPIRATION RATE: 16 BRPM | HEART RATE: 72 BPM | BODY MASS INDEX: 26.05 KG/M2 | OXYGEN SATURATION: 95 % | TEMPERATURE: 98 F

## 2019-01-01 VITALS
WEIGHT: 143.3 LBS | HEIGHT: 62 IN | DIASTOLIC BLOOD PRESSURE: 76 MMHG | RESPIRATION RATE: 18 BRPM | HEART RATE: 78 BPM | OXYGEN SATURATION: 98 % | BODY MASS INDEX: 26.37 KG/M2 | TEMPERATURE: 97.6 F | SYSTOLIC BLOOD PRESSURE: 110 MMHG

## 2019-01-01 VITALS
TEMPERATURE: 97.6 F | RESPIRATION RATE: 18 BRPM | BODY MASS INDEX: 26.11 KG/M2 | OXYGEN SATURATION: 96 % | DIASTOLIC BLOOD PRESSURE: 66 MMHG | SYSTOLIC BLOOD PRESSURE: 134 MMHG | WEIGHT: 152.12 LBS | HEART RATE: 86 BPM

## 2019-01-01 VITALS
OXYGEN SATURATION: 97 % | RESPIRATION RATE: 18 BRPM | HEIGHT: 64 IN | DIASTOLIC BLOOD PRESSURE: 62 MMHG | WEIGHT: 151 LBS | SYSTOLIC BLOOD PRESSURE: 100 MMHG | BODY MASS INDEX: 25.78 KG/M2 | HEART RATE: 76 BPM

## 2019-01-01 VITALS
DIASTOLIC BLOOD PRESSURE: 61 MMHG | RESPIRATION RATE: 25 BRPM | HEART RATE: 110 BPM | OXYGEN SATURATION: 97 % | HEIGHT: 64 IN | WEIGHT: 150 LBS | SYSTOLIC BLOOD PRESSURE: 87 MMHG | TEMPERATURE: 97.3 F | BODY MASS INDEX: 25.61 KG/M2

## 2019-01-01 VITALS
BODY MASS INDEX: 24.46 KG/M2 | HEIGHT: 64 IN | WEIGHT: 143.3 LBS | SYSTOLIC BLOOD PRESSURE: 110 MMHG | HEART RATE: 88 BPM | RESPIRATION RATE: 20 BRPM | DIASTOLIC BLOOD PRESSURE: 54 MMHG | OXYGEN SATURATION: 99 %

## 2019-01-01 VITALS
BODY MASS INDEX: 25.1 KG/M2 | HEIGHT: 64 IN | SYSTOLIC BLOOD PRESSURE: 130 MMHG | TEMPERATURE: 97.8 F | RESPIRATION RATE: 18 BRPM | DIASTOLIC BLOOD PRESSURE: 70 MMHG | WEIGHT: 147 LBS | HEART RATE: 68 BPM | OXYGEN SATURATION: 99 %

## 2019-01-01 VITALS
HEART RATE: 80 BPM | OXYGEN SATURATION: 97 % | WEIGHT: 146 LBS | TEMPERATURE: 97.1 F | BODY MASS INDEX: 24.92 KG/M2 | DIASTOLIC BLOOD PRESSURE: 62 MMHG | SYSTOLIC BLOOD PRESSURE: 102 MMHG | HEIGHT: 64 IN | RESPIRATION RATE: 18 BRPM

## 2019-01-01 VITALS
OXYGEN SATURATION: 97 % | BODY MASS INDEX: 25.22 KG/M2 | WEIGHT: 147.71 LBS | SYSTOLIC BLOOD PRESSURE: 108 MMHG | RESPIRATION RATE: 16 BRPM | HEART RATE: 76 BPM | DIASTOLIC BLOOD PRESSURE: 60 MMHG | HEIGHT: 64 IN

## 2019-01-01 VITALS
WEIGHT: 155 LBS | BODY MASS INDEX: 26.46 KG/M2 | OXYGEN SATURATION: 98 % | SYSTOLIC BLOOD PRESSURE: 112 MMHG | DIASTOLIC BLOOD PRESSURE: 78 MMHG | TEMPERATURE: 97.4 F | HEIGHT: 64 IN | HEART RATE: 78 BPM | RESPIRATION RATE: 18 BRPM

## 2019-01-01 VITALS
BODY MASS INDEX: 26.37 KG/M2 | WEIGHT: 143.3 LBS | TEMPERATURE: 98.2 F | DIASTOLIC BLOOD PRESSURE: 63 MMHG | RESPIRATION RATE: 12 BRPM | HEART RATE: 94 BPM | OXYGEN SATURATION: 95 % | HEIGHT: 62 IN | SYSTOLIC BLOOD PRESSURE: 114 MMHG

## 2019-01-01 VITALS
HEART RATE: 78 BPM | TEMPERATURE: 98.7 F | DIASTOLIC BLOOD PRESSURE: 72 MMHG | WEIGHT: 144.4 LBS | OXYGEN SATURATION: 94 % | RESPIRATION RATE: 18 BRPM | BODY MASS INDEX: 24.65 KG/M2 | SYSTOLIC BLOOD PRESSURE: 102 MMHG | HEIGHT: 64 IN

## 2019-01-01 DIAGNOSIS — Z51.89 VISIT FOR WOUND CHECK: ICD-10-CM

## 2019-01-01 DIAGNOSIS — I10 HYPERTENSION, UNSPECIFIED TYPE: ICD-10-CM

## 2019-01-01 DIAGNOSIS — Z99.2 ESRD ON HEMODIALYSIS (HCC): ICD-10-CM

## 2019-01-01 DIAGNOSIS — I48.91 ATRIAL FIBRILLATION WITH RAPID VENTRICULAR RESPONSE (HCC): ICD-10-CM

## 2019-01-01 DIAGNOSIS — Z09 HOSPITAL DISCHARGE FOLLOW-UP: ICD-10-CM

## 2019-01-01 DIAGNOSIS — J18.9 PNEUMONIA OF BOTH LUNGS DUE TO INFECTIOUS ORGANISM, UNSPECIFIED PART OF LUNG: ICD-10-CM

## 2019-01-01 DIAGNOSIS — I42.9 CARDIOMYOPATHY, UNSPECIFIED TYPE (HCC): ICD-10-CM

## 2019-01-01 DIAGNOSIS — Z95.810 CARDIAC DEFIBRILLATOR IN SITU: Primary | ICD-10-CM

## 2019-01-01 DIAGNOSIS — A41.9 SEPSIS, DUE TO UNSPECIFIED ORGANISM, UNSPECIFIED WHETHER ACUTE ORGAN DYSFUNCTION PRESENT (HCC): ICD-10-CM

## 2019-01-01 DIAGNOSIS — Z51.89 VISIT FOR WOUND CHECK: Primary | ICD-10-CM

## 2019-01-01 DIAGNOSIS — J20.9 BRONCHITIS WITH BRONCHOSPASM: Primary | ICD-10-CM

## 2019-01-01 DIAGNOSIS — Z95.810 CARDIAC DEFIBRILLATOR IN SITU: ICD-10-CM

## 2019-01-01 DIAGNOSIS — R06.02 SHORTNESS OF BREATH: ICD-10-CM

## 2019-01-01 DIAGNOSIS — Z93.9 HISTORY OF CREATION OF OSTOMY (HCC): ICD-10-CM

## 2019-01-01 DIAGNOSIS — Z76.89 ENCOUNTER TO ESTABLISH CARE: ICD-10-CM

## 2019-01-01 DIAGNOSIS — Z00.00 MEDICARE ANNUAL WELLNESS VISIT, SUBSEQUENT: ICD-10-CM

## 2019-01-01 DIAGNOSIS — N18.6 ESRD ON HEMODIALYSIS (HCC): ICD-10-CM

## 2019-01-01 DIAGNOSIS — J40 BRONCHITIS: Primary | ICD-10-CM

## 2019-01-01 DIAGNOSIS — G47.00 INSOMNIA, UNSPECIFIED TYPE: Primary | ICD-10-CM

## 2019-01-01 DIAGNOSIS — R22.42 MASS OF LEFT KNEE: ICD-10-CM

## 2019-01-01 DIAGNOSIS — G47.00 INSOMNIA, UNSPECIFIED TYPE: ICD-10-CM

## 2019-01-01 DIAGNOSIS — Z95.810 ICD (IMPLANTABLE CARDIOVERTER-DEFIBRILLATOR) IN PLACE: ICD-10-CM

## 2019-01-01 DIAGNOSIS — L08.9 BACTERIAL SKIN INFECTION: Primary | ICD-10-CM

## 2019-01-01 DIAGNOSIS — T82.7XXA INFECTION INVOLVING IMPLANTABLE CARDIOVERTER-DEFIBRILLATOR (ICD), INITIAL ENCOUNTER (HCC): Primary | ICD-10-CM

## 2019-01-01 DIAGNOSIS — M17.12 ARTHRITIS OF KNEE, LEFT: Primary | ICD-10-CM

## 2019-01-01 DIAGNOSIS — Z95.810 PROBLEM RELATED TO IMPLANTABLE CARDIOVERTER-DEFIBRILLATOR (ICD): Primary | ICD-10-CM

## 2019-01-01 DIAGNOSIS — T82.9XXA PACEMAKER COMPLICATIONS, INITIAL ENCOUNTER: Primary | ICD-10-CM

## 2019-01-01 DIAGNOSIS — T82.7XXA INFECTION INVOLVING IMPLANTABLE CARDIOVERTER-DEFIBRILLATOR (ICD), INITIAL ENCOUNTER (HCC): ICD-10-CM

## 2019-01-01 DIAGNOSIS — Z95.810 ICD (IMPLANTABLE CARDIOVERTER-DEFIBRILLATOR) IN PLACE: Primary | ICD-10-CM

## 2019-01-01 DIAGNOSIS — J20.9 BRONCHITIS WITH BRONCHOSPASM: ICD-10-CM

## 2019-01-01 DIAGNOSIS — B96.89 BACTERIAL SKIN INFECTION: Primary | ICD-10-CM

## 2019-01-01 DIAGNOSIS — T82.9XXA PROBLEM RELATED TO IMPLANTABLE CARDIOVERTER-DEFIBRILLATOR (ICD): Primary | ICD-10-CM

## 2019-01-01 DIAGNOSIS — M25.562 LEFT KNEE PAIN, UNSPECIFIED CHRONICITY: Primary | ICD-10-CM

## 2019-01-01 DIAGNOSIS — J81.0 ACUTE PULMONARY EDEMA (HCC): Primary | ICD-10-CM

## 2019-01-01 DIAGNOSIS — M79.605 LEFT LEG PAIN: ICD-10-CM

## 2019-01-01 DIAGNOSIS — N18.6 END STAGE RENAL DISEASE (HCC): ICD-10-CM

## 2019-01-01 LAB
ABO + RH BLD: NORMAL
ALBUMIN SERPL-MCNC: 2.8 G/DL (ref 3.5–5)
ALBUMIN SERPL-MCNC: 3 G/DL (ref 3.5–5)
ALBUMIN SERPL-MCNC: 3.2 G/DL (ref 3.5–5)
ALBUMIN SERPL-MCNC: 3.3 G/DL (ref 3.5–5)
ALBUMIN SERPL-MCNC: 3.9 G/DL (ref 3.5–4.8)
ALBUMIN/GLOB SERPL: 0.5 {RATIO} (ref 1.1–2.2)
ALBUMIN/GLOB SERPL: 0.5 {RATIO} (ref 1.1–2.2)
ALBUMIN/GLOB SERPL: 0.6 {RATIO} (ref 1.1–2.2)
ALBUMIN/GLOB SERPL: 0.7 {RATIO} (ref 1.1–2.2)
ALBUMIN/GLOB SERPL: 1 {RATIO} (ref 1.2–2.2)
ALP SERPL-CCNC: 139 IU/L (ref 39–117)
ALP SERPL-CCNC: 146 U/L (ref 45–117)
ALP SERPL-CCNC: 153 U/L (ref 45–117)
ALP SERPL-CCNC: 169 U/L (ref 45–117)
ALP SERPL-CCNC: 96 U/L (ref 45–117)
ALT SERPL-CCNC: 10 IU/L (ref 0–32)
ALT SERPL-CCNC: 13 U/L (ref 12–78)
ALT SERPL-CCNC: 13 U/L (ref 12–78)
ALT SERPL-CCNC: 17 U/L (ref 12–78)
ALT SERPL-CCNC: <6 U/L (ref 12–78)
ANION GAP SERPL CALC-SCNC: 10 MMOL/L (ref 5–15)
ANION GAP SERPL CALC-SCNC: 10 MMOL/L (ref 5–15)
ANION GAP SERPL CALC-SCNC: 11 MMOL/L (ref 5–15)
ANION GAP SERPL CALC-SCNC: 12 MMOL/L (ref 5–15)
ANION GAP SERPL CALC-SCNC: 13 MMOL/L (ref 5–15)
ANION GAP SERPL CALC-SCNC: 14 MMOL/L (ref 5–15)
ANION GAP SERPL CALC-SCNC: 15 MMOL/L (ref 5–15)
ANION GAP SERPL CALC-SCNC: 8 MMOL/L (ref 5–15)
APAP SERPL-MCNC: <2 UG/ML (ref 10–30)
APTT PPP: 22.2 SEC (ref 22.1–32)
ARTERIAL PATENCY WRIST A: ABNORMAL
AST SERPL-CCNC: 11 U/L (ref 15–37)
AST SERPL-CCNC: 18 IU/L (ref 0–40)
AST SERPL-CCNC: 23 U/L (ref 15–37)
AST SERPL-CCNC: 30 U/L (ref 15–37)
AST SERPL-CCNC: 44 U/L (ref 15–37)
ATRIAL RATE: 101 BPM
ATRIAL RATE: 111 BPM
ATRIAL RATE: 80 BPM
ATRIAL RATE: 83 BPM
ATRIAL RATE: 85 BPM
ATRIAL RATE: 89 BPM
ATRIAL RATE: 90 BPM
ATRIAL RATE: 94 BPM
ATRIAL RATE: 96 BPM
BACTERIA SPEC CULT: NORMAL
BASE DEFICIT BLDA-SCNC: 8.8 MMOL/L
BASOPHILS # BLD AUTO: 0 X10E3/UL (ref 0–0.2)
BASOPHILS # BLD: 0 K/UL (ref 0–0.1)
BASOPHILS # BLD: 0.1 K/UL (ref 0–0.1)
BASOPHILS # BLD: 0.1 K/UL (ref 0–0.1)
BASOPHILS NFR BLD AUTO: 0 %
BASOPHILS NFR BLD: 0 % (ref 0–1)
BASOPHILS NFR BLD: 1 % (ref 0–1)
BASOPHILS NFR BLD: 1 % (ref 0–1)
BDY SITE: ABNORMAL
BILIRUB SERPL-MCNC: 0.3 MG/DL (ref 0.2–1)
BILIRUB SERPL-MCNC: 0.4 MG/DL (ref 0.2–1)
BILIRUB SERPL-MCNC: 0.6 MG/DL (ref 0–1.2)
BILIRUB SERPL-MCNC: 0.7 MG/DL (ref 0.2–1)
BILIRUB SERPL-MCNC: 1.6 MG/DL (ref 0.2–1)
BLD PROD TYP BPU: NORMAL
BLD PROD TYP BPU: NORMAL
BLOOD GROUP ANTIBODIES SERPL: NORMAL
BNP SERPL-MCNC: ABNORMAL PG/ML
BPU ID: NORMAL
BPU ID: NORMAL
BUN SERPL-MCNC: 14 MG/DL (ref 6–20)
BUN SERPL-MCNC: 23 MG/DL (ref 8–27)
BUN SERPL-MCNC: 28 MG/DL (ref 6–20)
BUN SERPL-MCNC: 36 MG/DL (ref 6–20)
BUN SERPL-MCNC: 38 MG/DL (ref 6–20)
BUN SERPL-MCNC: 42 MG/DL (ref 6–20)
BUN SERPL-MCNC: 58 MG/DL (ref 6–20)
BUN SERPL-MCNC: 58 MG/DL (ref 6–20)
BUN SERPL-MCNC: 61 MG/DL (ref 6–20)
BUN/CREAT SERPL: 4 (ref 12–20)
BUN/CREAT SERPL: 4 (ref 12–20)
BUN/CREAT SERPL: 4 (ref 12–28)
BUN/CREAT SERPL: 5 (ref 12–20)
BUN/CREAT SERPL: 6 (ref 12–20)
BUN/CREAT SERPL: 6 (ref 12–20)
BUN/CREAT SERPL: 7 (ref 12–20)
BUN/CREAT SERPL: 8 (ref 12–20)
BUN/CREAT SERPL: 9 (ref 12–20)
CALCIUM SERPL-MCNC: 6.9 MG/DL (ref 8.5–10.1)
CALCIUM SERPL-MCNC: 7 MG/DL (ref 8.5–10.1)
CALCIUM SERPL-MCNC: 7.3 MG/DL (ref 8.5–10.1)
CALCIUM SERPL-MCNC: 7.7 MG/DL (ref 8.5–10.1)
CALCIUM SERPL-MCNC: 7.7 MG/DL (ref 8.5–10.1)
CALCIUM SERPL-MCNC: 7.8 MG/DL (ref 8.7–10.3)
CALCIUM SERPL-MCNC: 8.1 MG/DL (ref 8.5–10.1)
CALCIUM SERPL-MCNC: 8.6 MG/DL (ref 8.5–10.1)
CALCIUM SERPL-MCNC: 9 MG/DL (ref 8.5–10.1)
CALCULATED P AXIS, ECG09: 10 DEGREES
CALCULATED P AXIS, ECG09: 13 DEGREES
CALCULATED P AXIS, ECG09: 16 DEGREES
CALCULATED P AXIS, ECG09: 19 DEGREES
CALCULATED P AXIS, ECG09: 22 DEGREES
CALCULATED P AXIS, ECG09: 26 DEGREES
CALCULATED P AXIS, ECG09: 6 DEGREES
CALCULATED P AXIS, ECG09: 62 DEGREES
CALCULATED P AXIS, ECG09: 8 DEGREES
CALCULATED R AXIS, ECG10: -10 DEGREES
CALCULATED R AXIS, ECG10: -11 DEGREES
CALCULATED R AXIS, ECG10: -15 DEGREES
CALCULATED R AXIS, ECG10: -17 DEGREES
CALCULATED R AXIS, ECG10: -2 DEGREES
CALCULATED R AXIS, ECG10: -23 DEGREES
CALCULATED R AXIS, ECG10: -4 DEGREES
CALCULATED R AXIS, ECG10: -5 DEGREES
CALCULATED T AXIS, ECG11: -133 DEGREES
CALCULATED T AXIS, ECG11: -152 DEGREES
CALCULATED T AXIS, ECG11: -158 DEGREES
CALCULATED T AXIS, ECG11: -160 DEGREES
CALCULATED T AXIS, ECG11: -165 DEGREES
CALCULATED T AXIS, ECG11: -167 DEGREES
CALCULATED T AXIS, ECG11: -178 DEGREES
CALCULATED T AXIS, ECG11: 164 DEGREES
CALCULATED T AXIS, ECG11: 172 DEGREES
CHLORIDE SERPL-SCNC: 100 MMOL/L (ref 97–108)
CHLORIDE SERPL-SCNC: 101 MMOL/L (ref 97–108)
CHLORIDE SERPL-SCNC: 102 MMOL/L (ref 97–108)
CHLORIDE SERPL-SCNC: 103 MMOL/L (ref 97–108)
CHLORIDE SERPL-SCNC: 103 MMOL/L (ref 97–108)
CHLORIDE SERPL-SCNC: 106 MMOL/L (ref 97–108)
CHLORIDE SERPL-SCNC: 95 MMOL/L (ref 97–108)
CHLORIDE SERPL-SCNC: 95 MMOL/L (ref 97–108)
CHLORIDE SERPL-SCNC: 98 MMOL/L (ref 96–106)
CO2 SERPL-SCNC: 20 MMOL/L (ref 21–32)
CO2 SERPL-SCNC: 20 MMOL/L (ref 21–32)
CO2 SERPL-SCNC: 21 MMOL/L (ref 20–29)
CO2 SERPL-SCNC: 22 MMOL/L (ref 21–32)
CO2 SERPL-SCNC: 25 MMOL/L (ref 21–32)
CO2 SERPL-SCNC: 25 MMOL/L (ref 21–32)
CO2 SERPL-SCNC: 26 MMOL/L (ref 21–32)
CO2 SERPL-SCNC: 26 MMOL/L (ref 21–32)
CO2 SERPL-SCNC: 28 MMOL/L (ref 21–32)
COMMENT, HOLDF: NORMAL
COMMENT, HOLDF: NORMAL
CREAT SERPL-MCNC: 3.74 MG/DL (ref 0.55–1.02)
CREAT SERPL-MCNC: 3.79 MG/DL (ref 0.55–1.02)
CREAT SERPL-MCNC: 5.24 MG/DL (ref 0.57–1)
CREAT SERPL-MCNC: 5.94 MG/DL (ref 0.55–1.02)
CREAT SERPL-MCNC: 6.96 MG/DL (ref 0.55–1.02)
CREAT SERPL-MCNC: 7.17 MG/DL (ref 0.55–1.02)
CREAT SERPL-MCNC: 7.47 MG/DL (ref 0.55–1.02)
CREAT SERPL-MCNC: 9.04 MG/DL (ref 0.55–1.02)
CREAT SERPL-MCNC: 9.68 MG/DL (ref 0.55–1.02)
CROSSMATCH RESULT,%XM: NORMAL
CROSSMATCH RESULT,%XM: NORMAL
DIAGNOSIS, 93000: NORMAL
DIFFERENTIAL METHOD BLD: ABNORMAL
ECHO AV CUSP MM: 1.85 CM
ECHO AV PEAK GRADIENT: 9.3 MMHG
ECHO AV PEAK VELOCITY: 152.48 CM/S
ECHO EST RA PRESSURE: 10 MMHG
ECHO LV E' LATERAL VELOCITY: 7.93 CM/S
ECHO LV E' SEPTAL VELOCITY: 3.43 CM/S
ECHO LV INTERNAL DIMENSION DIASTOLIC MMODE: 5.65 CM
ECHO LV INTERNAL DIMENSION SYSTOLIC MMODE: 4.53 CM
ECHO LV IVSD MMODE: 1.04 CM
ECHO LV POSTERIOR WALL DIASTOLIC MMODE: 1.15 CM
ECHO LVOT PEAK GRADIENT: 1.7 MMHG
ECHO LVOT PEAK VELOCITY: 64.84 CM/S
ECHO MV A VELOCITY: 123.24 CM/S
ECHO MV E DECELERATION TIME (DT): 254.5 MS
ECHO MV E VELOCITY: 78.09 CM/S
ECHO MV E/A RATIO: 0.63
ECHO MV E/E' LATERAL: 9.85
ECHO MV E/E' RATIO (AVERAGED): 16.31
ECHO MV E/E' SEPTAL: 22.77
ECHO PULMONARY ARTERY SYSTOLIC PRESSURE (PASP): 40.4 MMHG
ECHO PV MAX VELOCITY: 75.75 CM/S
ECHO PV PEAK GRADIENT: 2.3 MMHG
ECHO RIGHT VENTRICULAR SYSTOLIC PRESSURE (RVSP): 40.4 MMHG
ECHO TV A WAVE: 48.97 CM/S
ECHO TV E WAVE: 50.24 CM/S
ECHO TV EROA: 1
ECHO TV REGURGITANT MAX VELOCITY: 275.62 CM/S
ECHO TV REGURGITANT PEAK GRADIENT: 30.4 MMHG
EOSINOPHIL # BLD AUTO: 0.1 X10E3/UL (ref 0–0.4)
EOSINOPHIL # BLD: 0 K/UL (ref 0–0.4)
EOSINOPHIL # BLD: 0.1 K/UL (ref 0–0.4)
EOSINOPHIL # BLD: 0.1 K/UL (ref 0–0.4)
EOSINOPHIL # BLD: 0.2 K/UL (ref 0–0.4)
EOSINOPHIL # BLD: 0.3 K/UL (ref 0–0.4)
EOSINOPHIL # BLD: 0.4 K/UL (ref 0–0.4)
EOSINOPHIL NFR BLD AUTO: 1 %
EOSINOPHIL NFR BLD: 0 % (ref 0–7)
EOSINOPHIL NFR BLD: 0 % (ref 0–7)
EOSINOPHIL NFR BLD: 1 % (ref 0–7)
EOSINOPHIL NFR BLD: 1 % (ref 0–7)
EOSINOPHIL NFR BLD: 2 % (ref 0–7)
EOSINOPHIL NFR BLD: 3 % (ref 0–7)
EPAP/CPAP/PEEP, PAPEEP: 6
ERYTHROCYTE [DISTWIDTH] IN BLOOD BY AUTOMATED COUNT: 13.5 % (ref 12.3–15.4)
ERYTHROCYTE [DISTWIDTH] IN BLOOD BY AUTOMATED COUNT: 16.1 % (ref 11.5–14.5)
ERYTHROCYTE [DISTWIDTH] IN BLOOD BY AUTOMATED COUNT: 17.2 % (ref 11.5–14.5)
ERYTHROCYTE [DISTWIDTH] IN BLOOD BY AUTOMATED COUNT: 17.5 % (ref 11.5–14.5)
ERYTHROCYTE [DISTWIDTH] IN BLOOD BY AUTOMATED COUNT: 17.5 % (ref 11.5–14.5)
ERYTHROCYTE [DISTWIDTH] IN BLOOD BY AUTOMATED COUNT: 17.6 % (ref 11.5–14.5)
ERYTHROCYTE [DISTWIDTH] IN BLOOD BY AUTOMATED COUNT: 17.7 % (ref 11.5–14.5)
ERYTHROCYTE [DISTWIDTH] IN BLOOD BY AUTOMATED COUNT: 17.7 % (ref 11.5–14.5)
ERYTHROCYTE [DISTWIDTH] IN BLOOD BY AUTOMATED COUNT: 17.8 % (ref 11.5–14.5)
ERYTHROCYTE [DISTWIDTH] IN BLOOD BY AUTOMATED COUNT: 17.8 % (ref 11.5–14.5)
ERYTHROCYTE [DISTWIDTH] IN BLOOD BY AUTOMATED COUNT: 17.9 % (ref 11.5–14.5)
ERYTHROCYTE [DISTWIDTH] IN BLOOD BY AUTOMATED COUNT: 18.4 % (ref 11.5–14.5)
ERYTHROCYTE [DISTWIDTH] IN BLOOD BY AUTOMATED COUNT: 18.5 % (ref 11.5–14.5)
FIO2 ON VENT: 100 %
GLOBULIN SER CALC-MCNC: 3.8 G/DL (ref 1.5–4.5)
GLOBULIN SER CALC-MCNC: 4.7 G/DL (ref 2–4)
GLOBULIN SER CALC-MCNC: 5 G/DL (ref 2–4)
GLOBULIN SER CALC-MCNC: 5.4 G/DL (ref 2–4)
GLOBULIN SER CALC-MCNC: 6.2 G/DL (ref 2–4)
GLUCOSE BLD STRIP.AUTO-MCNC: 120 MG/DL (ref 65–100)
GLUCOSE BLD STRIP.AUTO-MCNC: 424 MG/DL (ref 65–100)
GLUCOSE BLD STRIP.AUTO-MCNC: 94 MG/DL (ref 65–100)
GLUCOSE BLD STRIP.AUTO-MCNC: NORMAL MG/DL (ref 65–100)
GLUCOSE BLD STRIP.AUTO-MCNC: NORMAL MG/DL (ref 65–100)
GLUCOSE SERPL-MCNC: 56 MG/DL (ref 65–100)
GLUCOSE SERPL-MCNC: 79 MG/DL (ref 65–100)
GLUCOSE SERPL-MCNC: 79 MG/DL (ref 65–100)
GLUCOSE SERPL-MCNC: 87 MG/DL (ref 65–100)
GLUCOSE SERPL-MCNC: 92 MG/DL (ref 65–99)
GLUCOSE SERPL-MCNC: 93 MG/DL (ref 65–100)
GLUCOSE SERPL-MCNC: 95 MG/DL (ref 65–100)
GLUCOSE SERPL-MCNC: 96 MG/DL (ref 65–100)
GLUCOSE SERPL-MCNC: 97 MG/DL (ref 65–100)
GRAM STN SPEC: NORMAL
GRAM STN SPEC: NORMAL
HBV SURFACE AG SER QL: <0.1 INDEX
HBV SURFACE AG SER QL: NEGATIVE
HCO3 BLDA-SCNC: 13 MMOL/L (ref 22–26)
HCT VFR BLD AUTO: 22.4 % (ref 35–47)
HCT VFR BLD AUTO: 23.7 % (ref 35–47)
HCT VFR BLD AUTO: 24 % (ref 35–47)
HCT VFR BLD AUTO: 25.2 % (ref 35–47)
HCT VFR BLD AUTO: 27.3 % (ref 35–47)
HCT VFR BLD AUTO: 30.5 % (ref 35–47)
HCT VFR BLD AUTO: 32 % (ref 35–47)
HCT VFR BLD AUTO: 32.3 % (ref 34–46.6)
HCT VFR BLD AUTO: 36 % (ref 35–47)
HCT VFR BLD AUTO: 37 % (ref 35–47)
HCT VFR BLD AUTO: 39.4 % (ref 35–47)
HCT VFR BLD AUTO: 41 % (ref 35–47)
HCT VFR BLD AUTO: 41.1 % (ref 35–47)
HGB BLD-MCNC: 10.2 G/DL (ref 11.5–16)
HGB BLD-MCNC: 10.6 G/DL (ref 11.1–15.9)
HGB BLD-MCNC: 11.6 G/DL (ref 11.5–16)
HGB BLD-MCNC: 12 G/DL (ref 11.5–16)
HGB BLD-MCNC: 12.7 G/DL (ref 11.5–16)
HGB BLD-MCNC: 12.9 G/DL (ref 11.5–16)
HGB BLD-MCNC: 13 G/DL (ref 11.5–16)
HGB BLD-MCNC: 7.3 G/DL (ref 11.5–16)
HGB BLD-MCNC: 7.6 G/DL (ref 11.5–16)
HGB BLD-MCNC: 8 G/DL (ref 11.5–16)
HGB BLD-MCNC: 8.1 G/DL (ref 11.5–16)
HGB BLD-MCNC: 9 G/DL (ref 11.5–16)
HGB BLD-MCNC: 9.6 G/DL (ref 11.5–16)
IMM GRANULOCYTES # BLD AUTO: 0 K/UL
IMM GRANULOCYTES # BLD AUTO: 0 K/UL (ref 0–0.04)
IMM GRANULOCYTES # BLD AUTO: 0.1 K/UL (ref 0–0.04)
IMM GRANULOCYTES # BLD AUTO: 0.1 X10E3/UL (ref 0–0.1)
IMM GRANULOCYTES # BLD AUTO: 0.2 K/UL (ref 0–0.04)
IMM GRANULOCYTES # BLD AUTO: 0.3 K/UL (ref 0–0.04)
IMM GRANULOCYTES # BLD AUTO: 0.3 K/UL (ref 0–0.04)
IMM GRANULOCYTES NFR BLD AUTO: 0 %
IMM GRANULOCYTES NFR BLD AUTO: 0 % (ref 0–0.5)
IMM GRANULOCYTES NFR BLD AUTO: 1 %
IMM GRANULOCYTES NFR BLD AUTO: 1 % (ref 0–0.5)
IMM GRANULOCYTES NFR BLD AUTO: 2 % (ref 0–0.5)
IMM GRANULOCYTES NFR BLD AUTO: 2 % (ref 0–0.5)
INR PPP: 1.1 (ref 0.9–1.1)
INTERPRETATION: NORMAL
IPAP/PIP, IPAPIP: 16
LACTATE SERPL-SCNC: 1.7 MMOL/L (ref 0.4–2)
LACTATE SERPL-SCNC: 2.4 MMOL/L (ref 0.4–2)
LACTATE SERPL-SCNC: 5.7 MMOL/L (ref 0.4–2)
LYMPHOCYTES # BLD AUTO: 1.9 X10E3/UL (ref 0.7–3.1)
LYMPHOCYTES # BLD: 0.3 K/UL (ref 0.8–3.5)
LYMPHOCYTES # BLD: 0.7 K/UL (ref 0.8–3.5)
LYMPHOCYTES # BLD: 0.9 K/UL (ref 0.8–3.5)
LYMPHOCYTES # BLD: 0.9 K/UL (ref 0.8–3.5)
LYMPHOCYTES # BLD: 1.3 K/UL (ref 0.8–3.5)
LYMPHOCYTES # BLD: 1.5 K/UL (ref 0.8–3.5)
LYMPHOCYTES # BLD: 1.6 K/UL (ref 0.8–3.5)
LYMPHOCYTES # BLD: 1.7 K/UL (ref 0.8–3.5)
LYMPHOCYTES # BLD: 1.8 K/UL (ref 0.8–3.5)
LYMPHOCYTES NFR BLD AUTO: 20 %
LYMPHOCYTES NFR BLD: 10 % (ref 12–49)
LYMPHOCYTES NFR BLD: 11 % (ref 12–49)
LYMPHOCYTES NFR BLD: 12 % (ref 12–49)
LYMPHOCYTES NFR BLD: 13 % (ref 12–49)
LYMPHOCYTES NFR BLD: 14 % (ref 12–49)
LYMPHOCYTES NFR BLD: 2 % (ref 12–49)
LYMPHOCYTES NFR BLD: 22 % (ref 12–49)
LYMPHOCYTES NFR BLD: 5 % (ref 12–49)
LYMPHOCYTES NFR BLD: 6 % (ref 12–49)
LYMPHOCYTES NFR BLD: 6 % (ref 12–49)
LYMPHOCYTES NFR BLD: 9 % (ref 12–49)
MAGNESIUM SERPL-MCNC: 1.8 MG/DL (ref 1.6–2.4)
MCH RBC QN AUTO: 33.3 PG (ref 26–34)
MCH RBC QN AUTO: 33.5 PG (ref 26–34)
MCH RBC QN AUTO: 33.5 PG (ref 26–34)
MCH RBC QN AUTO: 33.7 PG (ref 26–34)
MCH RBC QN AUTO: 33.9 PG (ref 26–34)
MCH RBC QN AUTO: 34 PG (ref 26–34)
MCH RBC QN AUTO: 34.2 PG (ref 26.6–33)
MCH RBC QN AUTO: 34.3 PG (ref 26–34)
MCH RBC QN AUTO: 34.3 PG (ref 26–34)
MCH RBC QN AUTO: 34.5 PG (ref 26–34)
MCH RBC QN AUTO: 35.3 PG (ref 26–34)
MCHC RBC AUTO-ENTMCNC: 30 G/DL (ref 30–36.5)
MCHC RBC AUTO-ENTMCNC: 30.9 G/DL (ref 30–36.5)
MCHC RBC AUTO-ENTMCNC: 31.4 G/DL (ref 30–36.5)
MCHC RBC AUTO-ENTMCNC: 31.7 G/DL (ref 30–36.5)
MCHC RBC AUTO-ENTMCNC: 32.1 G/DL (ref 30–36.5)
MCHC RBC AUTO-ENTMCNC: 32.1 G/DL (ref 30–36.5)
MCHC RBC AUTO-ENTMCNC: 32.6 G/DL (ref 30–36.5)
MCHC RBC AUTO-ENTMCNC: 32.7 G/DL (ref 30–36.5)
MCHC RBC AUTO-ENTMCNC: 32.8 G/DL (ref 31.5–35.7)
MCHC RBC AUTO-ENTMCNC: 33 G/DL (ref 30–36.5)
MCHC RBC AUTO-ENTMCNC: 33.3 G/DL (ref 30–36.5)
MCHC RBC AUTO-ENTMCNC: 33.3 G/DL (ref 30–36.5)
MCHC RBC AUTO-ENTMCNC: 33.4 G/DL (ref 30–36.5)
MCV RBC AUTO: 102 FL (ref 80–99)
MCV RBC AUTO: 102.8 FL (ref 80–99)
MCV RBC AUTO: 103 FL (ref 80–99)
MCV RBC AUTO: 103.9 FL (ref 80–99)
MCV RBC AUTO: 104 FL (ref 79–97)
MCV RBC AUTO: 104 FL (ref 80–99)
MCV RBC AUTO: 104.6 FL (ref 80–99)
MCV RBC AUTO: 105.5 FL (ref 80–99)
MCV RBC AUTO: 106.8 FL (ref 80–99)
MCV RBC AUTO: 106.8 FL (ref 80–99)
MCV RBC AUTO: 106.9 FL (ref 80–99)
MCV RBC AUTO: 110.2 FL (ref 80–99)
MCV RBC AUTO: 112.3 FL (ref 80–99)
MONOCYTES # BLD AUTO: 0.9 X10E3/UL (ref 0.1–0.9)
MONOCYTES # BLD: 1.2 K/UL (ref 0–1)
MONOCYTES # BLD: 1.3 K/UL (ref 0–1)
MONOCYTES # BLD: 1.4 K/UL (ref 0–1)
MONOCYTES # BLD: 1.4 K/UL (ref 0–1)
MONOCYTES # BLD: 1.7 K/UL (ref 0–1)
MONOCYTES # BLD: 1.8 K/UL (ref 0–1)
MONOCYTES # BLD: 1.9 K/UL (ref 0–1)
MONOCYTES # BLD: 2 K/UL (ref 0–1)
MONOCYTES # BLD: 2 K/UL (ref 0–1)
MONOCYTES NFR BLD AUTO: 9 %
MONOCYTES NFR BLD: 10 % (ref 5–13)
MONOCYTES NFR BLD: 12 % (ref 5–13)
MONOCYTES NFR BLD: 12 % (ref 5–13)
MONOCYTES NFR BLD: 13 % (ref 5–13)
MONOCYTES NFR BLD: 18 % (ref 5–13)
MONOCYTES NFR BLD: 18 % (ref 5–13)
MONOCYTES NFR BLD: 8 % (ref 5–13)
MONOCYTES NFR BLD: 8 % (ref 5–13)
NEUTROPHILS # BLD AUTO: 6.4 X10E3/UL (ref 1.4–7)
NEUTROPHILS NFR BLD AUTO: 69 %
NEUTS BAND NFR BLD MANUAL: 11 % (ref 0–6)
NEUTS SEG # BLD: 10.3 K/UL (ref 1.8–8)
NEUTS SEG # BLD: 10.4 K/UL (ref 1.8–8)
NEUTS SEG # BLD: 10.6 K/UL (ref 1.8–8)
NEUTS SEG # BLD: 10.6 K/UL (ref 1.8–8)
NEUTS SEG # BLD: 11 K/UL (ref 1.8–8)
NEUTS SEG # BLD: 11.2 K/UL (ref 1.8–8)
NEUTS SEG # BLD: 12.2 K/UL (ref 1.8–8)
NEUTS SEG # BLD: 12.8 K/UL (ref 1.8–8)
NEUTS SEG # BLD: 14.2 K/UL (ref 1.8–8)
NEUTS SEG # BLD: 4.4 K/UL (ref 1.8–8)
NEUTS SEG # BLD: 7.4 K/UL (ref 1.8–8)
NEUTS SEG NFR BLD: 57 % (ref 32–75)
NEUTS SEG NFR BLD: 67 % (ref 32–75)
NEUTS SEG NFR BLD: 71 % (ref 32–75)
NEUTS SEG NFR BLD: 72 % (ref 32–75)
NEUTS SEG NFR BLD: 73 % (ref 32–75)
NEUTS SEG NFR BLD: 75 % (ref 32–75)
NEUTS SEG NFR BLD: 75 % (ref 32–75)
NEUTS SEG NFR BLD: 76 % (ref 32–75)
NEUTS SEG NFR BLD: 77 % (ref 32–75)
NEUTS SEG NFR BLD: 79 % (ref 32–75)
NEUTS SEG NFR BLD: 88 % (ref 32–75)
NRBC # BLD: 0 K/UL (ref 0–0.01)
NRBC # BLD: 0.09 K/UL (ref 0–0.01)
NRBC BLD-RTO: 0 PER 100 WBC
NRBC BLD-RTO: 0.6 PER 100 WBC
P-R INTERVAL, ECG05: 130 MS
P-R INTERVAL, ECG05: 130 MS
P-R INTERVAL, ECG05: 134 MS
P-R INTERVAL, ECG05: 136 MS
P-R INTERVAL, ECG05: 138 MS
P-R INTERVAL, ECG05: 150 MS
PCO2 BLDA: 19 MMHG (ref 35–45)
PH BLDA: 7.44 [PH] (ref 7.35–7.45)
PLATELET # BLD AUTO: 106 K/UL (ref 150–400)
PLATELET # BLD AUTO: 117 K/UL (ref 150–400)
PLATELET # BLD AUTO: 129 K/UL (ref 150–400)
PLATELET # BLD AUTO: 132 K/UL (ref 150–400)
PLATELET # BLD AUTO: 137 K/UL (ref 150–400)
PLATELET # BLD AUTO: 142 K/UL (ref 150–400)
PLATELET # BLD AUTO: 144 X10E3/UL (ref 150–450)
PLATELET # BLD AUTO: 152 K/UL (ref 150–400)
PLATELET # BLD AUTO: 158 K/UL (ref 150–400)
PLATELET # BLD AUTO: 183 K/UL (ref 150–400)
PLATELET # BLD AUTO: 192 K/UL (ref 150–400)
PLATELET # BLD AUTO: 193 K/UL (ref 150–400)
PLATELET # BLD AUTO: 241 K/UL (ref 150–400)
PMV BLD AUTO: 10.4 FL (ref 8.9–12.9)
PMV BLD AUTO: 10.6 FL (ref 8.9–12.9)
PMV BLD AUTO: 10.9 FL (ref 8.9–12.9)
PMV BLD AUTO: 11 FL (ref 8.9–12.9)
PMV BLD AUTO: 11.1 FL (ref 8.9–12.9)
PMV BLD AUTO: 11.2 FL (ref 8.9–12.9)
PMV BLD AUTO: 11.3 FL (ref 8.9–12.9)
PMV BLD AUTO: 11.3 FL (ref 8.9–12.9)
PMV BLD AUTO: 11.6 FL (ref 8.9–12.9)
PMV BLD AUTO: 11.7 FL (ref 8.9–12.9)
PO2 BLDA: 224 MMHG (ref 80–100)
POTASSIUM SERPL-SCNC: 3 MMOL/L (ref 3.5–5.2)
POTASSIUM SERPL-SCNC: 3.4 MMOL/L (ref 3.5–5.1)
POTASSIUM SERPL-SCNC: 3.6 MMOL/L (ref 3.5–5.1)
POTASSIUM SERPL-SCNC: 4 MMOL/L (ref 3.5–5.1)
POTASSIUM SERPL-SCNC: 4 MMOL/L (ref 3.5–5.1)
POTASSIUM SERPL-SCNC: 4.1 MMOL/L (ref 3.5–5.1)
POTASSIUM SERPL-SCNC: 4.3 MMOL/L (ref 3.5–5.1)
POTASSIUM SERPL-SCNC: 4.6 MMOL/L (ref 3.5–5.1)
POTASSIUM SERPL-SCNC: 5 MMOL/L (ref 3.5–5.1)
PRESSURE SUPPORT SETTING VENT: 10 CM[H2O]
PROT SERPL-MCNC: 7.7 G/DL (ref 6.4–8.2)
PROT SERPL-MCNC: 7.7 G/DL (ref 6–8.5)
PROT SERPL-MCNC: 8.2 G/DL (ref 6.4–8.2)
PROT SERPL-MCNC: 8.3 G/DL (ref 6.4–8.2)
PROT SERPL-MCNC: 9.4 G/DL (ref 6.4–8.2)
PROTHROMBIN TIME: 11.1 SEC (ref 9–11.1)
Q-T INTERVAL, ECG07: 362 MS
Q-T INTERVAL, ECG07: 364 MS
Q-T INTERVAL, ECG07: 368 MS
Q-T INTERVAL, ECG07: 370 MS
Q-T INTERVAL, ECG07: 390 MS
Q-T INTERVAL, ECG07: 396 MS
Q-T INTERVAL, ECG07: 396 MS
Q-T INTERVAL, ECG07: 410 MS
Q-T INTERVAL, ECG07: 410 MS
QRS DURATION, ECG06: 100 MS
QRS DURATION, ECG06: 104 MS
QRS DURATION, ECG06: 106 MS
QRS DURATION, ECG06: 110 MS
QRS DURATION, ECG06: 110 MS
QRS DURATION, ECG06: 112 MS
QRS DURATION, ECG06: 114 MS
QTC CALCULATION (BEZET), ECG08: 452 MS
QTC CALCULATION (BEZET), ECG08: 456 MS
QTC CALCULATION (BEZET), ECG08: 459 MS
QTC CALCULATION (BEZET), ECG08: 464 MS
QTC CALCULATION (BEZET), ECG08: 479 MS
QTC CALCULATION (BEZET), ECG08: 481 MS
QTC CALCULATION (BEZET), ECG08: 484 MS
QTC CALCULATION (BEZET), ECG08: 498 MS
QTC CALCULATION (BEZET), ECG08: 500 MS
RBC # BLD AUTO: 2.18 M/UL (ref 3.8–5.2)
RBC # BLD AUTO: 2.28 M/UL (ref 3.8–5.2)
RBC # BLD AUTO: 2.33 M/UL (ref 3.8–5.2)
RBC # BLD AUTO: 2.36 M/UL (ref 3.8–5.2)
RBC # BLD AUTO: 2.61 M/UL (ref 3.8–5.2)
RBC # BLD AUTO: 2.85 M/UL (ref 3.8–5.2)
RBC # BLD AUTO: 2.89 M/UL (ref 3.8–5.2)
RBC # BLD AUTO: 3.1 X10E6/UL (ref 3.77–5.28)
RBC # BLD AUTO: 3.46 M/UL (ref 3.8–5.2)
RBC # BLD AUTO: 3.53 M/UL (ref 3.8–5.2)
RBC # BLD AUTO: 3.73 M/UL (ref 3.8–5.2)
RBC # BLD AUTO: 3.79 M/UL (ref 3.8–5.2)
RBC # BLD AUTO: 3.84 M/UL (ref 3.8–5.2)
RBC MORPH BLD: ABNORMAL
SAMPLES BEING HELD,HOLD: NORMAL
SAMPLES BEING HELD,HOLD: NORMAL
SAO2 % BLD: 100 % (ref 92–97)
SAO2% DEVICE SAO2% SENSOR NAME: ABNORMAL
SERVICE CMNT-IMP: ABNORMAL
SERVICE CMNT-IMP: ABNORMAL
SERVICE CMNT-IMP: NORMAL
SODIUM SERPL-SCNC: 130 MMOL/L (ref 136–145)
SODIUM SERPL-SCNC: 135 MMOL/L (ref 136–145)
SODIUM SERPL-SCNC: 135 MMOL/L (ref 136–145)
SODIUM SERPL-SCNC: 136 MMOL/L (ref 136–145)
SODIUM SERPL-SCNC: 138 MMOL/L (ref 136–145)
SODIUM SERPL-SCNC: 140 MMOL/L (ref 136–145)
SODIUM SERPL-SCNC: 141 MMOL/L (ref 134–144)
SPECIMEN EXP DATE BLD: NORMAL
SPECIMEN SITE: ABNORMAL
STATUS OF UNIT,%ST: NORMAL
STATUS OF UNIT,%ST: NORMAL
THERAPEUTIC RANGE,PTTT: NORMAL SECS (ref 58–77)
TROPONIN I SERPL-MCNC: 0.12 NG/ML
UNIT DIVISION, %UDIV: 0
UNIT DIVISION, %UDIV: 0
VANCOMYCIN SERPL-MCNC: 24.6 UG/ML
VENTILATION MODE VENT: ABNORMAL
VENTRICULAR RATE, ECG03: 101 BPM
VENTRICULAR RATE, ECG03: 111 BPM
VENTRICULAR RATE, ECG03: 80 BPM
VENTRICULAR RATE, ECG03: 83 BPM
VENTRICULAR RATE, ECG03: 85 BPM
VENTRICULAR RATE, ECG03: 89 BPM
VENTRICULAR RATE, ECG03: 90 BPM
VENTRICULAR RATE, ECG03: 94 BPM
VENTRICULAR RATE, ECG03: 96 BPM
WBC # BLD AUTO: 11.1 K/UL (ref 3.6–11)
WBC # BLD AUTO: 11.7 K/UL (ref 3.6–11)
WBC # BLD AUTO: 13.4 K/UL (ref 3.6–11)
WBC # BLD AUTO: 13.9 K/UL (ref 3.6–11)
WBC # BLD AUTO: 13.9 K/UL (ref 3.6–11)
WBC # BLD AUTO: 14.1 K/UL (ref 3.6–11)
WBC # BLD AUTO: 14.8 K/UL (ref 3.6–11)
WBC # BLD AUTO: 14.8 K/UL (ref 3.6–11)
WBC # BLD AUTO: 15.3 K/UL (ref 3.6–11)
WBC # BLD AUTO: 16.2 K/UL (ref 3.6–11)
WBC # BLD AUTO: 16.6 K/UL (ref 3.6–11)
WBC # BLD AUTO: 7.8 K/UL (ref 3.6–11)
WBC # BLD AUTO: 9.4 X10E3/UL (ref 3.4–10.8)
WBC MORPH BLD: ABNORMAL
XXWBCSUS: 0

## 2019-01-01 PROCEDURE — 87340 HEPATITIS B SURFACE AG IA: CPT

## 2019-01-01 PROCEDURE — 80202 ASSAY OF VANCOMYCIN: CPT

## 2019-01-01 PROCEDURE — 74011000258 HC RX REV CODE- 258: Performed by: INTERNAL MEDICINE

## 2019-01-01 PROCEDURE — 74011000258 HC RX REV CODE- 258: Performed by: HOSPITALIST

## 2019-01-01 PROCEDURE — 74011000250 HC RX REV CODE- 250: Performed by: INTERNAL MEDICINE

## 2019-01-01 PROCEDURE — 94660 CPAP INITIATION&MGMT: CPT

## 2019-01-01 PROCEDURE — 74011250637 HC RX REV CODE- 250/637: Performed by: INTERNAL MEDICINE

## 2019-01-01 PROCEDURE — 74011250636 HC RX REV CODE- 250/636: Performed by: HOSPITALIST

## 2019-01-01 PROCEDURE — 74011250636 HC RX REV CODE- 250/636

## 2019-01-01 PROCEDURE — 74011636320 HC RX REV CODE- 636/320: Performed by: INTERNAL MEDICINE

## 2019-01-01 PROCEDURE — 77030037713 HC CLOSR DEV INCIS ZIP STRY -B: Performed by: INTERNAL MEDICINE

## 2019-01-01 PROCEDURE — 80048 BASIC METABOLIC PNL TOTAL CA: CPT

## 2019-01-01 PROCEDURE — 76060000033 HC ANESTHESIA 1 TO 1.5 HR: Performed by: INTERNAL MEDICINE

## 2019-01-01 PROCEDURE — 74011250637 HC RX REV CODE- 250/637: Performed by: HOSPITALIST

## 2019-01-01 PROCEDURE — 65660000000 HC RM CCU STEPDOWN

## 2019-01-01 PROCEDURE — 0JH608Z INSERTION OF DEFIBRILLATOR GENERATOR INTO CHEST SUBCUTANEOUS TISSUE AND FASCIA, OPEN APPROACH: ICD-10-PCS | Performed by: INTERNAL MEDICINE

## 2019-01-01 PROCEDURE — 99285 EMERGENCY DEPT VISIT HI MDM: CPT

## 2019-01-01 PROCEDURE — 36600 WITHDRAWAL OF ARTERIAL BLOOD: CPT

## 2019-01-01 PROCEDURE — 99284 EMERGENCY DEPT VISIT MOD MDM: CPT

## 2019-01-01 PROCEDURE — 77030018729 HC ELECTRD DEFIB PAD CARD -B: Performed by: INTERNAL MEDICINE

## 2019-01-01 PROCEDURE — C1769 GUIDE WIRE: HCPCS | Performed by: INTERNAL MEDICINE

## 2019-01-01 PROCEDURE — P9045 ALBUMIN (HUMAN), 5%, 250 ML: HCPCS

## 2019-01-01 PROCEDURE — 71045 X-RAY EXAM CHEST 1 VIEW: CPT

## 2019-01-01 PROCEDURE — 94760 N-INVAS EAR/PLS OXIMETRY 1: CPT

## 2019-01-01 PROCEDURE — 5A1D70Z PERFORMANCE OF URINARY FILTRATION, INTERMITTENT, LESS THAN 6 HOURS PER DAY: ICD-10-PCS | Performed by: INTERNAL MEDICINE

## 2019-01-01 PROCEDURE — 97116 GAIT TRAINING THERAPY: CPT

## 2019-01-01 PROCEDURE — 87186 SC STD MICRODIL/AGAR DIL: CPT

## 2019-01-01 PROCEDURE — A4565 SLINGS: HCPCS | Performed by: INTERNAL MEDICINE

## 2019-01-01 PROCEDURE — 36415 COLL VENOUS BLD VENIPUNCTURE: CPT

## 2019-01-01 PROCEDURE — 74011000250 HC RX REV CODE- 250: Performed by: EMERGENCY MEDICINE

## 2019-01-01 PROCEDURE — 90935 HEMODIALYSIS ONE EVALUATION: CPT

## 2019-01-01 PROCEDURE — 77030031139 HC SUT VCRL2 J&J -A: Performed by: INTERNAL MEDICINE

## 2019-01-01 PROCEDURE — 74011250636 HC RX REV CODE- 250/636: Performed by: EMERGENCY MEDICINE

## 2019-01-01 PROCEDURE — 83605 ASSAY OF LACTIC ACID: CPT

## 2019-01-01 PROCEDURE — 97530 THERAPEUTIC ACTIVITIES: CPT

## 2019-01-01 PROCEDURE — 93005 ELECTROCARDIOGRAM TRACING: CPT

## 2019-01-01 PROCEDURE — 80053 COMPREHEN METABOLIC PANEL: CPT

## 2019-01-01 PROCEDURE — 85025 COMPLETE CBC W/AUTO DIFF WBC: CPT

## 2019-01-01 PROCEDURE — 74011250636 HC RX REV CODE- 250/636: Performed by: INTERNAL MEDICINE

## 2019-01-01 PROCEDURE — 5A1935Z RESPIRATORY VENTILATION, LESS THAN 24 CONSECUTIVE HOURS: ICD-10-PCS | Performed by: EMERGENCY MEDICINE

## 2019-01-01 PROCEDURE — 74011250637 HC RX REV CODE- 250/637: Performed by: PHYSICIAN ASSISTANT

## 2019-01-01 PROCEDURE — 82803 BLOOD GASES ANY COMBINATION: CPT

## 2019-01-01 PROCEDURE — 77030002996 HC SUT SLK J&J -A: Performed by: INTERNAL MEDICINE

## 2019-01-01 PROCEDURE — 74011250637 HC RX REV CODE- 250/637: Performed by: EMERGENCY MEDICINE

## 2019-01-01 PROCEDURE — 93971 EXTREMITY STUDY: CPT

## 2019-01-01 PROCEDURE — 31500 INSERT EMERGENCY AIRWAY: CPT

## 2019-01-01 PROCEDURE — 71046 X-RAY EXAM CHEST 2 VIEWS: CPT

## 2019-01-01 PROCEDURE — 74011000250 HC RX REV CODE- 250: Performed by: NURSE PRACTITIONER

## 2019-01-01 PROCEDURE — 97530 THERAPEUTIC ACTIVITIES: CPT | Performed by: PHYSICAL THERAPIST

## 2019-01-01 PROCEDURE — C1722 AICD, SINGLE CHAMBER: HCPCS | Performed by: INTERNAL MEDICINE

## 2019-01-01 PROCEDURE — 73590 X-RAY EXAM OF LOWER LEG: CPT

## 2019-01-01 PROCEDURE — 96375 TX/PRO/DX INJ NEW DRUG ADDON: CPT

## 2019-01-01 PROCEDURE — 77030036668 HC HEMSTAT APPL W/HEMADERM KT -BARD -F: Performed by: INTERNAL MEDICINE

## 2019-01-01 PROCEDURE — 77030037029 HC IMPL ENV ICD ANTIBACT ABSRB TYRX MEDT -G: Performed by: INTERNAL MEDICINE

## 2019-01-01 PROCEDURE — 77010033678 HC OXYGEN DAILY

## 2019-01-01 PROCEDURE — 87205 SMEAR GRAM STAIN: CPT

## 2019-01-01 PROCEDURE — 97161 PT EVAL LOW COMPLEX 20 MIN: CPT

## 2019-01-01 PROCEDURE — 85730 THROMBOPLASTIN TIME PARTIAL: CPT

## 2019-01-01 PROCEDURE — 92950 HEART/LUNG RESUSCITATION CPR: CPT

## 2019-01-01 PROCEDURE — 73721 MRI JNT OF LWR EXTRE W/O DYE: CPT

## 2019-01-01 PROCEDURE — 94002 VENT MGMT INPAT INIT DAY: CPT

## 2019-01-01 PROCEDURE — 73700 CT LOWER EXTREMITY W/O DYE: CPT

## 2019-01-01 PROCEDURE — C1893 INTRO/SHEATH, FIXED,NON-PEEL: HCPCS | Performed by: INTERNAL MEDICINE

## 2019-01-01 PROCEDURE — P9016 RBC LEUKOCYTES REDUCED: HCPCS

## 2019-01-01 PROCEDURE — 75810000455 HC PLCMT CENT VENOUS CATH LVL 2 5182

## 2019-01-01 PROCEDURE — 94640 AIRWAY INHALATION TREATMENT: CPT

## 2019-01-01 PROCEDURE — 73552 X-RAY EXAM OF FEMUR 2/>: CPT

## 2019-01-01 PROCEDURE — 82962 GLUCOSE BLOOD TEST: CPT

## 2019-01-01 PROCEDURE — C1894 INTRO/SHEATH, NON-LASER: HCPCS | Performed by: INTERNAL MEDICINE

## 2019-01-01 PROCEDURE — 74011250636 HC RX REV CODE- 250/636: Performed by: FAMILY MEDICINE

## 2019-01-01 PROCEDURE — 83735 ASSAY OF MAGNESIUM: CPT

## 2019-01-01 PROCEDURE — 96365 THER/PROPH/DIAG IV INF INIT: CPT

## 2019-01-01 PROCEDURE — 93306 TTE W/DOPPLER COMPLETE: CPT

## 2019-01-01 PROCEDURE — 74011250636 HC RX REV CODE- 250/636: Performed by: ANESTHESIOLOGY

## 2019-01-01 PROCEDURE — 80307 DRUG TEST PRSMV CHEM ANLYZR: CPT

## 2019-01-01 PROCEDURE — 87040 BLOOD CULTURE FOR BACTERIA: CPT

## 2019-01-01 PROCEDURE — 77030027138 HC INCENT SPIROMETER -A

## 2019-01-01 PROCEDURE — 77030018673: Performed by: INTERNAL MEDICINE

## 2019-01-01 PROCEDURE — 77030011992 HC AIRWY NASOPHGL TELE -A: Performed by: ANESTHESIOLOGY

## 2019-01-01 PROCEDURE — 85027 COMPLETE CBC AUTOMATED: CPT

## 2019-01-01 PROCEDURE — 0BH17EZ INSERTION OF ENDOTRACHEAL AIRWAY INTO TRACHEA, VIA NATURAL OR ARTIFICIAL OPENING: ICD-10-PCS | Performed by: EMERGENCY MEDICINE

## 2019-01-01 PROCEDURE — 97535 SELF CARE MNGMENT TRAINING: CPT | Performed by: OCCUPATIONAL THERAPIST

## 2019-01-01 PROCEDURE — 0JPT0PZ REMOVAL OF CARDIAC RHYTHM RELATED DEVICE FROM TRUNK SUBCUTANEOUS TISSUE AND FASCIA, OPEN APPROACH: ICD-10-PCS | Performed by: INTERNAL MEDICINE

## 2019-01-01 PROCEDURE — 74011000250 HC RX REV CODE- 250

## 2019-01-01 PROCEDURE — 73562 X-RAY EXAM OF KNEE 3: CPT

## 2019-01-01 PROCEDURE — 97165 OT EVAL LOW COMPLEX 30 MIN: CPT | Performed by: OCCUPATIONAL THERAPIST

## 2019-01-01 PROCEDURE — P9047 ALBUMIN (HUMAN), 25%, 50ML: HCPCS | Performed by: INTERNAL MEDICINE

## 2019-01-01 PROCEDURE — 74011250636 HC RX REV CODE- 250/636: Performed by: PHYSICIAN ASSISTANT

## 2019-01-01 PROCEDURE — 65610000006 HC RM INTENSIVE CARE

## 2019-01-01 PROCEDURE — 84484 ASSAY OF TROPONIN QUANT: CPT

## 2019-01-01 PROCEDURE — 86900 BLOOD TYPING SEROLOGIC ABO: CPT

## 2019-01-01 PROCEDURE — C1777 LEAD, AICD, ENDO SINGLE COIL: HCPCS | Performed by: INTERNAL MEDICINE

## 2019-01-01 PROCEDURE — 87077 CULTURE AEROBIC IDENTIFY: CPT

## 2019-01-01 PROCEDURE — 83880 ASSAY OF NATRIURETIC PEPTIDE: CPT

## 2019-01-01 PROCEDURE — 02HK3KZ INSERTION OF DEFIBRILLATOR LEAD INTO RIGHT VENTRICLE, PERCUTANEOUS APPROACH: ICD-10-PCS | Performed by: INTERNAL MEDICINE

## 2019-01-01 PROCEDURE — 86920 COMPATIBILITY TEST SPIN: CPT

## 2019-01-01 PROCEDURE — 33249 INSJ/RPLCMT DEFIB W/LEAD(S): CPT | Performed by: INTERNAL MEDICINE

## 2019-01-01 PROCEDURE — 85610 PROTHROMBIN TIME: CPT

## 2019-01-01 DEVICE — ENVELOPE CMRM6133 ABSORB LRG US
Type: IMPLANTABLE DEVICE | Status: FUNCTIONAL
Brand: TYRX™

## 2019-01-01 DEVICE — IMPLANTABLE CARDIOVERTER DEFIBRILLATOR VR
Type: IMPLANTABLE DEVICE | Status: FUNCTIONAL
Brand: VIGILANT™ EL ICD VR

## 2019-01-01 DEVICE — STEROID-ELUTING BIPOLAR PACE/SENSE AND DEFIBRILLATION LEAD
Type: IMPLANTABLE DEVICE | Status: FUNCTIONAL
Brand: ENDOTAK RELIANCE® SG

## 2019-01-01 RX ORDER — EPINEPHRINE 0.1 MG/ML
INJECTION INTRACARDIAC; INTRAVENOUS
Status: COMPLETED | OUTPATIENT
Start: 2019-01-01 | End: 2019-01-01

## 2019-01-01 RX ORDER — PREDNISONE 20 MG/1
40 TABLET ORAL
Qty: 14 TAB | Refills: 0 | Status: SHIPPED | OUTPATIENT
Start: 2019-01-01 | End: 2019-01-01

## 2019-01-01 RX ORDER — DEXTROSE 50 % IN WATER (D50W) INTRAVENOUS SYRINGE
Status: COMPLETED | OUTPATIENT
Start: 2019-01-01 | End: 2019-01-01

## 2019-01-01 RX ORDER — SODIUM BICARBONATE 84 MG/ML
INJECTION, SOLUTION INTRAVENOUS
Status: DISCONTINUED
Start: 2019-01-01 | End: 2019-01-01 | Stop reason: HOSPADM

## 2019-01-01 RX ORDER — NALOXONE HYDROCHLORIDE 0.4 MG/ML
0.4 INJECTION, SOLUTION INTRAMUSCULAR; INTRAVENOUS; SUBCUTANEOUS AS NEEDED
Status: DISCONTINUED | OUTPATIENT
Start: 2019-01-01 | End: 2019-01-01 | Stop reason: SDUPTHER

## 2019-01-01 RX ORDER — LANOLIN ALCOHOL/MO/W.PET/CERES
3 CREAM (GRAM) TOPICAL
Qty: 30 TAB | Refills: 0 | Status: SHIPPED
Start: 2019-01-01 | End: 2019-01-01

## 2019-01-01 RX ORDER — CARVEDILOL 6.25 MG/1
6.25 TABLET ORAL 2 TIMES DAILY WITH MEALS
Status: DISCONTINUED | OUTPATIENT
Start: 2019-01-01 | End: 2019-01-01 | Stop reason: HOSPADM

## 2019-01-01 RX ORDER — AMIODARONE HYDROCHLORIDE 100 MG/1
100 TABLET ORAL DAILY
Qty: 30 TAB | Refills: 1 | Status: SHIPPED | OUTPATIENT
Start: 2019-01-01 | End: 2019-01-01 | Stop reason: CLARIF

## 2019-01-01 RX ORDER — TRAMADOL HYDROCHLORIDE 50 MG/1
50 TABLET ORAL
Status: DISCONTINUED | OUTPATIENT
Start: 2019-01-01 | End: 2019-01-01 | Stop reason: HOSPADM

## 2019-01-01 RX ORDER — PHENYLEPHRINE HCL IN 0.9% NACL 30MG/250ML
10-100 PLASTIC BAG, INJECTION (ML) INTRAVENOUS
Status: DISCONTINUED | OUTPATIENT
Start: 2019-01-01 | End: 2019-01-01

## 2019-01-01 RX ORDER — SODIUM BICARBONATE 84 MG/ML
100 INJECTION, SOLUTION INTRAVENOUS ONCE
Status: DISCONTINUED | OUTPATIENT
Start: 2019-01-01 | End: 2019-01-01 | Stop reason: HOSPADM

## 2019-01-01 RX ORDER — IPRATROPIUM BROMIDE AND ALBUTEROL SULFATE 2.5; .5 MG/3ML; MG/3ML
3 SOLUTION RESPIRATORY (INHALATION)
Qty: 30 NEBULE | Refills: 1 | Status: SHIPPED | OUTPATIENT
Start: 2019-01-01

## 2019-01-01 RX ORDER — HYDROCODONE BITARTRATE AND ACETAMINOPHEN 5; 325 MG/1; MG/1
1 TABLET ORAL
Status: DISCONTINUED | OUTPATIENT
Start: 2019-01-01 | End: 2019-01-01

## 2019-01-01 RX ORDER — LANOLIN ALCOHOL/MO/W.PET/CERES
3 CREAM (GRAM) TOPICAL
Status: DISCONTINUED | OUTPATIENT
Start: 2019-01-01 | End: 2019-01-01 | Stop reason: HOSPADM

## 2019-01-01 RX ORDER — OMEPRAZOLE 20 MG/1
1 CAPSULE, DELAYED RELEASE ORAL
Refills: 5 | COMMUNITY
Start: 2019-01-01

## 2019-01-01 RX ORDER — BACITRACIN 50000 [IU]/1
INJECTION, POWDER, FOR SOLUTION INTRAMUSCULAR AS NEEDED
Status: DISCONTINUED | OUTPATIENT
Start: 2019-01-01 | End: 2019-01-01 | Stop reason: HOSPADM

## 2019-01-01 RX ORDER — KETAMINE HYDROCHLORIDE 10 MG/ML
INJECTION, SOLUTION INTRAMUSCULAR; INTRAVENOUS AS NEEDED
Status: DISCONTINUED | OUTPATIENT
Start: 2019-01-01 | End: 2019-01-01 | Stop reason: HOSPADM

## 2019-01-01 RX ORDER — SODIUM CHLORIDE 0.9 % (FLUSH) 0.9 %
5-40 SYRINGE (ML) INJECTION AS NEEDED
Status: DISCONTINUED | OUTPATIENT
Start: 2019-01-01 | End: 2019-01-01 | Stop reason: SDUPTHER

## 2019-01-01 RX ORDER — BENZONATATE 100 MG/1
100 CAPSULE ORAL
Qty: 21 CAP | Refills: 0 | Status: SHIPPED | OUTPATIENT
Start: 2019-01-01 | End: 2019-01-01

## 2019-01-01 RX ORDER — PROPOFOL 10 MG/ML
INJECTION, EMULSION INTRAVENOUS AS NEEDED
Status: DISCONTINUED | OUTPATIENT
Start: 2019-01-01 | End: 2019-01-01 | Stop reason: HOSPADM

## 2019-01-01 RX ORDER — MIDAZOLAM HYDROCHLORIDE 1 MG/ML
INJECTION, SOLUTION INTRAMUSCULAR; INTRAVENOUS AS NEEDED
Status: DISCONTINUED | OUTPATIENT
Start: 2019-01-01 | End: 2019-01-01 | Stop reason: HOSPADM

## 2019-01-01 RX ORDER — HEPARIN SODIUM 200 [USP'U]/100ML
INJECTION, SOLUTION INTRAVENOUS
Status: COMPLETED | OUTPATIENT
Start: 2019-01-01 | End: 2019-01-01

## 2019-01-01 RX ORDER — CALCIUM ACETATE 667 MG/1
1 CAPSULE ORAL 3 TIMES DAILY
Refills: 3 | COMMUNITY
Start: 2018-11-10 | End: 2019-01-01 | Stop reason: ALTCHOICE

## 2019-01-01 RX ORDER — LIDOCAINE HYDROCHLORIDE 10 MG/ML
INJECTION INFILTRATION; PERINEURAL AS NEEDED
Status: DISCONTINUED | OUTPATIENT
Start: 2019-01-01 | End: 2019-01-01 | Stop reason: HOSPADM

## 2019-01-01 RX ORDER — MORPHINE SULFATE 2 MG/ML
2 INJECTION, SOLUTION INTRAMUSCULAR; INTRAVENOUS
Status: DISCONTINUED | OUTPATIENT
Start: 2019-01-01 | End: 2019-01-01 | Stop reason: HOSPADM

## 2019-01-01 RX ORDER — CEFDINIR 300 MG/1
300 CAPSULE ORAL EVERY OTHER DAY
Qty: 5 CAP | Refills: 0 | Status: SHIPPED | OUTPATIENT
Start: 2019-01-01 | End: 2019-01-01

## 2019-01-01 RX ORDER — ONDANSETRON 2 MG/ML
4 INJECTION INTRAMUSCULAR; INTRAVENOUS
Status: DISCONTINUED | OUTPATIENT
Start: 2019-01-01 | End: 2019-01-01 | Stop reason: HOSPADM

## 2019-01-01 RX ORDER — ACETAMINOPHEN 500 MG
1500 TABLET ORAL EVERY 4 HOURS
Status: ON HOLD | COMMUNITY
End: 2019-01-01 | Stop reason: SDUPTHER

## 2019-01-01 RX ORDER — ALBUMIN HUMAN 250 G/1000ML
12.5 SOLUTION INTRAVENOUS
Status: DISCONTINUED | OUTPATIENT
Start: 2019-01-01 | End: 2019-01-01 | Stop reason: HOSPADM

## 2019-01-01 RX ORDER — IPRATROPIUM BROMIDE AND ALBUTEROL SULFATE 2.5; .5 MG/3ML; MG/3ML
3 SOLUTION RESPIRATORY (INHALATION)
Qty: 1 NEBULE | Refills: 0
Start: 2019-01-01 | End: 2019-01-01

## 2019-01-01 RX ORDER — CALCIUM ACETATE 667 MG/1
1 CAPSULE ORAL 3 TIMES DAILY
Refills: 3 | COMMUNITY
Start: 2019-01-01

## 2019-01-01 RX ORDER — ONDANSETRON 2 MG/ML
8 INJECTION INTRAMUSCULAR; INTRAVENOUS
Status: COMPLETED | OUTPATIENT
Start: 2019-01-01 | End: 2019-01-01

## 2019-01-01 RX ORDER — CODEINE PHOSPHATE AND GUAIFENESIN 10; 100 MG/5ML; MG/5ML
2.5 SOLUTION ORAL
Qty: 118 ML | Refills: 0 | Status: SHIPPED | OUTPATIENT
Start: 2019-01-01 | End: 2019-01-01

## 2019-01-01 RX ORDER — LIDOCAINE HYDROCHLORIDE AND EPINEPHRINE 10; 10 MG/ML; UG/ML
INJECTION, SOLUTION INFILTRATION; PERINEURAL
Status: COMPLETED
Start: 2019-01-01 | End: 2019-01-01

## 2019-01-01 RX ORDER — CARVEDILOL 6.25 MG/1
6.25 TABLET ORAL 2 TIMES DAILY WITH MEALS
Qty: 60 TAB | Refills: 0 | Status: SHIPPED
Start: 2019-01-01 | End: 2019-01-01

## 2019-01-01 RX ORDER — APIXABAN 5 MG/1
1 TABLET, FILM COATED ORAL 2 TIMES DAILY
Refills: 11 | COMMUNITY
Start: 2019-01-01

## 2019-01-01 RX ORDER — ISOSORBIDE MONONITRATE 30 MG/1
30 TABLET, EXTENDED RELEASE ORAL DAILY
Status: DISCONTINUED | OUTPATIENT
Start: 2019-01-01 | End: 2019-01-01 | Stop reason: HOSPADM

## 2019-01-01 RX ORDER — AMOXICILLIN AND CLAVULANATE POTASSIUM 500; 125 MG/1; MG/1
1 TABLET, FILM COATED ORAL DAILY
Qty: 10 TAB | Refills: 0 | Status: SHIPPED | OUTPATIENT
Start: 2019-01-01 | End: 2019-01-01

## 2019-01-01 RX ORDER — GABAPENTIN 100 MG/1
1 CAPSULE ORAL 2 TIMES DAILY
Refills: 3 | COMMUNITY
Start: 2019-01-01

## 2019-01-01 RX ORDER — CEPHALEXIN 500 MG/1
500 CAPSULE ORAL
Status: COMPLETED | OUTPATIENT
Start: 2019-01-01 | End: 2019-01-01

## 2019-01-01 RX ORDER — CEPHALEXIN 500 MG/1
500 CAPSULE ORAL 2 TIMES DAILY
Qty: 10 CAP | Refills: 0 | Status: SHIPPED | OUTPATIENT
Start: 2019-01-01 | End: 2019-01-01

## 2019-01-01 RX ORDER — TRAMADOL HYDROCHLORIDE 50 MG/1
50 TABLET ORAL
Qty: 14 TAB | Refills: 0 | Status: ON HOLD | OUTPATIENT
Start: 2019-01-01 | End: 2019-01-01

## 2019-01-01 RX ORDER — IPRATROPIUM BROMIDE AND ALBUTEROL SULFATE 2.5; .5 MG/3ML; MG/3ML
3 SOLUTION RESPIRATORY (INHALATION)
Status: COMPLETED | OUTPATIENT
Start: 2019-01-01 | End: 2019-01-01

## 2019-01-01 RX ORDER — ZOLPIDEM TARTRATE 5 MG/1
5 TABLET ORAL DAILY
Qty: 30 TAB | Refills: 2 | OUTPATIENT
Start: 2019-01-01 | End: 2019-01-01

## 2019-01-01 RX ORDER — LIDOCAINE HYDROCHLORIDE AND EPINEPHRINE 10; 10 MG/ML; UG/ML
1.5 INJECTION, SOLUTION INFILTRATION; PERINEURAL ONCE
Status: COMPLETED | OUTPATIENT
Start: 2019-01-01 | End: 2019-01-01

## 2019-01-01 RX ORDER — VANCOMYCIN/0.9 % SOD CHLORIDE 1.5G/250ML
1500 PLASTIC BAG, INJECTION (ML) INTRAVENOUS ONCE
Status: COMPLETED | OUTPATIENT
Start: 2019-01-01 | End: 2019-01-01

## 2019-01-01 RX ORDER — ISOSORBIDE MONONITRATE 30 MG/1
30 TABLET, EXTENDED RELEASE ORAL DAILY
Qty: 30 TAB | Refills: 0 | Status: SHIPPED
Start: 2019-01-01 | End: 2019-01-01

## 2019-01-01 RX ORDER — SODIUM CHLORIDE 0.9 % (FLUSH) 0.9 %
5-40 SYRINGE (ML) INJECTION EVERY 8 HOURS
Status: DISCONTINUED | OUTPATIENT
Start: 2019-01-01 | End: 2019-01-01 | Stop reason: SDUPTHER

## 2019-01-01 RX ORDER — ACETAMINOPHEN 500 MG
1500 TABLET ORAL
Qty: 30 TAB | Refills: 0 | Status: SHIPPED
Start: 2019-01-01

## 2019-01-01 RX ORDER — PROPOFOL 10 MG/ML
INJECTION, EMULSION INTRAVENOUS
Status: DISCONTINUED | OUTPATIENT
Start: 2019-01-01 | End: 2019-01-01 | Stop reason: HOSPADM

## 2019-01-01 RX ORDER — CARVEDILOL 12.5 MG/1
1 TABLET ORAL 2 TIMES DAILY
COMMUNITY
Start: 2019-01-01

## 2019-01-01 RX ORDER — LORATADINE 10 MG/1
10 TABLET ORAL
Qty: 30 TAB | Refills: 2 | Status: SHIPPED | OUTPATIENT
Start: 2019-01-01

## 2019-01-01 RX ORDER — MAGNESIUM SULFATE 100 %
4 CRYSTALS MISCELLANEOUS AS NEEDED
Status: DISCONTINUED | OUTPATIENT
Start: 2019-01-01 | End: 2019-01-01 | Stop reason: HOSPADM

## 2019-01-01 RX ORDER — DEXTROSE MONOHYDRATE 100 MG/ML
INJECTION, SOLUTION INTRAVENOUS
Status: DISCONTINUED
Start: 2019-01-01 | End: 2019-01-01 | Stop reason: HOSPADM

## 2019-01-01 RX ORDER — SODIUM CHLORIDE 9 MG/ML
25 INJECTION, SOLUTION INTRAVENOUS CONTINUOUS
Status: DISCONTINUED | OUTPATIENT
Start: 2019-01-01 | End: 2019-01-01

## 2019-01-01 RX ORDER — ACETAMINOPHEN 325 MG/1
650 TABLET ORAL
Status: DISCONTINUED | OUTPATIENT
Start: 2019-01-01 | End: 2019-01-01 | Stop reason: HOSPADM

## 2019-01-01 RX ORDER — LEVOFLOXACIN 5 MG/ML
750 INJECTION, SOLUTION INTRAVENOUS
Status: COMPLETED | OUTPATIENT
Start: 2019-01-01 | End: 2019-01-01

## 2019-01-01 RX ORDER — SODIUM CHLORIDE 0.9 % (FLUSH) 0.9 %
5-40 SYRINGE (ML) INJECTION AS NEEDED
Status: DISCONTINUED | OUTPATIENT
Start: 2019-01-01 | End: 2019-01-01 | Stop reason: HOSPADM

## 2019-01-01 RX ORDER — TRAZODONE HYDROCHLORIDE 50 MG/1
50 TABLET ORAL
Qty: 90 TAB | Refills: 1 | Status: SHIPPED | OUTPATIENT
Start: 2019-01-01 | End: 2019-01-01

## 2019-01-01 RX ORDER — SODIUM CHLORIDE 9 MG/ML
25 INJECTION, SOLUTION INTRAVENOUS CONTINUOUS
Status: DISPENSED | OUTPATIENT
Start: 2019-01-01 | End: 2019-01-01

## 2019-01-01 RX ORDER — SODIUM CHLORIDE 0.9 % (FLUSH) 0.9 %
5-40 SYRINGE (ML) INJECTION EVERY 8 HOURS
Status: DISCONTINUED | OUTPATIENT
Start: 2019-01-01 | End: 2019-01-01 | Stop reason: HOSPADM

## 2019-01-01 RX ORDER — HYDROCODONE BITARTRATE AND ACETAMINOPHEN 5; 325 MG/1; MG/1
1 TABLET ORAL
Status: DISCONTINUED | OUTPATIENT
Start: 2019-01-01 | End: 2019-01-01 | Stop reason: HOSPADM

## 2019-01-01 RX ORDER — FENTANYL CITRATE 50 UG/ML
INJECTION, SOLUTION INTRAMUSCULAR; INTRAVENOUS AS NEEDED
Status: DISCONTINUED | OUTPATIENT
Start: 2019-01-01 | End: 2019-01-01 | Stop reason: HOSPADM

## 2019-01-01 RX ORDER — SODIUM BICARBONATE 1 MEQ/ML
SYRINGE (ML) INTRAVENOUS
Status: COMPLETED | OUTPATIENT
Start: 2019-01-01 | End: 2019-01-01

## 2019-01-01 RX ORDER — SODIUM CHLORIDE 9 MG/ML
INJECTION, SOLUTION INTRAVENOUS
Status: DISCONTINUED | OUTPATIENT
Start: 2019-01-01 | End: 2019-01-01 | Stop reason: HOSPADM

## 2019-01-01 RX ORDER — NEBULIZER AND COMPRESSOR
EACH MISCELLANEOUS
Qty: 1 EACH | Refills: 0 | Status: SHIPPED | OUTPATIENT
Start: 2019-01-01

## 2019-01-01 RX ORDER — KETAMINE HYDROCHLORIDE 50 MG/ML
INJECTION, SOLUTION INTRAMUSCULAR; INTRAVENOUS AS NEEDED
Status: DISCONTINUED | OUTPATIENT
Start: 2019-01-01 | End: 2019-01-01 | Stop reason: HOSPADM

## 2019-01-01 RX ORDER — ISOSORBIDE MONONITRATE 60 MG/1
0.5 TABLET, EXTENDED RELEASE ORAL DAILY
COMMUNITY
Start: 2019-01-01

## 2019-01-01 RX ORDER — LISINOPRIL 5 MG/1
5 TABLET ORAL DAILY
Status: DISCONTINUED | OUTPATIENT
Start: 2019-01-01 | End: 2019-01-01

## 2019-01-01 RX ORDER — ALBUMIN HUMAN 50 G/1000ML
SOLUTION INTRAVENOUS AS NEEDED
Status: DISCONTINUED | OUTPATIENT
Start: 2019-01-01 | End: 2019-01-01 | Stop reason: HOSPADM

## 2019-01-01 RX ORDER — SODIUM CHLORIDE 9 MG/ML
250 INJECTION, SOLUTION INTRAVENOUS AS NEEDED
Status: DISCONTINUED | OUTPATIENT
Start: 2019-01-01 | End: 2019-01-01 | Stop reason: HOSPADM

## 2019-01-01 RX ORDER — CALCIUM CARBONATE 200(500)MG
600 TABLET,CHEWABLE ORAL 2 TIMES DAILY WITH MEALS
Status: DISCONTINUED | OUTPATIENT
Start: 2019-01-01 | End: 2019-01-01 | Stop reason: HOSPADM

## 2019-01-01 RX ORDER — MORPHINE SULFATE 2 MG/ML
4 INJECTION, SOLUTION INTRAMUSCULAR; INTRAVENOUS
Status: COMPLETED | OUTPATIENT
Start: 2019-01-01 | End: 2019-01-01

## 2019-01-01 RX ORDER — ZOLPIDEM TARTRATE 5 MG/1
1 TABLET ORAL DAILY
COMMUNITY
Start: 2019-01-01 | End: 2019-01-01 | Stop reason: SDUPTHER

## 2019-01-01 RX ORDER — VENLAFAXINE 37.5 MG/1
37.5 TABLET ORAL DAILY
Status: DISCONTINUED | OUTPATIENT
Start: 2019-01-01 | End: 2019-01-01 | Stop reason: HOSPADM

## 2019-01-01 RX ORDER — NALOXONE HYDROCHLORIDE 0.4 MG/ML
0.4 INJECTION, SOLUTION INTRAMUSCULAR; INTRAVENOUS; SUBCUTANEOUS AS NEEDED
Status: DISCONTINUED | OUTPATIENT
Start: 2019-01-01 | End: 2019-01-01 | Stop reason: HOSPADM

## 2019-01-01 RX ORDER — OXYCODONE HYDROCHLORIDE 5 MG/1
5 TABLET ORAL
Status: COMPLETED | OUTPATIENT
Start: 2019-01-01 | End: 2019-01-01

## 2019-01-01 RX ORDER — LIDOCAINE HYDROCHLORIDE AND EPINEPHRINE 10; 10 MG/ML; UG/ML
1.5 INJECTION, SOLUTION INFILTRATION; PERINEURAL ONCE
Status: ACTIVE | OUTPATIENT
Start: 2019-01-01 | End: 2019-01-01

## 2019-01-01 RX ORDER — GABAPENTIN 100 MG/1
100 CAPSULE ORAL 2 TIMES DAILY
Status: DISCONTINUED | OUTPATIENT
Start: 2019-01-01 | End: 2019-01-01 | Stop reason: HOSPADM

## 2019-01-01 RX ORDER — CARVEDILOL 3.12 MG/1
3.12 TABLET ORAL 2 TIMES DAILY WITH MEALS
Status: DISCONTINUED | OUTPATIENT
Start: 2019-01-01 | End: 2019-01-01

## 2019-01-01 RX ORDER — DEXTROSE MONOHYDRATE 100 MG/ML
0-250 INJECTION, SOLUTION INTRAVENOUS AS NEEDED
Status: DISCONTINUED | OUTPATIENT
Start: 2019-01-01 | End: 2019-01-01 | Stop reason: HOSPADM

## 2019-01-01 RX ADMIN — HYDROCODONE BITARTRATE AND ACETAMINOPHEN 1 TABLET: 5; 325 TABLET ORAL at 15:26

## 2019-01-01 RX ADMIN — TRAMADOL HYDROCHLORIDE 50 MG: 50 TABLET, COATED ORAL at 23:28

## 2019-01-01 RX ADMIN — CALCIUM CARBONATE (ANTACID) CHEW TAB 500 MG 600 MG: 500 CHEW TAB at 09:02

## 2019-01-01 RX ADMIN — ISOSORBIDE MONONITRATE 30 MG: 30 TABLET, EXTENDED RELEASE ORAL at 11:33

## 2019-01-01 RX ADMIN — CARVEDILOL 6.25 MG: 6.25 TABLET, FILM COATED ORAL at 08:55

## 2019-01-01 RX ADMIN — ACETAMINOPHEN 650 MG: 325 TABLET ORAL at 02:08

## 2019-01-01 RX ADMIN — Medication 40 ML: at 23:56

## 2019-01-01 RX ADMIN — GABAPENTIN 100 MG: 100 CAPSULE ORAL at 08:56

## 2019-01-01 RX ADMIN — CALCIUM CARBONATE (ANTACID) CHEW TAB 500 MG 600 MG: 500 CHEW TAB at 11:33

## 2019-01-01 RX ADMIN — GABAPENTIN 100 MG: 100 CAPSULE ORAL at 16:53

## 2019-01-01 RX ADMIN — CEPHALEXIN 500 MG: 500 CAPSULE ORAL at 14:57

## 2019-01-01 RX ADMIN — PIPERACILLIN AND TAZOBACTAM 3.38 G: 3; .375 INJECTION, POWDER, FOR SOLUTION INTRAVENOUS at 22:23

## 2019-01-01 RX ADMIN — Medication 10 ML: at 06:41

## 2019-01-01 RX ADMIN — GABAPENTIN 100 MG: 100 CAPSULE ORAL at 09:58

## 2019-01-01 RX ADMIN — CARVEDILOL 3.12 MG: 3.12 TABLET, FILM COATED ORAL at 16:06

## 2019-01-01 RX ADMIN — CALCIUM CARBONATE (ANTACID) CHEW TAB 500 MG 600 MG: 500 CHEW TAB at 10:29

## 2019-01-01 RX ADMIN — GABAPENTIN 100 MG: 100 CAPSULE ORAL at 12:30

## 2019-01-01 RX ADMIN — CARVEDILOL 6.25 MG: 6.25 TABLET, FILM COATED ORAL at 17:39

## 2019-01-01 RX ADMIN — ALBUTEROL SULFATE 2 DOSE: 2.5 SOLUTION RESPIRATORY (INHALATION) at 23:47

## 2019-01-01 RX ADMIN — GABAPENTIN 100 MG: 100 CAPSULE ORAL at 09:02

## 2019-01-01 RX ADMIN — Medication 10 ML: at 13:07

## 2019-01-01 RX ADMIN — FENTANYL CITRATE 25 MCG: 50 INJECTION, SOLUTION INTRAMUSCULAR; INTRAVENOUS at 09:56

## 2019-01-01 RX ADMIN — PIPERACILLIN AND TAZOBACTAM 3.38 G: 3; .375 INJECTION, POWDER, FOR SOLUTION INTRAVENOUS at 10:30

## 2019-01-01 RX ADMIN — GABAPENTIN 100 MG: 100 CAPSULE ORAL at 18:42

## 2019-01-01 RX ADMIN — HYDROCODONE BITARTRATE AND ACETAMINOPHEN 1 TABLET: 5; 325 TABLET ORAL at 10:03

## 2019-01-01 RX ADMIN — TRAMADOL HYDROCHLORIDE 50 MG: 50 TABLET, COATED ORAL at 09:26

## 2019-01-01 RX ADMIN — CALCIUM CARBONATE (ANTACID) CHEW TAB 500 MG 600 MG: 500 CHEW TAB at 16:06

## 2019-01-01 RX ADMIN — CARVEDILOL 3.12 MG: 3.12 TABLET, FILM COATED ORAL at 17:07

## 2019-01-01 RX ADMIN — HYDROCODONE BITARTRATE AND ACETAMINOPHEN 1 TABLET: 5; 325 TABLET ORAL at 09:58

## 2019-01-01 RX ADMIN — KETAMINE HYDROCHLORIDE 10 MG: 50 INJECTION, SOLUTION INTRAMUSCULAR; INTRAVENOUS at 10:04

## 2019-01-01 RX ADMIN — FENTANYL CITRATE 25 MCG: 50 INJECTION, SOLUTION INTRAMUSCULAR; INTRAVENOUS at 09:39

## 2019-01-01 RX ADMIN — PIPERACILLIN AND TAZOBACTAM 3.38 G: 3; .375 INJECTION, POWDER, FOR SOLUTION INTRAVENOUS at 21:06

## 2019-01-01 RX ADMIN — MIDAZOLAM HYDROCHLORIDE 1 MG: 1 INJECTION, SOLUTION INTRAMUSCULAR; INTRAVENOUS at 09:16

## 2019-01-01 RX ADMIN — VENLAFAXINE 37.5 MG: 37.5 TABLET ORAL at 15:49

## 2019-01-01 RX ADMIN — CARVEDILOL 6.25 MG: 6.25 TABLET, FILM COATED ORAL at 10:29

## 2019-01-01 RX ADMIN — VANCOMYCIN HYDROCHLORIDE 750 MG: 750 INJECTION, POWDER, LYOPHILIZED, FOR SOLUTION INTRAVENOUS at 03:25

## 2019-01-01 RX ADMIN — APIXABAN 5 MG: 5 TABLET, FILM COATED ORAL at 21:07

## 2019-01-01 RX ADMIN — CARVEDILOL 3.12 MG: 3.12 TABLET, FILM COATED ORAL at 09:57

## 2019-01-01 RX ADMIN — CALCIUM CARBONATE (ANTACID) CHEW TAB 500 MG 600 MG: 500 CHEW TAB at 16:53

## 2019-01-01 RX ADMIN — PIPERACILLIN AND TAZOBACTAM 3.38 G: 3; .375 INJECTION, POWDER, FOR SOLUTION INTRAVENOUS at 10:14

## 2019-01-01 RX ADMIN — GABAPENTIN 100 MG: 100 CAPSULE ORAL at 10:15

## 2019-01-01 RX ADMIN — GABAPENTIN 100 MG: 100 CAPSULE ORAL at 10:29

## 2019-01-01 RX ADMIN — LEVOFLOXACIN 750 MG: 5 INJECTION, SOLUTION INTRAVENOUS at 00:05

## 2019-01-01 RX ADMIN — CALCIUM CARBONATE (ANTACID) CHEW TAB 500 MG 600 MG: 500 CHEW TAB at 17:58

## 2019-01-01 RX ADMIN — GABAPENTIN 100 MG: 100 CAPSULE ORAL at 17:59

## 2019-01-01 RX ADMIN — Medication 1 CAPSULE: at 09:02

## 2019-01-01 RX ADMIN — METHYLPREDNISOLONE SODIUM SUCCINATE 125 MG: 125 INJECTION, POWDER, FOR SOLUTION INTRAMUSCULAR; INTRAVENOUS at 23:32

## 2019-01-01 RX ADMIN — LIDOCAINE HYDROCHLORIDE,EPINEPHRINE BITARTRATE 10 MG: 10; .01 INJECTION, SOLUTION INFILTRATION; PERINEURAL at 12:42

## 2019-01-01 RX ADMIN — CARVEDILOL 3.12 MG: 3.12 TABLET, FILM COATED ORAL at 18:42

## 2019-01-01 RX ADMIN — Medication 1 CAPSULE: at 09:57

## 2019-01-01 RX ADMIN — Medication 10 ML: at 02:11

## 2019-01-01 RX ADMIN — CEFAZOLIN 1 G: 1 INJECTION, POWDER, FOR SOLUTION INTRAMUSCULAR; INTRAVENOUS; PARENTERAL at 08:58

## 2019-01-01 RX ADMIN — APIXABAN 5 MG: 5 TABLET, FILM COATED ORAL at 22:21

## 2019-01-01 RX ADMIN — EPINEPHRINE 1 MG: 0.1 INJECTION, SOLUTION ENDOTRACHEAL; INTRACARDIAC; INTRAVENOUS at 02:47

## 2019-01-01 RX ADMIN — FENTANYL CITRATE 25 MCG: 50 INJECTION, SOLUTION INTRAMUSCULAR; INTRAVENOUS at 10:08

## 2019-01-01 RX ADMIN — Medication 10 ML: at 15:21

## 2019-01-01 RX ADMIN — CALCIUM CARBONATE (ANTACID) CHEW TAB 500 MG 600 MG: 500 CHEW TAB at 10:15

## 2019-01-01 RX ADMIN — KETAMINE HYDROCHLORIDE 10 MG: 50 INJECTION, SOLUTION INTRAMUSCULAR; INTRAVENOUS at 09:52

## 2019-01-01 RX ADMIN — EPINEPHRINE 1 MG: 0.1 INJECTION, SOLUTION ENDOTRACHEAL; INTRACARDIAC; INTRAVENOUS at 03:49

## 2019-01-01 RX ADMIN — ALBUMIN HUMAN 250 ML: 50 SOLUTION INTRAVENOUS at 12:13

## 2019-01-01 RX ADMIN — Medication 10 ML: at 06:53

## 2019-01-01 RX ADMIN — MIDAZOLAM HYDROCHLORIDE 1 MG: 1 INJECTION, SOLUTION INTRAMUSCULAR; INTRAVENOUS at 09:10

## 2019-01-01 RX ADMIN — PROPOFOL 20 MG: 10 INJECTION, EMULSION INTRAVENOUS at 09:37

## 2019-01-01 RX ADMIN — VENLAFAXINE 37.5 MG: 37.5 TABLET ORAL at 13:22

## 2019-01-01 RX ADMIN — HYDROCODONE BITARTRATE AND ACETAMINOPHEN 1 TABLET: 5; 325 TABLET ORAL at 05:25

## 2019-01-01 RX ADMIN — LIDOCAINE HYDROCHLORIDE,EPINEPHRINE BITARTRATE 10 MG: 10; .01 INJECTION, SOLUTION INFILTRATION; PERINEURAL at 08:53

## 2019-01-01 RX ADMIN — Medication 40 ML: at 05:07

## 2019-01-01 RX ADMIN — ACETAMINOPHEN 650 MG: 325 TABLET ORAL at 19:30

## 2019-01-01 RX ADMIN — PROPOFOL 50 MCG/KG/MIN: 10 INJECTION, EMULSION INTRAVENOUS at 11:38

## 2019-01-01 RX ADMIN — EPINEPHRINE 1 MG: 0.1 INJECTION, SOLUTION ENDOTRACHEAL; INTRACARDIAC; INTRAVENOUS at 03:42

## 2019-01-01 RX ADMIN — MELATONIN TAB 3 MG 3 MG: 3 TAB at 21:07

## 2019-01-01 RX ADMIN — PIPERACILLIN AND TAZOBACTAM 3.38 G: 3; .375 INJECTION, POWDER, FOR SOLUTION INTRAVENOUS at 02:10

## 2019-01-01 RX ADMIN — ACETAMINOPHEN 650 MG: 325 TABLET ORAL at 05:43

## 2019-01-01 RX ADMIN — CARVEDILOL 3.12 MG: 3.12 TABLET, FILM COATED ORAL at 11:33

## 2019-01-01 RX ADMIN — KETAMINE HYDROCHLORIDE 10 MG: 50 INJECTION, SOLUTION INTRAMUSCULAR; INTRAVENOUS at 09:41

## 2019-01-01 RX ADMIN — PHENYLEPHRINE HYDROCHLORIDE 30 MCG/MIN: 10 INJECTION INTRAVENOUS at 13:00

## 2019-01-01 RX ADMIN — PROPOFOL 20 MG: 10 INJECTION, EMULSION INTRAVENOUS at 09:41

## 2019-01-01 RX ADMIN — FENTANYL CITRATE 25 MCG: 50 INJECTION, SOLUTION INTRAMUSCULAR; INTRAVENOUS at 09:18

## 2019-01-01 RX ADMIN — PIPERACILLIN AND TAZOBACTAM 3.38 G: 3; .375 INJECTION, POWDER, FOR SOLUTION INTRAVENOUS at 09:00

## 2019-01-01 RX ADMIN — HYDROCODONE BITARTRATE AND ACETAMINOPHEN 1 TABLET: 5; 325 TABLET ORAL at 20:30

## 2019-01-01 RX ADMIN — KETAMINE HYDROCHLORIDE 10 MG: 50 INJECTION, SOLUTION INTRAMUSCULAR; INTRAVENOUS at 09:20

## 2019-01-01 RX ADMIN — PIPERACILLIN AND TAZOBACTAM 3.38 G: 3; .375 INJECTION, POWDER, FOR SOLUTION INTRAVENOUS at 21:58

## 2019-01-01 RX ADMIN — ISOSORBIDE MONONITRATE 30 MG: 30 TABLET, EXTENDED RELEASE ORAL at 12:30

## 2019-01-01 RX ADMIN — KETAMINE HYDROCHLORIDE 20 MG: 50 INJECTION, SOLUTION INTRAMUSCULAR; INTRAVENOUS at 09:12

## 2019-01-01 RX ADMIN — GABAPENTIN 100 MG: 100 CAPSULE ORAL at 17:07

## 2019-01-01 RX ADMIN — DEXTROSE MONOHYDRATE 25 G: 500 INJECTION PARENTERAL at 02:55

## 2019-01-01 RX ADMIN — Medication 10 ML: at 21:58

## 2019-01-01 RX ADMIN — IPRATROPIUM BROMIDE AND ALBUTEROL SULFATE 3 ML: .5; 3 SOLUTION RESPIRATORY (INHALATION) at 23:25

## 2019-01-01 RX ADMIN — SODIUM CHLORIDE 25 ML/HR: 900 INJECTION, SOLUTION INTRAVENOUS at 21:59

## 2019-01-01 RX ADMIN — KETAMINE HYDROCHLORIDE 20 MG: 50 INJECTION, SOLUTION INTRAMUSCULAR; INTRAVENOUS at 09:26

## 2019-01-01 RX ADMIN — VENLAFAXINE 37.5 MG: 37.5 TABLET ORAL at 09:46

## 2019-01-01 RX ADMIN — ALBUMIN (HUMAN) 12.5 G: 0.25 INJECTION, SOLUTION INTRAVENOUS at 09:15

## 2019-01-01 RX ADMIN — ACETAMINOPHEN 650 MG: 325 TABLET ORAL at 16:06

## 2019-01-01 RX ADMIN — HYDROCODONE BITARTRATE AND ACETAMINOPHEN 1 TABLET: 5; 325 TABLET ORAL at 13:07

## 2019-01-01 RX ADMIN — SODIUM CHLORIDE: 9 INJECTION, SOLUTION INTRAVENOUS at 09:10

## 2019-01-01 RX ADMIN — VENLAFAXINE 37.5 MG: 37.5 TABLET ORAL at 10:40

## 2019-01-01 RX ADMIN — HYDROCODONE BITARTRATE AND ACETAMINOPHEN 1 TABLET: 5; 325 TABLET ORAL at 23:59

## 2019-01-01 RX ADMIN — VANCOMYCIN HYDROCHLORIDE 1500 MG: 10 INJECTION, POWDER, LYOPHILIZED, FOR SOLUTION INTRAVENOUS at 19:23

## 2019-01-01 RX ADMIN — DEXTROSE MONOHYDRATE 25 G: 500 INJECTION PARENTERAL at 02:52

## 2019-01-01 RX ADMIN — MIDAZOLAM HYDROCHLORIDE 1 MG: 1 INJECTION, SOLUTION INTRAMUSCULAR; INTRAVENOUS at 11:33

## 2019-01-01 RX ADMIN — TRAMADOL HYDROCHLORIDE 50 MG: 50 TABLET, COATED ORAL at 11:34

## 2019-01-01 RX ADMIN — PROPOFOL 20 MG: 10 INJECTION, EMULSION INTRAVENOUS at 09:52

## 2019-01-01 RX ADMIN — MELATONIN TAB 3 MG 3 MG: 3 TAB at 21:58

## 2019-01-01 RX ADMIN — ISOSORBIDE MONONITRATE 30 MG: 30 TABLET, EXTENDED RELEASE ORAL at 10:15

## 2019-01-01 RX ADMIN — CARVEDILOL 3.12 MG: 3.12 TABLET, FILM COATED ORAL at 18:22

## 2019-01-01 RX ADMIN — PIPERACILLIN AND TAZOBACTAM 3.38 G: 3; .375 INJECTION, POWDER, FOR SOLUTION INTRAVENOUS at 10:00

## 2019-01-01 RX ADMIN — MORPHINE SULFATE 4 MG: 2 INJECTION, SOLUTION INTRAMUSCULAR; INTRAVENOUS at 00:03

## 2019-01-01 RX ADMIN — Medication 10 ML: at 14:19

## 2019-01-01 RX ADMIN — HYDROCODONE BITARTRATE AND ACETAMINOPHEN 1 TABLET: 5; 325 TABLET ORAL at 20:06

## 2019-01-01 RX ADMIN — ACETAMINOPHEN 650 MG: 325 TABLET ORAL at 14:51

## 2019-01-01 RX ADMIN — CALCIUM CARBONATE (ANTACID) CHEW TAB 500 MG 600 MG: 500 CHEW TAB at 08:55

## 2019-01-01 RX ADMIN — APIXABAN 5 MG: 5 TABLET, FILM COATED ORAL at 21:13

## 2019-01-01 RX ADMIN — VENLAFAXINE 37.5 MG: 37.5 TABLET ORAL at 16:24

## 2019-01-01 RX ADMIN — PROPOFOL 20 MG: 10 INJECTION, EMULSION INTRAVENOUS at 09:20

## 2019-01-01 RX ADMIN — CALCIUM CARBONATE (ANTACID) CHEW TAB 500 MG 600 MG: 500 CHEW TAB at 17:59

## 2019-01-01 RX ADMIN — SODIUM BICARBONATE 50 MEQ: 84 INJECTION INTRAVENOUS at 02:53

## 2019-01-01 RX ADMIN — EPINEPHRINE 1 MG: 0.1 INJECTION, SOLUTION ENDOTRACHEAL; INTRACARDIAC; INTRAVENOUS at 03:52

## 2019-01-01 RX ADMIN — GABAPENTIN 100 MG: 100 CAPSULE ORAL at 17:39

## 2019-01-01 RX ADMIN — Medication 10 ML: at 17:39

## 2019-01-01 RX ADMIN — Medication 10 ML: at 03:26

## 2019-01-01 RX ADMIN — CALCIUM CARBONATE (ANTACID) CHEW TAB 500 MG 600 MG: 500 CHEW TAB at 17:39

## 2019-01-01 RX ADMIN — CARVEDILOL 3.12 MG: 3.12 TABLET, FILM COATED ORAL at 10:15

## 2019-01-01 RX ADMIN — MELATONIN TAB 3 MG 3 MG: 3 TAB at 21:14

## 2019-01-01 RX ADMIN — EPINEPHRINE 1 MG: 0.1 INJECTION, SOLUTION ENDOTRACHEAL; INTRACARDIAC; INTRAVENOUS at 03:38

## 2019-01-01 RX ADMIN — ONDANSETRON 8 MG: 2 INJECTION INTRAMUSCULAR; INTRAVENOUS at 00:03

## 2019-01-01 RX ADMIN — PIPERACILLIN AND TAZOBACTAM 3.38 G: 3; .375 INJECTION, POWDER, FOR SOLUTION INTRAVENOUS at 10:05

## 2019-01-01 RX ADMIN — TRAMADOL HYDROCHLORIDE 50 MG: 50 TABLET, COATED ORAL at 21:07

## 2019-01-01 RX ADMIN — Medication 40 ML: at 20:07

## 2019-01-01 RX ADMIN — GABAPENTIN 100 MG: 100 CAPSULE ORAL at 17:34

## 2019-01-01 RX ADMIN — HYDROCODONE BITARTRATE AND ACETAMINOPHEN 1 TABLET: 5; 325 TABLET ORAL at 04:55

## 2019-01-01 RX ADMIN — MELATONIN TAB 3 MG 3 MG: 3 TAB at 23:26

## 2019-01-01 RX ADMIN — Medication 10 ML: at 02:25

## 2019-01-01 RX ADMIN — PROPOFOL 20 MG: 10 INJECTION, EMULSION INTRAVENOUS at 10:04

## 2019-01-01 RX ADMIN — ALBUMIN (HUMAN) 12.5 G: 0.25 INJECTION, SOLUTION INTRAVENOUS at 09:11

## 2019-01-01 RX ADMIN — TRAMADOL HYDROCHLORIDE 50 MG: 50 TABLET, COATED ORAL at 01:24

## 2019-01-01 RX ADMIN — GABAPENTIN 100 MG: 100 CAPSULE ORAL at 09:57

## 2019-01-01 RX ADMIN — GABAPENTIN 100 MG: 100 CAPSULE ORAL at 18:00

## 2019-01-01 RX ADMIN — HYDROCODONE BITARTRATE AND ACETAMINOPHEN 1 TABLET: 5; 325 TABLET ORAL at 01:11

## 2019-01-01 RX ADMIN — GABAPENTIN 100 MG: 100 CAPSULE ORAL at 11:33

## 2019-01-01 RX ADMIN — Medication 10 ML: at 04:29

## 2019-01-01 RX ADMIN — VENLAFAXINE 37.5 MG: 37.5 TABLET ORAL at 10:36

## 2019-01-01 RX ADMIN — Medication 10 ML: at 15:50

## 2019-01-01 RX ADMIN — ISOSORBIDE MONONITRATE 30 MG: 30 TABLET, EXTENDED RELEASE ORAL at 13:07

## 2019-01-01 RX ADMIN — CALCIUM CARBONATE (ANTACID) CHEW TAB 500 MG 600 MG: 500 CHEW TAB at 18:23

## 2019-01-01 RX ADMIN — Medication 1 CAPSULE: at 11:33

## 2019-01-01 RX ADMIN — Medication 10 ML: at 23:28

## 2019-01-01 RX ADMIN — ACETAMINOPHEN 650 MG: 325 TABLET ORAL at 13:41

## 2019-01-01 RX ADMIN — TRAMADOL HYDROCHLORIDE 50 MG: 50 TABLET, COATED ORAL at 07:02

## 2019-01-01 RX ADMIN — PIPERACILLIN AND TAZOBACTAM 3.38 G: 3; .375 INJECTION, POWDER, FOR SOLUTION INTRAVENOUS at 13:08

## 2019-01-01 RX ADMIN — SODIUM CHLORIDE: 9 INJECTION, SOLUTION INTRAVENOUS at 11:33

## 2019-01-01 RX ADMIN — VENLAFAXINE 37.5 MG: 37.5 TABLET ORAL at 09:02

## 2019-01-01 RX ADMIN — FENTANYL CITRATE 50 MCG: 50 INJECTION, SOLUTION INTRAMUSCULAR; INTRAVENOUS at 12:01

## 2019-01-01 RX ADMIN — Medication 10 ML: at 22:01

## 2019-01-01 RX ADMIN — HYDROCODONE BITARTRATE AND ACETAMINOPHEN 1 TABLET: 5; 325 TABLET ORAL at 23:21

## 2019-01-01 RX ADMIN — MIDAZOLAM HYDROCHLORIDE 1 MG: 1 INJECTION, SOLUTION INTRAMUSCULAR; INTRAVENOUS at 11:38

## 2019-01-01 RX ADMIN — EPINEPHRINE 1 MG: 0.1 INJECTION, SOLUTION ENDOTRACHEAL; INTRACARDIAC; INTRAVENOUS at 03:46

## 2019-01-01 RX ADMIN — SODIUM CHLORIDE 500 ML: 900 INJECTION, SOLUTION INTRAVENOUS at 01:00

## 2019-01-01 RX ADMIN — Medication 10 ML: at 16:07

## 2019-01-01 RX ADMIN — Medication 1 CAPSULE: at 14:51

## 2019-01-01 RX ADMIN — ACETAMINOPHEN 650 MG: 325 TABLET ORAL at 15:46

## 2019-01-01 RX ADMIN — MELATONIN TAB 3 MG 3 MG: 3 TAB at 22:21

## 2019-01-01 RX ADMIN — Medication 10 ML: at 14:31

## 2019-01-01 RX ADMIN — CALCIUM CARBONATE (ANTACID) CHEW TAB 500 MG 600 MG: 500 CHEW TAB at 17:07

## 2019-01-01 RX ADMIN — CALCIUM CARBONATE (ANTACID) CHEW TAB 500 MG 600 MG: 500 CHEW TAB at 18:42

## 2019-01-01 RX ADMIN — Medication 10 ML: at 22:21

## 2019-01-01 RX ADMIN — ALBUTEROL SULFATE 2 DOSE: 2.5 SOLUTION RESPIRATORY (INHALATION) at 23:37

## 2019-01-01 RX ADMIN — CEFAZOLIN 1 G: 1 INJECTION, POWDER, FOR SOLUTION INTRAMUSCULAR; INTRAVENOUS; PARENTERAL at 12:33

## 2019-01-01 RX ADMIN — OXYCODONE HYDROCHLORIDE 5 MG: 5 TABLET ORAL at 14:37

## 2019-01-01 RX ADMIN — VANCOMYCIN HYDROCHLORIDE 750 MG: 750 INJECTION, POWDER, LYOPHILIZED, FOR SOLUTION INTRAVENOUS at 10:54

## 2019-01-01 RX ADMIN — APIXABAN 5 MG: 5 TABLET, FILM COATED ORAL at 12:14

## 2019-01-01 RX ADMIN — ISOSORBIDE MONONITRATE 30 MG: 30 TABLET, EXTENDED RELEASE ORAL at 14:51

## 2019-01-01 RX ADMIN — PIPERACILLIN AND TAZOBACTAM 3.38 G: 3; .375 INJECTION, POWDER, FOR SOLUTION INTRAVENOUS at 20:30

## 2019-01-01 RX ADMIN — Medication 1 CAPSULE: at 10:15

## 2019-01-01 RX ADMIN — EPINEPHRINE 1 MG: 0.1 INJECTION, SOLUTION ENDOTRACHEAL; INTRACARDIAC; INTRAVENOUS at 02:50

## 2019-01-01 RX ADMIN — SODIUM BICARBONATE 100 MEQ: 84 INJECTION INTRAVENOUS at 02:52

## 2019-01-01 RX ADMIN — GABAPENTIN 100 MG: 100 CAPSULE ORAL at 18:16

## 2019-01-01 RX ADMIN — ACETAMINOPHEN 650 MG: 325 TABLET ORAL at 03:22

## 2019-01-01 RX ADMIN — CARVEDILOL 3.12 MG: 3.12 TABLET, FILM COATED ORAL at 17:34

## 2019-01-01 RX ADMIN — HYDROCODONE BITARTRATE AND ACETAMINOPHEN 1 TABLET: 5; 325 TABLET ORAL at 18:23

## 2019-01-01 RX ADMIN — CARVEDILOL 6.25 MG: 6.25 TABLET, FILM COATED ORAL at 16:52

## 2019-01-01 RX ADMIN — Medication 1 CAPSULE: at 12:30

## 2019-01-01 RX ADMIN — MELATONIN TAB 3 MG 3 MG: 3 TAB at 00:12

## 2019-01-01 RX ADMIN — VANCOMYCIN HYDROCHLORIDE 500 MG: 1 INJECTION, POWDER, LYOPHILIZED, FOR SOLUTION INTRAVENOUS at 11:00

## 2019-01-01 RX ADMIN — Medication 10 ML: at 21:07

## 2019-01-01 RX ADMIN — Medication 1 CAPSULE: at 08:56

## 2019-01-01 RX ADMIN — ISOSORBIDE MONONITRATE 30 MG: 30 TABLET, EXTENDED RELEASE ORAL at 09:57

## 2019-01-01 RX ADMIN — PIPERACILLIN AND TAZOBACTAM 3.38 G: 3; .375 INJECTION, POWDER, FOR SOLUTION INTRAVENOUS at 21:19

## 2019-01-01 RX ADMIN — VENLAFAXINE 37.5 MG: 37.5 TABLET ORAL at 14:23

## 2019-01-01 RX ADMIN — SODIUM CHLORIDE 1000 ML: 900 INJECTION, SOLUTION INTRAVENOUS at 19:31

## 2019-01-01 RX ADMIN — CARVEDILOL 6.25 MG: 6.25 TABLET, FILM COATED ORAL at 17:58

## 2019-01-01 RX ADMIN — Medication 40 ML: at 21:14

## 2019-01-01 RX ADMIN — DEXTROSE MONOHYDRATE 25 G: 500 INJECTION PARENTERAL at 02:58

## 2019-01-01 RX ADMIN — KETAMINE HYDROCHLORIDE 20 MG: 10 INJECTION, SOLUTION INTRAMUSCULAR; INTRAVENOUS at 11:38

## 2019-01-01 RX ADMIN — CALCIUM CARBONATE (ANTACID) CHEW TAB 500 MG 600 MG: 500 CHEW TAB at 17:34

## 2019-01-01 RX ADMIN — ISOSORBIDE MONONITRATE 30 MG: 30 TABLET, EXTENDED RELEASE ORAL at 08:56

## 2019-01-01 RX ADMIN — GABAPENTIN 100 MG: 100 CAPSULE ORAL at 21:58

## 2019-01-01 RX ADMIN — FENTANYL CITRATE 50 MCG: 50 INJECTION, SOLUTION INTRAMUSCULAR; INTRAVENOUS at 11:59

## 2019-01-01 RX ADMIN — VENLAFAXINE 37.5 MG: 37.5 TABLET ORAL at 12:32

## 2019-01-01 RX ADMIN — Medication 10 ML: at 05:27

## 2019-01-01 RX ADMIN — Medication 1 CAPSULE: at 10:29

## 2019-01-01 RX ADMIN — Medication 1 CAPSULE: at 13:07

## 2019-01-01 RX ADMIN — GABAPENTIN 100 MG: 100 CAPSULE ORAL at 18:23

## 2019-01-01 RX ADMIN — ACETAMINOPHEN 650 MG: 325 TABLET ORAL at 05:35

## 2019-01-01 RX ADMIN — MELATONIN TAB 3 MG 3 MG: 3 TAB at 21:57

## 2019-01-01 RX ADMIN — ISOSORBIDE MONONITRATE 30 MG: 30 TABLET, EXTENDED RELEASE ORAL at 10:29

## 2019-01-30 PROBLEM — R78.81 BACTEREMIA: Status: RESOLVED | Noted: 2017-09-15 | Resolved: 2019-01-01

## 2019-01-30 PROBLEM — T14.8XXA HEMATOMA: Status: RESOLVED | Noted: 2018-09-28 | Resolved: 2019-01-01

## 2019-01-30 PROBLEM — I10 HTN (HYPERTENSION): Chronic | Status: RESOLVED | Noted: 2017-09-14 | Resolved: 2019-01-01

## 2019-01-30 PROBLEM — L02.211 ABDOMINAL WALL ABSCESS: Status: RESOLVED | Noted: 2017-09-14 | Resolved: 2019-01-01

## 2019-01-30 PROBLEM — N18.6 ESRD (END STAGE RENAL DISEASE) (HCC): Chronic | Status: RESOLVED | Noted: 2017-09-14 | Resolved: 2019-01-01

## 2019-01-30 NOTE — PROGRESS NOTES
Subjective:  
  
Scott Atkins is a 66 y.o. female with h/o ESRD on dialysis (on Tue, Thu, Sat), hypertension, atrial fibrillation, arthritis here today to establish care. She is accompanied today by her home aide. Patient with concern for skin infection of the abdomen. Reports noticed bloody drainage coming from a previous surgical wound on her abdomen last night after bathing. Did notice some mild discharge again today. Denies fever, chills. She has not felt any abscess in the area. No evaluation or treatment to date. Patient Care Team: 
Zara Murillo MD (Cardiology) Alex Perez MD (Nephrology) Man Parsons MD (Cardiology) Current Outpatient Medications Medication Sig Dispense Refill  gabapentin (NEURONTIN) 100 mg capsule Take 1 Cap by mouth two (2) times a day. 3  
 calcium acetate (PHOSLO) 667 mg cap Take 1 Cap by mouth three (3) times daily. 3  
 lisinopril (PRINIVIL, ZESTRIL) 5 mg tablet Take 1 Tab by mouth daily. 30 Tab 6  
 venlafaxine (EFFEXOR) 37.5 mg tablet Take 37.5 mg by mouth daily.  vitamin E (AQUA GEMS) 400 unit capsule Take 400 Units by mouth daily.  carvedilol (COREG) 12.5 mg tablet Take 1 Tab by mouth two (2) times daily (with meals). 60 Tab 0  
 apixaban (ELIQUIS) 5 mg tablet Take 1 Tab by mouth two (2) times a day. 60 Tab 0  
 zolpidem (AMBIEN) 5 mg tablet Take 5 mg by mouth nightly as needed for Sleep.  FOLIC ACID/VIT BCOMP,C (DIALYVITE 800 PO) Take 1 Tab by mouth daily.  isosorbide mononitrate ER (IMDUR) 60 mg CR tablet Take 30 mg by mouth daily. Half-tab  calcium carbonate (TUMS) 200 mg calcium (500 mg) chew Take 3 Tabs by mouth two (2) times daily (with meals). Allergies Allergen Reactions  Iron Anaphylaxis Per pt, to PO formulation only, can tolerate IV formulation Allergy to excipient? Past medical history - reviewed. Past Medical History:  
Diagnosis Date  Arthritis  Chronic kidney disease Dr Isaura Curtis  ESRD on hemodialysis ([facility] HD Tu,Thu,Sat   
 History of creation of ostomy ([facility] Left abdomen  Hypertension  Ill-defined condition Dialysis [facility] Obese 9/14/2017 Social history - reviewed. Social History Tobacco Use  Smoking status: Current Every Day Smoker Packs/day: 0.50  Smokeless tobacco: Never Used Substance Use Topics  Alcohol use: No  
  
 
Family history - reviewed. Family History Problem Relation Age of Onset  Hypertension Other   
     multiple family members Review of Systems Pertinent items are noted in HPI. Objective:  
 
Visit Vitals /72 (BP 1 Location: Right arm, BP Patient Position: Sitting) Comment: Manaual  
Pulse 78 Temp 98.7 °F (37.1 °C) (Oral) Comment: . Resp 18 Ht 5' 4\" (1.626 m) Wt 144 lb 6.4 oz (65.5 kg) SpO2 94% BMI 24.79 kg/m² General appearance - alert, well appearing, and in no distress Eyes - pupils equal and reactive, extraocular eye movements intact, sclera anicteric Oropharyngx - mucous membranes moist, pharynx normal without lesions Neck - supple, no significant adenopathy Chest - clear to auscultation, no wheezes, rales or rhonchi, symmetric air entry, no tachypnea, retractions or cyanosis Heart - normal rate, regular rhythm, normal S1, S2, no murmurs, rubs, clicks or gallops Abdomen - soft, nontender, nondistended, (+) bowel sounds, ostomy present left abdomen Skin - fistulas in BUE, left abdomen with bloody discharge noted near ostomy, no appreciable underlying fluctuance or induration, no skin lesions seen Mental Status - alert, oriented to person, place, and time, normal mood, behavior, speech, dress, motor activity, and thought processes Assessment/Plan:  
Scott Atkins is a 66 y.o. female seen for:  
 
1. Bacterial skin infection 
- amoxicillin-clavulanate (AUGMENTIN) 500-125 mg per tablet;  Take 1 Tab by mouth daily for 10 days. Dispense: 10 Tab; Refill: 0 
- keep area clean and dry  
- advised ED evaluation if worsening drainage noted, new symptoms arise, or no improvement noted in 48 hours 2. ESRD on hemodialysis [facility]): 3. History of creation of ostomy [facility]): left abdomen. 4. Encounter to establish care: previous medical records requested. I have discussed the diagnosis with the patient and the intended plan as seen in the above orders. The patient has received an after-visit summary and questions were answered concerning future plans. I have discussed medication side effects and warnings with the patient as well. Patient verbalizes understanding of plan of care and denies further questions or concerns at this time. Informed patient to return to the office if symptoms worsen or if new symptoms arise. Follow-up Disposition: Not on File

## 2019-01-30 NOTE — PROGRESS NOTES
Identified pt with two pt identifiers(name and ). Chief Complaint Patient presents with [facility] Establish Care PCP formerly Dr. Radha Herring. Dialysis  is tues thrus and sat in [facility] Maintenance Due Topic  GLAUCOMA SCREENING Q2Y  Bone Densitometry (Dexa) Screening  Pneumococcal 65+ High/Highest Risk (1 of 2 - PCV13)  MEDICARE YEARLY EXAM   
 Influenza Age 5 to Adult Wt Readings from Last 3 Encounters:  
19 144 lb 6.4 oz (65.5 kg) 10/31/18 136 lb 6.4 oz (61.9 kg) 18 138 lb 14.2 oz (63 kg) Temp Readings from Last 3 Encounters:  
19 98.7 °F (37.1 °C) (Oral) 10/01/18 97.8 °F (36.6 °C)  
18 98.1 °F (36.7 °C) BP Readings from Last 3 Encounters:  
19 102/72  
10/31/18 90/50  
10/01/18 99/45 Pulse Readings from Last 3 Encounters:  
19 78  
10/31/18 60  
10/01/18 68 Learning Assessment: 
:  
 
Learning Assessment 2019 PRIMARY LEARNER Patient PRIMARY LANGUAGE ENGLISH  
LEARNER PREFERENCE PRIMARY DEMONSTRATION  
ANSWERED BY self RELATIONSHIP SELF Depression Screening: 
:  
 
PHQ over the last two weeks 2019 Little interest or pleasure in doing things Not at all Feeling down, depressed, irritable, or hopeless Not at all Total Score PHQ 2 0 Fall Risk Assessment: 
:  
 
Fall Risk Assessment, last 12 mths 2019 Able to walk? Yes Fall in past 12 months? No  
 
 
Abuse Screening: 
:  
 
Abuse Screening Questionnaire 2019 Do you ever feel afraid of your partner? Waneta Jack Are you in a relationship with someone who physically or mentally threatens you? Waneta Jack Is it safe for you to go home? Maikel Stout Coordination of Care Questionnaire: 
:  
 
1) Have you been to an emergency room, urgent care clinic since your last visit? no  
Hospitalized since your last visit? no          
 
2) Have you seen or consulted any other health care providers outside of [facility] since your last visit? yes Dr. Jaime Sanders Nephrology  (Include any pap smears or colon screenings in this section.) 3) Do you have an Advance Directive on file? no 
Are you interested in receiving information about Advance Directives? No 
 
Reviewed record in preparation for visit and have obtained necessary documentation. Medication reconciliation up to date and corrected with patient at this time.

## 2019-02-07 NOTE — ED TRIAGE NOTES
Pt arrives via EMS complaining of left calf pain that began a few days ago. She reports the pain having been tolerable but today pain is 10/10. Pt completed dialysis today

## 2019-02-07 NOTE — ED NOTES
Pt resting on stretcher, reports continued leg pain. Awaiting imaging results. Pt has no needs at this time. Call thompson in reach.

## 2019-02-07 NOTE — ED NOTES
The patient was discharged home by [place], [place]  in stable condition. The patient is alert and oriented, in no respiratory distress and discharge vital signs obtained. The patient's diagnosis, condition and treatment were explained. The patient expressed understanding. No prescriptions given. No work/school note given. A discharge plan has been developed. A  was not involved in the process. Aftercare instructions were given. Pt ambulatory out of the ED with personal walker.

## 2019-02-07 NOTE — ED PROVIDER NOTES
66 y.o. female with past medical history significant for HTN, ESRD,  presents with complaints of left lower leg pain x 4 days. The pt states that the pain is behind her left knee. \"It feels like it did the last time I had a Baker's cyst.\"  The pt states that ambulating makes the pain worse. The pt rates the pain as a 6/10 in severity. The pt describes the pain as a dull ache. The pt denies taking anything at home for the discomfort. Denies chest pain or shortness of breath. There are no other acute medical complaints at this time. PCP: MD Royal Cuevas PA-C Past Medical History:  
Diagnosis Date  Arthritis  Chronic kidney disease Dr Alyssa Villaseñor  ESRD on hemodialysis ([facility] [address] HD Tu,Thu,Sat   
 History of creation of ostomy ([facility] [address] Left abdomen  Hypertension  Ill-defined condition Dialysis [place] Pain Obese 9/14/2017 Past Surgical History:  
Procedure Laterality Date  HX COLOSTOMY    
 left abdomen  HX PACEMAKER  09/13/2018  
 placed in left abdomen.  HX VASCULAR ACCESS    
 left upper arm fistula Family History:  
Problem Relation Age of Onset  Hypertension Other   
     multiple family members Social History Socioeconomic History  Marital status:  Spouse name: Not on file  Number of children: Not on file  Years of education: Not on file  Highest education level: Not on file Social Needs  Financial resource strain: Not on file  Food insecurity - worry: Not on file  Food insecurity - inability: Not on file  Transportation needs - medical: Not on file  Transportation needs - non-medical: Not on file Occupational History  Not on file Tobacco Use  Smoking status: Current Every Day Smoker Packs/day: 0.50  Smokeless tobacco: Never Used Substance and Sexual Activity  Alcohol use: No  
 Drug use: No  
 Sexual activity: Not on file Other Topics Concern  Not on file Social History Narrative  Not on file ALLERGIES: Iron Review of Systems Constitutional: Negative for chills, diaphoresis and fever. HENT: Negative for congestion, postnasal drip, rhinorrhea and sore throat. Eyes: Negative for photophobia, discharge, redness and visual disturbance. Respiratory: Negative for cough, chest tightness, shortness of breath and wheezing. Cardiovascular: Negative for chest pain, palpitations and leg swelling. Gastrointestinal: Negative for abdominal distention, abdominal pain, blood in stool, constipation, diarrhea, nausea and vomiting. Genitourinary: Negative for difficulty urinating, dysuria, frequency, hematuria and urgency. Musculoskeletal: Positive for arthralgias and myalgias. Negative for back pain and joint swelling. Skin: Negative for color change and rash. Neurological: Negative for dizziness, speech difficulty, weakness, light-headedness, numbness and headaches. Psychiatric/Behavioral: Negative for confusion. The patient is not nervous/anxious. All other systems reviewed and are negative. Vitals:  
 02/07/19 1415 02/07/19 1438 02/07/19 1536 02/07/19 1537 BP:   114/63 Pulse:  94 Resp:      
Temp:      
SpO2: 98%   95% Weight:      
Height:      
      
 
Physical Exam  
Constitutional: She is oriented to person, place, and time. She appears well-developed and well-nourished. No distress. HENT:  
Head: Normocephalic and atraumatic. Head is without raccoon's eyes, without Hernandez's sign and without laceration. Right Ear: Hearing, tympanic membrane, external ear and ear canal normal. No foreign bodies. Tympanic membrane is not bulging. No hemotympanum. Left Ear: Hearing, tympanic membrane, external ear and ear canal normal. No foreign bodies. Tympanic membrane is not bulging. No hemotympanum. Nose: Nose normal. No mucosal edema or rhinorrhea.  Right sinus exhibits no maxillary sinus tenderness and no frontal sinus tenderness. Left sinus exhibits no maxillary sinus tenderness and no frontal sinus tenderness. Mouth/Throat: Uvula is midline, oropharynx is clear and moist and mucous membranes are normal. No tonsillar abscesses. Eyes: Conjunctivae and EOM are normal. Pupils are equal, round, and reactive to light. Right eye exhibits no discharge. Left eye exhibits no discharge. Neck: Normal range of motion. Neck supple. Cardiovascular: Normal rate, regular rhythm and normal heart sounds. Exam reveals no gallop and no friction rub. No murmur heard. Regular rate and rhythm. No murmurs, gallops, rubs, or clicks. Pulmonary/Chest: Effort normal and breath sounds normal. No respiratory distress. She has no wheezes. She has no rales. No stridor or wheezes. No accessory muscle usage. No nasal flaring. Breath Sounds equal bilaterally. Abdominal: Soft. Bowel sounds are normal. She exhibits no distension. There is no tenderness. There is no rebound and no guarding. No abdominal Bruits. No pulsatile mass. No abdominal scars. Active bowel sounds. Musculoskeletal: Normal range of motion. She exhibits no edema, tenderness or deformity. No C, T, L, S spine tenderness. Pt has full mobility of upper and lower extremities. Pt is able to ambulate without difficulty. No perineal numbness. Pt is NVI. Neurological: She is alert and oriented to person, place, and time. Skin: She is not diaphoretic. Nursing note and vitals reviewed. MDM Number of Diagnoses or Management Options Left knee pain, unspecified chronicity:  
Diagnosis management comments: Imaging revealed osteoarthritis. Also informed patient of lucency on her x-ray and explained importance of following up. Will treat symptomatically for pain. Reviewed treatment plan with attending and they agree. Pankaj Sosa PA-C Procedures

## 2019-02-07 NOTE — ED NOTES
Pt resting on stretcher, reports pain level has gone down some since medication administration. Pt declines ambulation at this time because her leg hurts too much. Discussed ambulation after imaging, pt agreable.

## 2019-02-07 NOTE — DISCHARGE INSTRUCTIONS
Patient Education        Knee Pain or Injury: Care Instructions  Your Care Instructions    Injuries are a common cause of knee problems. Sudden (acute) injuries may be caused by a direct blow to the knee. They can also be caused by abnormal twisting, bending, or falling on the knee. Pain, bruising, or swelling may be severe, and may start within minutes of the injury. Overuse is another cause of knee pain. Other causes are climbing stairs, kneeling, and other activities that use the knee. Everyday wear and tear, especially as you get older, also can cause knee pain. Rest, along with home treatment, often relieves pain and allows your knee to heal. If you have a serious knee injury, you may need tests and treatment. Follow-up care is a key part of your treatment and safety. Be sure to make and go to all appointments, and call your doctor if you are having problems. It's also a good idea to know your test results and keep a list of the medicines you take. How can you care for yourself at home? · Be safe with medicines. Read and follow all instructions on the label. ? If the doctor gave you a prescription medicine for pain, take it as prescribed. ? If you are not taking a prescription pain medicine, ask your doctor if you can take an over-the-counter medicine. · Rest and protect your knee. Take a break from any activity that may cause pain. · Put ice or a cold pack on your knee for 10 to 20 minutes at a time. Put a thin cloth between the ice and your skin. · Prop up a sore knee on a pillow when you ice it or anytime you sit or lie down for the next 3 days. Try to keep it above the level of your heart. This will help reduce swelling. · If your knee is not swollen, you can put moist heat, a heating pad, or a warm cloth on your knee. · If your doctor recommends an elastic bandage, sleeve, or other type of support for your knee, wear it as directed.   · Follow your doctor's instructions about how much weight you can put on your leg. Use a cane, crutches, or a walker as instructed. · Follow your doctor's instructions about activity during your healing process. If you can do mild exercise, slowly increase your activity. · Reach and stay at a healthy weight. Extra weight can strain the joints, especially the knees and hips, and make the pain worse. Losing even a few pounds may help. When should you call for help? Call 911 anytime you think you may need emergency care. For example, call if:    · You have symptoms of a blood clot in your lung (called a pulmonary embolism). These may include:  ? Sudden chest pain. ? Trouble breathing. ? Coughing up blood.    Call your doctor now or seek immediate medical care if:    · You have severe or increasing pain.     · Your leg or foot turns cold or changes color.     · You cannot stand or put weight on your knee.     · Your knee looks twisted or bent out of shape.     · You cannot move your knee.     · You have signs of infection, such as:  ? Increased pain, swelling, warmth, or redness. ? Red streaks leading from the knee. ? Pus draining from a place on your knee. ? A fever.     · You have signs of a blood clot in your leg (called a deep vein thrombosis), such as:  ? Pain in your calf, back of the knee, thigh, or groin. ? Redness and swelling in your leg or groin.    Watch closely for changes in your health, and be sure to contact your doctor if:    · You have tingling, weakness, or numbness in your knee.     · You have any new symptoms, such as swelling.     · You have bruises from a knee injury that last longer than 2 weeks.     · You do not get better as expected. Where can you learn more? Go to http://daron-rizwana.info/. Enter K195 in the search box to learn more about \"Knee Pain or Injury: Care Instructions. \"  Current as of: September 23, 2018  Content Version: 11.9  © 4109-4547 [employer].  Care instructions adapted under license by [employer] (which disclaims liability or warranty for this information). If you have questions about a medical condition or this instruction, always ask your healthcare professional. [address] any warranty or liability for your use of this information. We hope that we have addressed all of your medical concerns. The examination and treatment you received in the Emergency Department were for an emergent problem and were not intended as complete care. It is important that you follow up with your healthcare provider(s) for ongoing care. If your symptoms worsen or do not improve as expected, and you are unable to reach your usual health care provider(s), you should return to the Emergency Department. Today's healthcare is undergoing tremendous change, and patient satisfaction surveys are one of the many tools to assess the quality of medical care. You may receive a survey from the [employer] regarding your experience in the Emergency Department. I hope that your experience has been completely positive, particularly the medical care that I provided. As such, please participate in the survey; anything less than excellent does not meet my expectations or intentions. [address] and [address] [place] [address] participate in nationally recognized quality of care measures. If your blood pressure is greater than 120/80, as reported below, we urge that you seek medical care to address the potential of high blood pressure, commonly known as hypertension. Hypertension can be hereditary or can be caused by certain medical conditions, pain, stress, or \"white coat syndrome. \"       Please make an appointment with your health care provider(s) for follow up of your Emergency Department visit.        VITALS:   Patient Vitals for the past 8 hrs:   Temp Pulse Resp BP SpO2   02/07/19 1438 -- 94 -- -- --   02/07/19 1415 -- -- -- -- 98 % 02/07/19 1215 -- -- -- -- 98 %   02/07/19 1144 98.2 °F (36.8 °C) 90 12 134/66 97 %          Thank you for allowing us to provide you with medical care today. We realize that you have many choices for your emergency care needs. Please choose us in the future for any continued health care needs. Zaid Ovalle, [address] [place] [address] 20.   Office: [phone]            Recent Results (from the past 24 hour(s))   CBC WITH AUTOMATED DIFF    Collection Time: 02/07/19 12:03 PM   Result Value Ref Range    WBC 11.1 (H) 3.6 - 11.0 K/uL    RBC 3.53 (L) 3.80 - 5.20 M/uL    HGB 12.0 11.5 - 16.0 g/dL    HCT 36.0 35.0 - 47.0 %    .0 (H) 80.0 - 99.0 FL    MCH 34.0 26.0 - 34.0 PG    MCHC 33.3 30.0 - 36.5 g/dL    RDW 16.1 (H) 11.5 - 14.5 %    PLATELET 376 132 - 097 K/uL    MPV 10.4 8.9 - 12.9 FL    NEUTROPHILS 67 32 - 75 %    LYMPHOCYTES 14 12 - 49 %    MONOCYTES 18 (H) 5 - 13 %    EOSINOPHILS 1 0 - 7 %    BASOPHILS 0 0 - 1 %    ABS. NEUTROPHILS 7.4 1.8 - 8.0 K/UL    ABS. LYMPHOCYTES 1.6 0.8 - 3.5 K/UL    ABS. MONOCYTES 2.0 (H) 0.0 - 1.0 K/UL    ABS. EOSINOPHILS 0.1 0.0 - 0.4 K/UL    ABS. BASOPHILS 0.0 0.0 - 0.1 K/UL    DF AUTOMATED      XXWBCSUS 0     METABOLIC PANEL, COMPREHENSIVE    Collection Time: 02/07/19 12:03 PM   Result Value Ref Range    Sodium 135 (L) 136 - 145 mmol/L    Potassium 3.4 (L) 3.5 - 5.1 mmol/L    Chloride 95 (L) 97 - 108 mmol/L    CO2 28 21 - 32 mmol/L    Anion gap 12 5 - 15 mmol/L    Glucose 93 65 - 100 mg/dL    BUN 14 6 - 20 MG/DL    Creatinine 3.79 (H) 0.55 - 1.02 MG/DL    BUN/Creatinine ratio 4 (L) 12 - 20      GFR est AA 14 (L) >60 ml/min/1.73m2    GFR est non-AA 12 (L) >60 ml/min/1.73m2    Calcium 9.0 8.5 - 10.1 MG/DL    Bilirubin, total 0.4 0.2 - 1.0 MG/DL    ALT (SGPT) 17 12 - 78 U/L    AST (SGOT) 23 15 - 37 U/L    Alk.  phosphatase 153 (H) 45 - 117 U/L    Protein, total 9.4 (H) 6.4 - 8.2 g/dL    Albumin 3.2 (L) 3.5 - 5.0 g/dL    Globulin 6.2 (H) 2.0 - 4.0 g/dL    A-G Ratio 0.5 (L) 1.1 - 2.2     ACETAMINOPHEN    Collection Time: 02/07/19 12:03 PM   Result Value Ref Range    Acetaminophen level <2 (L) 10 - 30 ug/mL       Xr Femur Lt 2 V    Result Date: 2/7/2019  EXAM:  XR FEMUR LT 2 V INDICATION: Leg pain. COMPARISON: None. FINDINGS: Four images which comprise AP and lateral views of the left femur demonstrate no fracture or other acute osseous or articular abnormality. The bones are osteopenic. 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 There are degenerative changes of the knee with joint space narrowing and sclerosis and osteophyte formation. There is a suprapatellar knee effusion. There is lucency in the proximal tibia medially there are vascular calcifications. The soft tissues are within normal limits. IMPRESSION: Osteopenia. No acute fracture. Degenerative changes of the knee with knee effusion and lucency in the proximal tibia. Xr Tib/fib Lt    Result Date: 2/7/2019  EXAM:  XR TIB/FIB LT INDICATION: Left lower leg pain. COMPARISON: None. FINDINGS: AP and lateral views of the distal left tibia and fibula demonstrate no fracture or other acute osseous or articular  abnormality. The soft tissues are within normal limits. The proximal tibia and fibula are not included. The bones are osteopenic. IMPRESSION: Normal distal left tibia and fibula.

## 2019-02-07 NOTE — ED NOTES
Pt ambulated to restroom with walker. Pt reports pain level about an 8 while ambulating. Typically pt pain level is 5/10. Pt ready to go home.

## 2019-02-08 NOTE — TELEPHONE ENCOUNTER
Patient is calling and stated that since she was in earlier in the week she had to go to the ER with extreme leg pain especially in her left leg. She went to [facility] ER. She is asking if something can be given to her for pain. Please call her back at [phone]. She was instructed by ER to call pcp.

## 2019-02-11 NOTE — TELEPHONE ENCOUNTER
Forwarded from the call center;    Brigette White, [address] [facility] Office Pool             Pt nir Han calling on behalf of pt requesting a prescription for leg pain sent to [facility].  Best contact [phone].

## 2019-02-11 NOTE — TELEPHONE ENCOUNTER
Patient is calling back as she is in extreme pain.   Please call her if at all possible today at [phone]

## 2019-02-12 NOTE — PROGRESS NOTES
Identified pt with two pt identifiers(name and ). Chief Complaint   Patient presents with    Leg Pain     began about 2 weeks ago. Left leg. Health Maintenance Due   Topic    GLAUCOMA SCREENING Q2Y     Bone Densitometry (Dexa) Screening     Pneumococcal 65+ High/Highest Risk (1 of 2 - PCV13)    MEDICARE YEARLY EXAM     Influenza Age 5 to Adult        Wt Readings from Last 3 Encounters:   19 143 lb 4.8 oz (65 kg)   19 143 lb 4.8 oz (65 kg)   19 144 lb 6.4 oz (65.5 kg)     Temp Readings from Last 3 Encounters:   19 97.6 °F (36.4 °C) (Oral)   19 98.2 °F (36.8 °C)   19 98.7 °F (37.1 °C) (Oral)     BP Readings from Last 3 Encounters:   19 110/76   19 114/63   19 102/72     Pulse Readings from Last 3 Encounters:   19 78   19 94   19 78         Learning Assessment:  :     Learning Assessment 2019   PRIMARY LEARNER Patient   PRIMARY LANGUAGE ENGLISH   LEARNER PREFERENCE PRIMARY DEMONSTRATION   ANSWERED BY self   RELATIONSHIP SELF       Depression Screening:  :     3 most recent PHQ Screens 2019   Little interest or pleasure in doing things Not at all   Feeling down, depressed, irritable, or hopeless Not at all   Total Score PHQ 2 0       Fall Risk Assessment:  :     Fall Risk Assessment, last 12 mths 2019   Able to walk? Yes   Fall in past 12 months? No       Abuse Screening:  :     Abuse Screening Questionnaire 2019   Do you ever feel afraid of your partner? N   Are you in a relationship with someone who physically or mentally threatens you? N   Is it safe for you to go home?  Y       Coordination of Care Questionnaire:  :     1) Have you been to an emergency room, urgent care clinic since your last visit? no   Hospitalized since your last visit? no             2) Have you seen or consulted any other health care providers outside of [facility] since your last visit? no  (Include any pap smears or colon screenings in this section.)    3) Do you have an Advance Directive on file? no  Are you interested in receiving information about Advance Directives? no    Reviewed record in preparation for visit and have obtained necessary documentation. Medication reconciliation up to date and corrected with patient at this time.

## 2019-02-12 NOTE — TELEPHONE ENCOUNTER
Patient would like to cancel her appt for tomorrow 2/13/19. From the notes it sounds like a wound check?      Phone: [phone]

## 2019-02-12 NOTE — PROGRESS NOTES
Subjective:      Jaquelyn Cranker is a 66 y.o. female here with complaint of pain in the left knee and leg. Onset was 2 weeks ago. Pain is aching and burning in quality, constant. Pain is worse with standing. Alleviating factors: none reported. Has been taking Tylenol every 4 hours without much improvement. Evaluated at [facility] on 2/7/19. ED workup with negative LLE venous duplex, XR left fib/tib without acute process. Left knee XR notable for degenerative changes and effusion. Discharged home with PCP follow up. Current Outpatient Medications   Medication Sig Dispense Refill    acetaminophen (TYLENOL EXTRA STRENGTH) 500 mg tablet Take 1,500 mg by mouth every four (4) hours.  gabapentin (NEURONTIN) 100 mg capsule Take 1 Cap by mouth two (2) times a day. 3    lisinopril (PRINIVIL, ZESTRIL) 5 mg tablet Take 1 Tab by mouth daily. 30 Tab 6    venlafaxine (EFFEXOR) 37.5 mg tablet Take 37.5 mg by mouth daily.  vitamin E (AQUA GEMS) 400 unit capsule Take 400 Units by mouth daily.  carvedilol (COREG) 12.5 mg tablet Take 1 Tab by mouth two (2) times daily (with meals). 60 Tab 0    apixaban (ELIQUIS) 5 mg tablet Take 1 Tab by mouth two (2) times a day. 60 Tab 0    zolpidem (AMBIEN) 5 mg tablet Take 5 mg by mouth nightly as needed for Sleep.  FOLIC ACID/VIT BCOMP,C (DIALYVITE 800 PO) Take 1 Tab by mouth daily.  isosorbide mononitrate ER (IMDUR) 60 mg CR tablet Take 30 mg by mouth daily. Half-tab      calcium carbonate (TUMS) 200 mg calcium (500 mg) chew Take 3 Tabs by mouth two (2) times daily (with meals). Allergies   Allergen Reactions    Iron Anaphylaxis     Per pt, to PO formulation only, can tolerate IV formulation  Allergy to excipient?        Past Medical History:   Diagnosis Date    Arthritis     Chronic kidney disease     Dr Ricky Hanks ESRD on hemodialysis [facility])     HD Tu,Thu,Sat     History of creation of ostomy ([address]     Left abdomen    Hypertension     Ill-defined condition     Dialysis T, Th, S    Obese 9/14/2017       Social History     Tobacco Use    Smoking status: Current Every Day Smoker     Packs/day: 0.50    Smokeless tobacco: Never Used   Substance Use Topics    Alcohol use: No        Review of Systems  Pertinent items are noted in HPI. Objective:     Visit Vitals  /76 (BP 1 Location: Right arm, BP Patient Position: Sitting) Comment: Arya;   Pulse 78   Temp 97.6 °F (36.4 °C) (Oral) Comment: .   Resp 18   Ht 5' 2\" (1.575 m)   Wt 143 lb 4.8 oz (65 kg)   SpO2 98%   BMI 26.21 kg/m²      General appearance - alert, well appearing, and in no distress  Chest - respirations unlabored   Extremities - peripheral pulses normal, no pedal edema, no clubbing or cyanosis  Musculoskeletal - TTP of left anterior leg and calf without palpable cords, left knee with reduced range of motion secondary to pain, no appreciable edema, TTP along anterior knee     Assessment/Plan:   Wilson Dent is a 66 y.o. female seen for:     1. Arthritis of knee, left: continue with acetaminophen as needed. Unable to use NSAID therapy due to ESRD. Will prescribe short course of tramadol PRN for pain. Advised that should symptoms continue, she may need evaluation for possible cortisone injection. Patient will let me know. - traMADol (ULTRAM) 50 mg tablet; Take 1 Tab by mouth every twelve (12) hours as needed for Pain. Max Daily Amount: 100 mg. Dispense: 14 Tab; Refill: 0    2. Left leg pain  - traMADol (ULTRAM) 50 mg tablet; Take 1 Tab by mouth every twelve (12) hours as needed for Pain. Max Daily Amount: 100 mg. Dispense: 14 Tab; Refill: 0    I have discussed the diagnosis with the patient and the intended plan as seen in the above orders. The patient has received an after-visit summary and questions were answered concerning future plans. I have discussed medication side effects and warnings with the patient as well.  Patient verbalizes understanding of plan of care and denies further questions or concerns at this time. Informed patient to return to the office if symptoms worsen or if new symptoms arise. Follow-up Disposition:  Return if symptoms worsen or fail to improve.

## 2019-02-12 NOTE — PATIENT INSTRUCTIONS
Knee Pain or Injury: Care Instructions  Your Care Instructions    Injuries are a common cause of knee problems. Sudden (acute) injuries may be caused by a direct blow to the knee. They can also be caused by abnormal twisting, bending, or falling on the knee. Pain, bruising, or swelling may be severe, and may start within minutes of the injury. Overuse is another cause of knee pain. Other causes are climbing stairs, kneeling, and other activities that use the knee. Everyday wear and tear, especially as you get older, also can cause knee pain. Rest, along with home treatment, often relieves pain and allows your knee to heal. If you have a serious knee injury, you may need tests and treatment. Follow-up care is a key part of your treatment and safety. Be sure to make and go to all appointments, and call your doctor if you are having problems. It's also a good idea to know your test results and keep a list of the medicines you take. How can you care for yourself at home? · Be safe with medicines. Read and follow all instructions on the label. ? If the doctor gave you a prescription medicine for pain, take it as prescribed. ? If you are not taking a prescription pain medicine, ask your doctor if you can take an over-the-counter medicine. · Rest and protect your knee. Take a break from any activity that may cause pain. · Put ice or a cold pack on your knee for 10 to 20 minutes at a time. Put a thin cloth between the ice and your skin. · Prop up a sore knee on a pillow when you ice it or anytime you sit or lie down for the next 3 days. Try to keep it above the level of your heart. This will help reduce swelling. · If your knee is not swollen, you can put moist heat, a heating pad, or a warm cloth on your knee. · If your doctor recommends an elastic bandage, sleeve, or other type of support for your knee, wear it as directed.   · Follow your doctor's instructions about how much weight you can put on your leg. Use a cane, crutches, or a walker as instructed. · Follow your doctor's instructions about activity during your healing process. If you can do mild exercise, slowly increase your activity. · Reach and stay at a healthy weight. Extra weight can strain the joints, especially the knees and hips, and make the pain worse. Losing even a few pounds may help. When should you call for help? Call 911 anytime you think you may need emergency care. For example, call if:    · You have symptoms of a blood clot in your lung (called a pulmonary embolism). These may include:  ? Sudden chest pain. ? Trouble breathing. ? Coughing up blood.    Call your doctor now or seek immediate medical care if:    · You have severe or increasing pain.     · Your leg or foot turns cold or changes color.     · You cannot stand or put weight on your knee.     · Your knee looks twisted or bent out of shape.     · You cannot move your knee.     · You have signs of infection, such as:  ? Increased pain, swelling, warmth, or redness. ? Red streaks leading from the knee. ? Pus draining from a place on your knee. ? A fever.     · You have signs of a blood clot in your leg (called a deep vein thrombosis), such as:  ? Pain in your calf, back of the knee, thigh, or groin. ? Redness and swelling in your leg or groin.    Watch closely for changes in your health, and be sure to contact your doctor if:    · You have tingling, weakness, or numbness in your knee.     · You have any new symptoms, such as swelling.     · You have bruises from a knee injury that last longer than 2 weeks.     · You do not get better as expected. Where can you learn more? Go to http://daron-rizwana.info/. Enter K195 in the search box to learn more about \"Knee Pain or Injury: Care Instructions. \"  Current as of: September 23, 2018  Content Version: 11.9  © 9062-1725 [employer].  Care instructions adapted under license by [employer] (which disclaims liability or warranty for this information). If you have questions about a medical condition or this instruction, always ask your healthcare professional. [address] any warranty or liability for your use of this information.

## 2019-03-08 NOTE — TELEPHONE ENCOUNTER
Spoke to patient to see if site has improved since last visit. She admits that is still leaks from time to time. I asked if she would like to come in for us to take a look and she says she is having problems with her knee and would call us when she gets that over with.

## 2019-04-03 PROBLEM — T82.7XXA ICD (IMPLANTABLE CARDIOVERTER-DEFIBRILLATOR) INFECTION (HCC): Status: ACTIVE | Noted: 2019-01-01

## 2019-04-03 NOTE — Clinical Note
TRANSFER - OUT REPORT:  
 
Verbal report given to: [facility]. Report consisted of patient's Situation, Background, Assessment and  
Recommendations(SBAR). Opportunity for questions and clarification was provided. Patient transported with a Registered Nurse and Oxygen. Oxygen used for patient = nasal cannula. Patient transported to: [address]

## 2019-04-03 NOTE — TELEPHONE ENCOUNTER
Pt called stating her incision at her ICD site is getting worse. Stated she had more discharge, itching & can feel it. Pt stated no fever. She has had issues in past & didn't keep appt with Dr Daniella Monique as she stated she was having issues with her knee. Explained to pt this is a very important problem that needs to be taken care of but Dr Daniella Monique is out of town. Pt stated she wants to wait until next week for appt but told her it cant wait & will send message to Daniel Bliss NP for Dr Daniella Monique. Verified home # to call back.

## 2019-04-03 NOTE — ED NOTES
Pt reports to ER  for incision check from her subcutaneous ICD which was placed in 9/2018. Pt hadsubcutaneous ICD placed in Oct 2018. No odor or active drainage at this time. She denies fever. She reports the drainage mainly occurs when she is lying down.  Pt states she is here to be admitted by Dr Antonio Hurtado.

## 2019-04-03 NOTE — Clinical Note
Mallampati: Class II - soft palate, uvula, fauces visible. ASA: Class 4 - patient with severe systemic disease that is a constant threat to life.

## 2019-04-03 NOTE — Clinical Note
TRANSFER - OUT REPORT:  
 
Verbal report given to: TUCKER Soto. Report consisted of patient's Situation, Background, Assessment and  
Recommendations(SBAR). Opportunity for questions and clarification was provided. Patient transported with a Registered Nurse. Patient transported to: [facility] bed 2161.

## 2019-04-03 NOTE — ED NOTES
Care assumed from outgoing RN. Pt lying in stretcher NAD. Pt denies pain, SOB, palpitations, or lightheadedness at this time. IV abx infusing. VSS WCTM

## 2019-04-03 NOTE — Clinical Note
TRANSFER - OUT REPORT:  
 
Verbal report given to: TUCKER Soto/[facility]. Report consisted of patient's Situation, Background, Assessment and  
Recommendations(SBAR). Opportunity for questions and clarification was provided. Patient transported with a Registered Nurse. Patient transported to: [facility].

## 2019-04-03 NOTE — PROGRESS NOTES
HISTORY OF PRESENTING ILLNESS      Malina Calero is a 78 y.o. female here for incision check from her subcutaneous ICD which was placed in 9/2018. She developed a hematoma post procedure requiring a pocket evacuation at that time. At her follow up on 10/31/2018, her incision had healed well and her follow ups were to continue with Dr. Amelia Tamayo. She was seen for device check in clinic and reported drainage at that time. She was offered appt and declined. Our office followed up a few weeks later, and scheduled an office visit which she then cancelled. She called today with reports of continued drainage. I asked her to come for office visit asap. She has an opening to the medial portion of the incision, through which the ICD generator is visible. There is what appears to be dried serosanguinous drainage surrounding the opening. No odor or active drainage at this time. She denies fever. She reports the drainage mainly occurs when she is lying down. She has hx of ICM, EF of 10-15%, permanent AF, ESRD on HD. She denies cardiac complaints.      ACTIVE PROBLEM LIST     Patient Active Problem List    Diagnosis Date Noted    Heart failure ([address] 09/14/2018     Priority: 1 - One    ESRD on hemodialysis ([address]     Arthritis     Hypertension     History of creation of ostomy ([address]     Severe left ventricular systolic dysfunction 57/83/3712    Atrial fibrillation with rapid ventricular response ([address] 07/21/2018    Pulmonary edema 07/21/2018    Leukocytosis 09/14/2017    Obese 09/14/2017           PAST MEDICAL HISTORY     Past Medical History:   Diagnosis Date    Arthritis     Chronic kidney disease     Dr Luis Daniel Butterfield ESRD on hemodialysis ([address]     HD Tu,Thu,Sat     History of creation of ostomy ([address]     Left abdomen    Hypertension     Ill-defined condition     Dialysis T, Th, S    Obese 9/14/2017           PAST SURGICAL HISTORY     Past Surgical History:   Procedure Laterality Date    HX COLOSTOMY      left abdomen    HX PACEMAKER  09/13/2018    placed in left abdomen.  HX VASCULAR ACCESS      left upper arm fistula          ALLERGIES     Allergies   Allergen Reactions    Iron Anaphylaxis     Per pt, to PO formulation only, can tolerate IV formulation  Allergy to excipient? FAMILY HISTORY     Family History   Problem Relation Age of Onset    Hypertension Other         multiple family members    negative for cardiac disease       SOCIAL HISTORY     Social History     Socioeconomic History    Marital status:      Spouse name: Not on file    Number of children: Not on file    Years of education: Not on file    Highest education level: Not on file   Tobacco Use    Smoking status: Current Every Day Smoker     Packs/day: 0.50    Smokeless tobacco: Never Used   Substance and Sexual Activity    Alcohol use: No    Drug use: No         MEDICATIONS     Current Outpatient Medications   Medication Sig    traMADol (ULTRAM) 50 mg tablet Take 1 Tab by mouth every twelve (12) hours as needed for Pain. Max Daily Amount: 100 mg.  acetaminophen (TYLENOL EXTRA STRENGTH) 500 mg tablet Take 1,500 mg by mouth every four (4) hours.  gabapentin (NEURONTIN) 100 mg capsule Take 1 Cap by mouth two (2) times a day.  lisinopril (PRINIVIL, ZESTRIL) 5 mg tablet Take 1 Tab by mouth daily.  venlafaxine (EFFEXOR) 37.5 mg tablet Take 37.5 mg by mouth daily.  vitamin E (AQUA GEMS) 400 unit capsule Take 400 Units by mouth daily.  carvedilol (COREG) 12.5 mg tablet Take 1 Tab by mouth two (2) times daily (with meals).  apixaban (ELIQUIS) 5 mg tablet Take 1 Tab by mouth two (2) times a day.  zolpidem (AMBIEN) 5 mg tablet Take 5 mg by mouth nightly as needed for Sleep.  FOLIC ACID/VIT BCOMP,C (DIALYVITE 800 PO) Take 1 Tab by mouth daily.  isosorbide mononitrate ER (IMDUR) 60 mg CR tablet Take 30 mg by mouth daily.  Half-tab    calcium carbonate (TUMS) 200 mg calcium (500 mg) chew Take 3 Tabs by mouth two (2) times daily (with meals). No current facility-administered medications for this visit. I have reviewed the nurses notes, vitals, problem list, allergy list, medical history, family, social history and medications. REVIEW OF SYMPTOMS      General: Pt denies excessive weight gain or loss. Pt is able to conduct ADL's  HEENT: Denies blurred vision, headaches, hearing loss, epistaxis and difficulty swallowing. Respiratory: Denies cough, congestion, shortness of breath, FRAUSTO, wheezing or stridor. Cardiovascular: Denies precordial pain, palpitations, edema or PND  Gastrointestinal: Denies poor appetite, indigestion, abdominal pain or blood in stool  Genitourinary: Denies hematuria, dysuria, increased urinary frequency  Musculoskeletal: Denies joint pain or swelling from muscles or joints  Neurologic: Denies tremor, paresthesias, headache, or sensory motor disturbance  Psychiatric: Denies confusion, insomnia, depression  Integumentray: Denies rash, itching or ulcers. Hematologic: Denies easy bruising, bleeding       PHYSICAL EXAMINATION      There were no vitals filed for this visit. General: Well developed, in no acute distress. HEENT: No jaundice, oral mucosa moist, no oral ulcers  Neck: Supple, no stiffness, no lymphadenopathy, supple  Heart:  Normal S1/S2 negative S3 or S4. Regular, no murmur, gallop or rub, no jugular venous distention  Respiratory: Clear bilaterally x 4, no wheezing or rales  Abdomen:   Soft, non-tender, bowel sounds are active.   Extremities:  No edema, normal cap refill, no cyanosis. Musculoskeletal: No clubbing, no deformities  Neuro: A&Ox3, speech clear, gait stable, cooperative, no focal neurologic deficits  Skin: Skin color is normal. No rashes or lesions.  Non diaphoretic, moist.  Vascular: 2+ pulses symmetric in all extremities       DIAGNOSTIC DATA      EKG:        LABORATORY DATA      Lab Results   Component Value Date/Time    WBC 11.1 (H) 02/07/2019 12:03 PM    HGB 12.0 02/07/2019 12:03 PM    HCT 36.0 02/07/2019 12:03 PM    PLATELET 538 10/29/1823 12:03 PM    .0 (H) 02/07/2019 12:03 PM      Lab Results   Component Value Date/Time    Sodium 135 (L) 02/07/2019 12:03 PM    Potassium 3.4 (L) 02/07/2019 12:03 PM    Chloride 95 (L) 02/07/2019 12:03 PM    CO2 28 02/07/2019 12:03 PM    Anion gap 12 02/07/2019 12:03 PM    Glucose 93 02/07/2019 12:03 PM    BUN 14 02/07/2019 12:03 PM    Creatinine 3.79 (H) 02/07/2019 12:03 PM    BUN/Creatinine ratio 4 (L) 02/07/2019 12:03 PM    GFR est AA 14 (L) 02/07/2019 12:03 PM    GFR est non-AA 12 (L) 02/07/2019 12:03 PM    Calcium 9.0 02/07/2019 12:03 PM    Bilirubin, total 0.4 02/07/2019 12:03 PM    AST (SGOT) 23 02/07/2019 12:03 PM    Alk. phosphatase 153 (H) 02/07/2019 12:03 PM    Protein, total 9.4 (H) 02/07/2019 12:03 PM    Albumin 3.2 (L) 02/07/2019 12:03 PM    Globulin 6.2 (H) 02/07/2019 12:03 PM    A-G Ratio 0.5 (L) 02/07/2019 12:03 PM    ALT (SGPT) 17 02/07/2019 12:03 PM           ASSESSMENT          1.  Cardiomyopathy                        G. Ischemic                        B. LV ejection fraction 10-15%                        C. NYHA class II  2.  Coronary artery disease, native  3.  End-stage renal disease on dialysis  4.  Hypertension   5.  Atrial fibrillation                        A. Permanent         PLAN     After discussion with Ms. Conchis Moody and her sisters who are present with her, I urged her to report to the ER at [facility] so that she can be evaluated promptly by Dr. Sherman Velazquez. She is in agreement to go to [facility] now. FOLLOW-UP       Thank you, Moe Daly MD for allowing me to participate in the care of this extraordinarily pleasant female. Please do not hesitate to contact me for further questions/concerns.      Nando Krueger, NP

## 2019-04-03 NOTE — ED NOTES
Bedside and Verbal shift change report given to Buzz Mendoza (oncoming nurse) by Jerson Dickson (offgoing nurse). Report included the following information SBAR, ED Summary, MAR and Recent Results.

## 2019-04-03 NOTE — TELEPHONE ENCOUNTER
Spoke with Ms. Samantha Perez who verified that she has drainage coming from her device which occurs mainly when sleeping. Denies fever. Instructed patient that she needed to be seen immediately for probably pocket infection. She is going to see if she can get a ride to the office. Advised her to use ambulance to get to the emergency room if she is unable to find a ride promptly. She is to call back and notify me of her plans.      Fritz Jung NP

## 2019-04-03 NOTE — H&P
Hospitalist Admission Note NAME: Cyndi Dugan :  1940 MRN:  [id number] Date/Time:  4/3/2019 6:58 PM 
 
Patient PCP: Em Barakat MD 
______________________________________________________________________ Given the patient's current clinical presentation, I have a high level of concern for decompensation if discharged from the emergency department. Complex decision making was performed, which includes reviewing the patient's available past medical records, laboratory results, and x-ray films. My assessment of this patient's clinical condition and my plan of care is as follows. Assessment / Plan: 
ICD site infection Lactic acidosis  
-no sepsis criteria; + leukocytosis  
-admit to the hospital for IV AB; monitor on tele  
-+ lactic acidosis and BP soft --> gentle hydration x 6 hr then re assess Follow lactic acid  
-cardiology consulted -c/w broad spectrum AB: Vanco + zosyn 
-follow cultures  
-check echo  
-ID consult per cardiology request  
-holding Eliquis pending decision of procedures Possible pathologic fractures/ tumor  
-+ L knee pain started ~ 4-5 weeks ago  
-seen by ortho OP at [facility] and refer [facility] for biopsy --> will ask ortho to see while here ? If connected to above infection. I also afraid she may get lost to follow up. -PT/OT  
-CT 3/8/2019  
2.4 x 2.1 x 2.7 cm lytic lesion at medial and posteromedial proximal 
tibial epiphysis and metaphysis with pathologic fracture. Additional findings as 
above consistent with renal osteodystrophy. Diagnostic considerations for the 
lesion include brown tumor, metastatic disease and bone, and less likely primary 
bone neoplasm. Brown tumors favored Ischemic Cardiomyopathy NYHA class II EF 10% CAD / HTN / Atrial fibrillation 
-no signs of fluid overload; BP at 110s; HR stable 
cxray clear  
-cont home Imdur Holding lisinopril for soft BP Coreg at lower dose with soft BP  
 -holding Eliquis pending decision of procedures ESRD  
-HD TThSa  
-Renal consult for HD. Renally dose Rx as appropriate. 
 
 
                     
 
 
Code Status: full code d/w pt in ED Surrogate Decision Maker: sister and dtr DVT Prophylaxis: holding Eliquis for procedure ; SCD for now; re assess in 24 hr  
GI Prophylaxis: not indicated Baseline: lives with one of her sons; ambulating with walker; she has 4 children Subjective: CHIEF COMPLAINT: ICD infection HISTORY OF PRESENT ILLNESS:    
Milo Welch is a 78 y.o.  female who presents with above complaint. Pt with h/o ischemic cardiomyopathy with ICD placed 09/2018 at [facility]. Procedure was complicated by ICD pocket hematoma and infection for which she was admitted to the hospital in 10/2018. At her follow up on 10/31/2018, her incision had healed well and her follow ups were to continue with Dr. Justin Bliss. She was seen for device check in clinic and reported drainage at that time. She was offered appt and declined. Dr. Justin Bliss office followed up a few weeks later, and scheduled an office visit which she then cancelled. She called today to Dr Justin Bliss office with reports of continued drainage. At the appointment she was found with an opening to the medial portion of the incision, through which the ICD generator is visible. Pt denies fever or chills at home. She reports the drainage mainly occurs when she is lying down. She c/o pain at ICD site especially with palpation. Off note: pt started with L knee/leg pain ~ 4-5 weeks ago. She was seen by PCP. She had CT done on 3/8 with findings of possible fractures and tumor. She already saw ortho OP and was refer to [facility] for biopsy. We were asked to admit for work up and evaluation of the above problems. Past Medical History:  
Diagnosis Date  Arthritis  Chronic kidney disease Dr Aniya Galarza  ESRD on hemodialysis ([facility] [address] HD Tu,Thu,Sat  History of creation of ostomy ([facility] [address] Left abdomen  Hypertension  Ill-defined condition Dialysis Cayetano Mace Obese 9/14/2017 Past Surgical History:  
Procedure Laterality Date  HX COLOSTOMY    
 left abdomen  HX PACEMAKER  09/13/2018  
 placed in left abdomen.  HX VASCULAR ACCESS    
 left upper arm fistula Social History Tobacco Use  Smoking status: Current Every Day Smoker Packs/day: 0.50  Smokeless tobacco: Never Used Substance Use Topics  Alcohol use: No  
  
 
Family History Problem Relation Age of Onset  Hypertension Other   
     multiple family members Allergies Allergen Reactions  Iron Anaphylaxis Per pt, to PO formulation only, can tolerate IV formulation Allergy to excipient? Prior to Admission medications Medication Sig Start Date End Date Taking? Authorizing Provider  
traMADol (ULTRAM) 50 mg tablet Take 1 Tab by mouth every twelve (12) hours as needed for Pain. Max Daily Amount: 100 mg. 2/12/19   Judie Juan MD  
acetaminophen (TYLENOL EXTRA STRENGTH) 500 mg tablet Take 1,500 mg by mouth every four (4) hours. Other, MD Greta  
gabapentin (NEURONTIN) 100 mg capsule Take 1 Cap by mouth two (2) times a day. 1/14/19   Provider, Historical  
lisinopril (PRINIVIL, ZESTRIL) 5 mg tablet Take 1 Tab by mouth daily. 10/29/18   Beau Latham NP  
[facility] 37.5 mg tablet Take 37.5 mg by mouth daily. Provider, Historical  
vitamin E (AQUA GEMS) 400 unit capsule Take 400 Units by mouth daily. Provider, Historical  
carvedilol (COREG) 12.5 mg tablet Take 1 Tab by mouth two (2) times daily (with meals). 7/24/18   Israel Marin MD  
apixaban (ELIQUIS) 5 mg tablet Take 1 Tab by mouth two (2) times a day. 7/24/18   Israel Marin MD  
zolpidem (AMBIEN) 5 mg tablet Take 5 mg by mouth nightly as needed for Sleep.     Greta Chung MD  
 FOLIC ACID/VIT BCOMP,C (DIALYVITE 800 PO) Take 1 Tab by mouth daily. Greta Chung MD  
isosorbide mononitrate ER (IMDUR) 60 mg CR tablet Take 30 mg by mouth daily. Half-tab    Greta Chung MD  
calcium carbonate (TUMS) 200 mg calcium (500 mg) chew Take 3 Tabs by mouth two (2) times daily (with meals). Greta Chung MD  
 
 
REVIEW OF SYSTEMS:    
I am not able to complete the review of systems because: The patient is intubated and sedated The patient has altered mental status due to his acute medical problems The patient has baseline aphasia from prior stroke(s) The patient has baseline dementia and is not reliable historian The patient is in acute medical distress and unable to provide information Total of 12 systems reviewed as follows:   
   POSITIVE= underlined text  Negative = text not underlined General:  fever, chills, sweats, generalized weakness, weight loss/gain,  
   loss of appetite Eyes:    blurred vision, eye pain, loss of vision, double vision ENT:    rhinorrhea, pharyngitis Respiratory:   cough, sputum production, SOB, FRAUSTO, wheezing, pleuritic pain  
Cardiology:   chest pain, palpitations, orthopnea, PND, edema, syncope Gastrointestinal:  abdominal pain , N/V, diarrhea, dysphagia, constipation, bleeding Genitourinary:  frequency, urgency, dysuria, hematuria, incontinence Muskuloskeletal :  Arthralgia L knee, myalgia, back pain Hematology:  easy bruising, nose or gum bleeding, lymphadenopathy Dermatological: rash, ulceration, pruritis, color change / jaundice, ICD wound Endocrine:   hot flashes or polydipsia Neurological:  headache, dizziness, confusion, focal weakness, paresthesia, Speech difficulties, memory loss, gait difficulty Psychological: Feelings of anxiety, depression, agitation Objective: VITALS:   
Visit Vitals /57 Pulse 78 Temp 97.7 °F (36.5 °C) Resp 20 Ht 5' 4\" (1.626 m) Wt 65 kg (143 lb 4.8 oz) SpO2 100% BMI 24.60 kg/m² PHYSICAL EXAM: 
 
General:    Alert, cooperative, no distress, appears stated age. HEENT: Atraumatic, anicteric sclerae, pink conjunctivae No oral ulcers, mucosa moist, throat clear, dentition fair Neck:  Supple, symmetrical,  thyroid: non tender Lungs:   Clear to auscultation bilaterally. No Wheezing or Rhonchi. No rales. Chest wall:  No tenderness  No Accessory muscle use. 
                        + under L breast ICD site with open wound/+ drain/ + TTP Heart:   Regular  rhythm,  No  murmur   No edema Abdomen:   Soft, non-tender. Not distended. Bowel sounds normal 
Extremities: No cyanosis. No clubbing,   
  Skin turgor normal, Capillary refill normal, Radial dial pulse 2+ Skin:     Not pale. Not Jaundiced  No rashes Psych:  Good insight. Not depressed. Not anxious or agitated. Neurologic: EOMs intact. No facial asymmetry. No aphasia or slurred speech. Symmetrical strength, Sensation grossly intact. Alert and oriented X 4.  
 
_______________________________________________________________________ Care Plan discussed with: 
  Comments Patient y Family RN y   
Care Manager Consultant:  radha MEMBRENO provider   
_______________________________________________________________________ Expected  Disposition:  
Home with Family HH/PT/OT/RN y  
SNF/LTC   
SABRINA   
________________________________________________________________________ TOTAL TIME:  65  Minutes Critical Care Provided     Minutes non procedure based Comments Reviewed previous records  
>50% of visit spent in counseling and coordination of care  Discussion with patient and/or family and questions answered 
  
 
________________________________________________________________________ Signed: Huber Timmons MD 
 
Procedures: see electronic medical records for all procedures/Xrays and details which were not copied into this note but were reviewed prior to creation of Plan. LAB DATA REVIEWED:   
Recent Results (from the past 24 hour(s)) EKG, 12 LEAD, INITIAL Collection Time: 04/03/19  3:19 PM  
Result Value Ref Range Ventricular Rate 85 BPM  
 Atrial Rate 85 BPM  
 P-R Interval 134 ms QRS Duration 106 ms  
 Q-T Interval 390 ms QTC Calculation (Bezet) 464 ms Calculated P Axis 16 degrees Calculated R Axis -17 degrees Calculated T Axis 172 degrees Diagnosis Sinus rhythm with premature atrial complexes with aberrant conduction Possible Left atrial enlargement Incomplete left bundle branch block Left ventricular hypertrophy T wave abnormality, consider inferolateral ischemia When compared with ECG of 14-SEP-2018 07:11, 
premature ventricular complexes are no longer present 
aberrant conduction is now present CBC WITH AUTOMATED DIFF Collection Time: 04/03/19  3:27 PM  
Result Value Ref Range WBC 14.1 (H) 3.6 - 11.0 K/uL  
 RBC 3.84 3.80 - 5.20 M/uL  
 HGB 13.0 11.5 - 16.0 g/dL HCT 41.0 35.0 - 47.0 % .8 (H) 80.0 - 99.0 FL  
 MCH 33.9 26.0 - 34.0 PG  
 MCHC 31.7 30.0 - 36.5 g/dL  
 RDW 17.6 (H) 11.5 - 14.5 % PLATELET 969 828 - 835 K/uL MPV 11.3 8.9 - 12.9 FL  
 NRBC 0.0 0  WBC ABSOLUTE NRBC 0.00 0.00 - 0.01 K/uL NEUTROPHILS 76 (H) 32 - 75 % LYMPHOCYTES 13 12 - 49 % MONOCYTES 10 5 - 13 % EOSINOPHILS 0 0 - 7 % BASOPHILS 0 0 - 1 % IMMATURE GRANULOCYTES 1 (H) 0.0 - 0.5 % ABS. NEUTROPHILS 10.6 (H) 1.8 - 8.0 K/UL  
 ABS. LYMPHOCYTES 1.8 0.8 - 3.5 K/UL  
 ABS. MONOCYTES 1.4 (H) 0.0 - 1.0 K/UL  
 ABS. EOSINOPHILS 0.1 0.0 - 0.4 K/UL  
 ABS. BASOPHILS 0.0 0.0 - 0.1 K/UL  
 ABS. IMM. GRANS. 0.1 (H) 0.00 - 0.04 K/UL  
 DF AUTOMATED METABOLIC PANEL, COMPREHENSIVE Collection Time: 04/03/19  3:27 PM  
Result Value Ref Range Sodium 138 136 - 145 mmol/L Potassium 4.0 3.5 - 5.1 mmol/L Chloride 101 97 - 108 mmol/L  
 CO2 26 21 - 32 mmol/L  Anion gap 11 5 - 15 mmol/L  
 Glucose 79 65 - 100 mg/dL BUN 58 (H) 6 - 20 MG/DL Creatinine 6.96 (H) 0.55 - 1.02 MG/DL  
 BUN/Creatinine ratio 8 (L) 12 - 20 GFR est AA 7 (L) >60 ml/min/1.73m2 GFR est non-AA 6 (L) >60 ml/min/1.73m2 Calcium 7.7 (L) 8.5 - 10.1 MG/DL Bilirubin, total 0.3 0.2 - 1.0 MG/DL  
 ALT (SGPT) 13 12 - 78 U/L  
 AST (SGOT) 11 (L) 15 - 37 U/L Alk. phosphatase 146 (H) 45 - 117 U/L Protein, total 8.3 (H) 6.4 - 8.2 g/dL Albumin 3.3 (L) 3.5 - 5.0 g/dL Globulin 5.0 (H) 2.0 - 4.0 g/dL A-G Ratio 0.7 (L) 1.1 - 2.2

## 2019-04-04 NOTE — PROGRESS NOTES
Orders receievd and chart reviewed. Noted PT spoke with RN. Pt off the floor for ICD removal. Also noted pt with lytic lesions in LLE and pending ortho consult. Will defer and continue to follow for evaluation once appropriate. Thank you.  
 
Jessica Gómez OTR/L

## 2019-04-04 NOTE — PROGRESS NOTES
7449: Received call from Dr Sherman Velazquez requesting pt to be consented for removal of ICD. 65: Spoke with Rani Krishna advised pt to be done at 845am 
 
0800: Pt and in BR doinf [address] bath and linens changed. 8391: Anesthesia and Dr Sherman Velazquez at bedside to discuss procedure.  
 
0900: Pt off unit to EP lab to remove ICD

## 2019-04-04 NOTE — PROGRESS NOTES
TRANSFER - IN REPORT: 
 
Verbal report received from Ellen Shah, [facility] [facility] (name) on [address] [place] [address]  being received from EP Lab (unit) for routine progression of care Report consisted of patients Situation, Background, Assessment and  
Recommendations(SBAR). Information from the following report(s) Procedure Summary was reviewed with the receiving nurse. Opportunity for questions and clarification was provided. Pt taken to recovery area.

## 2019-04-04 NOTE — PROGRESS NOTES
1849: Called after hours HD to check status of HD RN to dialyze pt. Spoke with Enmanuel Claudio earlier today and was advised 6pm. Wanted to follow up to confirm arrival time. 1853: Received call back from Birdie 68 advised pt to be treated by Tito Read. Will have Tito Read callback with treatment time

## 2019-04-04 NOTE — PROGRESS NOTES
TRANSFER - IN REPORT: 
 
Verbal report received from TUCKER Stevenson on [facility]  being received from EP Lab for routine progression of care. Report consisted of patients Situation, Background, Assessment and Recommendations(SBAR). Information from the following report(s) SBAR, Kardex, Procedure Summary and MAR was reviewed with the receiving clinician. Opportunity for questions and clarification was provided. Assessment completed upon patients arrival to [address] [place] [address] and care assumed. Cardiac Cath Lab Recovery Arrival Note: 
 
[facility] arrived to [facility] recovery area. Patient procedure= Subcutaneous ICD extraction. . Patient on cardiac monitor, non-invasive blood pressure, SPO2 monitor. On Room air. Patient status doing well without problems. Patient is A&Ox 4. Patient reports no complaints. PROCEDURE SITE CHECK: 
 
Procedure site:without any bleeding. No reports of pain/discomfort at procedure site. No change in patient status. Continue to monitor patient and status.

## 2019-04-04 NOTE — PROGRESS NOTES
Reason for Admission:   ICD infection, possible pathologic fracture/tumor RRAT Score:     25 Resources/supports as identified by patient/family:   Son and other adult members lives with patient. Patient has caregiver 5 days a week. Top Challenges facing patient (as identified by patient/family and CM): None identified Finances/Medication cost?      Has Medicare and Medicaid Transportation? Provided by Medicaid Med Trans. Support system or lack thereof? Family lives with patient and has caregiver M-F Living arrangements? 1 story home, 3 steps entry Self-care/ADLs/Cognition? Patient requires a 1 or 2 person assist with steps. Alert and oriented. Requires assistance with bathing, dressing and meals are prepared for patient. Current Advanced Directive/Advance Care Plan:  Not on file Plan for utilizing home health: To be determined Likelihood of readmission: Mod to high Transition of Care Plan:      Home with family assistance and caregiver support. CM met with patient to discuss discharge planning. Pt name and  confirmed as pt identifiers. Demographics confirmed. Patient lives in a single story home, 3 steps entry. Son, grandson, sister and brother stay with patient on occasion. Patient has caregiver support Mon - Fri. HD - [facility] [phone] Fax - [phone] Confirmed Chair time is TTS at [address] Preferred Rx - [facility] - previous experience CM confirmed chairtime with Sy Pratt at [facility]. Anticipate HH at discharge. CM will continue to follow for discharge needs. Yves Adhikari RN CM Ext [phone]

## 2019-04-04 NOTE — ANESTHESIA PREPROCEDURE EVALUATION
Anesthetic History No history of anesthetic complications Review of Systems / Medical History Patient summary reviewed, nursing notes reviewed and pertinent labs reviewed Pulmonary Smoker Comments: Smoker - 0.5 ppd Neuro/Psych Cardiovascular Hypertension CHF Dysrhythmias : atrial fibrillation Pacemaker Exercise tolerance: <4 METS Comments: TTE (7/21/18): Mild to moderate TI; Severe, diffuse hypokinesis, EF=10-15% GI/Hepatic/Renal 
  
 
 
Renal disease: ESRD Endo/Other Arthritis Pertinent negatives: No morbid obesity Other Findings Comments: Infected ICD Physical Exam 
 
Airway Mallampati: II 
TM Distance: 4 - 6 cm Neck ROM: normal range of motion Mouth opening: Normal 
 
 Cardiovascular Rhythm: regular Rate: normal 
 
 
 
 Dental 
 
Dentition: Poor dentition Comments: Multiple missing and broken teeth Pulmonary Breath sounds clear to auscultation Abdominal 
GI exam deferred Other Findings Anesthetic Plan ASA: 4 Anesthesia type: MAC and total IV anesthesia Induction: Intravenous Anesthetic plan and risks discussed with: Patient

## 2019-04-04 NOTE — PROGRESS NOTES
Pharmacy Automatic Renal Dosing Protocol - Antimicrobials Indication for Antimicrobials: skin and soft tissue infection Current Regimen of Each Antimicrobial: 
Vancomycin 1500 mg IV LOAD + 750 mg IV PRN dialysis (Start Date 4/3; Day # 1) Zosyn 3.375 g IV every 12 hours (start 4/3, day 1) Previous Antimicrobial Therapy: 
 
 
Goal Level: VANCOMYCIN TROUGH GOAL RANGE Vancomycin Trough: 10 - 15 mcg/mL Date Dose & Interval Measured (mcg/mL) Extrapolated (mcg/mL) Date & time of next level:  
Per protocol - Monitor the random level before the second dose of 750 mg and periodically thereafter. Significant Cultures:  
4/3 blood - pending Radiology / Imaging results: (X-ray, CT scan or MRI):  
4/3 xr chest - No significant interval change. No acute intrathoracic disease. Paralysis, amputations, malnutrition:  
 
Labs: 
Recent Labs 19 
1527 CREA 6.96* BUN 58* WBC 14.1* Temp (24hrs), Av.7 °F (36.5 °C), Min:97.6 °F (36.4 °C), Max:97.7 °F (36.5 °C) Creatinine Clearance (mL/min) or Dialysis: 5.7 ml/min Impression/Plan:  
Consult to nephro states \"esrd on HD TTS, no need for acute HD\". Dosed vancomycin as if patient is on HD - 750 mg IV PRN dialysis. Ordered zosyn 3.375 g IV every 12 hours per renal dosing protocol Antimicrobial stop date TBD BMP is ordered Pharmacy will follow daily and adjust medications as appropriate for renal function and/or serum levels. Thank you, Halina Ballard, PATRICIAD 
 
Recommended duration of therapy 
http://[facility]/[place]/[facility]/Pharmacy/Clinical%20Companion/Duration%20of%20ABX%20therapy. docx Renal Dosing 
http://[facility]/[place]/[facility]/Pharmacy/Clinical%20Companion/Renal%[id number]. pdf

## 2019-04-04 NOTE — ANESTHESIA POSTPROCEDURE EVALUATION
Procedure(s): REMOVE SUBQ ICD Venogram Upper Ext Left. MAC, total IV anesthesia Anesthesia Post Evaluation Patient location during evaluation: PACU Note status: Adequate. Level of consciousness: responsive to verbal stimuli and sleepy but conscious Pain management: satisfactory to patient Airway patency: patent Anesthetic complications: no 
Cardiovascular status: acceptable Respiratory status: acceptable Hydration status: acceptable Comments: +Post-Anesthesia Evaluation and Assessment Patient: Lin Gustafson MRN: [id number]  SSN: [id number] YOB: 1940  Age: 78 y.o. Sex: female Cardiovascular Function/Vital Signs /59   Pulse 69   Temp 36.4 °C (97.5 °F)   Resp 18   Ht 5' 4\" (1.626 m)   Wt 67.2 kg (148 lb 2.4 oz)   SpO2 96%   BMI 25.43 kg/m² Patient is status post Procedure(s): REMOVE SUBQ ICD Venogram Upper Ext Left. Nausea/Vomiting: Controlled. Postoperative hydration reviewed and adequate. Pain: 
Pain Scale 1: Numeric (0 - 10) (04/04/19 1134) Pain Intensity 1: 4 (04/04/19 1134) Managed. Neurological Status: At baseline. Mental Status and Level of Consciousness: Arousable. Pulmonary Status:  
O2 Device: Room air (04/04/19 1041) Adequate oxygenation and airway patent. Complications related to anesthesia: None Post-anesthesia assessment completed. No concerns. Signed By: Saida Ennis MD  
 4/4/2019 Post anesthesia nausea and vomiting:  controlled Vitals Value Taken Time /78 4/4/2019 11:30 AM  
Temp Pulse 70 4/4/2019 11:41 AM  
Resp SpO2 98 % 4/4/2019 11:41 AM  
Vitals shown include unvalidated device data.

## 2019-04-04 NOTE — CONSULTS
CHRONIC KIDNEY DISEASE (CKD) Subjective:  
 
Patient is a 78 y.o.  female has been admitted to the hospital with an infected and protruding AICD. This has just been removed. She has ESRF and is due for dialysis today. She also c/o severe left knee pain and had a markedly abnormal MRI done a month ago. Per Dr. Oropeza Na: \"ICD (implantable cardioverter-defibrillator) infection ([address] Christine Case. 7XXA] Pt has subcutaneous ICD which was placed in 9/2018. She developed a hematoma post procedure requiring a pocket evacuation following implant. At her follow up on 10/31/2018, her incision had healed well and her follow ups were to continue with Dr. Nick Palencia. She was seen for device check in clinic and reported drainage at that time. She was offered appt and declined. She cancelled a subsequent follow up. Was seen urgently yesterday in Dr Gavin Lin office for complaints of drainage from site. She has an opening to the medial portion of the incision, through which the ICD generator is visible. There is serosanguinous drainage surrounding the opening. No odor. She has hx of ICM, EF of 10-15%, permanent AF, ESRD on HD. She denies cardiac complaints. \" The patient says the infected area has been \"giving me a fit\" and c/o pain there, but she denies any chest pain or SOB. ROS as above, plus: no fever/chills. No HEENT complaints. No SOB or cough. No chest pain or palpitations.  ++pain in area of the ICD. No abd pain, N/V. +++left knee pain. No skin rashes or pruritis. A MRI done on 3/11/19:  
Nonspecific mass of intermediate signal on T1 and T2-weighted images within 
the medial tibial condyle, with pathologic fracture of medial tibial plateau 
showing mild depression. Diagnostic considerations include masses of neoplastic 
and nonneoplastic etiology. 2. Additional areas of bony signal change within tibia consistent with osteonecrosis. Nonspecific area of subcortical signal change throughout 
posterior lateral femoral condyle, possibly also osteonecrosis. 3. Moderate knee effusion with synovitis. Moderate-sized Baker's cyst containing 
masslike intermediate signal intensity on T1 and Q6-[id number] images, of 
uncertain etiology or significance. 4. Complex tears of medial lateral menisci. Patient Active Problem List  
 Diagnosis Date Noted  Heart failure ([address] 09/14/2018 Priority: 1 - One  
 ICD (implantable cardioverter-defibrillator) infection ([address] 04/03/2019  ESRD on hemodialysis ([address]  Arthritis  Hypertension  History of creation of ostomy ([address]  Severe left ventricular systolic dysfunction 59/72/8087  Atrial fibrillation with rapid ventricular response ([address] 07/21/2018  Pulmonary edema 07/21/2018  Leukocytosis 09/14/2017  Obese 09/14/2017 Past Medical History:  
Diagnosis Date  Arthritis  Chronic kidney disease Dr Alessio Bartlett  ESRD on hemodialysis ([address] HD Tu,Thu,Sat   
 History of creation of ostomy ([address] Left abdomen  Hypertension  Ill-defined condition Dialysis [facility] Obese 9/14/2017 Past Surgical History:  
Procedure Laterality Date  HX COLOSTOMY    
 left abdomen  HX PACEMAKER  09/13/2018  
 placed in left abdomen.  HX VASCULAR ACCESS    
 left upper arm fistula Prior to Admission medications Medication Sig Start Date End Date Taking? Authorizing Provider  
traMADol (ULTRAM) 50 mg tablet Take 1 Tab by mouth every twelve (12) hours as needed for Pain. Max Daily Amount: 100 mg. 2/12/19   Em Brothers MD  
acetaminophen (TYLENOL EXTRA STRENGTH) 500 mg tablet Take 1,500 mg by mouth every four (4) hours. Juliet, MD Greta  
gabapentin (NEURONTIN) 100 mg capsule Take 1 Cap by mouth two (2) times a day. 1/14/19   Provider, Historical  
lisinopril (PRINIVIL, ZESTRIL) 5 mg tablet Take 1 Tab by mouth daily. 10/29/18   Kathrin Harp NP  
venlafaxine [facility]) 37.5 mg tablet Take 37.5 mg by mouth daily. Provider, Historical  
vitamin E (AQUA GEMS) 400 unit capsule Take 400 Units by mouth daily. Provider, Historical  
carvedilol (COREG) 12.5 mg tablet Take 1 Tab by mouth two (2) times daily (with meals). 7/24/18   Lawson Castellano MD  
apixaban (ELIQUIS) 5 mg tablet Take 1 Tab by mouth two (2) times a day. 7/24/18   Lawson Castellano MD  
zolpidem (AMBIEN) 5 mg tablet Take 5 mg by mouth nightly as needed for Sleep. Greta Chung MD  
FOLIC ACID/VIT BCOMP,C (DIALYVITE 800 PO) Take 1 Tab by mouth daily. Greta Chung MD  
isosorbide mononitrate ER (IMDUR) 60 mg CR tablet Take 30 mg by mouth daily. Half-tab    Greta Chung MD  
calcium carbonate (TUMS) 200 mg calcium (500 mg) chew Take 3 Tabs by mouth two (2) times daily (with meals). Greta Chung MD  
 
Current Facility-Administered Medications Medication Dose Route Frequency  sodium chloride (NS) flush 5-40 mL  5-40 mL IntraVENous Q8H  
 sodium chloride (NS) flush 5-40 mL  5-40 mL IntraVENous PRN  
 naloxone (NARCAN) injection 0.4 mg  0.4 mg IntraVENous PRN  
 calcium carbonate (TUMS) chewable tablet 600 mg [elemental]  600 mg Oral BID WITH MEALS  carvedilol (COREG) tablet 3.125 mg  3.125 mg Oral BID WITH MEALS  gabapentin (NEURONTIN) capsule 100 mg  100 mg Oral BID  isosorbide mononitrate ER (IMDUR) tablet 30 mg  30 mg Oral DAILY  traMADol (ULTRAM) tablet 50 mg  50 mg Oral Q12H PRN  
 venlafaxine (EFFEXOR) tablet 37.5 mg  37.5 mg Oral DAILY  melatonin tablet 3 mg  3 mg Oral QHS  sodium chloride (NS) flush 5-40 mL  5-40 mL IntraVENous Q8H  
 sodium chloride (NS) flush 5-40 mL  5-40 mL IntraVENous PRN  
 acetaminophen (TYLENOL) tablet 650 mg  650 mg Oral Q4H PRN  
 naloxone (NARCAN) injection 0.4 mg  0.4 mg IntraVENous PRN  
 ondansetron (ZOFRAN) injection 4 mg  4 mg IntraVENous Q4H PRN  
  lactobac ac& pc-s.therm-b.anim (MICHAEL Q/RISAQUAD)  1 Cap Oral DAILY  vancomycin (VANCOCIN) 750 mg in 0.9% sodium chloride (MBP/ADV) 250 mL  750 mg IntraVENous DIALYSIS PRN And  Vancomycin reminder to give after HD   Other DAILY  piperacillin-tazobactam (ZOSYN) 3.375 g in 0.9% sodium chloride (MBP/ADV) 100 mL  3.375 g IntraVENous Q12H Allergies Allergen Reactions  Iron Anaphylaxis Per pt, to PO formulation only, can tolerate IV formulation Allergy to excipient? Social History Tobacco Use  Smoking status: Current Every Day Smoker Packs/day: 0.50  Smokeless tobacco: Never Used Substance Use Topics  Alcohol use: No  
  
Family History Problem Relation Age of Onset  Hypertension Other   
     multiple family members Review of Systems A comprehensive review of systems was negative except for that written in the HPI. Objective:  
 
Patient Vitals for the past 8 hrs: 
 BP Temp Pulse Resp SpO2  
19 1400 116/53  71  99 % 19 1300 113/57  73  100 % 19 1230 122/60  78  100 % 19 1215 126/67  70  99 % 19 1200 121/57  71  99 % 19 1145 124/56  69  97 % 19 1130 109/78  72  98 % 19 1117 119/59 97.5 °F (36.4 °C) 69 18 96 % 19 1116 119/59  69  96 % 19 1111 121/56  67 26 96 % 19 1110 128/58  74 23 96 % 19 1109   72 23 95 % 19 1105 132/52  72 21 96 % 19 1100 121/42  69 19 95 % 19 1055 129/51  71 19 95 % 19 1050 137/79  71 19 93 % 19 1045 134/56  71 18 94 % 19 1041 136/55 97.8 °F (36.6 °C) 75 20 96 % 19 1040 136/55  64 22 97 % 19 1033 132/65  84 22 100 % 19 0718 112/53 97.6 °F (36.4 °C) 81 13 100 % Temp (24hrs), Av.6 °F (36.4 °C), Min:97.4 °F (36.3 °C), Max:97.8 °F (36.6 °C) No intake/output data recorded. 701 - 1900 In: 200 [I.V.:200] Out: 25  
 
 
 Visit Vitals /53 Pulse 71 Temp 97.5 °F (36.4 °C) Resp 18 Ht 5' 4\" (1.626 m) Wt 67.2 kg (148 lb 2.4 oz) SpO2 99% BMI 25.43 kg/m² General appearance: alert, cooperative, no distress Head: Normocephalic, without obvious abnormality, atraumatic Eyes: conjunctivae/corneas clear. EOM's intact. Neck: supple; no masses Lungs: clear to auscultation bilaterally Heart: regular rate and rhythm Abdomen: soft, non-tender. No masses,  no organomegaly Extremities: no edema Skin:  No rashes; ICD wound not examined Neurologic: alert and appropriate; normal speech; no tremor Psych: normal affect and mood Assessment:  
 
Data Review CBC:  
Lab Results Component Value Date/Time WBC 11.7 (H) 04/04/2019 03:26 AM  
 RBC 3.46 (L) 04/04/2019 03:26 AM  
 HGB 11.6 04/04/2019 03:26 AM  
 HCT 37.0 04/04/2019 03:26 AM  
 PLATELET 935 81/55/8453 03:26 AM  
, CMP:  
Lab Results Component Value Date/Time Glucose 87 04/04/2019 03:26 AM  
 Sodium 140 04/04/2019 03:26 AM  
 Potassium 4.6 04/04/2019 03:26 AM  
 Chloride 106 04/04/2019 03:26 AM  
 CO2 20 (L) 04/04/2019 03:26 AM  
 BUN 61 (H) 04/04/2019 03:26 AM  
 Creatinine 7.17 (H) 04/04/2019 03:26 AM  
 Calcium 6.9 (L) 04/04/2019 03:26 AM  
 Anion gap 14 04/04/2019 03:26 AM  
 BUN/Creatinine ratio 9 (L) 04/04/2019 03:26 AM  
 AST (SGOT) 11 (L) 04/03/2019 03:27 PM  
 Alk. phosphatase 146 (H) 04/03/2019 03:27 PM  
 Protein, total 8.3 (H) 04/03/2019 03:27 PM  
 Albumin 3.3 (L) 04/03/2019 03:27 PM  
 Globulin 5.0 (H) 04/03/2019 03:27 PM  
 A-G Ratio 0.7 (L) 04/03/2019 03:27 PM  
 
 
Reviewed: MRI and labs Active Problems: 
 
End-stage renal failure -- on a TTS schedule at [facility], followed by Dr. Desire Stout. 
 
 ICD (implantable cardioverter-defibrillator) infection. S/p removal of the ICD on 4/4/19. Ischemic cardiomyopathy. EF of 10-15%. Severe left knee pain. MRI a month ago:  
Nonspecific mass within the medial tibial condyle, with pathologic fracture of medial tibial plateau 
showing mild depression. Diagnostic considerations include masses of neoplastic 
and nonneoplastic etiology. 2. Additional areas of bony signal change within tibia c/w 
osteonecrosis. Nonspecific area of subcortical signal change throughout 
posterior lateral femoral condyle, possibly also osteonecrosis. 3. Moderate knee effusion with synovitis. Moderate-sized Baker's cyst containing a 
masslike signal of uncertain significance. 4. Complex tears of medial lateral menisci. Chronic atrial fibrillation. Plan:  
 
Have ordered dialysis and will follow closely. Labs in AM. Above d/w the patient and the dialysis team. 
 
 
 
Chart reviewed. Pertinent Notes Reviewed. Medications list Personally Reviewed. Larose Simmonds, [address] [facility] In Person [facility] [address] [place], [address] [place] [address] Phone - [phone]    Fax - [phone] [employer] [facility] [address] [facility], [address] [facility] Phone - [phone]  Fax - [phone] [employer]

## 2019-04-04 NOTE — PROGRESS NOTES
Problem: Falls - Risk of 
Goal: *Absence of Falls Description Document Sonido Luong Fall Risk and appropriate interventions in the flowsheet. 4/4/2019 1612 by Antonio Rodriguez RN Outcome: Progressing Towards Goal 
4/4/2019 0912 by Antonio Rodriguez RN Outcome: Progressing Towards Goal 
  
Problem: Patient Education: Go to Patient Education Activity Goal: Patient/Family Education Outcome: Progressing Towards Goal 
  
Problem: Pressure Injury - Risk of 
Goal: *Prevention of pressure injury Description Document Cullen Scale and appropriate interventions in the flowsheet. Outcome: Progressing Towards Goal 
  
Problem: Patient Education: Go to Patient Education Activity Goal: Patient/Family Education Outcome: Progressing Towards Goal

## 2019-04-04 NOTE — ED NOTES
TRANSFER - OUT REPORT: 
 
Verbal report given to Jonah CEBALLOS on Ani Farias  being transferred to [facility] for Progression of care Report consisted of patients Situation, Background, Assessment and  
Recommendations(SBAR). Information from the following report(s) wound care, iv abx, and care plan was reviewed with the receiving nurse. Lines:  
Peripheral IV 04/03/19 Left Antecubital (Active) Site Assessment Clean, dry, & intact 4/3/2019  6:06 PM  
Phlebitis Assessment 0 4/3/2019  6:06 PM  
Infiltration Assessment 0 4/3/2019  6:06 PM  
Dressing Status Clean, dry, & intact 4/3/2019  6:06 PM  
Dressing Type Transparent 4/3/2019  6:06 PM  
Hub Color/Line Status Pink 4/3/2019  6:06 PM  
  
 
Opportunity for questions and clarification was provided. Patient transported with: 
 RN tele transport

## 2019-04-04 NOTE — PROGRESS NOTES
Problem: Falls - Risk of 
Goal: *Absence of Falls Description Document Judy Mast Fall Risk and appropriate interventions in the flowsheet. Outcome: Progressing Towards Goal 
  
Problem: Patient Education: Go to Patient Education Activity Goal: Patient/Family Education Outcome: Progressing Towards Goal 
  
Problem: Pressure Injury - Risk of 
Goal: *Prevention of pressure injury Description Document Cullen Scale and appropriate interventions in the flowsheet. Outcome: Progressing Towards Goal 
  
Problem: Patient Education: Go to Patient Education Activity Goal: Patient/Family Education Outcome: Progressing Towards Goal

## 2019-04-04 NOTE — PROGRESS NOTES
Hospitalist Progress Note NAME: Samara Brunson :  1940 MRN:  [id number] Assessment / Plan: 
ICD site infection Lactic acidosis  
-no sepsis criteria; + leukocytosis  
-admit to the hospital for IV AB; monitor on tele  
-Lactic acidosis corrected. -cardiology consulted 
-ICD removed on 19 
-c/w broad spectrum AB: Vanco + zosyn 
-Blood cx NGTD 
-Wound cx pending -ECHO pending 
-ID consult per cardiology request  
-Eliquis held before procedures  
  
Possible pathologic fractures/ tumor  
-+ L knee pain started ~ 4-5 weeks ago  
-[place] [facility] [place] [facility] [place] --> will ask ortho to see while here ? If connected to above infection. I also afraid she may get lost to follow up. -PT/OT  
-CT 3/8/2019  
2.4 x 2.1 x 2.7 cm lytic lesion at medial and posteromedial proximal 
tibial epiphysis and metaphysis with pathologic fracture. Additional findings as 
above consistent with renal osteodystrophy. Diagnostic considerations for the 
lesion include brown tumor, metastatic disease and bone, and less likely primary 
bone neoplasm. Brown tumors favored 
  
Ischemic Cardiomyopathy NYHA class II EF 10% CAD / HTN / Atrial fibrillation 
-no signs of fluid overload; BP at 110s; HR stable 
cxray clear  
-cont home Imdur Holding lisinopril for soft BP Coreg at lower dose with soft BP  
-Eliquis on hold 
  
ESRD  
-HD TThSa  
-Renal consulted for HD. Renally dose Rx as appropriate. Body mass index is 25.43 kg/m². Code Status: full code d/w pt in ED Surrogate Decision Maker: sister and dtr DVT Prophylaxis: holding Eliquis for procedure ; SCD for now; re assess in 24 hr  
GI Prophylaxis: not indicated Disposition:TBD Baseline: lives with one of her sons; ambulating with walker; she has 4 children Subjective: Chief Complaint / Reason for Physician Visit Pt is s/p surgery c/w removal of ICD. Saying he has pain at the surgical area. Discussed with RN events overnight. Review of Systems: 
Symptom Y/N Comments  Symptom Y/N Comments Fever/Chills n   Chest Pain n   
Poor Appetite n   Edema n   
Cough n   Abdominal Pain Sputum n   Joint Pain SOB/FRAUSTO n   Pruritis/Rash Nausea/vomit n   Tolerating PT/OT Diarrhea    Tolerating Diet Constipation    Other y Pain at the surgical area. Could NOT obtain due to:   
 
Objective: VITALS:  
Last 24hrs VS reviewed since prior progress note. Most recent are: 
Patient Vitals for the past 24 hrs: 
 Temp Pulse Resp BP SpO2  
04/04/19 1130  72  109/78 98 % 04/04/19 1117 97.5 °F (36.4 °C) 69 18 119/59 96 % 04/04/19 1116  69  119/59 96 % 04/04/19 1111  67 26 121/56 96 % 04/04/19 1110  74 23 128/58 96 % 04/04/19 1109  72 23  95 % 04/04/19 1105  72 21 132/52 96 % 04/04/19 1100  69 19 121/42 95 % 04/04/19 1055  71 19 129/51 95 % 04/04/19 1050  71 19 137/79 93 % 04/04/19 1045  71 18 134/56 94 % 04/04/19 1041 97.8 °F (36.6 °C) 75 20 136/55 96 % 04/04/19 1040  64 22 136/55 97 % 04/04/19 1033  84 22 132/65 100 % 04/04/19 0718 97.6 °F (36.4 °C) 81 13 112/53 100 % 04/04/19 0320 97.5 °F (36.4 °C) 79 25 125/56 100 % 04/03/19 2321 97.5 °F (36.4 °C) 71 15 102/56 100 % 04/03/19 2043 97.4 °F (36.3 °C) 70 24 138/49 100 % 04/03/19 1930 97.6 °F (36.4 °C) 86 18 116/67 100 % 04/03/19 1822     100 % 04/03/19 1800  78 20 108/57 100 % 04/03/19 1730  79 23 112/51 100 % 04/03/19 1513 97.7 °F (36.5 °C) 72 16 116/85 98 % Intake/Output Summary (Last 24 hours) at 4/4/2019 1224 Last data filed at 4/4/2019 1016 Gross per 24 hour Intake 200 ml Output 25 ml Net 175 ml PHYSICAL EXAM: 
General: WD, WN. Alert, cooperative, no acute distress   
EENT:  EOMI. Anicteric sclerae. MMM Resp:  CTA bilaterally, no wheezing or rales. No accessory muscle use CV:  Regular  rhythm,  No edema GI:  Soft, Non distended, Non tender.  +Bowel sounds Neurologic:  Alert and oriented X 3, normal speech, Psych:   Good insight. Not anxious nor agitated Skin:  Dressing left side of chest s/p removal of ICD Reviewed most current lab test results and cultures  YES Reviewed most current radiology test results   YES Review and summation of old records today    NO Reviewed patient's current orders and MAR    YES 
PMH/SH reviewed - no change compared to H&P 
________________________________________________________________________ Care Plan discussed with: 
  Comments Patient X Family RN X   
Care Manager Consultant Multidiciplinary team rounds were held today with , nursing, pharmacist and clinical coordinator. Patient's plan of care was discussed; medications were reviewed and discharge planning was addressed. ________________________________________________________________________ Total NON critical care TIME: 30   Minutes Total CRITICAL CARE TIME Spent:   Minutes non procedure based Comments >50% of visit spent in counseling and coordination of care X   
________________________________________________________________________ Janene White MD  
 
Procedures: see electronic medical records for all procedures/Xrays and details which were not copied into this note but were reviewed prior to creation of Plan. LABS: 
I reviewed today's most current labs and imaging studies. Pertinent labs include: 
Recent Labs 04/04/19 
0326 04/03/19 
1527 WBC 11.7* 14.1* HGB 11.6 13.0 HCT 37.0 41.0  
 192 Recent Labs 04/04/19 
0326 04/03/19 
1527  138  
K 4.6 4.0  
 101 CO2 20* 26 GLU 87 79 BUN 61* 58* CREA 7.17* 6.96* CA 6.9* 7.7* ALB  --  3.3* TBILI  --  0.3 SGOT  --  11* ALT  --  13 Signed: Janene White MD

## 2019-04-04 NOTE — PROGRESS NOTES
Pharmacy Clarification of the Prior to Admission Medication Regimen Retrospective to the Admission Medication Reconciliation The patient was interviewed regarding clarification of the prior to admission medication regimen and was questioned regarding use of any other inhalers, topical products, over the counter medications, herbal medications, vitamin products or ophthalmic/nasal/otic medication use. Information Obtained From: RX Query, Patient Recommendations/Findings: The following amendments were made to the patient's active medication list on file at [facility]:  
 
1) Additions: None 2) Removals:  
Lisinopril Tramadol 3) Changes: 
b complex-vitamin c-folic acid (DIALYVITE)  tab tablet (Old regimen: (strength 800 mg) 800 mg daily /New regimen: (strength 100 mg) 100 mg daily) 4) Pertinent Pharmacy Findings: 
During interview patient stated that she receives dialysis every Tuesday, Thursday and Saturday at [facility] in [place], [place]. Patient was last dialyzed on 4/2/19. Identified High Alert Medication Information Current Anticoagulants: 
Name: Eliquis PTA medication list was corrected to the following:  
 
Prior to Admission Medications Prescriptions Last Dose Informant Patient Reported? Taking?  
acetaminophen (TYLENOL EXTRA STRENGTH) 500 mg tablet 4/1/2019 at Unknown time Self Yes Yes Sig: Take 1,500 mg by mouth every four (4) hours. apixaban (ELIQUIS) 5 mg tablet 4/2/2019 at Unknown time Self No Yes Sig: Take 1 Tab by mouth two (2) times a day. b complex-vitamin c-folic acid (DIALYVITE) 100-1 mg tab tablet 4/2/2019 at Unknown time Self Yes Yes Sig: Take 1 Tab by mouth daily. calcium carbonate (TUMS) 200 mg calcium (500 mg) chew 4/2/2019 at Unknown time Self Yes Yes Sig: Take 3 Tabs by mouth two (2) times daily (with meals). carvedilol (COREG) 12.5 mg tablet 4/2/2019 at Unknown time Self No Yes Sig: Take 1 Tab by mouth two (2) times daily (with meals). gabapentin (NEURONTIN) 100 mg capsule 4/2/2019 at Unknown time Self Yes Yes Sig: Take 1 Cap by mouth two (2) times a day. isosorbide mononitrate ER (IMDUR) 60 mg CR tablet 4/2/2019 at Unknown time Self Yes Yes Sig: Take 30 mg by mouth daily. Half-tab  
venlafaxine (EFFEXOR) 37.5 mg tablet 4/2/2019 at Unknown time Self Yes Yes Sig: Take 37.5 mg by mouth daily. vitamin E (AQUA GEMS) 400 unit capsule 4/2/2019 at Unknown time Self Yes Yes Sig: Take 400 Units by mouth daily. zolpidem (AMBIEN) 5 mg tablet 4/2/2019 at Unknown time Self Yes Yes Sig: Take 5 mg by mouth nightly as needed for Sleep. Facility-Administered Medications: None Thank you, 
Archie Mayo Medication History Pharmacy Technician

## 2019-04-04 NOTE — PROGRESS NOTES
Physical therapy: 
 
Orders receievd and chart reviewed. Spoke with RN. Pt off the floor for ICD removal. Also noted pt with lytic lesions in LLE and pending ortho consult. Will defer and continue to follow for evaluation once appropriate. Thank you Asya Antoine, PT, DPT

## 2019-04-04 NOTE — PROGRESS NOTES
Problem: Falls - Risk of 
Goal: *Absence of Falls Description Document Robert Navarro Fall Risk and appropriate interventions in the flowsheet. Outcome: Progressing Towards Goal 
  
Problem: Patient Education: Go to Patient Education Activity Goal: Patient/Family Education Outcome: Progressing Towards Goal 
  
Problem: Pressure Injury - Risk of 
Goal: *Prevention of pressure injury Description Document Cullen Scale and appropriate interventions in the flowsheet. Outcome: Progressing Towards Goal 
  
Problem: Patient Education: Go to Patient Education Activity Goal: Patient/Family Education Outcome: Progressing Towards Goal

## 2019-04-04 NOTE — PROGRESS NOTES
Called and spoke with Dr Ivone Greer office advising of pt's admission. Scheduled for HD today at [facility]. Advised by Saroj Valdivia notified but unsure if Nephrology notified. Pt taken for procedure at 9am for removal of device. Called office to be sure they were aware of admission and to received HD orders. Spoke with Tatiana Avalos, advise Dr Warren Gonzalez on call.

## 2019-04-04 NOTE — CONSULTS
[address]  [phone] Date of  Admission: 4/3/2019  4:59 PM  
 
Admission type:Emergency Consult for: ICD infection Consult by: Dr Faye Perez NP Subjective:  
 
Lin Gustafson is a 78 y.o. female admitted for ICD (implantable cardioverter-defibrillator) infection ([address] [T82. 7XXA] Pt has subcutaneous ICD which was placed in 9/2018. She developed a hematoma post procedure requiring a pocket evacuation following implant. At her follow up on 10/31/2018, her incision had healed well and her follow ups were to continue with Dr. Kel Hackett. She was seen for device check in clinic and reported drainage at that time. She was offered appt and declined. She cancelled a subsequent follow up. Was seen urgently yesterday in Dr Wilman Soto office for complaints of drainage from site.  
  
She has an opening to the medial portion of the incision, through which the ICD generator is visible. There is serosanguinous drainage surrounding the opening. No odor.   
  
She has hx of ICM, EF of 10-15%, permanent AF, ESRD on HD. She denies cardiac complaints. Patient Active Problem List  
 Diagnosis Date Noted  Heart failure ([address] 09/14/2018 Priority: 1 - One  
 ICD (implantable cardioverter-defibrillator) infection ([address] 04/03/2019  ESRD on hemodialysis ([address]  Arthritis  Hypertension  History of creation of ostomy ([address]  Severe left ventricular systolic dysfunction 52/66/0194  Atrial fibrillation with rapid ventricular response ([address] 07/21/2018  Pulmonary edema 07/21/2018  Leukocytosis 09/14/2017  Obese 09/14/2017 Kelly Pendleton MD 
Past Medical History:  
Diagnosis Date  Arthritis  Chronic kidney disease Dr Cadena Creed  ESRD on hemodialysis ([address] HD Tu,Thu,Sat   
 History of creation of ostomy ([address] Left abdomen  Hypertension  Ill-defined condition  Dialysis T, Th, S  
  Obese 9/14/2017 Social History Socioeconomic History  Marital status:  Spouse name: Not on file  Number of children: Not on file  Years of education: Not on file  Highest education level: Not on file Tobacco Use  Smoking status: Current Every Day Smoker Packs/day: 0.50  Smokeless tobacco: Never Used Substance and Sexual Activity  Alcohol use: No  
 Drug use: No  
 
Allergies Allergen Reactions  Iron Anaphylaxis Per pt, to PO formulation only, can tolerate IV formulation Allergy to excipient? Family History Problem Relation Age of Onset  Hypertension Other   
     multiple family members Current Facility-Administered Medications Medication Dose Route Frequency  calcium carbonate (TUMS) chewable tablet 600 mg [elemental]  600 mg Oral BID WITH MEALS  carvedilol (COREG) tablet 3.125 mg  3.125 mg Oral BID WITH MEALS  gabapentin (NEURONTIN) capsule 100 mg  100 mg Oral BID  isosorbide mononitrate ER (IMDUR) tablet 30 mg  30 mg Oral DAILY  traMADol (ULTRAM) tablet 50 mg  50 mg Oral Q12H PRN  
 venlafaxine (EFFEXOR) tablet 37.5 mg  37.5 mg Oral DAILY  melatonin tablet 3 mg  3 mg Oral QHS  sodium chloride (NS) flush 5-40 mL  5-40 mL IntraVENous Q8H  
 sodium chloride (NS) flush 5-40 mL  5-40 mL IntraVENous PRN  
 acetaminophen (TYLENOL) tablet 650 mg  650 mg Oral Q4H PRN  
 naloxone (NARCAN) injection 0.4 mg  0.4 mg IntraVENous PRN  
 ondansetron (ZOFRAN) injection 4 mg  4 mg IntraVENous Q4H PRN  
 lactobac ac& pc-s.therm-b.anim (MICHAEL Q/RISAQUAD)  1 Cap Oral DAILY  vancomycin (VANCOCIN) 750 mg in 0.9% sodium chloride (MBP/ADV) 250 mL  750 mg IntraVENous DIALYSIS PRN And  Vancomycin reminder to give after HD   Other DAILY  piperacillin-tazobactam (ZOSYN) 3.375 g in 0.9% sodium chloride (MBP/ADV) 100 mL  3.375 g IntraVENous Q12H Review of Symptoms: 
Constitutional: negative Eyes: negative Ears, nose, mouth, throat, and face: negative Respiratory: negative Cardiovascular: negative Gastrointestinal: negative Genitourinary:negative Musculoskeletal:negative Neurological: negative Reports drainage right flank, ICD site Subjective:  
  
Visit Vitals /53 Pulse 81 Temp 97.6 °F (36.4 °C) Resp 13 Ht 5' 4\" (1.626 m) Wt 148 lb 2.4 oz (67.2 kg) SpO2 100% BMI 25.43 kg/m² Physical: 
Heart: Regular, S1 WNL and S2 WNL. No S3 or S4, no m/S3/JVD, no carotid bruits Lungs: clear Abdomen: Soft, +BS, NTND, left flank sub Q, ICD with drainage medially, ICD visible. ostomy present Extremities: LE catalina +DP/PT, no edema Neurologic: Grossly normal 
 
Data Review:  
Recent Labs 04/04/19 
0326 04/03/19 
1527 WBC 11.7* 14.1* HGB 11.6 13.0 HCT 37.0 41.0  
 192 Recent Labs 04/04/19 
0326 04/03/19 
1527  138  
K 4.6 4.0  
 101 CO2 20* 26 GLU 87 79 BUN 61* 58* CREA 7.17* 6.96* CA 6.9* 7.7* ALB  --  3.3* TBILI  --  0.3 SGOT  --  11* ALT  --  13 No results for input(s): TROIQ, CPK, CKMB in the last 72 hours. No intake or output data in the 24 hours ending 04/04/19 0831 Cardiographics Telemetry: sinus ECG: sinus rhythm, T wave inversions Echocardiogram: 7/18 Left ventricle: The ventricle was dilated. Systolic function was severely 
reduced. Ejection fraction was estimated in the range of 10 % to 15 %. There was severe diffuse hypokinesis. There was akinesis of the basal-mid 
inferoseptal and apical septal wall(s). Doppler parameters were consistent 
with a reversible restrictive pattern, indicative of decreased left 
ventricular diastolic compliance and/or increased left atrial pressure 
(grade 3 diastolic dysfunction). Left atrium: The atrium was severely dilated. Assessment: 
 
   
  
 Active Problems: 
  ICD (implantable cardioverter-defibrillator) infection ([address] (4/3/2019) Plan: Myles Gibson is a 78year old female with ESRD, cardiomyopathy with EF 15%, ostomy, hx of AF, HTN. She is a  
candidate for subQ ICD removal. I discussed the risks/benefits/alternatives of the procedure with the patient. Risks include (but are not limited to) bleeding, infection, cva/mi/death. The patient understands and would like to proceed. Thank you for this interesting consultation. Jena Hutchins ANP Patient seen and examined by me with nurse practitioner. I personally performed all components of the history, physical, and medical decision making and agree with the assessment and plan with minor modifications as noted. SubQ ICD implanted in 9/59 complicated by hematoma then presenting with drainage. Seen in clinic with ICD eroding through skin. Will need removal of ICD generator and lead extraction. I discussed the risks/benefits/alternatives of the procedure with the patient. Risks include (but are not limited to) bleeding, heart block, infection, cva/mi/tamponade/death. The patient understands and agrees to proceed. Thank you for this interesting consultation.  
 
 
Sonya Deras MD, Ron Berger

## 2019-04-04 NOTE — PROGRESS NOTES
Called IP dialysis unit advised of pt needing her regular HD today and that page was placed to [facility] with no callback. Asked if they have any way to get in touch with MD's faster than we do. Rn stated she would get back to me

## 2019-04-05 NOTE — PROGRESS NOTES
Problem: Falls - Risk of 
Goal: *Absence of Falls Description Document Amston Beadradha Fall Risk and appropriate interventions in the flowsheet. 4/5/2019 1713 by Israel Ochoa, RN Outcome: Progressing Towards Goal 
4/5/2019 0750 by Israel Ochoa, RN Outcome: Progressing Towards Goal

## 2019-04-05 NOTE — PROGRESS NOTES
Occupational Therapy Note: 
 
Chart reviewed, patient still pending ortho consult for pathological fracture and also pending new ICD placement. Will defer at this time and attempt OT evaluation again as appropriate. Thank you.  
 
Siomara Damon, OTR/L

## 2019-04-05 NOTE — PROGRESS NOTES
Chart review. Patient admitted on 4/3/2019 for ICD infection. New ICD placed today. I&D notes pending. Reviewed with attending. Will wait for I&D recommendation. PTOT evaluations are pending. Huy Gonzalez RN CM Ext [phone]

## 2019-04-05 NOTE — DIALYSIS
[facility] Dialysis Team [place]  [phone] Vitals   Pre   Post   Assessment   Pre   Post    
Temp  Temp: 98.4 °F (36.9 °C) (04/04/19 2200)  98.3 LOC  A&OX3 A&OX3 HR   Pulse (Heart Rate): 79 (04/04/19 2315) 88 Lungs   CTA  CTA  
B/P   BP: 134/63 (04/04/19 2315) 108/67 Cardiac   IRREG IRREG Resp   Resp Rate: 16 (04/04/19 2315) 18 Skin   W/D/I  W/D/I Pain level  Pain Intensity 1: 0 (04/04/19 2200) 0 Edema  Trace Trace Orders: Duration:   Start:    2200 End:    0115 Total:   3.25 Dialyzer:   Dialyzer/Set Up Inspection: Matthew Jain (04/04/19 2200) K Bath:   Dialysate K (mEq/L): 2 (04/04/19 2200) Ca Bath:   Dialysate CA (mEq/L): 2.5 (04/04/19 2200) Na/Bicarb:   Dialysate NA (mEq/L): 140 (04/04/19 2200) Target Fluid Removal:   Goal/Amount of Fluid to Remove (mL): 2000 mL (04/04/19 2200) Access  Cannulated with 15g x2 without diff. Type & Location:   RT UE AVG, prep per protocol Labs Obtained/Reviewed Critical Results Called   Date when labs were drawn- 
Hgb-   
HGB Date Value Ref Range Status 04/04/2019 11.6 11.5 - 16.0 g/dL Final  
 
K-   
Potassium Date Value Ref Range Status 04/04/2019 4.6 3.5 - 5.1 mmol/L Final  
 
Ca-  
Calcium Date Value Ref Range Status 04/04/2019 6.9 (L) 8.5 - 10.1 MG/DL Final  
 
Bun-  
BUN Date Value Ref Range Status 04/04/2019 61 (H) 6 - 20 MG/DL Final  
 
Creat-  
Creatinine Date Value Ref Range Status 04/04/2019 7.17 (H) 0.55 - 1.02 MG/DL Final  
 
  
Medications/ Blood Products Given Name   Dose   Route and Time Blood Volume Processed (BVP):    69.1 Net Fluid Removed:  2000mls Comments Time Out Done: completed at 6866 Primary Nurse Rpt Pre: RIGOBERTO Hair,RN Primary Nurse Rpt Post:RIGOBERTO HairRN Pt Education:yes Care Plan:noted/unchanged Tx Summary:Tx initiated per protocol via Rt UE AVG. Angi tx well. All poss blood returned with rinse back.  Needles pulled DSG applied no excess bleeding +B/T. Pt in bed with rails up x3, call bell in hand. Bed low and locked. Paresh Garcia Pt's previous clinic-[facility] Consent signed - Informed Consent Verified: Yes (04/04/19 2200) Maynor Consent - yes 4/4/19 Hepatitis Status- 70 4/17/18 immune Machine #- Machine Number: [id number] (04/04/19 2200) Telemetry status-bedside Pre-dialysis wt. - Pre-Dialysis Weight: 67.2 kg (148 lb 2.4 oz) (04/04/19 2200)

## 2019-04-05 NOTE — PROGRESS NOTES
Hospitalist Progress Note NAME: Ryan Alexander :  1940 MRN:  [id number] Assessment / Plan: 
ICD site infection Lactic acidosis  
-no sepsis criteria; + leukocytosis  
-admit to the hospital for IV AB; monitor on tele  
-Lactic acidosis corrected. -cardiology consulted 
-ICD removed on 19 
-S/p ICD placement, subclavian L on 19 
-c/w broad spectrum AB: Vanco + zosyn 
-Blood cx NGTD 
-Wound cx negative -ECHO showed EF 21 - 25%. Visually measured ejection fraction. Left ventricular cavity size is mildly dilated. Left ventricular global hypokinesis. Left ventricular diastolic dysfunction. -ID consult per cardiology request - ID has seen patient today,will follow recommendations 
-Eliquis held before procedures. Eliquis can be resumed tomorrow as per cardiology Cardiology has cleared patient for discharge. 
  
Possible pathologic fractures/ tumor  
-+ L knee pain started ~ 4-5 weeks ago  
-seen by ortho OP at [facility] and refer [facility] for biopsy --> will ask ortho to see while here ? If connected to above infection. I also afraid she may get lost to follow up. -PT/OT  
-CT 3/8/2019  
2.4 x 2.1 x 2.7 cm lytic lesion at medial and posteromedial proximal 
tibial epiphysis and metaphysis with pathologic fracture. Additional findings as 
above consistent with renal osteodystrophy. Diagnostic considerations for the 
lesion include brown tumor, metastatic disease and bone, and less likely primary 
bone neoplasm. Brown tumors favored 
  
Ischemic Cardiomyopathy NYHA class II EF 10% CAD / HTN / Atrial fibrillation 
-no signs of fluid overload; BP at 110s; HR stable 
cxray clear  
-cont home Imdur Holding lisinopril for soft BP Coreg at lower dose with soft BP  
-Eliquis on hold. To resume on 19. 
  
ESRD  
-HD TThSa  
-Renal consulted for HD. Renally dose Rx as appropriate. Body mass index is 25.43 kg/m².  
 
Code Status: full code d/w pt in ED  
 Surrogate Decision Maker: sister and dtr DVT Prophylaxis: holding Eliquis for procedure ; SCD for now; re assess in 24 hr  
GI Prophylaxis: not indicated Disposition:TBD Baseline: lives with one of her sons; ambulating with walker; she has 4 children Subjective: Chief Complaint / Reason for Physician Visit Pt is s/p surgery c/w removal of ICD. Saying he has pain at the surgical area. Discussed with RN events overnight. Review of Systems: 
Symptom Y/N Comments  Symptom Y/N Comments Fever/Chills n   Chest Pain n   
Poor Appetite n   Edema n   
Cough n   Abdominal Pain Sputum n   Joint Pain SOB/FRAUSTO n   Pruritis/Rash Nausea/vomit n   Tolerating PT/OT Diarrhea    Tolerating Diet Constipation    Other y Pain at the surgical area. Could NOT obtain due to:   
 
Objective: VITALS:  
Last 24hrs VS reviewed since prior progress note. Most recent are: 
Patient Vitals for the past 24 hrs: 
 Temp Pulse Resp BP SpO2  
04/05/19 1453  83  (!) 116/97 100 % 04/05/19 1445  87  (!) 116/97   
04/05/19 1433    (!) 163/112   
04/05/19 1408 98.4 °F (36.9 °C) 89 19 111/75 98 % 04/05/19 1350  100  125/85 99 % 04/05/19 1340  (!) 101  104/70 100 % 04/05/19 1335  100  (!) 159/114 96 % 04/05/19 1330  100  160/89 100 % 04/05/19 1325  100   100 % 04/05/19 1320  (!) 101  93/49 100 % 04/05/19 1315  99  93/42 100 % 04/05/19 1310  99  95/55 100 % 04/05/19 1305  100  102/71 100 % 04/05/19 1300  (!) 102  (!) 84/34 94 % 04/05/19 1255  (!) 104  94/75 100 % 04/05/19 1250  (!) 108   94 % 04/05/19 1245  (!) 103  (!) 201/156 99 % 04/05/19 1240 98.4 °F (36.9 °C) (!) 104  163/44 99 % 04/05/19 1236 99 °F (37.2 °C) (!) 111 20 140/76 95 % 04/05/19 1235  (!) 109  (!) 74/43 95 % 04/05/19 1130  90     
04/05/19 1125  87     
04/05/19 1120  86  111/48   
04/05/19 1117 98.7 °F (37.1 °C) 87 18 111/48 97 % 04/05/19 1115  90  (!) 77/37 96 % 04/05/19 1110  88     
04/05/19 1105  89     
04/05/19 1100  90     
04/05/19 0720 99.3 °F (37.4 °C) 88 18 109/48 97 % 04/05/19 0300 98.4 °F (36.9 °C) 94 18 123/43 96 % 04/05/19 0115 98.3 °F (36.8 °C) 88 18 101/54 99 % 04/05/19 0100  82 16 151/57 98 % 04/05/19 0045  84 16 (!) 136/93 100 % 04/05/19 0030  87 16 161/67 100 % 04/05/19 0015  90 18 142/64 99 % 04/05/19 0000  92 16 (!) 126/108 100 % 04/04/19 2345  78 18 148/64 100 % 04/04/19 2330  76 18 139/58 97 % 04/04/19 2315  79 16 134/63 99 % 04/04/19 2300  82 18 139/66 99 % 04/04/19 2245  73 16 138/66 100 % 04/04/19 2230  73 16 134/63   
04/04/19 2215  80 16 139/66   
04/04/19 2200 98.4 °F (36.9 °C) 72 16 118/47 99 % 04/04/19 2100  72  122/54 98 % 04/04/19 2000  78  109/40   
04/04/19 1900 98.2 °F (36.8 °C) 87 16 116/49 96 % 04/04/19 1800  80  114/52 98 % 04/04/19 1700  77  114/52 99 % Intake/Output Summary (Last 24 hours) at 4/5/2019 1607 Last data filed at 4/5/2019 2829 Gross per 24 hour Intake  Output 2000 ml Net -2000 ml PHYSICAL EXAM: 
General: WD, WN. Alert, cooperative, no acute distress   
EENT:  EOMI. Anicteric sclerae. MMM Resp:  CTA bilaterally, no wheezing or rales. No accessory muscle use CV:  Regular  rhythm,  No edema GI:  Soft, Non distended, Non tender.  +Bowel sounds Neurologic:  Alert and oriented X 3, normal speech, Psych:   Good insight. Not anxious nor agitated Skin:  Dressing left side of chest s/p removal of ICD Reviewed most current lab test results and cultures  YES Reviewed most current radiology test results   YES Review and summation of old records today    NO Reviewed patient's current orders and MAR    YES 
PMH/SH reviewed - no change compared to H&P 
________________________________________________________________________ Care Plan discussed with: 
  Comments Patient X Family RN X   
 Care Manager Consultant Multidiciplinary team rounds were held today with , nursing, pharmacist and clinical coordinator. Patient's plan of care was discussed; medications were reviewed and discharge planning was addressed. ________________________________________________________________________ Total NON critical care TIME: 30   Minutes Total CRITICAL CARE TIME Spent:   Minutes non procedure based Comments >50% of visit spent in counseling and coordination of care X   
________________________________________________________________________ Eugenia Yu MD  
 
Procedures: see electronic medical records for all procedures/Xrays and details which were not copied into this note but were reviewed prior to creation of Plan. LABS: 
I reviewed today's most current labs and imaging studies. Pertinent labs include: 
Recent Labs 04/05/19 
3901 04/04/19 
0326 04/03/19 
1527 WBC 16.2* 11.7* 14.1* HGB 12.9 11.6 13.0 HCT 39.4 37.0 41.0  
 158 192 Recent Labs 04/05/19 
7331 04/04/19 
0326 04/03/19 
1527  140 138  
K 3.6 4.6 4.0  
 106 101 CO2 26 20* 26 GLU 79 87 79 BUN 28* 61* 58* CREA 3.74* 7.17* 6.96* CA 8.1* 6.9* 7.7* ALB  --   --  3.3* TBILI  --   --  0.3 SGOT  --   --  11* ALT  --   --  13 Signed: Eugenia Yu MD

## 2019-04-05 NOTE — PROGRESS NOTES
Called [facility] spoke with Laura Callaway. Advised reason for consult:  
abnormal CT: tibial epiphysis and metaphysis with pathologic fracture. Dr Gabbi Zamudio MD on call. Will await call back or for MD to see patient.

## 2019-04-05 NOTE — PROGRESS NOTES
Consult to ID called again. Spoke with Caroline Hernandez at [facility]. Advise Dr. Dmitry Donahue MD on call. Will await call back.

## 2019-04-05 NOTE — PROGRESS NOTES
Physical Therapy Chart reviewed, patient still pending ortho consult for pathological fracture and also pending new AICD placement. Plan to defer and continue to follow. Dayanara Macias

## 2019-04-05 NOTE — PROGRESS NOTES
TRANSFER - OUT REPORT: 
 
Verbal report given to TUCKER Soto(name) on Brittany Cifuentes  being transferred to IVCU(unit) for routine progression of care Report consisted of patients Situation, Background, Assessment and  
Recommendations(SBAR). Information from the following report(s) Procedure Summary and MAR was reviewed with the receiving nurse. Lines:  
Peripheral IV 04/03/19 Left Antecubital (Active) Site Assessment Clean, dry, & intact 4/5/2019  7:20 AM  
Phlebitis Assessment 0 4/5/2019  7:20 AM  
Infiltration Assessment 0 4/5/2019  7:20 AM  
Dressing Status Clean, dry, & intact 4/5/2019  7:20 AM  
Dressing Type Transparent 4/5/2019  7:20 AM  
Hub Color/Line Status Pink 4/5/2019  7:20 AM  
Action Taken Blood drawn 4/4/2019  4:19 AM  
  
 
Opportunity for questions and clarification was provided. Patient transported with: 
 Monitor Registered Nurse

## 2019-04-05 NOTE — ANESTHESIA POSTPROCEDURE EVALUATION
Procedure(s): 
INSERT ICD SINGLE. 
 
MAC, total IV anesthesia Anesthesia Post Evaluation Patient location during evaluation: PACU Note status: Adequate. Level of consciousness: responsive to verbal stimuli and sleepy but conscious Pain management: satisfactory to patient Airway patency: patent Anesthetic complications: no 
Cardiovascular status: acceptable Respiratory status: acceptable Hydration status: acceptable Comments: +Post-Anesthesia Evaluation and Assessment Patient: Rut Gomez MRN: [id number]  SSN: [id number] YOB: 1940  Age: 78 y.o. Sex: female Cardiovascular Function/Vital Signs /76   Pulse (!) 111   Temp 37.2 °C (99 °F)   Resp 20   Ht 5' 4\" (1.626 m)   Wt 67.4 kg (148 lb 9.4 oz)   SpO2 95%   BMI 25.51 kg/m² Patient is status post Procedure(s): 
INSERT ICD SINGLE. Nausea/Vomiting: Controlled. Postoperative hydration reviewed and adequate. Pain: 
Pain Scale 1: Numeric (0 - 10) (04/05/19 1222) Pain Intensity 1: 0 (04/05/19 1222) Managed. Neurological Status: At baseline. Mental Status and Level of Consciousness: Arousable. Pulmonary Status:  
O2 Device: Nasal cannula;Nasal airway (04/05/19 1236) Adequate oxygenation and airway patent. Complications related to anesthesia: None Post-anesthesia assessment completed. No concerns. Signed By: Jaz Perez MD  
 4/5/2019 Post anesthesia nausea and vomiting:  controlled Vitals Value Taken Time /76 4/5/2019 12:36 PM  
Temp 37.2 °C (99 °F) 4/5/2019 12:36 PM  
Pulse 105 4/5/2019 12:37 PM  
Resp 20 4/5/2019 12:36 PM  
SpO2 98 % 4/5/2019 12:37 PM  
Vitals shown include unvalidated device data.

## 2019-04-05 NOTE — PROGRESS NOTES
[email or web address]                  Phone - [phone] Renal Progress Note Subjective:  
Patient: Opal Leone                    YOB: 1940 Date- 4/5/2019 Reason for visit: ESRF Admission Date: 4/3/2019 Active Problems: 
  
End-stage renal failure -- on a TTS schedule at [place], followed by Dr. Gina Danielle. 
  
 ICD (implantable cardioverter-defibrillator) infection. S/p removal of the ICD on 4/4/19. New one placed on 4/5. 
  
Ischemic cardiomyopathy. EF of 10-15%. 
  
Severe left knee pain. Ortho following. 
  
Chronic atrial fibrillation. 
  
  
Plan:  
  
Dialysis and labs in AM. 
  
  
Above d/w the patient and the dialysis team. 
 
 
 
Interim History: On 4/4:  admitted to the hospital with an infected and protruding AICD. This has just been removed. She has ESRF and is due for dialysis today. She also c/o severe left knee pain and had a markedly abnormal MRI done a month ago. Per Dr. Noah Donovan: \"ICD (implantable cardioverter-defibrillator) infection ([facility] [address] Keshia Pyle. Elizabet Anderson has subcutaneous ICD which was placed in 9/2018. She developed a hematoma post procedure requiring a pocket evacuation following implant. At her follow up on 10/31/2018, her incision had healed well and her follow ups were to continue with Dr. Brianna Montez. She was seen for device check in clinic and reported drainage at that time. She was offered appt and declined. She cancelled a subsequent follow up.  Was seen urgently yesterday in Dr Adrianne Vasquez office for complaints of drainage from site. She has an opening to the medial portion of the incision, through which the ICD generator is visible. There is serosanguinous drainage surrounding the opening. No odor.   
She has hx of ICM, EF of 10-15%, permanent AF, ESRD on HD. She denies cardiac complaints. \" 
 The patient says the infected area has been \"giving me a fit\" and c/o pain there, but she denies any chest pain or SOB. On :  Pt seen right after placement of the new ICD. She says it does not hurt much at all. She is mostly bothered by the left knee pain and by the fact that the dialysis RN didn't arrive until very late last night. 
  
ROS as above, plus: no fever/chills. No HEENT complaints. No SOB or cough. No chest pain or palpitations. No real pain in area of the new ICD. No abd pain, N/V. +++left knee pain. No skin rashes or pruritis.  
  
   
 
 
Objective:  
 
Visit Vitals /67 Pulse 99 Temp 98.4 °F (36.9 °C) Resp 16 Ht 5' 4\" (1.626 m) Wt 67.4 kg (148 lb 9.4 oz) SpO2 100% BMI 25.51 kg/m² Temp (24hrs), Av.6 °F (37 °C), Min:98.2 °F (36.8 °C), Max:99.3 °F (37.4 °C) No intake/output data recorded.  190 -  0700 In: 200 [I.V.:200] Out:  Physical Exam: 
General:  alert, cooperative, no distress Eye:  conjunctivae/corneas clear. EOM's intact. Neurologic:  Alert and appropriate; normal speech; no tremor Neck:  supple; no masses Lungs:  clear to auscultation bilaterally Heart:  regular rate and rhythm Skin:  no rash or abnormalities Abdomen:  soft, non-tender. No masses,  no organomegaly Extremities: no edema Data Review:  
 
Recent Labs 19 
8517 19 
0326 19 
1527 WBC 16.2* 11.7* 14.1* HGB 12.9 11.6 13.0  140 138  
K 3.6 4.6 4.0  
 106 101 CO2 26 20* 26 BUN 28* 61* 58* CREA 3.74* 7.17* 6.96* CA 8.1* 6.9* 7.7* ALB  --   --  3.3* Assessment:  
 
Active Problems: 
  ICD (implantable cardioverter-defibrillator) infection ([facility] [address] (4/3/2019) Chart reviewed. Pertinent Notes Reviewed. Medications list Personally Reviewed. Zenon Mccall, [address] [facility] [address] [place] , [address] [place], [address] Phone - [phone]    Fax - [phone] Www. [employer] [facility] [address] [facility] Phone - [phone]  Fax - [phone] [email or web address]

## 2019-04-05 NOTE — PROGRESS NOTES
Successful ICD implant. cxr pending. If cxr wnl, then ok for dc from EP standpoint. Antibiotics per ID 
 
F/u with Dr Moy Rolling in 2 weeks as outpatient. Thank you for allowing me to participate in this patients care.  
 
Bev Zhang MD, Jacquie Pro

## 2019-04-05 NOTE — PROGRESS NOTES
Problem: Falls - Risk of 
Goal: *Absence of Falls Description Document Catherene Bunting Fall Risk and appropriate interventions in the flowsheet.  
Outcome: Progressing Towards Goal

## 2019-04-05 NOTE — PROGRESS NOTES
Spoke with Janene Aguiar CRNA and informed him that the patients SBP after dcing giulia gtt has dropped into the 80s. Giulia gtt restarted and Janene Aguiar says to ttr up by 10 mcg/min for SBP>90, giulia gtt is now at 40mcg/min.

## 2019-04-05 NOTE — CONSULTS
ORTHOPAEDIC CONSULT NOTE Subjective:  
 
Date of Consultation:  April 5, 2019 Fatou Olmstead is a 78 y.o. female who is being seen for left prox tibia pathologic fxr-  Dx by Dr Aubery Sandifer 2/22/19- put in brace, referred to [facility] for biopsy(states she is waiting on a call from [facility] to get her appt). Pt C/O persistent left knee pain about the knee cap. States she doesn't wear the brace provided by Dr Aubery Sandifer- because its uncomfortable and bulky. States she doesn't have time for the brace, because she has places to go like dialysis. States she got a injection a couple weeks ago that didn't help a bit. Admitted for ICD infection. Patient Active Problem List  
 Diagnosis Date Noted  Heart failure ([address] 09/14/2018 Priority: 1 - One  
 ICD (implantable cardioverter-defibrillator) infection ([address] 04/03/2019  ESRD on hemodialysis ([address]  Arthritis  Hypertension  History of creation of ostomy ([address]  Severe left ventricular systolic dysfunction 44/95/2723  Atrial fibrillation with rapid ventricular response ([address] 07/21/2018  Pulmonary edema 07/21/2018  Leukocytosis 09/14/2017  Obese 09/14/2017 Family History Problem Relation Age of Onset  Hypertension Other   
     multiple family members Social History Tobacco Use  Smoking status: Current Every Day Smoker Packs/day: 0.50  Smokeless tobacco: Never Used Substance Use Topics  Alcohol use: No  
 
Past Medical History:  
Diagnosis Date  Arthritis  Chronic kidney disease Dr Chrissie Sparks  ESRD on hemodialysis ([address] HD Tu,Thu,Sat   
 History of creation of ostomy ([address] Left abdomen  Hypertension  Ill-defined condition Dialysis Davy Dakin Obese 9/14/2017 Past Surgical History:  
Procedure Laterality Date  HX COLOSTOMY    
 left abdomen  HX PACEMAKER  09/13/2018  
 placed in left abdomen.  HX VASCULAR ACCESS    
 left upper arm fistula Prior to Admission medications Medication Sig Start Date End Date Taking? Authorizing Provider  
b complex-vitamin c-folic acid (DIALYVITE) 100-1 mg tab tablet Take 1 Tab by mouth daily. Yes Provider, Historical  
acetaminophen (TYLENOL EXTRA STRENGTH) 500 mg tablet Take 1,500 mg by mouth every four (4) hours. Yes Other, MD Greta  
gabapentin (NEURONTIN) 100 mg capsule Take 1 Cap by mouth two (2) times a day. 1/14/19  Yes Provider, Historical  
venlafaxine (EFFEXOR) 37.5 mg tablet Take 37.5 mg by mouth daily. Yes Provider, Historical  
vitamin E (AQUA GEMS) 400 unit capsule Take 400 Units by mouth daily. Yes Provider, Historical  
carvedilol (COREG) 12.5 mg tablet Take 1 Tab by mouth two (2) times daily (with meals). 7/24/18  Yes Linda Garcia MD  
apixaban (ELIQUIS) 5 mg tablet Take 1 Tab by mouth two (2) times a day. 7/24/18  Yes Linda Garcia MD  
zolpidem (AMBIEN) 5 mg tablet Take 5 mg by mouth nightly as needed for Sleep. Yes Other, MD Greta  
isosorbide mononitrate ER (IMDUR) 60 mg CR tablet Take 30 mg by mouth daily. Half-tab   Yes Juliet, MD Greta  
calcium carbonate (TUMS) 200 mg calcium (500 mg) chew Take 3 Tabs by mouth two (2) times daily (with meals). Yes Juliet, MD Greta  
 
Current Facility-Administered Medications Medication Dose Route Frequency  naloxone (NARCAN) injection 0.4 mg  0.4 mg IntraVENous PRN  
 calcium carbonate (TUMS) chewable tablet 600 mg [elemental]  600 mg Oral BID WITH MEALS  carvedilol (COREG) tablet 3.125 mg  3.125 mg Oral BID WITH MEALS  gabapentin (NEURONTIN) capsule 100 mg  100 mg Oral BID  isosorbide mononitrate ER (IMDUR) tablet 30 mg  30 mg Oral DAILY  traMADol (ULTRAM) tablet 50 mg  50 mg Oral Q12H PRN  
 venlafaxine (EFFEXOR) tablet 37.5 mg  37.5 mg Oral DAILY  melatonin tablet 3 mg  3 mg Oral QHS  sodium chloride (NS) flush 5-40 mL  5-40 mL IntraVENous Q8H  
  sodium chloride (NS) flush 5-40 mL  5-40 mL IntraVENous PRN  
 acetaminophen (TYLENOL) tablet 650 mg  650 mg Oral Q4H PRN  
 naloxone (NARCAN) injection 0.4 mg  0.4 mg IntraVENous PRN  
 ondansetron (ZOFRAN) injection 4 mg  4 mg IntraVENous Q4H PRN  
 lactobac ac& pc-s.therm-b.anim (MICHAEL Q/RISAQUAD)  1 Cap Oral DAILY  vancomycin (VANCOCIN) 750 mg in 0.9% sodium chloride (MBP/ADV) 250 mL  750 mg IntraVENous DIALYSIS PRN And  Vancomycin reminder to give after HD   Other DAILY  piperacillin-tazobactam (ZOSYN) 3.375 g in 0.9% sodium chloride (MBP/ADV) 100 mL  3.375 g IntraVENous Q12H Allergies Allergen Reactions  Iron Anaphylaxis Per pt, to PO formulation only, can tolerate IV formulation Allergy to excipient? Review of Systems:  A comprehensive review of systems was negative except for that written in the HPI. Mental Status: no dementia Objective:  
 
Patient Vitals for the past 8 hrs: 
 BP Temp Pulse Resp SpO2  
19 1236 140/76 99 °F (37.2 °C) (!) 111 20 95 % 19 1117 111/48 98.7 °F (37.1 °C) 87 18 97 % 19 0720 109/48 99.3 °F (37.4 °C) 88 18 97 % Temp (24hrs), Av.6 °F (37 °C), Min:98.2 °F (36.8 °C), Max:99.3 °F (37.4 °C) Gen: Well-developed,  in no acute distress HEENT: Pink conjunctivae, hearing intact to voice, moist mucous membranes Neck: Supple Resp: No respiratory distress Card:  palpable dorsalis pulse-equal bilaterally, birsk cap refill all distal digits Abd: non-distended Musc: left knee with subtle effusion, intact ext mech, no collateral instability. Generalized TTP, No sign of LE VTE Skin: No skin breakdown noted. Skin warm, pink, dry Neuro: Cranial nerves are grossly intact, no focal motor weakness, follows commands appropriately Psych: Good insight, oriented to person, place and time, alert Imaging Review: LLE MRI----IMPRESSION: 
 1. Nonspecific mass of intermediate signal on T1 and T2-weighted images within 
the medial tibial condyle, with pathologic fracture of medial tibial plateau 
showing mild depression. Diagnostic considerations include masses of neoplastic 
and nonneoplastic etiology. 2. Additional areas of bony signal change within tibia consistent with 
osteonecrosis. Nonspecific area of subcortical signal change throughout 
posterior lateral femoral condyle, possibly also osteonecrosis. 3. Moderate knee effusion with synovitis. Moderate-sized Baker's cyst containing 
masslike intermediate signal intensity on T1 and Q0-[id number] images, of 
uncertain etiology or significance. 4. Complex tears of medial lateral menisci. 5. Tricompartmental osteoarthrosis. LLE CT scan----IMPRESSION:  
2.4 x 2.1 x 2.7 cm lytic lesion at medial and posteromedial proximal 
tibial epiphysis and metaphysis with pathologic fracture. Additional findings as 
above consistent with renal osteodystrophy. Diagnostic considerations for the 
lesion include brown tumor, metastatic disease and bone, and less likely primary 
bone neoplasm. Brown tumors favored. Labs:  
Recent Results (from the past 24 hour(s)) ECHO ADULT COMPLETE Collection Time: 04/04/19  3:35 PM  
Result Value Ref Range LV E' Lateral Velocity 7.93 cm/s LV E' Septal Velocity 3.43 cm/s Aortic Valve Systolic Peak Velocity 223.94 cm/s AoV PG 9.3 mmHg LVOT Peak Velocity 64.84 cm/s LVOT Peak Gradient 1.7 mmHg MV A Ken[place] 123.24 cm/s  
 MV E [place] 78.09 cm/s  
 MV E/A 0.63 Tricuspid Valve E Wave Peak Velocity 50.24 cm/s Tricuspid Valve A Wave Peak Velocity 48.97 cm/s  
 E/E' lateral 9.85   
 E/E' septal 22.77   
 TR ERO 1.0   
 RVSP 40.4 mmHg LVIDs (M-mode) 4.53 cm LVIDd (M-mode) 5.65 (A) cm  
 LVPWd (M-mode) 1.15 (A) cm IVSd (M-mode) 1.04 (A) cm  
 E/E' ratio (averaged) 16.31 Est. RA Pressure 10.0 mmHg Mitral Valve E Wave Deceleration Time 254.5 ms Triscuspid Valve Regurgitation Peak Gradient 30.4 mmHg Pulmonic Valve Max Velocity 75.75 cm/s  
 TR Max Velocity 275.62 cm/s PASP 40.4 mmHg AV Cusp 1.85 cm PV peak gradient 2.3 mmHg METABOLIC PANEL, BASIC Collection Time: 04/05/19  1:37 AM  
Result Value Ref Range Sodium 138 136 - 145 mmol/L Potassium 3.6 3.5 - 5.1 mmol/L Chloride 102 97 - 108 mmol/L  
 CO2 26 21 - 32 mmol/L Anion gap 10 5 - 15 mmol/L Glucose 79 65 - 100 mg/dL BUN 28 (H) 6 - 20 MG/DL Creatinine 3.74 (H) 0.55 - 1.02 MG/DL  
 BUN/Creatinine ratio 7 (L) 12 - 20 GFR est AA 14 (L) >60 ml/min/1.73m2 GFR est non-AA 12 (L) >60 ml/min/1.73m2 Calcium 8.1 (L) 8.5 - 10.1 MG/DL  
CBC WITH AUTOMATED DIFF Collection Time: 04/05/19  1:37 AM  
Result Value Ref Range WBC 16.2 (H) 3.6 - 11.0 K/uL  
 RBC 3.79 (L) 3.80 - 5.20 M/uL  
 HGB 12.9 11.5 - 16.0 g/dL HCT 39.4 35.0 - 47.0 % .0 (H) 80.0 - 99.0 FL  
 MCH 34.0 26.0 - 34.0 PG  
 MCHC 32.7 30.0 - 36.5 g/dL  
 RDW 17.7 (H) 11.5 - 14.5 % PLATELET 962 030 - 485 K/uL MPV 11.6 8.9 - 12.9 FL  
 NRBC 0.0 0  WBC ABSOLUTE NRBC 0.00 0.00 - 0.01 K/uL NEUTROPHILS 88 (H) 32 - 75 % LYMPHOCYTES 2 (L) 12 - 49 % MONOCYTES 8 5 - 13 % EOSINOPHILS 1 0 - 7 % BASOPHILS 0 0 - 1 % IMMATURE GRANULOCYTES 1 (H) 0.0 - 0.5 % ABS. NEUTROPHILS 14.2 (H) 1.8 - 8.0 K/UL  
 ABS. LYMPHOCYTES 0.3 (L) 0.8 - 3.5 K/UL  
 ABS. MONOCYTES 1.3 (H) 0.0 - 1.0 K/UL  
 ABS. EOSINOPHILS 0.2 0.0 - 0.4 K/UL  
 ABS. BASOPHILS 0.0 0.0 - 0.1 K/UL  
 ABS. IMM. GRANS. 0.2 (H) 0.00 - 0.04 K/UL  
 DF AUTOMATED    
 RBC COMMENTS ANISOCYTOSIS 1+ 
    
 RBC COMMENTS MACROCYTOSIS 1+ 
    
EKG, 12 LEAD, INITIAL Collection Time: 04/05/19  2:25 AM  
Result Value Ref Range Ventricular Rate 94 BPM  
 Atrial Rate 94 BPM  
 P-R Interval 130 ms QRS Duration 104 ms Q-T Interval 362 ms QTC Calculation (Bezet) 452 ms Calculated P Axis 26 degrees Calculated R Axis -2 degrees Calculated T Axis -178 degrees Diagnosis Sinus rhythm with occasional premature ventricular complexes ST & T wave abnormality, consider inferolateral ischemia When compared with ECG of 03-APR-2019 15:19, 
premature ventricular complexes are now present 
aberrant conduction is no longer present GLUCOSE, POC Collection Time: 04/05/19  3:33 AM  
Result Value Ref Range Glucose (POC) 94 65 - 100 mg/dL Performed by Suzette Posada. Impression:  
 
Patient Active Problem List  
 Diagnosis Date Noted  Heart failure ([address] 09/14/2018 Priority: 1 - One  
 ICD (implantable cardioverter-defibrillator) infection ([address] 04/03/2019  ESRD on hemodialysis ([address]  Arthritis  Hypertension  History of creation of ostomy ([address]  Severe left ventricular systolic dysfunction 01/58/3657  Atrial fibrillation with rapid ventricular response ([address] 07/21/2018  Pulmonary edema 07/21/2018  Leukocytosis 09/14/2017  Obese 09/14/2017 Active Problems: 
  ICD (implantable cardioverter-defibrillator) infection ([address] (4/3/2019) XR KNEE LT 3 V 
 INDICATION: left proximal tibia fracture.  COMPARISON: CT 3/8/2019. MRI 3/11/2019. 
 FINDINGS: Three views of the left knee demonstrate severe diffuse osteopenia. There is an osteolytic lesion of the medial tibial condyles again shown. There 
is slightly increased density well within the osteolytic lesion as well as 
suggestion of interval healing of adjacent tibial fracture. The lytic lesion is 
unchanged in size measuring nearly 3 cc in craniocaudal. Severe left knee 
osteoarthrosis primarily involving the medial lateral compartments is again 
shown as is a moderate knee effusion medullary osteonecrosis of the distal 
femur. Except for atherosclerotic calcifications are again shown. IMPRESSION: There may be a interval increased density within the osteolytic 
lesion of the medial tibial condyle as well as healing of the tibial fracture. Recommend further evaluation with CT for clarification. Plan:  
 
Left knee with DJD, meniscus tears WBAT- cane/walker if necessary for stability/off loading Pathologic fracture left proximal tibial - healing Hinged knee brace- preferred--- BUT pt unwilling to wear Recommend out-pt referral to either Dr Yepez/Dr Camille Linares at [facility] orthopedics or [facility] ortho Dr. Johnson aware and agrees with plan as above. Cassandra Winters PA-C [employer]

## 2019-04-05 NOTE — PROGRESS NOTES
TRANSFER - IN REPORT: 
 
Verbal report received from [facility] AT THE [facility], RN (name) on [address] [place] [address]  being received from EP(unit) for routine progression of care. Report consisted of patients Situation, Background, Assessment and  
Recommendations(SBAR). Information from the following report(s) Procedure Summary was reviewed with the receiving nurse. Opportunity for questions and clarification was provided. Pt to be taken to recovery

## 2019-04-05 NOTE — ANESTHESIA PREPROCEDURE EVALUATION
Anesthetic History No history of anesthetic complications Review of Systems / Medical History Patient summary reviewed, nursing notes reviewed and pertinent labs reviewed Pulmonary Smoker Comments: Smoker - 0.5 ppd Neuro/Psych Cardiovascular Hypertension CHF Dysrhythmias : atrial fibrillation Pacemaker Exercise tolerance: <4 METS Comments: TTE (7/21/18): Mild to moderate TI; Severe, diffuse hypokinesis, EF=10-15% GI/Hepatic/Renal 
  
 
 
Renal disease: ESRD Endo/Other Arthritis Pertinent negatives: No morbid obesity Other Findings Comments: Infected Sub-Q ICD removed 4/4/19 Physical Exam 
 
Airway Mallampati: III 
TM Distance: 4 - 6 cm Neck ROM: normal range of motion Mouth opening: Normal 
 
 Cardiovascular Rhythm: regular Rate: normal 
 
 
 
 Dental 
 
Dentition: Poor dentition Comments: Multiple missing and broken teeth Pulmonary Breath sounds clear to auscultation Abdominal 
GI exam deferred Other Findings Anesthetic Plan ASA: 4 Anesthesia type: MAC and total IV anesthesia Induction: Intravenous Anesthetic plan and risks discussed with: Patient

## 2019-04-05 NOTE — CONSULTS
Pt off floor for new ICD. Chart/CT/MRI reviewed. Pt should continue in hinged knee brace. Recommend referral to either Lucho/Phan at [facility] or [facility] on disharge. Full consult to follow.

## 2019-04-05 NOTE — CONSULTS
Infectious Disease Consult Abbie Byers MD FAC Date of Consultation:  April 5, 2019 Referring Physician:  Dr Briseida Fiore Subjective:  
 
Patient is a 78 y.o. female who is being seen for - ICD infection IMPRESSION:  
· Leakage of serous fluid from subcutaneous ICD pocket , s/p removal of SICD on 4/4  
· BC, WC negative , cultures still in preliminary stage · S/p placement of new subcutaneous ICD today · SICD placed on 914/18 at [facility], pt presented with leakage of blood from pocket on 9/28 · Pt had evacuation of hematoma  on 10/1 & DC home on clindamycin po · CAD/ ischemic CMP EF 10/ Afib · ESRD on HD TTS, x 7 yrs · 2.4x 2.1 x 2.7 cm lytic lesion at medial & posteromedial prox tibial epiphysis & metaphysis  With pathological  fracture PLAN:  
  
· Continue Vancomycin / Zosyn iV · Await final cultures · Do not recommend discharge until WBC close to normal range , ( today it is 16.2) & cultures are final 
· ECHO evaluate valve for vegetations · Re evaluate pocket for any warmth, swelling , erythema  drainage Chente Goodrich is a 78 y.o. female who presented with  Complaint of leakage of fluid from ICD pocket . Pt has h/o ischemic cardiomyopathy with ICD placed 09/14/18 at [facility]. Procedure was complicated by ICD pocket hematoma. She presented 9/28  With leakage of blood from pocket . She was admitted 9/28-10/1 /18 . Hematoma evacuated on 10/1/18. No available cultures. She was given Vancomycin IV during admission &  discharged with clindamycin to take by mouth x 5 days  . At her follow up on 10/31/2018, her incision had healed well and her follow ups were to continue with Dr. Rafiq Henriquez. AS per H&P she was seen for device check in clinic and reported drainage at that time. She was offered appt and declined. Dr. Arun Arnlod  office followed up a few weeks later, and scheduled an office visit which she then cancelled.  She had called   Ariela Watson 's office with reports of continued drainage on day of current admission. At the appointment she was found with an opening to the medial portion of the incision, through which the ICD generator was  visible. Pt denies fever or chills at home. She reports the drainage mainly occurs when she is lying down. She c/o pain at ICD site especially with palpation. Serous , clear drainage Off note: pt started with L knee/leg pain ~ 4-5 weeks ago. She was seen by PCP. She had CT done on 3/8.  
 IMPRESSION: 2.4 x 2.1 x 2.7 cm lytic lesion at medial and posteromedial proximal 
tibial epiphysis and metaphysis with pathologic fracture. Additional findings as 
above consistent with renal osteodystrophy. Diagnostic considerations for the 
lesion include brown tumor, metastatic disease and bone, and less likely primary 
bone neoplasm. Brown tumors favored. 
 Pt s/p removal of ICD which is subcutaneous on 4/5. WC , BC no growth, still in preliminary stage . New ICD placement done today . D/w Dr Antonio Hurtado . No evidence of active infection , no purulence or odor . ICD is subcutaneous with wires being in the skin, s/cutaneous tissue & not in RA. Pt denies fevers , chills , just feels a little cold. No chest pain, SOB Patient Active Problem List  
Diagnosis Code  Leukocytosis D72.829  Obese E66.9  Atrial fibrillation with rapid ventricular response (HCC) I48.91  
 Pulmonary edema J81.1  Severe left ventricular systolic dysfunction W69.9  Heart failure (HCC) I50.9  ESRD on hemodialysis (HCC) N18.6, Z99.2  Arthritis M19.90  
 Hypertension I10  
 History of creation of ostomy ([address] Z93.9  ICD (implantable cardioverter-defibrillator) infection ([address] T82. 7XXA Past Medical History:  
Diagnosis Date  Arthritis  Chronic kidney disease Dr Ruslan Hines  ESRD on hemodialysis ([address] HD Tu,Thu,Sat   
 History of creation of ostomy ([address] Left abdomen  Hypertension  Ill-defined condition Dialysis [place] [facility] Obese 9/14/2017 Family History Problem Relation Age of Onset  Hypertension Other   
     multiple family members Social History Tobacco Use  Smoking status: Current Every Day Smoker Packs/day: 0.50  Smokeless tobacco: Never Used Substance Use Topics  Alcohol use: No  
 
Past Surgical History:  
Procedure Laterality Date  HX COLOSTOMY    
 left abdomen  HX PACEMAKER  09/13/2018  
 placed in left abdomen.  HX VASCULAR ACCESS    
 left upper arm fistula Prior to Admission medications Medication Sig Start Date End Date Taking? Authorizing Provider  
b complex-vitamin c-folic acid (DIALYVITE) 100-1 mg tab tablet Take 1 Tab by mouth daily. Yes Provider, Historical  
acetaminophen (TYLENOL EXTRA STRENGTH) 500 mg tablet Take 1,500 mg by mouth every four (4) hours. Yes Other, MD Greta  
gabapentin (NEURONTIN) 100 mg capsule Take 1 Cap by mouth two (2) times a day. 1/14/19  Yes Provider, Historical  
venlafaxine (EFFEXOR) 37.5 mg tablet Take 37.5 mg by mouth daily. Yes Provider, Historical  
vitamin E (AQUA GEMS) 400 unit capsule Take 400 Units by mouth daily. Yes Provider, Historical  
carvedilol (COREG) 12.5 mg tablet Take 1 Tab by mouth two (2) times daily (with meals). 7/24/18  Yes Kacie Wilson MD  
apixaban (ELIQUIS) 5 mg tablet Take 1 Tab by mouth two (2) times a day. 7/24/18  Yes Kacie Wilson MD  
zolpidem (AMBIEN) 5 mg tablet Take 5 mg by mouth nightly as needed for Sleep. Yes Other, MD Greta  
isosorbide mononitrate ER (IMDUR) 60 mg CR tablet Take 30 mg by mouth daily. Half-tab   Yes Other, MD Greta  
calcium carbonate (TUMS) 200 mg calcium (500 mg) chew Take 3 Tabs by mouth two (2) times daily (with meals). Yes Juliet, MD Greta  
 
Allergies Allergen Reactions  Iron Anaphylaxis Per pt, to PO formulation only, can tolerate IV formulation Allergy to excipient? Review of Systems:  A comprehensive review of systems was negative except for that written in the History of Present Illness. 10 point review of systems obtained . All other systems negative Objective:  
Blood pressure (!) 140/119, pulse 99, temperature 98.4 °F (36.9 °C), resp. rate 16, height 5' 4\" (1.626 m), weight 148 lb 9.4 oz (67.4 kg), SpO2 100 %. Temp (24hrs), Av.6 °F (37 °C), Min:98.2 °F (36.8 °C), Max:99.3 °F (37.4 °C) Current Facility-Administered Medications Medication Dose Route Frequency  sodium chloride (NS) flush 5-40 mL  5-40 mL IntraVENous Q8H  
 sodium chloride (NS) flush 5-40 mL  5-40 mL IntraVENous PRN  
 naloxone (NARCAN) injection 0.4 mg  0.4 mg IntraVENous PRN  
 HYDROcodone-acetaminophen (NORCO) 5-325 mg per tablet 1 Tab  1 Tab Oral Q6H PRN  
 PHENYLephrine (PF)(KEYSHAWN-SYNEPHRINE) 30 mg in 0.9% sodium chloride 250 mL infusion   mcg/min IntraVENous TITRATE  naloxone (NARCAN) injection 0.4 mg  0.4 mg IntraVENous PRN  
 calcium carbonate (TUMS) chewable tablet 600 mg [elemental]  600 mg Oral BID WITH MEALS  carvedilol (COREG) tablet 3.125 mg  3.125 mg Oral BID WITH MEALS  gabapentin (NEURONTIN) capsule 100 mg  100 mg Oral BID  isosorbide mononitrate ER (IMDUR) tablet 30 mg  30 mg Oral DAILY  traMADol (ULTRAM) tablet 50 mg  50 mg Oral Q12H PRN  
 venlafaxine (EFFEXOR) tablet 37.5 mg  37.5 mg Oral DAILY  melatonin tablet 3 mg  3 mg Oral QHS  sodium chloride (NS) flush 5-40 mL  5-40 mL IntraVENous Q8H  
 sodium chloride (NS) flush 5-40 mL  5-40 mL IntraVENous PRN  
 acetaminophen (TYLENOL) tablet 650 mg  650 mg Oral Q4H PRN  
 ondansetron (ZOFRAN) injection 4 mg  4 mg IntraVENous Q4H PRN  
 lactobac ac& pc-s.therm-b.anim (MICHAEL Q/RISAQUAD)  1 Cap Oral DAILY  vancomycin (VANCOCIN) 750 mg in 0.9% sodium chloride (MBP/ADV) 250 mL  750 mg IntraVENous DIALYSIS PRN And  Vancomycin reminder to give after HD   Other DAILY  piperacillin-tazobactam (ZOSYN) 3.375 g in 0.9% sodium chloride (MBP/ADV) 100 mL  3.375 g IntraVENous Q12H Exam:   
General:  Awake, cooperative, Eyes:  Sclera anicteric. Pupils equally round and reactive to light. Mouth/Throat: Mucous membranes normal, oral pharynx clear Neck: Supple Lungs:   Clear to auscultation bilaterally Chest - Lupper chest - dressing +  
CV:  Regular rate and rhythm,no murmur, click, rub or gallop Abdomen:   Soft, non-tender. bowel sounds normal. non-distended Extremities: No cyanosis or edema Skin: Skin color, texture, turgor normal. no acute rash or lesions Lymph nodes: Cervical and supraclavicular normal  
Musculoskeletal: No swelling or deformity Lines/Devices:  Intact, no erythema, drainage or tenderness Psych: Alert and oriented, normal mood affect given the setting Data Review: CBC:  
Recent Labs 04/05/19 
8599 04/04/19 
0326 04/03/19 
1527 WBC 16.2* 11.7* 14.1*  
RBC 3.79* 3.46* 3.84  
HGB 12.9 11.6 13.0 HCT 39.4 37.0 41.0  
 158 192 GRANS 88*  --  76* LYMPH 2*  --  13 EOS 1  --  0 CMP:  
Recent Labs 04/05/19 
3922 04/04/19 
0326 04/03/19 
1527 GLU 79 87 79  140 138  
K 3.6 4.6 4.0  
 106 101 CO2 26 20* 26 BUN 28* 61* 58* CREA 3.74* 7.17* 6.96* CA 8.1* 6.9* 7.7* AGAP 10 14 11 BUCR 7* 9* 8* AP  --   --  146* TP  --   --  8.3* ALB  --   --  3.3*  
GLOB  --   --  5.0* AGRAT  --   --  0.7* Lab Results Component Value Date/Time  Culture result: NO GROWTH AFTER 19 HOURS 04/04/2019 10:15 AM  
 Culture result: NO GROWTH 2 DAYS 04/03/2019 07:04 PM  
 Culture result: NO GROWTH 2 DAYS 04/03/2019 07:04 PM  
 Culture result: NO GROWTH 5 DAYS 09/15/2017 04:57 PM  
 Culture result: NO GROWTH 6 DAYS 09/14/2017 01:37 PM  
 Culture result: (A) 09/14/2017 01:37 PM  
  STAPHYLOCOCCUS SPECIES, COAGULASE NEGATIVE GROWING IN 1 OF 2 BOTTLES DRAWN (NO SITE INDICATED) PLATES HELD 3 DAYS. CALL MICROBIOLOGY LAB IF SENSITIVITIES ARE NEEDED. Culture result: REMAINING BOTTLE(S) HAS/HAVE NO GROWTH IN 5 DAYS 09/14/2017 01:37 PM  
  
 
 
XR Results (most recent): 
Results from Hospital Encounter encounter on 04/03/19 XR CHEST PORT Narrative EXAM: XR CHEST PORT INDICATION: Evaluate for pneumothorax status post AICD placement. COMPARISON: 4/4/2019 FINDINGS: A portable AP radiograph of the chest was obtained at 1317 hours. There is interval placement of a left axillary AICD with single right 
ventricular lead. There is no demonstration of pneumothorax or substantial 
pleural effusion. There is no consolidation or pulmonary edema. Cardiac, 
mediastinal and hilar contours are stable. Impression IMPRESSION: No pneumothorax demonstrated. ICD-10-CM ICD-9-CM 1. Problem related to implantable cardioverter-defibrillator (ICD) T82. 9XXA V45.02   
 Z95.810    
2. Infection involving implantable cardioverter-defibrillator (ICD), initial encounter ([facility] T82. 7XXA 996.61 ELECTROPHYSIOLOGY PROCEDURE  
   ELECTROPHYSIOLOGY PROCEDURE INVASIVE VASCULAR PROCEDURE INVASIVE VASCULAR PROCEDURE 3. Cardiomyopathy, unspecified type (HCC) I42.9 425.4 ELECTROPHYSIOLOGY PROCEDURE  
   ELECTROPHYSIOLOGY PROCEDURE Antibiotic History Vancomycin - 4/3 Zosyn IV - 4/3 I have discussed the diagnosis with the patient and the intended plan as seen in the above orders. I have discussed medication side effects and warnings with the patient as well. Reviewed test results at length with patient Signed By: Kyle Wilburn MD [facility] April 5, 2019

## 2019-04-05 NOTE — PROGRESS NOTES
Cardiac Electrophysiology Progress Note [address], [place], [address] [facility]  [phone] 
 
4/5/2019 9:52 AM 
 
Admit Date: 4/3/2019 Admit Diagnosis: ICD (implantable cardioverter-defibrillator) infection ([facility] [T82. 7XXA] Subjective:  
 
[address]   denies chest pain, chest pressure/discomfort, palpitations. Admits to discomfort at ICD removal site. Visit Vitals /48 Pulse 88 Temp 99.3 °F (37.4 °C) Resp 18 Ht 5' 4\" (1.626 m) Wt 148 lb 9.4 oz (67.4 kg) SpO2 97% BMI 25.51 kg/m² Current Facility-Administered Medications Medication Dose Route Frequency  naloxone (NARCAN) injection 0.4 mg  0.4 mg IntraVENous PRN  
 calcium carbonate (TUMS) chewable tablet 600 mg [elemental]  600 mg Oral BID WITH MEALS  carvedilol (COREG) tablet 3.125 mg  3.125 mg Oral BID WITH MEALS  gabapentin (NEURONTIN) capsule 100 mg  100 mg Oral BID  isosorbide mononitrate ER (IMDUR) tablet 30 mg  30 mg Oral DAILY  traMADol (ULTRAM) tablet 50 mg  50 mg Oral Q12H PRN  
 venlafaxine (EFFEXOR) tablet 37.5 mg  37.5 mg Oral DAILY  melatonin tablet 3 mg  3 mg Oral QHS  sodium chloride (NS) flush 5-40 mL  5-40 mL IntraVENous Q8H  
 sodium chloride (NS) flush 5-40 mL  5-40 mL IntraVENous PRN  
 acetaminophen (TYLENOL) tablet 650 mg  650 mg Oral Q4H PRN  
 naloxone (NARCAN) injection 0.4 mg  0.4 mg IntraVENous PRN  
 ondansetron (ZOFRAN) injection 4 mg  4 mg IntraVENous Q4H PRN  
 lactobac ac& pc-s.therm-b.anim (MICHAEL Q/RISAQUAD)  1 Cap Oral DAILY  vancomycin (VANCOCIN) 750 mg in 0.9% sodium chloride (MBP/ADV) 250 mL  750 mg IntraVENous DIALYSIS PRN And  Vancomycin reminder to give after HD   Other DAILY  piperacillin-tazobactam (ZOSYN) 3.375 g in 0.9% sodium chloride (MBP/ADV) 100 mL  3.375 g IntraVENous Q12H Objective:  
  
Visit Vitals /48 Pulse 88 Temp 99.3 °F (37.4 °C) Resp 18 Ht 5' 4\" (1.626 m) Wt 148 lb 9.4 oz (67.4 kg) SpO2 97% BMI 25.51 kg/m² Physical Exam: Abdomen: soft, non-tender Extremities: extremities normal 
Heart: regular rate and rhythm Lungs: clear to auscultation bilaterally Pulses: 2+ and symmetric Data Review:  
Labs:   
Recent Labs 04/05/19 
7208 04/04/19 
0326 04/03/19 
1527 WBC 16.2* 11.7* 14.1* HGB 12.9 11.6 13.0 HCT 39.4 37.0 41.0  
 158 192 Recent Labs 04/05/19 
4912 04/04/19 
0326 04/03/19 
1527  140 138  
K 3.6 4.6 4.0  
 106 101 CO2 26 20* 26 GLU 79 87 79 BUN 28* 61* 58* CREA 3.74* 7.17* 6.96* CA 8.1* 6.9* 7.7* ALB  --   --  3.3* TBILI  --   --  0.3 SGOT  --   --  11* ALT  --   --  13 No results for input(s): TROIQ, CPK, CKMB in the last 72 hours. Intake/Output Summary (Last 24 hours) at 4/5/2019 1672 Last data filed at 4/5/2019 7842 Gross per 24 hour Intake 200 ml Output 2025 ml Net -1825 ml Telemetry: sinus rhythm, ventricular rate 88 Assessment:  
 
Active Problems: 
  ICD (implantable cardioverter-defibrillator) infection ([facility] (4/3/2019) Plan:  
 
Kelli Becker is s/p removal of subQ ICD generator and lead 4/4/19. She is in sinus with PVCs, no cardiac complaints. Dressing dry and intact. She has been afebrile, no growth in blood cultures, wound culture without growth. She is a candidate for ICD placement, subclavian L. I discussed the risks/benefits/alternatives of the procedure with the patient. Risks include (but are not limited to) bleeding, heart block, infection, cva/mi/tamponade/death. The patient understands and agrees to proceed. Thank you for this interesting consultation. Jena Bangura, JENNA Patient seen and examined by me with nurse practitioner.   I personally performed all components of the history, physical, and medical decision making and agree with the assessment and plan with minor modifications as noted. bld cx ntd. Pocket culture ntd. No fevers. Will proceed with icd implant for class III chf and non ischemic cardiomyopathy. Based on risk of sudden cardiac death, a formal shared decision has been made to proceed with implantation of a cardiac defibrillator for primary prevention of sudden cardiac death using the [address] [place] [address] patient decision aid tool. The patient verbalizes understanding of indication, risks and benefits of ICD procedure. Based on risk of sudden cardiac death, a formal shared decision has been made to proceed with implantation of a cardiac defibrillator for primary prevention of sudden cardiac death using the [address] [place] [address] patient decision aid tool. The patient verbalizes understanding of indication, risks and benefits of ICD procedure.  
 
 
Nano Morataya MD, [facility] 
 
 
 
4/5/2019 
9:52 AM

## 2019-04-05 NOTE — PROGRESS NOTES
Called [phone] to contact HD for an update. Was told that they would page the on-call dialysis nurse for callback.

## 2019-04-06 NOTE — PROGRESS NOTES
Problem: Mobility Impaired (Adult and Pediatric) Goal: *Acute Goals and Plan of Care (Insert Text) Description Physical Therapy Goals Initiated 4/6/2019 1. Patient will move from supine to sit and sit to supine  in bed with supervision/set-up within 7 day(s). 2.  Patient will transfer from bed to chair and chair to bed with supervision/set-up using the least restrictive device within 7 day(s). 3.  Patient will perform sit to stand with supervision/set-up within 7 day(s). 4.  Patient will ambulate with minimal assistance/contact guard assist for 100 feet with the least restrictive device within 7 day(s). 5.  Patient will ascend/descend 3 stairs with  handrail(s) with minimal assistance/contact guard assist within 7 day(s). Outcome: Progressing Towards Goal 
 PHYSICAL THERAPY EVALUATION Patient: Rut Gomez (49 y.o. female) Date: 4/6/2019 Primary Diagnosis: ICD (implantable cardioverter-defibrillator) infection ([facility] [address] [T82. 7XXA] Procedure(s) (LRB): 
INSERT ICD SINGLE (N/A) 1 Day Post-Op Precautions: WBAT left, pacemaker precautions ASSESSMENT : 
Based on the objective data described below, the patient presents with impaired ability to perform bed mobility, transfer and ambulate, general weakness, decreased ROM and pain in left LE due to pathological fx,  decreased safety awareness and knowledge of pacemaker precautions, and decreased tolerance of activity. Patient received in bed and agreeable to participate, nursing cleared to see and provided assist during visit for which I am very grateful. Patient lives with family in a single story home with 3 steps to enter. Patient reports PTA she had significant difficulty with the steps and that her son had to \"push her up\". Patient has pathological fx left tibia, has hinged brace which she refuses to wear and is WBAT as per ortho note. ROM left knee is limited and painful. Patient unable to state any pacemaker precautions, reviewed and instructed  but patient needed frequent reminders  during visit not to push or pull with left UE. Patient requires min assist x 2 to come to sit on edge of bed and sitting balance is intact. Patient transferred to stand with mod assist x 2 to  RW and was able to take a few slow steps to chair. Gait is antalgic with decreased floor clearance bilaterally. Patient left up in chair with call bell in reach. Discussed dc planning, recommended rehab in SNF as patient is likely to have difficulty with mobility at home but she is adamantly against it. Patient did agree to home health therapy. Patient will benefit from skilled intervention to address the above impairments. Patient?s rehabilitation potential is considered to be Fair Factors which may influence rehabilitation potential include:  
? None noted ? Mental ability/status ? Medical condition ? Home/family situation and support systems ? Safety awareness 
? Pain tolerance/management 
? Other: PLAN : 
Recommendations and Planned Interventions: 
?           Bed Mobility Training             ? Neuromuscular Re-Education ? Transfer Training                   ? Orthotic/Prosthetic Training 
? Gait Training                         ? Modalities ? Therapeutic Exercises           ? Edema Management/Control ? Therapeutic Activities            ? Patient and Family Training/Education ? Other (comment): Frequency/Duration: Patient will be followed by physical therapy  4 times a week to address goals. Discharge Recommendations: Home Health Further Equipment Recommendations for Discharge: SUBJECTIVE:  
Patient stated ?how am I going to get up the stairs if I can't pull with my arm? .? OBJECTIVE DATA SUMMARY:  
HISTORY:   
Past Medical History:  
Diagnosis Date Arthritis Chronic kidney disease Dr Velia Munoz ESRD on hemodialysis ([facility] [address] HD Tu,Thu,Sat History of creation of ostomy ([facility] [address] Left abdomen Hypertension Ill-defined condition Dialysis T, Th, S  
 Obese 9/14/2017 Past Surgical History:  
Procedure Laterality Date HX COLOSTOMY    
 left abdomen HX PACEMAKER  09/13/2018  
 placed in left abdomen. HX VASCULAR ACCESS    
 left upper arm fistula Prior Level of Function/Home Situation: Patient lives with her son in a single story home, ambulated short distances with RW. Did not get in shower, but indep with sponge bath. Personal factors and/or comorbidities impacting plan of care: pathological tibial fx,colostomy Home Situation Home Environment: Private residence # Steps to Enter: 3 Rails to Enter: Yes One/Two Story Residence: One story Living Alone: No 
Support Systems: Family member(s) Patient Expects to be Discharged to[de-identified] Private residence Current DME Used/Available at Home: Jessica Zarco EXAMINATION/PRESENTATION/DECISION MAKING:  
Critical Behavior: 
Neurologic State: Alert Orientation Level: Oriented X4 Cognition: Follows commands Hearing: Auditory Auditory Impairment: None Range Of Motion: 
AROM: Generally decreased, functional 
  
  
  
PROM: Generally decreased, functional 
  
  
  
Strength:   
Strength: Generally decreased, functional 
  
  
  
  
  
  
Tone & Sensation:  
Tone: Normal 
  
  
  
  
Sensation: Intact Coordination: 
Coordination: Within functional limits Vision:  
  
Functional Mobility: 
Bed Mobility: 
Rolling: Contact guard assistance Supine to Sit: Minimum assistance Scooting: Minimum assistance Transfers: 
Sit to Stand: Moderate assistance;Assist x2 Stand to Sit: Minimum assistance;Assist x2 Bed to Chair: Minimum assistance;Assist x2 Balance:  
Sitting: Intact Standing: Impaired; With support Standing - Static: Constant support; River Blood Standing - Dynamic : Constant support; Eleanor Sykes Ambulation/Gait Training: 
Distance (ft): 5 Feet (ft) Assistive Device: Walker, rolling Ambulation - Level of Assistance: Minimal assistance;Assist x2 Gait Abnormalities: Antalgic;Decreased step clearance Left Side Weight Bearing: As tolerated Base of Support: Widened Speed/Maryanne: Slow Step Length: Left shortened;Right shortened Stairs: 
  
  
   
 
 
Functional Measure: 
Barthel Index: 
Bathin Bladder: 10 Bowels: 10 
Groomin Dressin Feeding: 10 Mobility: 0 Stairs: 0 Toilet Use: 5 Transfer (Bed to Chair and Back): 5 Total: 45/100 Percentage of impairment  
0% 1-19% 20-39% 40-59% 60-79% 80-99% 100% Barthel Score 0-100 100 99-80 79-60 59-40 20-39 1-19 
 0 The Barthel ADL Index: Guidelines 1. The index should be used as a record of what a patient does, not as a record of what a patient could do. 2. The main aim is to establish degree of independence from any help, physical or verbal, however minor and for whatever reason. 3. The need for supervision renders the patient not independent. 4. A patient's performance should be established using the best available evidence. Asking the patient, friends/relatives and nurses are the usual sources, but direct observation and common sense are also important. However direct testing is not needed. 5. Usually the patient's performance over the preceding 24-48 hours is important, but occasionally longer periods will be relevant. 6. Middle categories imply that the patient supplies over 50 per cent of the effort. 7. Use of aids to be independent is allowed. Genia Montenegro., Barthel, D.W. (5006). Functional evaluation: the Barthel Index. [address] [facility] (14)2. DILSHAD Alanis, Stefano Waters., Ramez Brothers., [place], [address] ().  Measuring the change indisability after inpatient rehabilitation; comparison of the responsiveness of the Barthel Index and Functional [place] Measure. Journal of Neurology, Neurosurgery, and Psychiatry, 66(4), 248-721. ADELFO Rossi, RON Burns, & Pro Felix M.A. (2004.) Assessment of post-stroke quality of life in cost-effectiveness studies: The usefulness of the Barthel Index and the EuroQoL-5D. [facility], 13, 577-17 Physical Therapy Evaluation Charge Determination History Examination Presentation Decision-Making MEDIUM  Complexity : 1-2 comorbidities / personal factors will impact the outcome/ POC  MEDIUM Complexity : 3 Standardized tests and measures addressing body structure, function, activity limitation and / or participation in recreation  MEDIUM Complexity : Evolving with changing characteristics  MEDIUM Complexity : FOTO score of 26-74 Based on the above components, the patient evaluation is determined to be of the following complexity level: MEDIUM Pain: 
Pain Scale 1: Numeric (0 - 10) Pain Intensity 1: 2 Pain Location 1: Incisional;Chest 
Pain Orientation 1: Left Pain Description 1: Aching Pain Intervention(s) 1: Medication (see MAR) Activity Tolerance:  
poor Please refer to the flowsheet for vital signs taken during this treatment. After treatment:  
?         Patient left in no apparent distress sitting up in chair ? Patient left in no apparent distress in bed 
? Call bell left within reach ? Nursing notified ? Caregiver present ? Bed alarm activated COMMUNICATION/EDUCATION:  
The patient?s plan of care was discussed with: Registered Nurse. ?         Fall prevention education was provided and the patient/caregiver indicated understanding. ? Patient/family have participated as able in goal setting and plan of care. ?         Patient/family agree to work toward stated goals and plan of care. ?         Patient understands intent and goals of therapy, but is neutral about his/her participation. ? Patient is unable to participate in goal setting and plan of care. Thank you for this referral. 
Susan Waters, PT Time Calculation: 25 mins

## 2019-04-06 NOTE — PROGRESS NOTES
TRANSFER - IN REPORT: 
 
Verbal report received from Ron Morocho RN on [facility]  being received from [facility]. Report consisted of patients Situation, Background, Assessment and  
Recommendations(SBAR). Information from the following report(s) Full report was reviewed with the receiving nurse. Opportunity for questions and clarification was provided. Assessment completed upon patients arrival to unit and care assumed.

## 2019-04-06 NOTE — DIALYSIS
HD TRANSFER - OUT REPORT: 
 
Verbal report given to Rika Irizarry RN on [facility] being transferred to [facility]. Report consisted of patient's Situation, Background, Assessment and  
Recommendations(SBAR). Information from the following report(s) LAst vital signs, Fluid removal and medications given,  was reviewed with the receiving nurse. Method:  $$ Method: Hemodialysis (04/06/19 0835) Fluid Removed  NET Fluid Removed (mL): 2000 ml (04/05/19 0115) Patient response to treatment:  [address] End Time   1215 If not documented, dialysis nurse to update post-dialysis row in HD/Filtration flowsheet Medications /Volume expansion agents or Fluid boluses administered during treatment? Yes Albumin and Vancomycin Post-dialysis medication administration due? No  
Remind nurse to administer post-HD medication upon return to unit. Fistula hemostasis? Yes 
 
Line heparinization? No  
 
Lines: N/A Opportunity for questions and clarification was provided. Patient transported with: Transporter

## 2019-04-06 NOTE — PROGRESS NOTES
The patient was repositioned in bed, bed pad changed. Left arm in sling the patient still refused ice pack. Left chest incision site no bleeding, no hematoma, dressing still intact. 5147 The patient was assisted to recliner per patient request, she tolerated this activity. She is now sitting up in chair watching tv 
 
5155 Patient is now back in bed with three max assist 
0748 TRANSFER - OUT REPORT: 
 
Verbal report given to HD unit (name) on [facility]  being transferred to HD (unit) for routine progression of care Report consisted of patients Situation, Background, Assessment and  
Recommendations(SBAR). Information from the following report(s) SBAR was reviewed with the receiving nurse. Lines:  
Peripheral IV 04/03/19 Left Antecubital (Active) Site Assessment Clean, dry, & intact 4/5/2019  7:21 PM  
Phlebitis Assessment 0 4/5/2019  7:21 PM  
Infiltration Assessment 0 4/5/2019  7:21 PM  
Dressing Status Clean, dry, & intact 4/5/2019  7:21 PM  
Dressing Type Transparent 4/5/2019  7:21 PM  
Hub Color/Line Status Flushed;Patent 4/5/2019  7:21 PM  
Action Taken Blood drawn 4/4/2019  4:19 AM  
  
 
Opportunity for questions and clarification was provided. Patient transported with: 
 Monitor

## 2019-04-06 NOTE — PROGRESS NOTES
Physical Therapy Chart reviewed, patient off the floor for dialysis, plan to defer visit today and continue to follow. Kesha Zapata

## 2019-04-06 NOTE — PROGRESS NOTES
Hospitalist Progress Note NAME: Chayo Patino :  1940 MRN:  [id number] Assessment / Plan: 
ICD site infection Lactic acidosis  
-no sepsis criteria; + leukocytosis  
-admitted to the hospital for IV AB; monitor on tele  
-Lactic acidosis corrected. -cardiology consulted 
-ICD removed on 19 
-S/p ICD placement, subclavian L on 19 
-c/w broad spectrum AB: Vanco + zosyn 
-Blood cx NGTD 
-Wound cx negative -ECHO showed EF 21 - 25%. Visually measured ejection fraction. Left ventricular cavity size is mildly dilated. Left ventricular global hypokinesis. Left ventricular diastolic dysfunction. -ID consult per cardiology request - ID saw patient on 19 and recommended to continue with vanc/zosyn,wait for the complete cultures results and discharge patient only if WBC around normal range. 
-Will continue to follow ID recommendations. 
-Cardiology has cleared patient for discharge. However,will wait until cleared by ID. 
  
Possible pathologic fractures/ tumor  
-+ L knee pain started ~ 4-5 weeks ago  
-seen by ortho OP at [facility] and refer [facility] for biopsy --> Ortho consulted. ? If connected to above infection. -PT/OT  
-CT 3/8/2019  
2.4 x 2.1 x 2.7 cm lytic lesion at medial and posteromedial proximal 
tibial epiphysis and metaphysis with pathologic fracture. Additional findings as 
above consistent with renal osteodystrophy. Diagnostic considerations for the 
lesion include brown tumor, metastatic disease and bone, and less likely primary 
bone neoplasm. Brown tumors favored Pain management 
  
Ischemic Cardiomyopathy NYHA class II EF 10% CAD / HTN / Atrial fibrillation 
-no signs of fluid overload; BP at 110s; HR stable 
cxray clear  
-cont home Imdur Holding lisinopril for soft BP Coreg at lower dose with soft BP  
-Eliquis was on hold before the procedure. Will resume today  as per cardiology. 
  
ESRD  
-HD TThSa  
-Renal consulted for HD. Renally dose Rx as appropriate. Body mass index is 25.43 kg/m². Code Status: full code d/w pt in ED Surrogate Decision Maker: sister and dtr DVT Prophylaxis: holding Eliquis for procedure ; SCD for now; re assess in 24 hr  
GI Prophylaxis: not indicated Disposition:TBD Baseline: lives with one of her sons; ambulating with walker; she has 4 children Subjective: Chief Complaint / Reason for Physician Visit Says still has left knee pain if she moves it. Discussed with RN events overnight. Review of Systems: 
Symptom Y/N Comments  Symptom Y/N Comments Fever/Chills n   Chest Pain n   
Poor Appetite n   Edema n   
Cough n   Abdominal Pain Sputum n   Joint Pain y Left knee. SOB/FRAUSTO n   Pruritis/Rash Nausea/vomit n   Tolerating PT/OT Diarrhea    Tolerating Diet Constipation    Other y Pain at the surgical area. Could NOT obtain due to:   
 
Objective: VITALS:  
Last 24hrs VS reviewed since prior progress note. Most recent are: 
Patient Vitals for the past 24 hrs: 
 Temp Pulse Resp BP SpO2  
04/06/19 1303 97.8 °F (36.6 °C) 85 18 139/52 95 % 04/06/19 1115  81 18 121/43   
04/06/19 1100  76 18 145/58   
04/06/19 1045  75 18 119/55   
04/06/19 1030  75 18 135/63   
04/06/19 1015  81 18 149/67   
04/06/19 1005  84 18 133/62   
04/06/19 0950  78 18 140/62   
04/06/19 0935  78 18 115/69   
04/06/19 0920  77 18 116/68   
04/06/19 0905  90 18 (!) 89/57   
04/06/19 0850  99 18 92/78   
04/06/19 0835 98.9 °F (37.2 °C) 89 18 (!) 85/46 97 % 04/06/19 0730 99 °F (37.2 °C) 92 18 112/57 97 % 04/06/19 0232 98.3 °F (36.8 °C) 99 18 103/49 96 % 04/05/19 2303 97.8 °F (36.6 °C) 98 18 102/55 98 % 04/05/19 1921 98.8 °F (37.1 °C) 97 16 92/42 99 % 04/05/19 1832  (!) 111  111/76 100 % 04/05/19 1700  99  (!) 140/119 100 % 04/05/19 1600  99 16 126/67 100 % 04/05/19 1530  93  136/56 100 % 04/05/19 1500  92 (!) 96 (!) 123/103   
04/05/19 1453  83  (!) 116/97 100 % 04/05/19 1445  87  (!) 116/97   
04/05/19 1433    (!) 163/112   
04/05/19 1408 98.4 °F (36.9 °C) 89 19 111/75 98 % 04/05/19 1350  100  125/85 99 % 04/05/19 1340  (!) 101  104/70 100 % Intake/Output Summary (Last 24 hours) at 4/6/2019 1338 Last data filed at 4/6/2019 0730 Gross per 24 hour Intake 550 ml Output 0 ml Net 550 ml PHYSICAL EXAM: 
General: WD, WN. Alert, cooperative, no acute distress   
EENT:  EOMI. Anicteric sclerae. MMM Resp:  CTA bilaterally, no wheezing or rales. No accessory muscle use CV:  Regular  rhythm,  No edema GI:  Soft, Non distended, Non tender.  +Bowel sounds Neurologic:  Alert and oriented X 3, normal speech. Extr:                 Tender left knee to palpation,warm. Psych:   Good insight. Not anxious nor agitated Skin:  Dressing left side of chest s/p removal of ICD Reviewed most current lab test results and cultures  YES Reviewed most current radiology test results   YES Review and summation of old records today    NO Reviewed patient's current orders and MAR    YES 
PMH/SH reviewed - no change compared to H&P 
________________________________________________________________________ Care Plan discussed with: 
  Comments Patient X Family RN X   
Care Manager Consultant Multidiciplinary team rounds were held today with , nursing, pharmacist and clinical coordinator. Patient's plan of care was discussed; medications were reviewed and discharge planning was addressed. ________________________________________________________________________ Total NON critical care TIME: 30   Minutes Total CRITICAL CARE TIME Spent:   Minutes non procedure based Comments >50% of visit spent in counseling and coordination of care X   
________________________________________________________________________ Milo Delgadillo MD  
 
 Procedures: see electronic medical records for all procedures/Xrays and details which were not copied into this note but were reviewed prior to creation of Plan. LABS: 
I reviewed today's most current labs and imaging studies. Pertinent labs include: 
Recent Labs 04/06/19 
5024 04/05/19 
8101 04/04/19 
8028 WBC 13.9* 16.2* 11.7* HGB 10.2* 12.9 11.6 HCT 30.5* 39.4 37.0 * 183 158 Recent Labs 04/05/19 
3468 04/04/19 
0326 04/03/19 
1527  140 138  
K 3.6 4.6 4.0  
 106 101 CO2 26 20* 26 GLU 79 87 79 BUN 28* 61* 58* CREA 3.74* 7.17* 6.96* CA 8.1* 6.9* 7.7* ALB  --   --  3.3* TBILI  --   --  0.3 SGOT  --   --  11* ALT  --   --  13 Signed: Lupe Paz MD

## 2019-04-06 NOTE — PROCEDURES
[facility] Dialysis Team Ben Sabillon  [phone] Vitals   Pre   Post   Assessment   Pre   Post    
Temp  Temp: 98.9 °F (37.2 °C) (04/06/19 0835)  98.8F LOC  A & O x 4, following commands A & O x 4, following commands HR   Pulse (Heart Rate): 89 (04/06/19 0835) 79 Lungs   Coarse  Coarse B/P   BP: (!) 85/46 (04/06/19 0835) 141/61 Cardiac   RRR, pulses palpable RRR, pulses palpable Resp   Resp Rate: 18 (04/06/19 0835) 18 Skin   Clean, dry, surgical dressing on left shoulder  Clean, dry, surgical dressing on left shoulder Pain level  Pain Intensity 1: 2 (04/06/19 0730) 4/10 shoulder  Edema  Generalized Generalized Orders: Duration:   Start:    0835 End:    1215 Total:   3.25 hours Dialyzer:   Dialyzer/Set Up Inspection: Ruby Eddy (04/06/19 8077) K Bath:   Dialysate K (mEq/L): 2 (04/06/19 0835) Ca Bath:   Dialysate CA (mEq/L): 2.5 (04/06/19 0835) Na/Bicarb:   Dialysate NA (mEq/L): 140 (04/06/19 0835) Target Fluid Removal:   Goal/Amount of Fluid to Remove (mL): 1500 mL (04/06/19 0835) Access Type & Location:   EMIGDIO AV Graft, no s/s of infection + bruit/thrill, and also has non-working ALEXANDER AV Graft, no bruit/thrill. Labs Obtained/Reviewed Critical Results Called   Date when labs were drawn- 
Hgb-   
HGB Date Value Ref Range Status 04/06/2019 10.2 (L) 11.5 - 16.0 g/dL Final  
  Comment:  
  INVESTIGATED PER DELTA CHECK PROTOCOL  
 
K-   
Potassium Date Value Ref Range Status 04/05/2019 3.6 3.5 - 5.1 mmol/L Final  
  Comment:  
  INVESTIGATED PER DELTA CHECK PROTOCOL Ca-  
Calcium Date Value Ref Range Status 04/05/2019 8.1 (L) 8.5 - 10.1 MG/DL Final  
 
Bun-  
BUN Date Value Ref Range Status 04/05/2019 28 (H) 6 - 20 MG/DL Final  
  Comment:  
  INVESTIGATED PER DELTA CHECK PROTOCOL Creat-  
Creatinine Date Value Ref Range Status 04/05/2019 3.74 (H) 0.55 - 1.02 MG/DL Final  
  Comment:  
  INVESTIGATED PER DELTA CHECK PROTOCOL Medications/ Blood Products Given Name   Dose   Route and Time Albumin  12.5 g x 2   0911 and 915      IV Vancomycin  750 mg  1100             IV  
    
Blood Volume Processed (BVP):    64.6 L  Net Fluid Removed:  2000 ml Comments Time Out Done: 6390 Primary Nurse Rpt Pre: Maggie Mack, RN 
Primary Nurse Rpt Post: Marco A Davis RN 
Pt Education: access care Care Plan: continue with dialysis per physician's orders Tx Summary: pt tolerated treatment well. 4722- HD started 1974 - called Dr. Kenia Ryan and asked for RX for Albumin, pt's AUDREY was 85/46. UF off. Got OK for Albumin order, gave 2 doses 0850- VSS, all lines visible and intact 
0905- VSS, all lines visible and intact 
0920- VSS, all lines visible and intact 0935-VSS, all lines visible and intact 
0950- VSS, all lines visible and intact 1005-VSS, all lines visible and intact 1020- VSS, all lines visible and intact 1030-Dialyzer clotted, re-strung machine 1045-HD restarted,  
1100-VSS, all lines visible and intact 1115-VSS, all lines visible and intact 1130-VSS, all lines visible and intact 1145- VSS, all lines visible and intact 1215- HD ended Admiting Diagnosis: 
Pt's previous clinic- 
Consent signed - Informed Consent Verified: Yes (04/06/19 9338) Maynor Consent - Yes Hepatitis Status- Negative  4/12/18 Immune 71 4/12/18, crispin new one 4/6/19 Machine #- Machine Number: [id number] (04/06/19 7236) Telemetry status- None Pre-dialysis wt. - Pre-Dialysis Weight: 67.2 kg (148 lb 2.4 oz) (04/04/19 2200)

## 2019-04-06 NOTE — PROGRESS NOTES
Bedside report received from Birtha Schilder, [facility] Assessment, Background, Procedure summary, Intake/Output, MAR, and recent results discussed. Care assumed. 0491 Patient off floor for dialysis. 7044 Dialysis nurse, [employer] called to report patient complaints of pain 8/10 to back and BEHZAD incision. Administered 5/325 hydrocodone and 100mg gabapentin po. Dialysis nurse reported improvement of pain. 1241 TRANSFER - IN REPORT: 
 
Verbal report received from [employer] (name) on Brittany Cifuentes  being received from Dialysis (unit) for routine progression of care Report consisted of patients Situation, Background, Assessment and  
Recommendations(SBAR). Information from the following report(s) Procedure Summary, Intake/Output and Recent Results was reviewed with the receiving nurse. 25mg IV albumin and IV Vancomycin administered. 2 liters removed. VSS. Opportunity for questions and clarification was provided. Assessment completed upon patients arrival to unit and care assumed. 1530 Patient accidentally removed PIV. Attempted reinsertion x 2 without success. PICC team notified 1830 IV access reestablished by PICC team fter multiple attempts using US. Verbal report given to oncoming nurse [facility] Report consisted of patients Situation, Background, Assessment, and  
Recommendations Information was reviewed with the receiving nurse. Opportunity for questions and clarification was provided.    
 
Tanya Smith RN

## 2019-04-07 NOTE — PROGRESS NOTES
Bedside report received from [facility] Assessment, Background, Procedure summary, Intake/Output, MAR, and recent results discussed. Care assumed. Pt up in recliner chair x 6 hours. Transfers with x2 assist and walker. No complaints of pain, shortness of breath, dizziness, or light-headedness. Incision appears clean, dry, and intact. No swelling or hematoma present. Verbal report given to oncoming nurse TUCKER Blakely Report consisted of patients Situation, Background, Assessment, and  
Recommendations Information was reviewed with the receiving nurse. Opportunity for questions and clarification was provided.    
 
Katiuska Farr RN

## 2019-04-07 NOTE — PROGRESS NOTES
Hospitalist Progress Note NAME: Khushboo Duvall :  1940 MRN:  [id number] Assessment / Plan: 
ICD site infection Lactic acidosis  
-no sepsis criteria; + leukocytosis  
-admitted to the hospital for IV AB; monitor on tele  
-Lactic acidosis resolved 
-cardiology consulted 
-ICD removed on 19 
-S/p ICD placement, subclavian L on 19 
-c/w broad spectrum AB: Vanco + zosyn 
-Blood cx NGTD 
-Wound cx negative -ECHO showed EF 21 - 25%. Visually measured ejection fraction. Left ventricular cavity size is mildly dilated. Left ventricular global hypokinesis. Left ventricular diastolic dysfunction. -ID consulted per cardiology request - ID saw patient on 19 and recommended to continue with vanc/zosyn,wait for the complete cultures results and discharge patient only if WBC around normal range. -WBC 13.4,blood cx and wound cx negtainve 
-Will continue to follow ID recommendations,wait if ID clears for discharge. 
-Cardiology signed off on 19 and recommended op in 2 weeks. 
  
Possible pathologic fractures/ tumor  
-+ L knee pain started ~ 4-5 weeks ago  
-seen by ortho OP at [facility] and refer [facility] for biopsy --> Ortho consulted. ? If connected to above infection. -PT/OT  
-CT 3/8/2019  
2.4 x 2.1 x 2.7 cm lytic lesion at medial and posteromedial proximal 
tibial epiphysis and metaphysis with pathologic fracture. Additional findings as 
above consistent with renal osteodystrophy. Diagnostic considerations for the 
lesion include brown tumor, metastatic disease and bone, and less likely primary 
bone neoplasm. Brown tumors favored Pain management 
  
Ischemic Cardiomyopathy NYHA class II EF 10% CAD / HTN / Atrial fibrillation 
-no signs of fluid overload; BP at 110s; HR stable 
cxray clear  
-cont home Imdur Holding lisinopril for soft BP Coreg at lower dose with soft BP  
-Eliquis was on hold before the procedure. Resumed on  as per cardiology. 
  
ESRD  
-HD TThSa  
 -Renal consulted for HD. Renally dose Rx as appropriate. Body mass index is 25.43 kg/m². Code Status: full code d/w pt in ED Surrogate Decision Maker: sister and dtr DVT Prophylaxis: Eliquis. GI Prophylaxis: not indicated Disposition:TBD Baseline: lives with one of her sons; ambulating with walker; she has 4 children Subjective: Chief Complaint / Reason for Physician Visit Feeling better overall,only reporting joints soreness due to arthritis. Discussed with RN events overnight. Review of Systems: 
Symptom Y/N Comments  Symptom Y/N Comments Fever/Chills n   Chest Pain n   
Poor Appetite n   Edema n   
Cough n   Abdominal Pain Sputum n   Joint Pain y Left knee. SOB/FRAUSTO n   Pruritis/Rash Nausea/vomit n   Tolerating PT/OT Diarrhea    Tolerating Diet Constipation    Other Could NOT obtain due to:   
 
Objective: VITALS:  
Last 24hrs VS reviewed since prior progress note. Most recent are: 
Patient Vitals for the past 24 hrs: 
 Temp Pulse Resp BP SpO2  
04/07/19 0721 99.2 °F (37.3 °C) 90 16 97/45 91 % 04/07/19 0449 98 °F (36.7 °C) 85 16 105/51 92 % 04/06/19 2356 98.4 °F (36.9 °C) 91 18 105/56 94 % 04/06/19 1901 98.2 °F (36.8 °C) 92 18 120/57 94 % 04/06/19 1522 98.4 °F (36.9 °C) 93 18 117/84 94 % 04/06/19 1303 97.8 °F (36.6 °C) 85 18 139/52 95 % 04/06/19 1215 98.8 °F (37.1 °C) 79 18 141/61 95 % 04/06/19 1200  76 18 144/63   
04/06/19 1145  83 18 118/51   
04/06/19 1130  81 18 120/45   
04/06/19 1115  81 18 121/43   
04/06/19 1100  76 18 145/58   
04/06/19 1045  75 18 119/55   
04/06/19 1030  75 18 135/63  Intake/Output Summary (Last 24 hours) at 4/7/2019 1026 Last data filed at 4/7/2019 6531 Gross per 24 hour Intake 1040 ml Output 1500 ml Net -460 ml PHYSICAL EXAM: 
General: WD, WN. Alert, cooperative, no acute distress   
EENT:  EOMI. Anicteric sclerae. MMM Resp: CTA bilaterally, no wheezing or rales. No accessory muscle use CV:  Regular  rhythm,  No edema GI:  Soft, Non distended, Non tender.  +Bowel sounds Neurologic:  Alert and oriented X 3, normal speech. Extr:                 Tender left knee to palpation,warm. Psych:   Good insight. Not anxious nor agitated Skin:  Dressing left side of chest s/p removal of ICD Reviewed most current lab test results and cultures  YES Reviewed most current radiology test results   YES Review and summation of old records today    NO Reviewed patient's current orders and MAR    YES 
PMH/SH reviewed - no change compared to H&P 
________________________________________________________________________ Care Plan discussed with: 
  Comments Patient X Family RN X   
Care Manager Consultant Multidiciplinary team rounds were held today with , nursing, pharmacist and clinical coordinator. Patient's plan of care was discussed; medications were reviewed and discharge planning was addressed. ________________________________________________________________________ Total NON critical care TIME: 30   Minutes Total CRITICAL CARE TIME Spent:   Minutes non procedure based Comments >50% of visit spent in counseling and coordination of care X   
________________________________________________________________________ Milo Delgadillo MD  
 
Procedures: see electronic medical records for all procedures/Xrays and details which were not copied into this note but were reviewed prior to creation of Plan. LABS: 
I reviewed today's most current labs and imaging studies. Pertinent labs include: 
Recent Labs 04/07/19 
4449 04/06/19 
0220 04/05/19 
4348 WBC 13.4* 13.9* 16.2* HGB 9.0* 10.2* 12.9 HCT 27.3* 30.5* 39.4 * 137* 183 Recent Labs 04/07/19 
3216 04/05/19 
1321 * 138  
K 4.0 3.6  102 CO2 25 26 GLU 96 79 BUN 38* 28*  
 CREA 5.94* 3.74* CA 7.3* 8.1* Signed: Annelise Ortiz MD

## 2019-04-07 NOTE — PROGRESS NOTES
The patient is talking on the phone, the patient denies any pain. Skin protectant lotion applied around left chest area away from the incision site for comfort.

## 2019-04-08 NOTE — PROGRESS NOTES
Problem: Mobility Impaired (Adult and Pediatric) Goal: *Acute Goals and Plan of Care (Insert Text) Description Physical Therapy Goals Initiated 4/6/2019 1. Patient will move from supine to sit and sit to supine  in bed with supervision/set-up within 7 day(s). 2.  Patient will transfer from bed to chair and chair to bed with supervision/set-up using the least restrictive device within 7 day(s). 3.  Patient will perform sit to stand with supervision/set-up within 7 day(s). 4.  Patient will ambulate with minimal assistance/contact guard assist for 100 feet with the least restrictive device within 7 day(s). 5.  Patient will ascend/descend 3 stairs with  handrail(s) with minimal assistance/contact guard assist within 7 day(s). Outcome: Progressing Towards Goal 
 
 PHYSICAL THERAPY TREATMENT Patient: Chayo Patino (87 y.o. female) Date: 4/8/2019 Diagnosis: ICD (implantable cardioverter-defibrillator) infection ([facility] [address] [T82. 7XXA] <principal problem not specified> Procedure(s) (LRB): 
INSERT ICD SINGLE (N/A) 3 Days Post-Op Precautions: WBAT LLE Chart, physical therapy assessment, plan of care and goals were reviewed. ASSESSMENT: 
Patient seen for exercises and mobility/transfers. Per NAE Russell  note, it is preferred for patient is to continue wearing her left knee brace. She hasn't been wearing it at home stating that its too heavy and she doesn't have it here. Bulky pressure dressing over ICD with oozing BRB noted on gown after transfer to the chair. Nurse in the room and aware. Only worked on transferring to the chair. With the ICD she can't push on the RW to decrease the [address] on the left leg. She needed assist x 2 for mobility this morning. Good sitting balance. Stood with mod assist x 2. VERNON just being supported and not letting her weight bear through it. Able to take steps around to the bedside chair.   VERNON supported on a pillow once situated in the chair. Encouraged LE exercises while up in the chair throughout the day. Would benefit from rehab at discharge, but she is presently declining and wanting to go home. Evidently she has a lot of family around. Progression toward goals: 
?    Improving appropriately and progressing toward goals ? Improving slowly and progressing toward goals ? Not making progress toward goals and plan of care will be adjusted PLAN: 
Patient continues to benefit from skilled intervention to address the above impairments. Continue treatment per established plan of care. Discharge Recommendations:  Moise Rayo (she is declining at this time, if doesn't go to SNF, will need 24/7 assist/supervision. Further Equipment Recommendations for Discharge:  TBD SUBJECTIVE:  
Patient stated I don't wear that knee brace, its too heavy.  OBJECTIVE DATA SUMMARY:  
Critical Behavior: 
Neurologic State: Alert Orientation Level: Oriented X4 Cognition: Appropriate for age attention/concentration, Follows commands Safety/Judgement: Awareness of environment, Insight into deficits Functional Mobility Training: 
Bed Mobility: 
Supine to Sit: Minimum assistance Scooting: Minimum assistance Transfers: 
Sit to Stand: Moderate assistance;Assist x2; Additional time Stand to Sit: Minimum assistance;Assist x2; Additional time Bed to Chair: Moderate assistance;Assist x2 Balance: 
Sitting: Intact Standing: Impaired Standing - Static: Fair Standing - Dynamic : Fair Ambulation/Gait Training: 
Distance (ft): 3 Feet (ft) Assistive Device: Gait belt(hand held assist x 2) Ambulation - Level of Assistance: Minimal assistance;Assist x2 with minimal weight bearing on the LLE Gait Abnormalities: Decreased step clearance Base of Support: Widened Speed/Maryanne: Slow Therapeutic Exercises: Toe wiggles, ankle pumps and circles, long arc quads, heel slides and hip internal/external rotation and abduction/adduction Pain: 
Pain Scale 1: Numeric (0 - 10) Pain Intensity 1: 0 Pain Location 1: Incisional 
Pain Orientation 1: Anterior; Left Pain Description 1: Aching Pain Intervention(s) 1: Medication (see MAR)-per nurse Activity Tolerance: Tolerated PT treatment session well. Please refer to the flowsheet for vital signs taken during this treatment. After treatment:  
?    Patient left in no apparent distress sitting up in chair ? Patient left in no apparent distress in bed 
? Call bell left within reach ? Nursing notified ? Caregiver present ? Bed alarm activated (not being used) COMMUNICATION/COLLABORATION:  
The patients plan of care was discussed with: Occupational Therapist and Registered Nurse Kb Montes De Oca PT Time Calculation: 25 mins

## 2019-04-08 NOTE — PROGRESS NOTES
Problem: Self Care Deficits Care Plan (Adult) Goal: *Acute Goals and Plan of Care (Insert Text) Description Occupational Therapy Goals Initiated 4/8/2019 1. Patient will perform grooming standing at the sink with supervision/set-up within 7 day(s). 2.  Patient will perform upper body dressing with supervision and cues within 7 day(s). 3.  Patient will perform lower body dressing with minimal assistance within 7 day(s). 4.  Patient will perform toilet transfers with minimal assistance/contact guard assist within 7 day(s). 5.  Patient will perform all aspects of toileting with minimal assistance/contact guard assist within 7 day(s). Outcome: Progressing Towards Goal 
 
OCCUPATIONAL THERAPY EVALUATION Patient: Alexander Simons (86 y.o. female) Date: 4/8/2019 Primary Diagnosis: ICD (implantable cardioverter-defibrillator) infection ([facility] [address] [T82. 7XXA] Procedure(s) (LRB): 
INSERT ICD SINGLE (N/A) 3 Days Post-Op Precautions: ICD 4/5 - No weight bearing, pushing, or pulling through L UE    
 
ASSESSMENT : 
Based on the objective data described below, the patient presents with deficits in self-care, primarily due to decreased activity tolerance, general weakness, decreased dynamic balance, and decreased safety. Per chart, she is supposed to have a hinged knee brace, but she indicates it is too big and too heavy and has not been wearing it at home. She reports no restrictions on L LE weight bearing. Will await ortho note to clarify. Patient is currently requiring assistance of 2 for transfers and some ADL. She will benefit from skilled OT treatment to maximize independence and safety in ADL. Recommend rehab upon discharge but she is currently declining. If she goes home she will need 24 hour direct supervision/assistance. Patient will benefit from skilled intervention to address the above impairments. Patients rehabilitation potential is considered to be Good Factors which may influence rehabilitation potential include: ? None noted ? Mental ability/status ? Medical condition ? Home/family situation and support systems ? Safety awareness ? Pain tolerance/management ? Other: PLAN : 
Recommendations and Planned Interventions: 
?               Self Care Training                  ? Therapeutic Activities ? Functional Mobility Training    ? Cognitive Retraining 
? Therapeutic Exercises           ? Endurance Activities ? Balance Training                   ? Neuromuscular Re-Education ? Visual/Perceptual Training     ? Home Safety Training 
? Patient Education                 ? Family Training/Education ? Other (comment): Frequency/Duration: Patient will be followed by occupational therapy 4 times a week to address goals. Discharge Recommendations: Moise Rayo (Patient currently declining, and is aware that 24 hour direct supervision/assist is recommended if she goes home.) Further Equipment Recommendations for Discharge: To be determined SUBJECTIVE:  
Patient stated Oh no. I'm not going to rehab. I'm going home.  OBJECTIVE DATA SUMMARY:  
HISTORY:  
Past Medical History:  
Diagnosis Date  Arthritis  Chronic kidney disease Dr Marilu Braun  ESRD on hemodialysis ([facility] [address] HD Tu,Thu,Sat   
 History of creation of ostomy ([facility] [address] Left abdomen  Hypertension  Ill-defined condition Dialysis Raymond Robert Obese 9/14/2017 Past Surgical History:  
Procedure Laterality Date  HX COLOSTOMY    
 left abdomen  HX PACEMAKER  09/13/2018  
 placed in left abdomen.  HX VASCULAR ACCESS    
 left upper arm fistula  CT INSJ/RPLCMT PERM DFB W/TRNSVNS LDS 1/DUAL CHMBR N/A 4/5/2019 INSERT ICD SINGLE performed by Sean Hopson MD at [facility]  MA RMVL OF SUBQ IMPLANTABLE DEFIBRILLATOR ELECTRODE N/A 4/4/2019 REMOVE SUBQ ICD performed by Sean Hopson MD at [facility] Prior Level of Function/Environment/Context: Lives with her son and has a caregiver 5 hours M-F. She sponge bathes due to knee pain/difficulty stepping over the side of the tub. Patient reports being able to manage basic self-care with S to Mod I using a RW Expanded or extensive additional review of patient history:  
 
Home Situation Home Environment: Private residence # Steps to Enter: 3 Rails to Enter: Yes One/Two Story Residence: One story Living Alone: No 
Support Systems: Child(robert), Family member(s), Home care staff(aide 5 hours M-F) Patient Expects to be Discharged to[de-identified] Private residence Current DME Used/Available at Home: Ted Rozina Tub or Shower Type: Tub/Shower combination(sponge bathes) Hand dominance: Right EXAMINATION OF PERFORMANCE DEFICITS: 
Cognitive/Behavioral Status: 
Neurologic State: Alert Orientation Level: Oriented X4 Cognition: Appropriate for age attention/concentration; Follows commands Perception: Appears intact Perseveration: No perseveration noted Safety/Judgement: Awareness of environment; Insight into deficits Hearing: Auditory Auditory Impairment: None Vision/Perceptual:   
Indicates her vision is intact, but had difficulty reading the dry erase board (even larger writing). Range of Motion: 
AROM: Generally decreased, functional 
  
  
  
  
  
  
  
Strength: 
Strength: Generally decreased, functional 
  
  
  
  
Coordination: 
  
Fine Motor Skills-Upper: Left Intact; Right Intact(grossly) Tone & Sensation: 
Tone: Normal 
Sensation: Impaired(c/o tingling/numbness B hands) Balance: 
Sitting: Intact Standing: Impaired Standing - Static: Fair Standing - Dynamic : Fair Functional Mobility and Transfers for ADLs: 
Bed Mobility: 
Supine to Sit: Minimum assistance Scooting: Minimum assistance Transfers: 
Sit to Stand: Moderate assistance;Assist x2; Additional time Stand to Sit: Minimum assistance;Assist x2; Additional time Bed to Chair: Moderate assistance;Assist x2 Toilet Transfer : Moderate assistance;Assist x2 ADL Assessment: 
Feeding: Setup;Supervision Oral Facial Hygiene/Grooming: Minimum assistance Bathing: Moderate assistance Upper Body Dressing: Moderate assistance Lower Body Dressing: Total assistance Toileting: Total assistance ADL Intervention and task modifications: 
Discussed safety and fall prevention during ADL. Encouraged her to be out of bed as much as possible. Discussed precautions for L UE. Patient requires cues throughout to remind her not to push through her arm. Cognitive Retraining Safety/Judgement: Awareness of environment; Insight into deficits Therapeutic Exercise: 
Recommended exercising R UE throughout the day, especially during commercial breaks. Demonstrated shoulder flexion and abduction, elbow flexion and extension. Encouraged gross grasp and release both hands. Functional Measure: 
Barthel Index: 
 
Bathin Bladder: 10 Bowels: 10 
Groomin Dressin Feedin Mobility: 0 Stairs: 0 Toilet Use: 5 Transfer (Bed to Chair and Back): 5 Total: 40/100 Percentage of impairment  
0% 1-19% 20-39% 40-59% 60-79% 80-99% 100% Barthel Score 0-100 100 99-80 79-60 59-40 20-39 1-19 
 0 The Barthel ADL Index: Guidelines 1. The index should be used as a record of what a patient does, not as a record of what a patient could do. 2. The main aim is to establish degree of independence from any help, physical or verbal, however minor and for whatever reason. 3. The need for supervision renders the patient not independent. 4. A patient's performance should be established using the best available evidence. Asking the patient, friends/relatives and nurses are the usual sources, but direct observation and common sense are also important. However direct testing is not needed. 5. Usually the patient's performance over the preceding 24-48 hours is important, but occasionally longer periods will be relevant. 6. Middle categories imply that the patient supplies over 50 per cent of the effort. 7. Use of aids to be independent is allowed. Paige Uriarte., Barthel, D.W. (2699). Functional evaluation: the Barthel Index. [address] [facility] (14)2. Estefanía Villa josue DILSHAD Pina, Katarzyna Mckinney., Wilian San., Ni, [address] [facility] (1999). Measuring the change indisability after inpatient rehabilitation; comparison of the responsiveness of the Barthel Index and Functional [place] Measure. Journal of Neurology, Neurosurgery, and Psychiatry, 66(4), 266-448. Jericho Ramirez, N.[place].A, RON Burns, & Edmund Stoddard, MBrookeA. (2004.) Assessment of post-stroke quality of life in cost-effectiveness studies: The usefulness of the Barthel Index and the EuroQoL-5D. [facility], 20, 604-78 Occupational Therapy Evaluation Charge Determination History Examination Decision-Making LOW Complexity : Brief history review  MEDIUM Complexity : 3-5 performance deficits relating to physical, cognitive , or psychosocial skils that result in activity limitations and / or participation restrictions MEDIUM Complexity : Patient may present with comorbidities that affect occupational performnce. Miniml to moderate modification of tasks or assistance (eg, physical or verbal ) with assesment(s) is necessary to enable patient to complete evaluation Based on the above components, the patient evaluation is determined to be of the following complexity level: LOW Pain: 
Pain Scale 1: Numeric (0 - 10) Pain Intensity 1: 5 Pain Location 1: Incisional 
 Pain Orientation 1: Anterior; Left Pain Description 1: Aching Activity Tolerance:  
Fair Please refer to the flowsheet for vital signs taken during this treatment. After treatment:  
? Patient left in no apparent distress sitting up in chair ? Patient left in no apparent distress in bed 
? Call bell left within reach ? Nursing notified ? Caregiver present ? Bed alarm activated COMMUNICATION/EDUCATION:  
The patients plan of care was discussed with: Physical Therapist and Registered Nurse. 
? Home safety education was provided. ? Patient/family have participated as able in goal setting and plan of care. ? Patient/family agree to work toward stated goals and plan of care. ? Patient understands intent and goals of therapy, but is neutral about his/her participation. ? Patient is unable to participate in goal setting and plan of care. This patients plan of care is appropriate for delegation to [facility]. Thank you for this referral. 
Charmayne Rower, OTR/L Time Calculation: 24 mins

## 2019-04-08 NOTE — PROGRESS NOTES
IMPRESSION: · Leakage of serous fluid from subcutaneous ICD pocket , s/p removal of SICD on   
· BC, WC negative · S/p placement of new subcutaneous ICD  · S/p bleeding around AICD since   since starting po eliquis · SICD placed on  at [facility], pt presented with leakage of blood from pocket on  · Pt had evacuation of hematoma  on 10/1 & DC home on clindamycin po · CAD/ ischemic CMP EF 10/ Afib · ESRD on HD TTS, x 7 yrs · 2.4x 2.1 x 2.7 cm lytic lesion at medial & posteromedial prox tibial epiphysis & metaphysis  With pathological  fracture · ECHO negative for vegetations  
  
  
PLAN:  
  
· Continue Vancomycin / Zosyn iV · Do not recommend discharge until WBC close to normal range & no further bleeding · Plan of care D/w pt & family at bedside   
   
  
Subjective:  
 
Pt seen . Complains of pain over ICD site . D/w RN events of the day . Pt s/p bleedig around ICD since yesterday , s/p application of pressure dressing . Family members in room. Review of Systems:  A comprehensive review of systems was negative except for that written in the History of Present Illness. 10 point ROS obtained . All other systems negative . Objective:  
 
Blood pressure 92/46, pulse 97, temperature 99.1 °F (37.3 °C), resp. rate 18, height 5' 4\" (1.626 m), weight 153 lb 12.8 oz (69.8 kg), SpO2 97 %. Temp (24hrs), Av.8 °F (37.1 °C), Min:98.5 °F (36.9 °C), Max:99.1 °F (37.3 °C) Patient Vitals for the past 24 hrs: 
 Temp Pulse Resp BP SpO2  
19 1452 99.1 °F (37.3 °C) 97 18 92/46 97 % 19 1018  97  106/61   
19 1017  99  106/61   
19 1004  97  120/50   
19 1003  100  120/50 92 % 19 0741 98.7 °F (37.1 °C) 85 18 115/52 95 % 19 0429 98.7 °F (37.1 °C) 94 18 134/54 93 % 19 2321 98.5 °F (36.9 °C) 90 18 107/54 96 % 19 1922 99.1 °F (37.3 °C) 89 16 102/71 96 % 19 1536 98.6 °F (37 °C) 94 16  97 % Lines:  Peripheral IV:    and HD access Physical Exam:  
General:  Alert, cooperative Eyes:  Sclera anicteric. Pupils equally round and reactive to light. Mouth/Throat: Mucous membranes normal, oral pharynx clear Neck: Supple Lungs:   Clear to auscultation bilaterally Chest - Large compression dressing +  
CV:  Regular rate and rhythm,no murmur, click, rub or gallop Abdomen:   Soft, non-tender. bowel sounds normal. non-distended Extremities: No edema Skin: Skin color, texture, turgor normal. no acute rash or lesions Lymph nodes: Cervical and supraclavicular normal  
Musculoskeletal: No swelling or deformity Lines/Devices:  Intact, no erythema, drainage or tenderness Psych: Alert and oriented, agitated Data Review: CBC:  
Recent Labs 04/07/19 
7199 04/06/19 
8193 WBC 13.4* 13.9*  
RBC 2.61* 2.89* HGB 9.0* 10.2* HCT 27.3* 30.5* * 137* GRANS 77* 79* LYMPH 6* 6*  
EOS 3 2 CMP:  
Recent Labs 04/07/19 
5726 GLU 96 * K 4.0  
 CO2 25 BUN 38* CREA 5.94* CA 7.3* AGAP 10 BUCR 6* Studies:     
Lab Results Component Value Date/Time Culture result: NO GROWTH 2 DAYS 04/04/2019 10:15 AM  
 Culture result: NO GROWTH 5 DAYS 04/03/2019 07:04 PM  
 Culture result: NO GROWTH 5 DAYS 04/03/2019 07:04 PM  
 Culture result: NO GROWTH 5 DAYS 09/15/2017 04:57 PM  
 Culture result: NO GROWTH 6 DAYS 09/14/2017 01:37 PM  
 Culture result: (A) 09/14/2017 01:37 PM  
  STAPHYLOCOCCUS SPECIES, COAGULASE NEGATIVE GROWING IN 1 OF 2 BOTTLES DRAWN (NO SITE INDICATED) PLATES HELD 3 DAYS. CALL MICROBIOLOGY LAB IF SENSITIVITIES ARE NEEDED. Culture result: REMAINING BOTTLE(S) HAS/HAVE NO GROWTH IN 5 DAYS 09/14/2017 01:37 PM  
  
 
XR Results (most recent): 
Results from Hospital Encounter encounter on 04/03/19 XR KNEE LT 3 V Narrative EXAM: XR KNEE LT 3 V 
 
INDICATION: left proximal tibia fracture. COMPARISON: CT 3/8/2019. MRI 3/11/2019. FINDINGS: Three views of the left knee demonstrate severe diffuse osteopenia. There is an osteolytic lesion of the medial tibial condyles again shown. There 
is slightly increased density well within the osteolytic lesion as well as 
suggestion of interval healing of adjacent tibial fracture. The lytic lesion is 
unchanged in size measuring nearly 3 cc in craniocaudal. Severe left knee 
osteoarthrosis primarily involving the medial lateral compartments is again 
shown as is a moderate knee effusion medullary osteonecrosis of the distal 
femur. Except for atherosclerotic calcifications are again shown. Impression IMPRESSION: There may be a interval increased density within the osteolytic 
lesion of the medial tibial condyle as well as healing of the tibial fracture. Recommend further evaluation with CT for clarification. Patient Active Problem List  
Diagnosis Code  Leukocytosis D72.829  Obese E66.9  Atrial fibrillation with rapid ventricular response ([facility]) I48.91  
 Pulmonary edema J81.1  Severe left ventricular systolic dysfunction A12.1  Heart failure ([facility]) I50.9  ESRD on hemodialysis ([facility]) N18.6, Z99.2  Arthritis M19.90  
 Hypertension I10  
 History of creation of ostomy ([facility] Z93.9  ICD (implantable cardioverter-defibrillator) infection ([facility] T82. 7XXA  
 
 
  ICD-10-CM ICD-9-CM 1. Problem related to implantable cardioverter-defibrillator (ICD) T82. 9XXA V45.02   
 Z95.810    
2. Infection involving implantable cardioverter-defibrillator (ICD), initial encounter ([facility] T82. 7XXA 996.61 ELECTROPHYSIOLOGY PROCEDURE  
   ELECTROPHYSIOLOGY PROCEDURE INVASIVE VASCULAR PROCEDURE INVASIVE VASCULAR PROCEDURE 3. Cardiomyopathy, unspecified type ([facility]) I42.9 425.4 ELECTROPHYSIOLOGY PROCEDURE  
   ELECTROPHYSIOLOGY PROCEDURE I have discussed the diagnosis with the patient and the intended plan as seen in the above orders. I have discussed medication side effects and warnings with the patient as well. Reviewed test results at length with patient Anti-infectives:  
 
 Vancomycin / Zosyn IV  Ab Hoover MD FACP

## 2019-04-08 NOTE — PROGRESS NOTES
CHG bath done, patient repositioned again. Gown, linens and pad changed. Colostomy bag emptied. The patient refused further care, She refused EKG and blood work

## 2019-04-08 NOTE — PROGRESS NOTES
Nephrology Progress Note Antolin Blanco  
 
[facility]. [facility]                  Phone - [phone] Patient: Baudilio Bailon Date- 4/8/2019 Admit Date: 4/3/2019 YOB: 1940 CC: Follow up for ESRD Subjective: Interval History: -  S/p hd Saturday 
bp stable No c/o sob, No c/o chest pain, No c/o nausea or vomiting No c/o  fever. ROS:- as above Assessment:  
ESRD on a TTS schedule at [facility], followed by Dr. Neil Boo CKD ICD (implantable cardioverter-defibrillator) infection. S/p removal of the ICD on 4/4/19. New one placed on 4/5. 
  
Ischemic cardiomyopathy.  EF of 10-15%. 
  
Severe left knee pain.  Ortho following. 
  
Chronic atrial fibrillation. Plan: · No plan for hd today · Next hd on Tuesday · epogen TTS · We can give her VANCO WITH HD AS out pt. Physical Exam:  
GEN: NAD NECK- Supple, no mass RESP: Clear b/l, no wheezing, CVS: RRR,S1,S2 ABDO: soft , non tender, No mass EXT: No Edema Upper arm av graft + NEURO: normal speech, non focal 
 
Care Plan discussed with: pt  
Objective:  
Patient Vitals for the past 24 hrs: 
 Temp Pulse Resp BP SpO2  
04/08/19 0429 98.7 °F (37.1 °C) 94 18 134/54 93 % 04/07/19 2321 98.5 °F (36.9 °C) 90 18 107/54 96 % 04/07/19 1922 99.1 °F (37.3 °C) 89 16 102/71 96 % 04/07/19 1536 98.6 °F (37 °C) 94 16  97 % 04/07/19 1125 99.3 °F (37.4 °C) 93 16 100/46 96 % Last 3 Recorded Weights in this Encounter 04/06/19 0221 04/07/19 3818 04/08/19 6351 Weight: 69.3 kg (152 lb 12.8 oz) 69.1 kg (152 lb 4.8 oz) 69.8 kg (153 lb 12.8 oz) 04/06 1901 - 04/08 0700 In: 7968 [P.O.:1260; I.V.:330] Out: 400 Chart reviewed. Pertinent Notes reviewed. Medication list  reviewed Current Facility-Administered Medications Medication  apixaban (ELIQUIS) tablet 5 mg  albumin human 25% (BUMINATE) solution 12.5 g  
  HYDROcodone-acetaminophen (NORCO) 5-325 mg per tablet 1 Tab  sodium chloride (NS) flush 5-40 mL  sodium chloride (NS) flush 5-40 mL  naloxone (NARCAN) injection 0.4 mg  
 PHENYLephrine (PF)(KEYSHAWN-SYNEPHRINE) 30 mg in 0.9% sodium chloride 250 mL infusion  naloxone (NARCAN) injection 0.4 mg  
 calcium carbonate (TUMS) chewable tablet 600 mg [elemental]  carvedilol (COREG) tablet 3.125 mg  
 gabapentin (NEURONTIN) capsule 100 mg  
 isosorbide mononitrate ER (IMDUR) tablet 30 mg  
 traMADol (ULTRAM) tablet 50 mg  
 venlafaxine (EFFEXOR) tablet 37.5 mg  
 melatonin tablet 3 mg  sodium chloride (NS) flush 5-40 mL  sodium chloride (NS) flush 5-40 mL  acetaminophen (TYLENOL) tablet 650 mg  
 ondansetron (ZOFRAN) injection 4 mg  lactobac ac& pc-s.therm-b.anim (MICHAEL Q/RISAQUAD)  vancomycin (VANCOCIN) 750 mg in 0.9% sodium chloride (MBP/ADV) 250 mL And  Vancomycin reminder to give after HD  piperacillin-tazobactam (ZOSYN) 3.375 g in 0.9% sodium chloride (MBP/ADV) 100 mL Data Review : 
Recent Labs 04/07/19 
7626 04/06/19 
0894 WBC 13.4* 13.9* HGB 9.0* 10.2* * 137* ANEU 10.3* 11.0* *  --   
K 4.0  --   
GLU 96  --   
BUN 38*  --   
CREA 5.94*  --   
CA 7.3*  --   
 
Lab Results Component Value Date/Time Culture result: NO GROWTH 2 DAYS 04/04/2019 10:15 AM  
 Culture result: NO GROWTH 5 DAYS 04/03/2019 07:04 PM  
 Culture result: NO GROWTH 5 DAYS 04/03/2019 07:04 PM  
 
No results found for: SDES No results for input(s): FE, TIBC, PSAT, FERR in the last 72 hours. No results found for: Shaye Saunders MD 
[address] [facility] 
 www. [facility] [employer] [address] [place], [address] Phone - [phone] Fax - [phone]

## 2019-04-08 NOTE — PROGRESS NOTES
Carlotta Hooper, RN contacted me re: oozing from left subclavian PM site. PM inserted 4/5/19. Dressing removed by Dr Mae Meyers, large clot covering incision noted. Appeared to initially stop bleeding. Within a few minutes, lateral part of incision was oozing. Per Dr Mae Meyers instructions, Fior Nyhan removed,  Site injected with 10cc 1:100,000 Xylocane to distal area of incision. Bleeding ceased. New zipline, tegaderm and telfa applied. Pt tolerated well. Instructed RN to apply pressure dressing overnight.

## 2019-04-08 NOTE — PROGRESS NOTES
Hospitalist Progress Note NAME: Paulina Nunes :  1940 MRN:  [id number] Assessment / Plan: 
ICD site infection Lactic acidosis, resolved 
-no sepsis criteria; + leukocytosis but trending down now 
-admitted to the hospital for IV AB 
-ICD removed on 19 
-S/p ICD placement, subclavian L on 19 
-c/w broad spectrum AB: Vanco + zosyn 
-Blood cx NGTD 
-Wound cx negative -ECHO showed EF 21 - 25%. Visually measured ejection fraction. Left ventricular cavity size is mildly dilated. Left ventricular global hypokinesis. Left ventricular diastolic dysfunction. -ID input noted. Continue IV abx until WBC has normalized or when okay with ID for discharge.  
-Cardiology signed off on 19 and recommended op fflup in 2 weeks. 
  
Possible pathologic fractures/ tumor  
-+ L knee pain started ~ 4-5 weeks ago  
-seen by ortho OP at [facility] and refer [facility] for biopsy --> Ortho consulted. ? If connected to above infection. -PT/OT  
-CT 3/8/2019  
2.4 x 2.1 x 2.7 cm lytic lesion at medial and posteromedial proximal 
tibial epiphysis and metaphysis with pathologic fracture. Additional findings as 
above consistent with renal osteodystrophy. Diagnostic considerations for the 
lesion include brown tumor, metastatic disease and bone, and less likely primary 
bone neoplasm. Brown tumors favored Pain management 
  
Ischemic Cardiomyopathy NYHA class II EF 10% CAD / HTN / Atrial fibrillation 
-no signs of fluid overload; BP at 110s; HR stable; cxray clear  
-cont home Imdur; Holding lisinopril and continue coreg at lower dose with soft BP  
-Eliquis was on hold before the procedure. Resumed on  as per cardiology. 
  
ESRD  
-HD TThSa  
-Renal consulted for HD. Renally dose Rx as appropriate. Body mass index is 25.43 kg/m². Code Status: full code d/w pt in ED Surrogate Decision Maker: sister and dtr DVT Prophylaxis: Eliquis. GI Prophylaxis: not indicated Disposition:TBD Baseline: lives with one of her sons; ambulating with walker; she has 4 children Subjective: Chief Complaint / Reason for Physician Visit Follow up ICD infection, ESRD, CHF Review of Systems: 
Symptom Y/N Comments  Symptom Y/N Comments Fever/Chills n   Chest Pain n   
Poor Appetite n   Edema n   
Cough n   Abdominal Pain Sputum n   Joint Pain y Left knee. SOB/FRAUSTO n   Pruritis/Rash Nausea/vomit n   Tolerating PT/OT Diarrhea    Tolerating Diet Constipation    Other Could NOT obtain due to:   
 
Objective: VITALS:  
Last 24hrs VS reviewed since prior progress note. Most recent are: 
Patient Vitals for the past 24 hrs: 
 Temp Pulse Resp BP SpO2  
04/08/19 0741 98.7 °F (37.1 °C) 85 18 115/52 95 % 04/08/19 0429 98.7 °F (37.1 °C) 94 18 134/54 93 % 04/07/19 2321 98.5 °F (36.9 °C) 90 18 107/54 96 % 04/07/19 1922 99.1 °F (37.3 °C) 89 16 102/71 96 % 04/07/19 1536 98.6 °F (37 °C) 94 16  97 % 04/07/19 1125 99.3 °F (37.4 °C) 93 16 100/46 96 % Intake/Output Summary (Last 24 hours) at 4/8/2019 0957 Last data filed at 4/8/2019 1545 Gross per 24 hour Intake 1250 ml Output 400 ml Net 850 ml PHYSICAL EXAM: 
General: WD, WN. Alert, cooperative, no acute distress   
EENT:  EOMI. Anicteric sclerae. MMM Resp:  CTA bilaterally, no wheezing or rales. No accessory muscle use CV:  Regular  rhythm,  No edema GI:  Soft, Non distended, Non tender.  +Bowel sounds Neurologic:  Alert and oriented X 3, normal speech. Extr:                 Tender left knee to palpation,warm. Psych:   Good insight. Not anxious nor agitated Skin:  Dressing left side of chest s/p removal of ICD Reviewed most current lab test results and cultures  YES Reviewed most current radiology test results   YES Review and summation of old records today    NO Reviewed patient's current orders and MAR    YES 
PMH/SH reviewed - no change compared to H&P 
 ________________________________________________________________________ Care Plan discussed with: 
  Comments Patient X Family RN X   
Care Manager Consultant Multidiciplinary team rounds were held today with , nursing, pharmacist and clinical coordinator. Patient's plan of care was discussed; medications were reviewed and discharge planning was addressed. ________________________________________________________________________ Total NON critical care TIME: 30   Minutes Total CRITICAL CARE TIME Spent:   Minutes non procedure based Comments >50% of visit spent in counseling and coordination of care X   
________________________________________________________________________ Almaz Pablo MD  
 
Procedures: see electronic medical records for all procedures/Xrays and details which were not copied into this note but were reviewed prior to creation of Plan. LABS: 
I reviewed today's most current labs and imaging studies. Pertinent labs include: 
Recent Labs 04/07/19 
4294 04/06/19 
5529 WBC 13.4* 13.9* HGB 9.0* 10.2* HCT 27.3* 30.5* * 137* Recent Labs 04/07/19 
2636 * K 4.0  
 CO2 25 GLU 96 BUN 38* CREA 5.94* CA 7.3* Signed: Almaz Pablo MD

## 2019-04-08 NOTE — PROGRESS NOTES
Bedside and Verbal shift change report given to Herbert (oncoming nurse) by Jailene Wu  (offgoing nurse). Report included the following information SBAR, Kardex, Intake/Output, MAR, Accordion and Recent Results.

## 2019-04-08 NOTE — PROGRESS NOTES
Chart review. I & D recommending continuation of IV abx. Discharge pending - WBC needs to be wnl. PTOT - recommendation SNF - patient has refused. Agreeable to HHPTOT. Presently on room air. Ortho is recommednding out pt referral to either Dr. Melchor Wu at [address] or [facility]. Patient previously seen at [address] [facility]. Patient will require Medicaid transport at time of discharge. CM will review with patient for HHPT. Attending at bedside. 959am 
CM discussed PTOT recommendations. Patient confirmed preference is home with [facility]. FOC signed. Copy to patient and on chart. Patient has requested [facility]. 2nd Medicare letter signed. Copy to patient and on chart. 1010am 
 SNPTOT sent to [facility]. Lissy Le RN CM Ext [phone]

## 2019-04-08 NOTE — PROGRESS NOTES
Cardiology Progress Note [address], [place], [address] [facility]  [phone] 
 
4/8/2019 2:14 PM 
 
Admit Date: 4/3/2019 Admit Diagnosis: ICD (implantable cardioverter-defibrillator) infection ([address] [T82. 7XXA] Subjective:  
 
[facility]   denies chest pain. Visit Vitals /61 Pulse 97 Temp 98.7 °F (37.1 °C) Resp 18 Ht 5' 4\" (1.626 m) Wt 153 lb 12.8 oz (69.8 kg) SpO2 92% BMI 26.40 kg/m² Current Facility-Administered Medications Medication Dose Route Frequency  [START ON 4/9/2019] Vancomycin Level- Please draw with AM labs on 4/9/19  1 Each Other ONCE  
 apixaban (ELIQUIS) tablet 5 mg  5 mg Oral Q12H  
 albumin human 25% (BUMINATE) solution 12.5 g  12.5 g IntraVENous DIALYSIS PRN  
 HYDROcodone-acetaminophen (NORCO) 5-325 mg per tablet 1 Tab  1 Tab Oral Q4H PRN  
 sodium chloride (NS) flush 5-40 mL  5-40 mL IntraVENous Q8H  
 sodium chloride (NS) flush 5-40 mL  5-40 mL IntraVENous PRN  
 naloxone (NARCAN) injection 0.4 mg  0.4 mg IntraVENous PRN  
 PHENYLephrine (PF)(KEYSHAWN-SYNEPHRINE) 30 mg in 0.9% sodium chloride 250 mL infusion   mcg/min IntraVENous TITRATE  naloxone (NARCAN) injection 0.4 mg  0.4 mg IntraVENous PRN  
 calcium carbonate (TUMS) chewable tablet 600 mg [elemental]  600 mg Oral BID WITH MEALS  carvedilol (COREG) tablet 3.125 mg  3.125 mg Oral BID WITH MEALS  gabapentin (NEURONTIN) capsule 100 mg  100 mg Oral BID  isosorbide mononitrate ER (IMDUR) tablet 30 mg  30 mg Oral DAILY  traMADol (ULTRAM) tablet 50 mg  50 mg Oral Q12H PRN  
 venlafaxine (EFFEXOR) tablet 37.5 mg  37.5 mg Oral DAILY  melatonin tablet 3 mg  3 mg Oral QHS  acetaminophen (TYLENOL) tablet 650 mg  650 mg Oral Q4H PRN  
 ondansetron (ZOFRAN) injection 4 mg  4 mg IntraVENous Q4H PRN  
 lactobac ac& pc-s.therm-b.anim (MICHAEL Q/RISAQUAD)  1 Cap Oral DAILY  vancomycin (VANCOCIN) 750 mg in 0.9% sodium chloride (MBP/ADV) 250 mL 750 mg IntraVENous DIALYSIS PRN And  Vancomycin reminder to give after HD   Other DAILY  piperacillin-tazobactam (ZOSYN) 3.375 g in 0.9% sodium chloride (MBP/ADV) 100 mL  3.375 g IntraVENous Q12H Objective:  
  
Visit Vitals /61 Pulse 97 Temp 98.7 °F (37.1 °C) Resp 18 Ht 5' 4\" (1.626 m) Wt 153 lb 12.8 oz (69.8 kg) SpO2 92% BMI 26.40 kg/m² Physical Exam: Abdomen: soft, non-tender Extremities: extremities normal 
Heart: regular rate and rhythm Lungs: clear to auscultation bilaterally Pulses: 2+ and symmetric Data Review:  
Labs:   
Recent Labs 04/07/19 
5358 04/06/19 
1671 WBC 13.4* 13.9* HGB 9.0* 10.2* HCT 27.3* 30.5* * 137* Recent Labs 04/07/19 
8548 * K 4.0  
 CO2 25 GLU 96 BUN 38* CREA 5.94* CA 7.3* No results for input(s): TROIQ, CPK, CKMB in the last 72 hours. Intake/Output Summary (Last 24 hours) at 4/8/2019 1414 Last data filed at 4/8/2019 1918 Gross per 24 hour Intake 660 ml Output 400 ml Net 260 ml Telemetry: nsr Assessment:  
 
Active Problems: 
  ICD (implantable cardioverter-defibrillator) infection ([address] (4/3/2019) 
 
cardiomyopathy Pocket erosion 
esrd Plan:  
 
Khushboo Duvall is with oozing from icd site yesterday. Pressure dressing was placed yesterday. Dressing and zipline removed today. Injected with epi/lido and suture reapplied with pressure dressing for 24 hours. Cont coreg for cardiomyopathy. Mari Meraz MD, [facility] 
 
4/8/2019

## 2019-04-08 NOTE — PROGRESS NOTES
Spiritual Care Assessment/Progress Note [address] 
 
 
NAME: Irina Dugan      MRN: [id number] AGE: 78 y.o. SEX: female Denominational Affiliation: [facility]  
Language: [place] 4/8/2019     Total Time (in minutes): 6 Spiritual Assessment begun in [facility] 2 INTRVNTNL CARDIO through conversation with: 
  
    [x]Patient        [] Family    [] Friend(s) Reason for Consult: Initial/Spiritual assessment, patient floor Spiritual beliefs: (Please include comment if needed) [x] Identifies with a concetta tradition:     
   [x] Supported by a concetta community:        
   [] Claims no spiritual orientation:       
   [] Seeking spiritual identity:            
   [] Adheres to an individual form of spirituality:       
   [] Not able to assess:                   
 
    
Identified resources for coping:  
   [] Prayer                           
   [] Music                  [] Guided Imagery [x] Family/friends                 [] Pet visits [] Devotional reading                         [] Unknown 
   [] Other:                                      
 
 
Interventions offered during this visit: (See comments for more details) Patient Interventions: Affirmation of emotions/emotional suffering, Initial/Spiritual assessment, patient floor, Normalization of emotional/spiritual concerns Plan of Care: 
 
 [x] Support spiritual and/or cultural needs  
 [] Support AMD and/or advance care planning process    
 [] Support grieving process 
 [] Coordinate Rites and/or Rituals  
 [] Coordination with community clergy [] No spiritual needs identified at this time 
 [] Detailed Plan of Care below (See Comments)  [] Make referral to Music Therapy 
[] Make referral to Pet Therapy    
[] Make referral to Addiction services 
[] Make referral to [facility] 
[] Make referral to Spiritual Care Partner 
[] No future visits requested       
[x] Follow up visits as needed Comments: Initial spiritual assessment in Interventional Cardio. Patient/family shared about concerns. Provided effective listening. Consulted with patient. Advised of  Availability. [address]  Paging Service [phone])

## 2019-04-08 NOTE — PROGRESS NOTES
0730: received bedside report from night shift nurse. Pressure dressing in place on L chest. Site slightly oozy. 1020: called Willian Day NP for cardiology regarding oozy L chest site. Still oozing small amount of blood/drainage. Left message, will call back later.

## 2019-04-09 NOTE — PROGRESS NOTES
Pharmacy Automatic Renal Dosing Protocol - Antimicrobials Indication for Antimicrobials: skin and soft tissue infection Current Regimen of Each Antimicrobial: 
Vancomycin 750 mg IV PRN dialysis (Start Date 4/3; Day # 6) Zosyn 3.375 g IV every 12 hours (start 4/3, day 6) Previous Antimicrobial Therapy: 
None Goal Level: VANCOMYCIN TROUGH GOAL RANGE Vancomycin Trough: 15 - 20 mcg/mL Date Dose & Interval Measured (mcg/mL) Extrapolated (mcg/mL)  
19 @ 04:13 750 mg with HD 24.6 Date & time of next level: 19 @ 0400 Significant Cultures:  
4/3 Blood: NG x 5 days- final 
 Wound: NG x 2 days- final 
 
Radiology / Imaging results: (X-ray, CT scan or MRI):  
4/3 xr chest - No significant interval change. No acute intrathoracic disease. Paralysis, amputations, malnutrition: None documented Labs: 
Recent Labs 19 
6467 19 
7150 19 
2689 CREA  --  9.04* 5.94* BUN  --  58* 38* WBC 15.3*  --  13.4* Temp (24hrs), Av.9 °F (37.2 °C), Min:98.1 °F (36.7 °C), Max:99.8 °F (37.7 °C) Creatinine Clearance (mL/min) or Dialysis: HD [facility] Impression/Plan:  
+++ ID consulted/following Leakage of serous fluid from SQ ICD pocket w/p removal SICD on  Vancomycin level resulted as supratherapeutic at 24.6 mcg/mL. Will change to 500 mg IV with HD per dosing protocol. Will continue current regimen for Zosyn as appropriate for indication and renal function. Antimicrobial stop date - pending Pharmacy will follow daily and adjust medications as appropriate for renal function and/or serum levels. Thank you, Roseanne Mccall, PHARMD

## 2019-04-09 NOTE — PROGRESS NOTES
Occupational Therapy Patient is currently off the floor for dialysis and unavailable for OT treatment. Will defer for now but continue to follow.

## 2019-04-09 NOTE — PROCEDURES
[address] Dialysis Team [facility]  [phone] Vitals   Pre   Post   Assessment   Pre   Post    
Temp  Temp: 100 °F (37.8 °C) (04/09/19 1015) 98 LOC  A & O x 3 A & O x 3 HR   Pulse (Heart Rate): (!) 107 (04/09/19 1015) 86 Lungs   Clear  Clear B/P   BP: 102/52 (04/09/19 1015) 165/75 Cardiac   Normal sinus rhythm Normal sinus rhythm Resp   Resp Rate: 16 (04/09/19 1015) 16 Skin   Warm & dry  Warm & dry Pain level  Pain Intensity 1: 0 (04/09/19 0600) 8 Edema  None None Orders: HEMODIALYSIS INPATIENT Duration (hr): 3.25; Dialysate Bath:  K: 2; Dialysate Bath:  CA: 2.5 Draw labs if not done.  Pull 2 Kg net as tolerated. GIVE 2 UNIT PRBC TX  ONE TIME Routine Duration:   Start:    1015 End:    1330 Total:   3:15 Dialyzer:   Dialyzer/Set Up Inspection: Darlene Lemus (04/09/19 1015) K Bath:   Dialysate K (mEq/L): 2 (04/09/19 1015) Ca Bath:   Dialysate CA (mEq/L): 2.5 (04/09/19 1015) Na/Bicarb:   Dialysate NA (mEq/L): 140 (04/09/19 1015) Target Fluid Removal:   Goal/Amount of Fluid to Remove (mL): 2000 mL (04/09/19 1015) Access  AVG Type & Location:   [facility] Labs Obtained/Reviewed Critical Results Called   Date when labs were drawn- 
Hgb-   
HGB Date Value Ref Range Status 04/09/2019 7.3 (L) 11.5 - 16.0 g/dL Final  
 
K-   
Potassium Date Value Ref Range Status 04/08/2019 5.0 3.5 - 5.1 mmol/L Final  
 
Ca-  
Calcium Date Value Ref Range Status 04/08/2019 7.0 (L) 8.5 - 10.1 MG/DL Final  
 
Bun-  
BUN Date Value Ref Range Status 04/08/2019 58 (H) 6 - 20 MG/DL Final  
  Comment:  
  INVESTIGATED PER DELTA CHECK PROTOCOL Creat-  
Creatinine Date Value Ref Range Status 04/08/2019 9.04 (H) 0.55 - 1.02 MG/DL Final  
  Comment:  
  INVESTIGATED PER DELTA CHECK PROTOCOL Medications/ Blood Products Given Name   Dose   Route and Time Vanc 500mg  IV  
1 unit PRBC's 1 unit IV Blood Volume Processed (BVP):    82.4 Net Fluid Removed:  800ml Comments Time Out Done: 0800 Primary Nurse Rpt Pre: Nikki Bryant RN 
Primary Nurse Rpt Post: Nikki Bryant RN 
Pt Education: access care Care Plan: Continue HD Tx Summary:ALEXANDER AVF: skin CDI. No s/s of infection. No issues with cannulation or hemostasis. Running well at . Pt arrived to HD [address]. Consent signed & on file. SBAR received from Primary RN. 1015: Pt cannulated with 92J needles per policy & without issue. Labs drawn per request/ order. VSS. Dialysis Tx initiated. 1030: Pt resting quietly. , lines secure and visible 1045:  Pt. Resting well, lines secure and visible 
1100: Dr. Leah Pruitt rounding ordered to down UF goal, 1500ml 1115: pt. Resting well, lines secure and visible 1130: Pt. Stable, lines secure and visible 1145: Pt. Stable, lines secure and visble 
1200: Pt. Resting well, lines secure and visible 1215: Pt. Stable, lines secure and visible 1230: Pt. Stable, resting well, lines secure and visible 1245: Pt. C/O cramping lower ext. UF goal down hydrate w/200ml NS, lines secure and visible 
1300: Pt.  Stable 1 of 1 PRBC's started infusion. Lines secure and visible 1315: Pt. Angi. Transfusion without issues 1130: Tx ended. VSS. All possible blood returned to patient. Hemostasis achieved without issue. Bed locked and in the lowest position, call bell and belongings in reach. SBAR given to Primary, RN. Patient is stable at time of their/ my departure. Assessment completed by Kayce Bell All Dialysis related medications have been reviewed. Admiting Diagnosis: 
Pt's previous clinic- [facility] Consent signed - Informed Consent Verified: Yes (04/09/19 1015) [place] Consent - Yes Hepatitis Status- Negative Machine #- Machine Number: [id number] (04/09/19 1015) Telemetry status- Yes Pre-dialysis wt. - Pre-Dialysis Weight: 67.2 kg (148 lb 2.4 oz) (04/04/19 2200)

## 2019-04-09 NOTE — PROGRESS NOTES
TRANSFER - OUT REPORT: 
 
Verbal report given to TUCKER Avila on Michael Rosenberg  being transferred to [facility] for routine post - op Report consisted of patients Situation, Background, Assessment and  
Recommendations(SBAR). Information from the following report(s) Kardex  was reviewed with the receiving nurse. Opportunity for questions and clarification was provided. Patient transported with: 
 Moy Univer

## 2019-04-09 NOTE — PROGRESS NOTES
Physical Therapy Note Chart reviewed. Patient is currently GELY for dialysis and unavailable for PT intervention. Will defer and continue to follow.  
 
Thank you, 
David Subramanian, PT, DPT

## 2019-04-09 NOTE — PROGRESS NOTES
Hospitalist Progress Note NAME: Anish Moura :  1940 MRN:  [id number] Assessment / Plan: 
ICD site infection Lactic acidosis, resolved No sepsis/SIRs criteria; + leukocytosis but trending down now ICD removed on 19 S/p ICD placement, subclavian L on 19 C/w broad spectrum AB: Vanco + zosyn Blood cx NGTD Wound cx negative ECHO showed EF 21 - 25%. Visually measured ejection fraction. Left ventricular cavity size is mildly dilated. Left ventricular global hypokinesis. Left ventricular diastolic dysfunction. ID input noted. Continue IV abx until WBC has normalized or when okay with ID for discharge. Cardiology signed off on 19 and recommended op followup in 2 weeks. 
  
Possible pathologic fractures/ tumor L knee pain started ~ 4-5 weeks ago Seen by ortho OP at [facility] and refer [facility] for biopsy --> Ortho consulted. ? If connected to above infection. PT/OT  
CT 3/8/2019  
2.4 x 2.1 x 2.7 cm lytic lesion at medial and posteromedial proximal 
tibial epiphysis and metaphysis with pathologic fracture. Additional findings as 
above consistent with renal osteodystrophy. Diagnostic considerations for the 
lesion include brown tumor, metastatic disease and bone, and less likely primary 
bone neoplasm. Brown tumors favored Orthopedic Recommended pt referral to either Dr Yepez/Dr Jose Ruelas at [facility] orthopedics or [facility] ortho Pain management 
  
Ischemic Cardiomyopathy NYHA class II EF 10% CAD / HTN / Atrial fibrillation No signs of fluid overload; BP at 110s; HR stable; cxray clear Continue BB and imdur at lower dose Not on ACE/ARBs, will let cardiology decide as an OP Eliquis was on hold before the procedure. Resumed on  as per cardiology. 
  
ESRD  
HD TThSa Renal consulted for HD. Renally dose Rx as appropriate. Code Status: full code d/w pt in ED Surrogate Decision Maker: sister and dtr DVT Prophylaxis: Eliquis. GI Prophylaxis: not indicated Disposition:TBD Baseline: lives with one of her sons; ambulating with walker; she has 4 children Subjective: pt seen and examined at bedside. NAD, denies any new complaints. Overnight events d/w RN Chief Complaint / Reason for Physician Visit: f/u \"ICD infection\" Review of Systems: 
Symptom Y/N Comments  Symptom Y/N Comments Fever/Chills n   Chest Pain n   
Poor Appetite n   Edema n   
Cough n   Abdominal Pain Sputum n   Joint Pain y Left knee. SOB/FRAUSTO n   Pruritis/Rash Nausea/vomit n   Tolerating PT/OT Diarrhea    Tolerating Diet Constipation    Other Could NOT obtain due to:   
 
Objective: VITALS:  
Last 24hrs VS reviewed since prior progress note. Most recent are: 
Patient Vitals for the past 24 hrs: 
 Temp Pulse Resp BP SpO2  
04/09/19 1449 98.7 °F (37.1 °C) (!) 106 20 129/52 96 % 04/09/19 1330 98 °F (36.7 °C) (!) 101 16 142/70   
04/09/19 1315 97.9 °F (36.6 °C)  16 121/74   
04/09/19 1300 97.8 °F (36.6 °C) 99 16 146/68   
04/09/19 1230  88 16 140/64   
04/09/19 1215  82 16 147/74   
04/09/19 1200  90 16 129/67   
04/09/19 1145  76 16 127/77   
04/09/19 1130  95 16 152/78   
04/09/19 1115  81 16 128/73   
04/09/19 1100  80 16 140/72   
04/09/19 1053  92 16 116/57   
04/09/19 1030    103/59   
04/09/19 1015 100 °F (37.8 °C) (!) 107 16 102/52   
04/09/19 0808 99.1 °F (37.3 °C)    97 % 04/09/19 0804  100  100/51   
04/09/19 0400 99.8 °F (37.7 °C) 99 18 104/52 100 % 04/09/19 0000 98.3 °F (36.8 °C) 99 16 124/49 98 % 04/08/19 2000 98.1 °F (36.7 °C) 84 16 96/47 100 % Intake/Output Summary (Last 24 hours) at 4/9/2019 1705 Last data filed at 4/9/2019 1330 Gross per 24 hour Intake 410 ml Output 700 ml Net -290 ml PHYSICAL EXAM: 
General: WD, WN. Alert, cooperative, no acute distress   
EENT:  EOMI. Anicteric sclerae. MMM Resp:  CTA bilaterally, no wheezing or rales. No accessory muscle use CV:  Regular  rhythm,  No edema GI:  Soft, Non distended, Non tender.  +Bowel sounds Neurologic:  Alert and oriented X 3, normal speech. Extr:                 Tender left knee to palpation,warm. Psych:   Good insight. Not anxious nor agitated Skin:  Dressing left side of chest s/p removal of ICD Reviewed most current lab test results and cultures  YES Reviewed most current radiology test results   YES Review and summation of old records today    NO Reviewed patient's current orders and MAR    YES 
PMH/SH reviewed - no change compared to H&P 
________________________________________________________________________ Care Plan discussed with: 
  Comments Patient X Family RN X   
Care Manager Consultant Multidiciplinary team rounds were held today with , nursing, pharmacist and clinical coordinator. Patient's plan of care was discussed; medications were reviewed and discharge planning was addressed. ________________________________________________________________________ Total NON critical care TIME: 30 Minutes Total CRITICAL CARE TIME Spent:   Minutes non procedure based Comments >50% of visit spent in counseling and coordination of care X Chart review  
________________________________________________________________________ Libby Gilman MD  
 
Procedures: see electronic medical records for all procedures/Xrays and details which were not copied into this note but were reviewed prior to creation of Plan. LABS: 
I reviewed today's most current labs and imaging studies. Pertinent labs include: 
Recent Labs 04/09/19 
0413 04/07/19 
7715 WBC 15.3* 13.4* HGB 7.3* 9.0*  
HCT 22.4* 27.3*  
* 106* Recent Labs 04/08/19 
6721 04/07/19 
9135 * 135*  
K 5.0 4.0  
CL 95* 100 CO2 22 25 GLU 95 96 BUN 58* 38* CREA 9.04* 5.94* CA 7.0* 7.3* Signed: Libby Gilman MD

## 2019-04-09 NOTE — PROGRESS NOTES
Initial Nutrition Assessment: 
 
INTERVENTIONS/RECOMMENDATIONS:  
· Consider low Na, K+ and phos · Trial of ensure clear · Encourage PO intake ASSESSMENT:  
Chart reviewed, medically noted for ICD site infection, Ischemic Cardiomyopathy, ESRD-HD, and PMH shown below. Assessing pt due to LOS. Pt report good appetite PTA however fair appetite currently. She is consuming ~50% of meals. She declined nepro shakes, will send ensure clear for her to try to help meet protein needs. Currently on renal diet which restricts protein. Pt protein needs are increased due to HD. Past Medical History:  
Diagnosis Date  Arthritis  Chronic kidney disease Dr George Chen  ESRD on hemodialysis ([facility] [address] HD Tu,Thu,Sat   
 History of creation of ostomy ([facility] [address] Left abdomen  Hypertension  Ill-defined condition Dialysis Andrade Nguyen Obese 9/14/2017 Diet Order: Renal 
% Eaten:   
Patient Vitals for the past 72 hrs: 
 % Diet Eaten 04/07/19 1826 50 % 04/07/19 1125 50 % 04/07/19 1005 50 % 04/06/19 1522 50 % Pertinent Medications: [x]Reviewed: TUMS, lactobac, Pertinent Labs: [x]Reviewed: Na 130, Food Allergies: [x]NKFA  []Other Last BM: 4/9 (ostomy) Edema:        []RUE   []LUE   []RLE   [x]LLE 1+ Pressure Injury:   n/a   [] Stage I   [] Stage II   [] Stage III   [] Stage IV Wt Readings from Last 30 Encounters:  
04/09/19 73.8 kg (162 lb 9.6 oz) 02/12/19 65 kg (143 lb 4.8 oz) 02/07/19 65 kg (143 lb 4.8 oz) 01/30/19 65.5 kg (144 lb 6.4 oz) 10/31/18 61.9 kg (136 lb 6.4 oz) 09/28/18 63 kg (138 lb 14.2 oz) 09/18/18 63.3 kg (139 lb 8.8 oz) 09/14/18 64.1 kg (141 lb 6.8 oz) 08/10/18 64.2 kg (141 lb 9.6 oz)  
07/24/18 66.6 kg (146 lb 14.4 oz) 09/18/17 77.1 kg (169 lb 15.6 oz) 09/07/16 72 kg (158 lb 11.7 oz) 07/09/15 68.5 kg (151 lb) Anthropometrics:  
Height: 5' 4\" (162.6 cm) Weight: 73.8 kg (162 lb 9.6 oz) IBW (%IBW):   ( ) UBW (%UBW):   (  %)  
 Last Weight Metrics: 
Weight Loss Metrics 4/9/2019 4/3/2019 2/12/2019 2/7/2019 1/30/2019 10/31/2018 9/28/2018 Today's Wt 162 lb 9.6 oz - 143 lb 4.8 oz 143 lb 4.8 oz 144 lb 6.4 oz 136 lb 6.4 oz 138 lb 14.2 oz  
BMI - 27.91 kg/m2 26.21 kg/m2 26.21 kg/m2 24.79 kg/m2 23.41 kg/m2 23.84 kg/m2 BMI: Body mass index is 27.91 kg/m². This BMI is indicative of: 
 []Underweight    []Normal    [x]Overweight    [] Obesity   [] Extreme Obesity (BMI>40) Estimated Nutrition Needs (Based on):  
1425 Kcals/day(BMR: 1200 x 1.2) , 95 g(1.3 g/kg) Protein Carbohydrate: At Least 130 g/day  Fluids: 1425 mL/day (1ml/kcal) or per primary team 
 
NUTRITION DIAGNOSES:  
Problem:  Increased nutrient needs(protein) Etiology: related to protein losses from HD Signs/Symptoms: as evidenced by ESRD-HD   
 
NUTRITION INTERVENTIONS: 
Meals/Snacks: General/healthful diet GOAL:  
consume >50% of meals in 3-5 days LEARNING NEEDS (Diet, Food/Nutrient-Drug Interaction):  
 [x] None Identified 
 [] Identified and Education Provided/Documented 
 [] Identified and Pt declined/was not appropriate Cultureal, [place]ar[place], OR Ethnic Dietary Needs:  
 [x] None Identified 
 [] Identified and Addressed 
 
 [x] Interdisciplinary Care Plan Reviewed/Documented  
 [x] Discharge Planning:  Renal friendly diet MONITORING /EVALUATION:  
  
Food/Nutrient Intake Outcomes: Total energy intake Physical Signs/Symptoms Outcomes: Weight/weight change, Electrolyte and renal profile, Glucose profile, GI 
 
NUTRITION RISK:  
 [] High              [x] Moderate           []  Low  []  Minimal/Uncompromised PT SEEN FOR:  
 []  MD Consult: []Calorie Count []Diabetic Diet Education []Diet Education []Electrolyte Management []General Nutrition Management and Supplements []Management of Tube Feeding []TPN Recommendations []  RN Referral:  []MST score >=2 
   []Enteral/Parenteral Nutrition PTA []Pregnant: Gestational DM or Multigestation 
   []Pressure Ulcer/Wound Care needs 
     
[]  Low BMI [x]  LOS Referral  
 
 
Karl Covarrubias RDN Pager [phone] Weekend Pager [phone]

## 2019-04-09 NOTE — PROGRESS NOTES
Bedside and Verbal shift change report given to [facility], RN (oncoming nurse) by Kun Johnson RN (offgoing nurse). Report included the following information SBAR, Kardex, MAR, Accordion and Cardiac Rhythm NSR.

## 2019-04-09 NOTE — PROGRESS NOTES
Nephrology Progress Note Antolin Blanco  
 
[facility]. [email or web address]                  Phone - [phone] Patient: Armin Josue Date- 4/9/2019 Admit Date: 4/3/2019 YOB: 1940 CC: Follow up for esrd Subjective: Interval History: -  Seen on hd Hb low She has been bleeding for her AICD site No sob No fever Denies any nausea. ROS:- as above Assessment:  
ESRD on a TTS schedule at [place], followed by Dr. Jourdan Mtz CKD ICD (implantable cardioverter-defibrillator) infection. S/p removal of the ICD on 4/4/19. New one placed on 4/5. 
  
Ischemic cardiomyopathy.  EF of 10-15%. 
  
Severe left knee pain.  Ortho following. 
  
Chronic atrial fibrillation. Plan: · Dialysis today · Give 2 unit prbc tx · epogen TTS · Physical Exam:  
GEN: nad NECK- Supple, no mass RESP: Clear b/l, no wheezing, CVS: s1,s2,rrr ABDO: soft , non tender, No mass EXT: no edema Upper arm av graft + NEURO: normal speech, non focal 
 
Care Plan discussed with: pt  
Objective:  
Patient Vitals for the past 24 hrs: 
 Temp Pulse Resp BP SpO2  
04/09/19 0808 99.1 °F (37.3 °C)    97 % 04/09/19 0804  100  100/51   
04/09/19 0400 99.8 °F (37.7 °C) 99 18 104/52 100 % 04/09/19 0000 98.3 °F (36.8 °C) 99 16 124/49 98 % 04/08/19 2000 98.1 °F (36.7 °C) 84 16 96/47 100 % 04/08/19 1527    92/43   
04/08/19 1525    (!) 91/39   
04/08/19 1502    95/43   
04/08/19 1452 99.1 °F (37.3 °C) 97 18 92/46 97 % Last 3 Recorded Weights in this Encounter 04/07/19 0452 04/08/19 0521 04/09/19 0600 Weight: 69.1 kg (152 lb 4.8 oz) 69.8 kg (153 lb 12.8 oz) 73.8 kg (162 lb 9.6 oz) 04/07 1901 - 04/09 0700 In: 440 [P.O.:240; I.V.:200] Out: 150 Chart reviewed. Pertinent Notes reviewed. Medication list  reviewed Current Facility-Administered Medications Medication  0.9% sodium chloride infusion 250 mL  vancomycin (VANCOCIN) 500 mg in 0.9% sodium chloride 100 mL IVPB And  
 VANCOMYCIN INFORMATION NOTE  lidocaine-EPINEPHrine (XYLOCAINE) 1 %-1:100,000 injection 15 mg  
 apixaban (ELIQUIS) tablet 5 mg  albumin human 25% (BUMINATE) solution 12.5 g  
 HYDROcodone-acetaminophen (NORCO) 5-325 mg per tablet 1 Tab  sodium chloride (NS) flush 5-40 mL  sodium chloride (NS) flush 5-40 mL  naloxone (NARCAN) injection 0.4 mg  
 PHENYLephrine (PF)(KEYSHAWN-SYNEPHRINE) 30 mg in 0.9% sodium chloride 250 mL infusion  naloxone (NARCAN) injection 0.4 mg  
 calcium carbonate (TUMS) chewable tablet 600 mg [elemental]  carvedilol (COREG) tablet 3.125 mg  
 gabapentin (NEURONTIN) capsule 100 mg  
 isosorbide mononitrate ER (IMDUR) tablet 30 mg  
 traMADol (ULTRAM) tablet 50 mg  
 venlafaxine (EFFEXOR) tablet 37.5 mg  
 melatonin tablet 3 mg  acetaminophen (TYLENOL) tablet 650 mg  
 ondansetron (ZOFRAN) injection 4 mg  lactobac ac& pc-s.therm-b.anim (MICHAEL Q/RISAQUAD)  piperacillin-tazobactam (ZOSYN) 3.375 g in 0.9% sodium chloride (MBP/ADV) 100 mL Data Review : 
Recent Labs 04/09/19 
1705 04/08/19 
7598 04/07/19 
2606 WBC 15.3*  --  13.4* HGB 7.3*  --  9.0*  
*  --  106* ANEU 11.2*  --  10.3* NA  --  130* 135* K  --  5.0 4.0  
GLU  --  95 96 BUN  --  58* 38* CREA  --  9.04* 5.94* CA  --  7.0* 7.3* Lab Results Component Value Date/Time Culture result: NO GROWTH 2 DAYS 04/04/2019 10:15 AM  
 Culture result: NO GROWTH 6 DAYS 04/03/2019 07:04 PM  
 Culture result: NO GROWTH 6 DAYS 04/03/2019 07:04 PM  
 
No results found for: SDES No results for input(s): FE, TIBC, PSAT, FERR in the last 72 hours. No results found for: Monique Kearney MD 
[facility] 
 www. [facility] [employer] [address] [place], [address] Phone - [phone] Fax - [phone]

## 2019-04-09 NOTE — PROGRESS NOTES
IMPRESSION: · Leakage of serous fluid from subcutaneous ICD pocket , s/p removal of SICD on   
· BC, WC negative · S/p placement of new subcutaneous ICD  · Bleeding around ICD since   since starting po eliquis, oozing today · SICD placed on  at [facility], pt presented with leakage of blood from pocket on  · Pt had evacuation of hematoma  on 10/1 & DC home on clindamycin po · CAD/ ischemic CMP EF 10/ Afib · ESRD on HD TTS, x 7 yrs · 2.4x 2.1 x 2.7 cm lytic lesion at medial & posteromedial prox tibial epiphysis & metaphysis  With pathological  fracture · ECHO negative for vegetations  
  
  
PLAN:  
  
· Continue Vancomycin / Zosyn iV · Do not recommend discharge until WBC close to normal range & no further bleeding · Plan of care D/w pt    
   
  
Subjective:  
 
Pt seen . Complains of pain over ICD site . D/w RN S/p dressing change by Cardiology . Still oozing Review of Systems:  A comprehensive review of systems was negative except for that written in the History of Present Illness. 10 point ROS obtained . All other systems negative . Objective:  
 
Blood pressure 102/54, pulse 89, temperature 98.5 °F (36.9 °C), resp. rate 18, height 5' 4\" (1.626 m), weight 162 lb 9.6 oz (73.8 kg), SpO2 96 %. Temp (24hrs), Av.6 °F (37 °C), Min:97.8 °F (36.6 °C), Max:100 °F (37.8 °C) Patient Vitals for the past 24 hrs: 
 Temp Pulse Resp BP SpO2  
19 1846 98.5 °F (36.9 °C) 89 18 102/54   
19 1449 98.7 °F (37.1 °C) (!) 106 20 129/52 96 % 19 1330 98 °F (36.7 °C) (!) 101 16 142/70   
19 1315 97.9 °F (36.6 °C)  16 121/74   
19 1300 97.8 °F (36.6 °C) 99 16 146/68   
19 1230  88 16 140/64   
19 1215  82 16 147/74   
19 1200  90 16 129/67   
19 1145  76 16 127/77   
19 1130  95 16 152/78   
19 1115  81 16 128/73   
19 1100  80 16 140/72   
19 1053  92 16 116/57   
19 1030    103/59  04/09/19 1015 100 °F (37.8 °C) (!) 107 16 102/52   
04/09/19 0808 99.1 °F (37.3 °C)    97 % 04/09/19 0804  100  100/51   
04/09/19 0400 99.8 °F (37.7 °C) 99 18 104/52 100 % 04/09/19 0000 98.3 °F (36.8 °C) 99 16 124/49 98 % 04/08/19 2000 98.1 °F (36.7 °C) 84 16 96/47 100 % Lines:  Peripheral IV:    and HD access Physical Exam:  
General:  Alert, cooperative Eyes:  Sclera anicteric. Pupils equally round and reactive to light. Mouth/Throat: Mucous membranes normal, oral pharynx clear Neck: Supple Lungs:   Clear to auscultation bilaterally Chest - Large compression dressing +  
CV:  Regular rate and rhythm,no murmur, click, rub or gallop Abdomen:   Soft, non-tender. bowel sounds normal. non-distended Extremities: No edema Skin: Skin color, texture, turgor normal. no acute rash or lesions Lymph nodes: Cervical and supraclavicular normal  
Musculoskeletal: No swelling or deformity Lines/Devices:  Intact, no erythema, drainage or tenderness Psych: Alert and oriented, agitated Data Review: CBC:  
Recent Labs 04/09/19 
0413 04/07/19 
4283 WBC 15.3* 13.4*  
RBC 2.18* 2.61* HGB 7.3* 9.0*  
HCT 22.4* 27.3*  
* 106* GRANS 73 77* LYMPH 10* 6*  
EOS 3 3 CMP:  
Recent Labs 04/08/19 
3702 04/07/19 
4991 GLU 95 96 * 135*  
K 5.0 4.0  
CL 95* 100 CO2 22 25 BUN 58* 38* CREA 9.04* 5.94* CA 7.0* 7.3* AGAP 13 10 BUCR 6* 6* Studies:     
Lab Results Component Value Date/Time Culture result: NO GROWTH 2 DAYS 04/04/2019 10:15 AM  
 Culture result: NO GROWTH 6 DAYS 04/03/2019 07:04 PM  
 Culture result: NO GROWTH 6 DAYS 04/03/2019 07:04 PM  
 Culture result: NO GROWTH 5 DAYS 09/15/2017 04:57 PM  
 Culture result: NO GROWTH 6 DAYS 09/14/2017 01:37 PM  
 Culture result: (A) 09/14/2017 01:37 PM  
  STAPHYLOCOCCUS SPECIES, COAGULASE NEGATIVE GROWING IN 1 OF 2 BOTTLES DRAWN (NO SITE INDICATED) PLATES HELD 3 DAYS.  CALL MICROBIOLOGY LAB IF SENSITIVITIES ARE NEEDED. Culture result: REMAINING BOTTLE(S) HAS/HAVE NO GROWTH IN 5 DAYS 09/14/2017 01:37 PM  
  
 
XR Results (most recent): 
Results from Hospital Encounter encounter on 04/03/19 XR KNEE LT 3 V Narrative EXAM: XR KNEE LT 3 V 
 
INDICATION: left proximal tibia fracture. COMPARISON: CT 3/8/2019. MRI 3/11/2019. FINDINGS: Three views of the left knee demonstrate severe diffuse osteopenia. There is an osteolytic lesion of the medial tibial condyles again shown. There 
is slightly increased density well within the osteolytic lesion as well as 
suggestion of interval healing of adjacent tibial fracture. The lytic lesion is 
unchanged in size measuring nearly 3 cc in craniocaudal. Severe left knee 
osteoarthrosis primarily involving the medial lateral compartments is again 
shown as is a moderate knee effusion medullary osteonecrosis of the distal 
femur. Except for atherosclerotic calcifications are again shown. Impression IMPRESSION: There may be a interval increased density within the osteolytic 
lesion of the medial tibial condyle as well as healing of the tibial fracture. Recommend further evaluation with CT for clarification. Patient Active Problem List  
Diagnosis Code  Leukocytosis D72.829  Obese E66.9  Atrial fibrillation with rapid ventricular response ([facility]) I48.91  
 Pulmonary edema J81.1  Severe left ventricular systolic dysfunction O40.2  Heart failure ([facility]) I50.9  ESRD on hemodialysis ([facility]) N18.6, Z99.2  Arthritis M19.90  
 Hypertension I10  
 History of creation of ostomy ([facility] [address] Z93.9  ICD (implantable cardioverter-defibrillator) infection ([facility] T82. 7XXA  
 
 
  ICD-10-CM ICD-9-CM 1. Problem related to implantable cardioverter-defibrillator (ICD) T82. 9XXA V45.02   
 Z95.810    
2. Infection involving implantable cardioverter-defibrillator (ICD), initial encounter ([facility] [address] T82. 7XXA 996.61 ELECTROPHYSIOLOGY PROCEDURE  
 ELECTROPHYSIOLOGY PROCEDURE INVASIVE VASCULAR PROCEDURE INVASIVE VASCULAR PROCEDURE 3. Cardiomyopathy, unspecified type (HCC) I42.9 425.4 ELECTROPHYSIOLOGY PROCEDURE  
   ELECTROPHYSIOLOGY PROCEDURE I have discussed the diagnosis with the patient and the intended plan as seen in the above orders. I have discussed medication side effects and warnings with the patient as well. Reviewed test results at length with patient Anti-infectives:  
 
 Vancomycin / Zosyn IV  Sharri Reyes MD FACP

## 2019-04-10 NOTE — PROGRESS NOTES
Problem: Falls - Risk of 
Goal: *Absence of Falls Description Document Izzy Latin Fall Risk and appropriate interventions in the flowsheet.  
Outcome: Progressing Towards Goal

## 2019-04-10 NOTE — PROGRESS NOTES
Spoke with Dr Phillip Blanchard about pt disposition. Advised pt to be lept until WBC decreases. Cardiology has signed off on pt and no longer needs IVCU level of care. Dr Phillip Blanchard to put in xfer order to medical telemetry to continue IV abx until discharge home

## 2019-04-10 NOTE — PROGRESS NOTES
Hospitalist Progress Note NAME: Paulina Nunes :  1940 MRN:  [id number] Assessment / Plan: 
ICD site infection Lactic acidosis, resolved No sepsis/SIRs criteria; + leukocytosis but trending down now ICD removed on 19 S/p ICD placement, subclavian L on 19 C/w broad spectrum AB: Vanco + zosyn Blood cx NGTD Wound cx negative ECHO showed EF 21 - 25%. Visually measured ejection fraction. Left ventricular cavity size is mildly dilated. Left ventricular global hypokinesis. Left ventricular diastolic dysfunction. ID input noted. Continue IV abx until WBC has normalized or when okay with ID for discharge. Cardiology signed off on 19 and recommended op followup in 2 weeks. 
  
Possible pathologic fractures/ tumor L knee pain started ~ 4-5 weeks ago Seen by ortho OP at [facility] and refer [facility] for biopsy --> Ortho consulted. ? If connected to above infection. PT/OT  
CT 3/8/2019  
2.4 x 2.1 x 2.7 cm lytic lesion at medial and posteromedial proximal 
tibial epiphysis and metaphysis with pathologic fracture. Additional findings as 
above consistent with renal osteodystrophy. Diagnostic considerations for the 
lesion include brown tumor, metastatic disease and bone, and less likely primary 
bone neoplasm. Brown tumors favored Orthopedic Recommended pt referral to either Dr Yepez/Dr Azael Davis at [facility] orthopedics or [facility] ortho Pain management 
  
Ischemic Cardiomyopathy NYHA class II EF 10% CAD / HTN / Atrial fibrillation No signs of fluid overload; BP at 110s; HR stable; cxray clear Continue BB and imdur at lower dose Not on ACE/ARBs, will let cardiology decide as an OP Eliquis on held by cardiology for couple of weeks 
  
ESRD  
HD TThSa Renal consulted for HD. Renally dose Rx as appropriate. Code Status: full code d/w pt in ED Surrogate Decision Maker: sister and dtr DVT Prophylaxis: Eliquis. GI Prophylaxis: not indicated Disposition:TBD Baseline: lives with one of her sons; ambulating with walker; she has 4 children Subjective: pt seen and examined at bedside. Feels the same, denies any new complaints. Overnight events d/w RN Chief Complaint / Reason for Physician Visit: f/u \"ICD infection\" Review of Systems: 
Symptom Y/N Comments  Symptom Y/N Comments Fever/Chills n   Chest Pain n   
Poor Appetite n   Edema n   
Cough n   Abdominal Pain Sputum n   Joint Pain y Left knee. SOB/FRAUSTO n   Pruritis/Rash Nausea/vomit n   Tolerating PT/OT Diarrhea    Tolerating Diet Constipation    Other Could NOT obtain due to:   
 
Objective: VITALS:  
Last 24hrs VS reviewed since prior progress note. Most recent are: 
Patient Vitals for the past 24 hrs: 
 Temp Pulse Resp BP SpO2  
04/10/19 0726 98.5 °F (36.9 °C) 91 18 108/51 99 % 04/10/19 0356 98.9 °F (37.2 °C) 86 18 111/74 97 % 04/09/19 2351 98.7 °F (37.1 °C) 89 16 107/49 97 % 04/09/19 1900 98.6 °F (37 °C) 90 16 105/56 96 % 04/09/19 1846 98.5 °F (36.9 °C) 89 18 102/54   
04/09/19 1449 98.7 °F (37.1 °C) (!) 106 20 129/52 96 % 04/09/19 1330 98 °F (36.7 °C) (!) 101 16 142/70   
04/09/19 1315 97.9 °F (36.6 °C)  16 121/74   
04/09/19 1300 97.8 °F (36.6 °C) 99 16 146/68   
04/09/19 1230  88 16 140/64   
04/09/19 1215  82 16 147/74   
04/09/19 1200  90 16 129/67   
04/09/19 1145  76 16 127/77   
04/09/19 1130  95 16 152/78   
04/09/19 1115  81 16 128/73   
04/09/19 1100  80 16 140/72   
04/09/19 1053  92 16 116/57  Intake/Output Summary (Last 24 hours) at 4/10/2019 1038 Last data filed at 4/10/2019 0026 Gross per 24 hour Intake 655 ml Output 700 ml Net -45 ml PHYSICAL EXAM: 
General: WD, WN. Alert, cooperative, no acute distress   
EENT:  EOMI. Anicteric sclerae. MMM Resp:  CTA bilaterally, no wheezing or rales. No accessory muscle use CV:  Regular  rhythm,  No edema GI:  Soft, Non distended, Non tender.  +Bowel sounds Neurologic:  Alert and oriented X 3, normal speech. Extr:                 Tender left knee to palpation,warm. Psych:   Good insight. Not anxious nor agitated Skin:  Dressing left side of chest s/p removal of ICD Reviewed most current lab test results and cultures  YES Reviewed most current radiology test results   YES Review and summation of old records today    NO Reviewed patient's current orders and MAR    YES 
PMH/SH reviewed - no change compared to H&P 
________________________________________________________________________ Care Plan discussed with: 
  Comments Patient X Family RN X   
Care Manager Consultant Multidiciplinary team rounds were held today with , nursing, pharmacist and clinical coordinator. Patient's plan of care was discussed; medications were reviewed and discharge planning was addressed. ________________________________________________________________________ Total NON critical care TIME: 25 Minutes Total CRITICAL CARE TIME Spent:   Minutes non procedure based Comments >50% of visit spent in counseling and coordination of care    
________________________________________________________________________ Tariq Mariano MD  
 
Procedures: see electronic medical records for all procedures/Xrays and details which were not copied into this note but were reviewed prior to creation of Plan. LABS: 
I reviewed today's most current labs and imaging studies. Pertinent labs include: 
Recent Labs 04/09/19 
0413 WBC 15.3* HGB 7.3* HCT 22.4*  
* Recent Labs 04/08/19 
6779 *  
K 5.0  
CL 95* CO2 22 GLU 95 BUN 58* CREA 9.04* CA 7.0* Signed: Tariq Mariano MD

## 2019-04-10 NOTE — PROGRESS NOTES
Problem: Mobility Impaired (Adult and Pediatric) Goal: *Acute Goals and Plan of Care (Insert Text) Description Physical Therapy Goals Initiated 4/6/2019 1. Patient will move from supine to sit and sit to supine  in bed with supervision/set-up within 7 day(s). 2.  Patient will transfer from bed to chair and chair to bed with supervision/set-up using the least restrictive device within 7 day(s). 3.  Patient will perform sit to stand with supervision/set-up within 7 day(s). 4.  Patient will ambulate with minimal assistance/contact guard assist for 100 feet with the least restrictive device within 7 day(s). 5.  Patient will ascend/descend 3 stairs with  handrail(s) with minimal assistance/contact guard assist within 7 day(s). Outcome: Progressing Towards Goal 
 PHYSICAL THERAPY TREATMENT Patient: Paulina Nunes (89 y.o. female) Date: 4/10/2019 Diagnosis: ICD (implantable cardioverter-defibrillator) infection ([facility] [address] [T82. 7XXA] <principal problem not specified> Procedure(s) (LRB): 
INSERT ICD SINGLE (N/A) 5 Days Post-Op Precautions:  WBAT LLE Chart, physical therapy assessment, plan of care and goals were reviewed. ASSESSMENT: 
Pt received supine in bed and agreeable to PT intervention with encouragement. VSS at rest. Patient cleared by nursing for mobility. No pain complaints. Patient continues to require constant VCs and occasional assistance for adherence to pacemaker precautions as patient with tendency to push through LUE for all bed mobility, transfers, and gait (with HHA on L). She requires min A x 1 for bed mobility and sit<>stand transfers. Ambulated 48' with min-mod A x 1-2 and HHA x 1-2. Exhibits very unsteady gait with significant trunk and hip flexion, short step length ASHLEY, poor foot clearance ASHLEY, and slow gait speed throughout ambulation.  Without HHA x 1 on L, patient attempting to reach out with LUE for wall railing for support, needing additional cueing and education to avoid this. Gait is very impaired as patient typically uses RW for ambulation, however with poor adherence to pacemaker precautions, this would currently be inappropriate unless cleared by MD. Patient continues to be resistance towards rehab, however would benefit greatly as patient does not have appropriate support at home and is a HIGH fall risk. Pt was left supine in bed with all needs met and RN aware following therapy session. Recommend patient discharge to SNF rehab to improve functional mobility and independence prior to returning home. IF patient does return home at discharge, she will require HHPT and 24/7 physical assistance x 2 for safety. Progression toward goals: 
?    Improving appropriately and progressing toward goals ? Improving slowly and progressing toward goals ? Not making progress toward goals and plan of care will be adjusted PLAN: 
Patient continues to benefit from skilled intervention to address the above impairments. Continue treatment per established plan of care. Discharge Recommendations:  Moise Rayo Further Equipment Recommendations for Discharge:  TBD by rehab SUBJECTIVE:  
Patient stated I don't get how they think I can move with one arm. They need to let up a little.  OBJECTIVE DATA SUMMARY:  
Critical Behavior: 
Neurologic State: Alert Orientation Level: Appropriate for age Cognition: Appropriate for age attention/concentration Safety/Judgement: Awareness of environment, Insight into deficits Functional Mobility Training: 
Bed Mobility: 
  
Supine to Sit: Minimum assistance;Assist x1(constant VCs to avoid use of LUE) Scooting: Minimum assistance;Assist x1;Additional time(constant VCs to avoid use of LUE) Transfers: 
Sit to Stand: Minimum assistance;Assist x1 Stand to Sit: Minimum assistance;Assist x1 Bed to Chair: Minimum assistance;Assist x2 
     
  
  
  
  
 Balance: 
Sitting: Intact Standing: Impaired; With support Standing - Static: Fair;Constant support Standing - Dynamic : Poor;Constant support Ambulation/Gait Training: 
Distance (ft): 50 Feet (ft) Assistive Device: Gait belt(HHA x 1-2) Ambulation - Level of Assistance: Minimal assistance; Moderate assistance Gait Abnormalities: Decreased step clearance;Shuffling gait; Path deviations;Trunk sway increased; Step to gait(significant trunk and hip flexion) Base of Support: Widened Speed/Maryanne: Slow Step Length: Left shortened;Right shortened Pain: 
Pain Scale 1: Numeric (0 - 10) Pain Intensity 1: 0 Activity Tolerance:  
Poor - limited endurance;  bpm at rest and up to 130 bpm with activity; no pain complaints Please refer to the flowsheet for vital signs taken during this treatment. After treatment:  
?    Patient left in no apparent distress sitting up in chair ? Patient left in no apparent distress in bed 
? Call bell left within reach ? Nursing notified ? Caregiver present ? Bed alarm activated COMMUNICATION/COLLABORATION:  
The patients plan of care was discussed with: Physical Therapist, Occupational Therapist, Registered Nurse and  Dorothy Hammond PT, DPT Time Calculation: 26 mins

## 2019-04-10 NOTE — PROGRESS NOTES
CM was advised by PT that pt might be considering idea of going to SNF [place] if some different options for SNF's [facility] were presented to her. Apparently pt had been to [facility] (a [facility] facility) in past but did not report this as a positive experience. CM spoke w/ pt at length to further explore SNF options and pt's reasoning for preferring to go home at [place]. Pt is alert, oriented. Pt has Medicaid pers care aide 5 days per week for approx 5 hours. On HD days, aide typically comes when she arrives back home - she is wondering if aide could come very early to assist her with dressing before HD. Aide is through [place] [phone] in [place] ([phone]). CM called agency and had to leave message for PC CM to discuss aide's hours. She attends HD on TTS schedule and has 6 am chair time at [facility]. Taxi cab (Medicaid) picks her up for 1 hour ride to HD, then brings her back. Pt's son and another family member also live in the home. Pt stated that her son is home on Saturday AM and would assist her in getting dressed for HD. Pt was willing to look over SNF list and consider but would much prefer to return home at [place]. [facility] liaison, Flor Lepe, was in this AM to check on pt and this agency is prepared to follow at [place]. INOCENCIO STONER wrote orders for cefazolin IV during HD 2g/2g/3g end date 4/18 and lab orders. Addendum 1400:  Conversation with Stella at [address] [place] [address] re: aide coming in before HD - too early - no aides can work that early in AM. Addendum 1500:  IV ABX and lab orders sent to [facility] via [address]. Conversation with Lit Brower ([phone]) at HD center re: possibility of changing pt's chair time if necessary to allow aide to come in to assist with AM routine/dressing prior to HD.  Lit Brower indicated that 2nd shift might be possibility which would necessitate pt being at center at 10:30-10:45 am. They would hold her current 1st shift chair as well. Yojana Trimble, KERLINE

## 2019-04-10 NOTE — PROGRESS NOTES
IMPRESSION: · Leakage of serous fluid from subcutaneous ICD pocket , s/p removal of SICD on   
· BC, WC negative · S/p placement of new subcutaneous ICD  · Bleeding around ICD since   since starting po eliquis, oozing less today, swelling still +  
· SICD placed on  at [facility], pt presented with leakage of blood from pocket on  · Pt had evacuation of hematoma  on 10/1 & DC home on clindamycin po · CAD/ ischemic CMP EF 10/ Afib · ESRD on HD TTS, x 7 yrs · 2.4x 2.1 x 2.7 cm lytic lesion at medial & posteromedial prox tibial epiphysis & metaphysis  With pathological  fracture · ECHO negative for vegetations  
  
  
PLAN:  
  
· DC  Vancomycin / Zosyn iV , start Cefazolin IV · DC planning on Cefazolin 2g/ 2g /3g regimen end date  · Weekly CBC, CMP while on Cefazolin fax reports to [phone], call with abnormal results at [phone] · Plan of care D/w pt  , D/w Dr Astrid Flores  
   
  
Subjective:  
 
Pt seen . No more oozing . Light dressing + . Pt denies pain Review of Systems:  A comprehensive review of systems was negative except for that written in the History of Present Illness. 10 point ROS obtained . All other systems negative . Objective:  
 
Blood pressure 108/51, pulse 91, temperature 98.5 °F (36.9 °C), resp. rate 18, height 5' 4\" (1.626 m), weight 157 lb 3.2 oz (71.3 kg), SpO2 99 %. Temp (24hrs), Av.4 °F (36.9 °C), Min:97.8 °F (36.6 °C), Max:98.9 °F (37.2 °C) Patient Vitals for the past 24 hrs: 
 Temp Pulse Resp BP SpO2  
04/10/19 0726 98.5 °F (36.9 °C) 91 18 108/51 99 % 04/10/19 0356 98.9 °F (37.2 °C) 86 18 111/74 97 % 19 2351 98.7 °F (37.1 °C) 89 16 107/49 97 % 19 1900 98.6 °F (37 °C) 90 16 105/56 96 % 19 1846 98.5 °F (36.9 °C) 89 18 102/54   
19 1449 98.7 °F (37.1 °C) (!) 106 20 129/52 96 % 19 1330 98 °F (36.7 °C) (!) 101 16 142/70   
19 1315 97.9 °F (36.6 °C)  16 121/74  04/09/19 1300 97.8 °F (36.6 °C) 99 16 146/68   
04/09/19 1230  88 16 140/64   
04/09/19 1215  82 16 147/74   
04/09/19 1200  90 16 129/67   
04/09/19 1145  76 16 127/77  Lines:  Peripheral IV:    and HD access Physical Exam:  
General:  Alert, cooperative Eyes:  Sclera anicteric. Pupils equally round and reactive to light. Mouth/Throat: Mucous membranes normal, oral pharynx clear Neck: Supple Lungs:   Clear to auscultation bilaterally Chest - light dressing +, localized swelling +  
CV:  Regular rate and rhythm,no murmur, click, rub or gallop Abdomen:   Soft, non-tender. bowel sounds normal. non-distended Extremities: No edema Skin: Skin color, texture, turgor normal. no acute rash or lesions Lymph nodes: Cervical and supraclavicular normal  
Musculoskeletal: No swelling or deformity Lines/Devices:  Intact, no erythema, drainage or tenderness Psych: Alert and oriented, agitated Data Review: CBC:  
Recent Labs 04/10/19 
1054 04/09/19 
0413 WBC 13.9* 15.3*  
RBC 2.28* 2.18* HGB 7.6* 7.3* HCT 23.7* 22.4*  
* 129* GRANS 75 73 LYMPH 9* 10* EOS 2 3 CMP:  
Recent Labs 04/08/19 
3315 GLU 95 *  
K 5.0  
CL 95* CO2 22 BUN 58* CREA 9.04* CA 7.0* AGAP 13 BUCR 6* Studies:     
Lab Results Component Value Date/Time Culture result: NO GROWTH 2 DAYS 04/04/2019 10:15 AM  
 Culture result: NO GROWTH 6 DAYS 04/03/2019 07:04 PM  
 Culture result: NO GROWTH 6 DAYS 04/03/2019 07:04 PM  
 Culture result: NO GROWTH 5 DAYS 09/15/2017 04:57 PM  
 Culture result: NO GROWTH 6 DAYS 09/14/2017 01:37 PM  
 Culture result: (A) 09/14/2017 01:37 PM  
  STAPHYLOCOCCUS SPECIES, COAGULASE NEGATIVE GROWING IN 1 OF 2 BOTTLES DRAWN (NO SITE INDICATED) PLATES HELD 3 DAYS. CALL MICROBIOLOGY LAB IF SENSITIVITIES ARE NEEDED.   
 Culture result: REMAINING BOTTLE(S) HAS/HAVE NO GROWTH IN 5 DAYS 09/14/2017 01:37 PM  
  
 
XR Results (most recent): 
 Results from Hospital Encounter encounter on 04/03/19 XR KNEE LT 3 V Narrative EXAM: XR KNEE LT 3 V 
 
INDICATION: left proximal tibia fracture. COMPARISON: CT 3/8/2019. MRI 3/11/2019. FINDINGS: Three views of the left knee demonstrate severe diffuse osteopenia. There is an osteolytic lesion of the medial tibial condyles again shown. There 
is slightly increased density well within the osteolytic lesion as well as 
suggestion of interval healing of adjacent tibial fracture. The lytic lesion is 
unchanged in size measuring nearly 3 cc in craniocaudal. Severe left knee 
osteoarthrosis primarily involving the medial lateral compartments is again 
shown as is a moderate knee effusion medullary osteonecrosis of the distal 
femur. Except for atherosclerotic calcifications are again shown. Impression IMPRESSION: There may be a interval increased density within the osteolytic 
lesion of the medial tibial condyle as well as healing of the tibial fracture. Recommend further evaluation with CT for clarification. Patient Active Problem List  
Diagnosis Code  Leukocytosis D72.829  Obese E66.9  Atrial fibrillation with rapid ventricular response ([facility]) I48.91  
 Pulmonary edema J81.1  Severe left ventricular systolic dysfunction B65.0  Heart failure ([facility]) I50.9  ESRD on hemodialysis ([facility]) N18.6, Z99.2  Arthritis M19.90  
 Hypertension I10  
 History of creation of ostomy ([facility] [address] Z93.9  ICD (implantable cardioverter-defibrillator) infection ([address] T82. 7XXA  
 
 
  ICD-10-CM ICD-9-CM 1. Problem related to implantable cardioverter-defibrillator (ICD) T82. 9XXA V45.02   
 Z95.810    
2. Infection involving implantable cardioverter-defibrillator (ICD), initial encounter ([address] T82. 7XXA 996.61 ELECTROPHYSIOLOGY PROCEDURE  
   ELECTROPHYSIOLOGY PROCEDURE INVASIVE VASCULAR PROCEDURE    INVASIVE VASCULAR PROCEDURE  
 3. Cardiomyopathy, unspecified type (HCC) I42.9 425.4 ELECTROPHYSIOLOGY PROCEDURE  
   ELECTROPHYSIOLOGY PROCEDURE I have discussed the diagnosis with the patient and the intended plan as seen in the above orders. I have discussed medication side effects and warnings with the patient as well. Reviewed test results at length with patient Anti-infectives:  
 
 Vancomycin / Zosyn IV- 4/3-4/10  Minna Bernard MD FACP

## 2019-04-10 NOTE — PROGRESS NOTES
Problem: Self Care Deficits Care Plan (Adult) Goal: *Acute Goals and Plan of Care (Insert Text) Description Occupational Therapy Goals Initiated 4/8/2019 1. Patient will perform grooming standing at the sink with supervision/set-up within 7 day(s). 2.  Patient will perform upper body dressing with supervision and cues within 7 day(s). 3.  Patient will perform lower body dressing with minimal assistance within 7 day(s). 4.  Patient will perform toilet transfers with minimal assistance/contact guard assist within 7 day(s). 5.  Patient will perform all aspects of toileting with minimal assistance/contact guard assist within 7 day(s). Outcome: Not Progressing Towards Goal 
 OCCUPATIONAL THERAPY TREATMENT Patient: Shira Field (54 y.o. female) Date: 4/10/2019 Diagnosis: ICD (implantable cardioverter-defibrillator) infection ([facility] [address] [T82. 7XXA] <principal problem not specified> Procedure(s) (LRB): 
INSERT ICD SINGLE (N/A) 5 Days Post-Op Precautions:   
Chart, occupational therapy assessment, plan of care, and goals were reviewed. ASSESSMENT: Patient with poor compliance of pacemaker precautions even with extensive education. She is very fearful of falling and even with second person for safety felt uncomfortable not using Rw. She pushes heavily through both arms when not prevented so using a RW can compromise pacemaker. Also with trouble with bed mobility and sit to stand following precautions. Provided extensive education with PT about recommendations. Patient mostly concerned that at her last stay at SNF there was extensive difficulty getting to dialysis. Spoke to  with this concern. Recommend SNF at discharge. Progression toward goals: 
?       Improving appropriately and progressing toward goals ? Improving slowly and progressing toward goals ? Not making progress toward goals and plan of care will be adjusted PLAN: 
 Patient continues to benefit from skilled intervention to address the above impairments. Continue treatment per established plan of care. Discharge Recommendations:  Moise Rayo Further Equipment Recommendations for Discharge:  to be determined SUBJECTIVE:  
Patient stated ? I had so much trouble getting to dialysis when at SNF. ? OBJECTIVE DATA SUMMARY:  
Cognitive/Behavioral Status: 
Neurologic State: Alert Orientation Level: Oriented X4 Functional Mobility and Transfers for ADLs: 
Bed Mobility: 
Supine to Sit: Minimum assistance;Assist x1(constant VCs to avoid use of LUE) Scooting: Minimum assistance;Assist x1;Additional time(constant VCs to avoid use of LUE) Transfers: 
Sit to Stand: Minimum assistance;Assist x1 Bed to Chair: Minimum assistance;Assist x2 Balance: 
Sitting: Intact Standing: Impaired; With support Standing - Static: Fair;Constant support Standing - Dynamic : Poor;Constant support Pain: 
Pain Scale 1: Numeric (0 - 10) Pain Intensity 1: 0 Activity Tolerance:  
Fair, did not tolerate much activity on her feet Please refer to the flowsheet for vital signs taken during this treatment. After treatment:  
? Patient left in no apparent distress sitting up in chair ? Patient left in no apparent distress in bed 
? Call bell left within reach ? Nursing notified ? Caregiver present ? Bed alarm activated COMMUNICATION/COLLABORATION:  
The patient?s plan of care was discussed with: Physical Therapist and Registered Nurse Heidi Reyes Time Calculation: 25 mins

## 2019-04-10 NOTE — PROGRESS NOTES
Pharmacy Automatic Renal Dosing Protocol - Antimicrobials Indication for Antimicrobials: SSTI Current Regimen of Each Antimicrobial: 
Cefazolin 2 g IV q24h x 7 days (Start Date ; Day # 1) Previous Antimicrobial Therapy: 
Vancomycin 750 mg IV PRN dialysis (Start Date 4/3; Day # 7) Zosyn 3.375 g IV every 12 hours (start 4/3, day 7) Significant Cultures:  
4/3 Blood: NG x 5 days- final 
 Wound: NG x 2 days- final 
  
Radiology / Imaging results: (X-ray, CT scan or MRI):  
4/3 xr chest - No significant interval change. No acute intrathoracic disease. Paralysis, amputations, malnutrition:   
 
Labs: 
Recent Labs 04/10/19 
1054 19 
0413 19 
6462 CREA  --   --  9.04* BUN  --   --  58* WBC 13.9* 15.3*  -- Temp (24hrs), Av.4 °F (36.9 °C), Min:97.8 °F (36.6 °C), Max:98.9 °F (37.2 °C) Creatinine Clearance (mL/min) or Dialysis: HD //Sat Impression/Plan:  
+++ ID consulted/following Leakage of serous fluid from SQ ICD pocket w/p removal SICD on  Adjust Cefazolin dose to 1 g IV q24h Antimicrobial stop date - 19 Pharmacy will follow daily and adjust medications as appropriate for renal function and/or serum levels. Thank you, 
Kang Mcpherson Recommended duration of therapy 
http://[facility]/[place]/[facility]/Pharmacy/Clinical%20Companion/[email or web address]%[email or web address]. docx Renal Dosing 
http://[facility]/[place]/[facility]/Pharmacy/Clinical%20Companion/Renal%[email or web address]. pdf

## 2019-04-10 NOTE — PROGRESS NOTES
After discussing several possible scenarios for dc, pt decided that she might consider SNF particularly if she could go for only 2-3 weeks and if she could be assured that her transportation to HD would not be a problem as it was with her previous SNF stay. She will discuss further with her children tonight. After being presented with [facility] - pt's choices were [facility] or [facility] - no preference between the two. Refs sent via [place]. Discussed with Dr. Kp Benitez who stated that he and ID are watching pt's WBC's and to be sure that she has no more bleeding before dc is considered. Cece Hernandez, MSW

## 2019-04-10 NOTE — PROGRESS NOTES
Nephrology Progress Note Antolin Blanco  
 
www. [facility]"[email or web address]                  Phone - [phone] Patient: Khushboo Duvall Date- 4/10/2019 Admit Date: 4/3/2019 YOB: 1940 CC: Follow up for  esrd Subjective: Interval History: - S/P HD YESTERDAY She received 1 unit prbc tx after hd yesterday No more bleeding at AICD site On ancef per id No C/O of chest pain,SOB, vomiting, abdominal pain,fever,chills ROS:- as above Assessment:  
ESRD on a TTS schedule at [place], followed by Dr. Rolinda Harada CKD ICD (implantable cardioverter-defibrillator) infection. S/p removal of the ICD on 4/4/19. New one placed on 4/5. 
  
Ischemic cardiomyopathy.  EF of 10-15%. 
  
Severe left knee pain.  Ortho following. 
  
Chronic atrial fibrillation. Plan: · No dialysis today · Continue hd tts · She will get cefazoin 2/2/3g with hd last date 4-18-19 · epogen TTS · Physical Exam:  
GEN:NAD 
NECK- Supple, no mass RESP: clear b/l, no wheezing, CVS: s1,s2,rrr ABDO: soft , non tender EXT: no edema Upper arm av graft + NEURO: normal speech, non focal 
 
Care Plan discussed with: pt  
Objective:  
Patient Vitals for the past 24 hrs: 
 Temp Pulse Resp BP SpO2  
04/10/19 0726 98.5 °F (36.9 °C) 91 18 108/51 99 % 04/10/19 0356 98.9 °F (37.2 °C) 86 18 111/74 97 % 04/09/19 2351 98.7 °F (37.1 °C) 89 16 107/49 97 % 04/09/19 1900 98.6 °F (37 °C) 90 16 105/56 96 % 04/09/19 1846 98.5 °F (36.9 °C) 89 18 102/54   
04/09/19 1449 98.7 °F (37.1 °C) (!) 106 20 129/52 96 % 04/09/19 1330 98 °F (36.7 °C) (!) 101 16 142/70   
04/09/19 1315 97.9 °F (36.6 °C)  16 121/74   
04/09/19 1300 97.8 °F (36.6 °C) 99 16 146/68   
04/09/19 1230  88 16 140/64   
04/09/19 1215  82 16 147/74   
04/09/19 1200  90 16 129/67   
04/09/19 1145  76 16 127/77   
04/09/19 1130  95 16 152/78   
04/09/19 1115  81 16 128/73  04/09/19 1100  80 16 140/72  Last 3 Recorded Weights in this Encounter 04/08/19 0521 04/09/19 0600 04/10/19 1029 Weight: 69.8 kg (153 lb 12.8 oz) 73.8 kg (162 lb 9.6 oz) 71.3 kg (157 lb 3.2 oz) 04/08 1901 - 04/10 0700 In: 56 [P.O.:245; I.V.:200] Out: 700 Chart reviewed. Pertinent Notes reviewed. Medication list  reviewed Current Facility-Administered Medications Medication  0.9% sodium chloride infusion 250 mL  vancomycin (VANCOCIN) 500 mg in 0.9% sodium chloride 100 mL IVPB And  
 VANCOMYCIN INFORMATION NOTE  carvedilol (COREG) tablet 6.25 mg  
 albumin human 25% (BUMINATE) solution 12.5 g  
 HYDROcodone-acetaminophen (NORCO) 5-325 mg per tablet 1 Tab  sodium chloride (NS) flush 5-40 mL  sodium chloride (NS) flush 5-40 mL  naloxone (NARCAN) injection 0.4 mg  
 naloxone (NARCAN) injection 0.4 mg  
 calcium carbonate (TUMS) chewable tablet 600 mg [elemental]  gabapentin (NEURONTIN) capsule 100 mg  
 isosorbide mononitrate ER (IMDUR) tablet 30 mg  
 traMADol (ULTRAM) tablet 50 mg  
 venlafaxine (EFFEXOR) tablet 37.5 mg  
 melatonin tablet 3 mg  acetaminophen (TYLENOL) tablet 650 mg  
 ondansetron (ZOFRAN) injection 4 mg  lactobac ac& pc-s.therm-b.anim (MICHAEL Q/RISAQUAD)  piperacillin-tazobactam (ZOSYN) 3.375 g in 0.9% sodium chloride (MBP/ADV) 100 mL Data Review : 
Recent Labs 04/09/19 
0413 04/08/19 
5215 WBC 15.3*  --   
HGB 7.3*  --   
*  --   
ANEU 11.2*  --   
NA  --  130* K  --  5.0  
GLU  --  95 BUN  --  58* CREA  --  9.04* CA  --  7.0* Lab Results Component Value Date/Time Culture result: NO GROWTH 2 DAYS 04/04/2019 10:15 AM  
 Culture result: NO GROWTH 6 DAYS 04/03/2019 07:04 PM  
 Culture result: NO GROWTH 6 DAYS 04/03/2019 07:04 PM  
 
No results found for: SDES No results for input(s): FE, TIBC, PSAT, FERR in the last 72 hours. No results found for: Daniel Meier MD 
 [facility] 
 [email or web address] [place] / [employer] [address] [place], [address] Phone - [phone] Fax - [phone]

## 2019-04-10 NOTE — PROGRESS NOTES
Problem: Falls - Risk of 
Goal: *Absence of Falls Description Document Dontrell Cohn Fall Risk and appropriate interventions in the flowsheet. 4/10/2019 1520 by Sean Coates RN Outcome: Progressing Towards Goal 
4/10/2019 1115 by Sean Coates, RN Outcome: Progressing Towards Goal

## 2019-04-10 NOTE — PROGRESS NOTES
Cardiac Electrophysiology Progress Note [address], [place], [address] [facility]  [phone] 4/10/2019 9:39 AM 
 
Admit Date: 4/3/2019 Admit Diagnosis: ICD (implantable cardioverter-defibrillator) infection ([facility] [address] [T82. 7XXA] Subjective:  
 
[address]   denies chest pain, chest pressure/discomfort, dyspnea, palpitations, lower extremity edema. Visit Vitals /51 Pulse 91 Temp 98.5 °F (36.9 °C) Resp 18 Ht 5' 4\" (1.626 m) Wt 162 lb 9.6 oz (73.8 kg) SpO2 99% BMI 27.91 kg/m² Current Facility-Administered Medications Medication Dose Route Frequency  0.9% sodium chloride infusion 250 mL  250 mL IntraVENous PRN  
 vancomycin (VANCOCIN) 500 mg in 0.9% sodium chloride 100 mL IVPB  500 mg IntraVENous DIALYSIS PRN And  
 VANCOMYCIN INFORMATION NOTE   Other DAILY  carvedilol (COREG) tablet 6.25 mg  6.25 mg Oral BID WITH MEALS  
 apixaban (ELIQUIS) tablet 5 mg  5 mg Oral Q12H  
 albumin human 25% (BUMINATE) solution 12.5 g  12.5 g IntraVENous DIALYSIS PRN  
 HYDROcodone-acetaminophen (NORCO) 5-325 mg per tablet 1 Tab  1 Tab Oral Q4H PRN  
 sodium chloride (NS) flush 5-40 mL  5-40 mL IntraVENous Q8H  
 sodium chloride (NS) flush 5-40 mL  5-40 mL IntraVENous PRN  
 naloxone (NARCAN) injection 0.4 mg  0.4 mg IntraVENous PRN  
 PHENYLephrine (PF)(KEYSHAWN-SYNEPHRINE) 30 mg in 0.9% sodium chloride 250 mL infusion   mcg/min IntraVENous TITRATE  naloxone (NARCAN) injection 0.4 mg  0.4 mg IntraVENous PRN  
 calcium carbonate (TUMS) chewable tablet 600 mg [elemental]  600 mg Oral BID WITH MEALS  gabapentin (NEURONTIN) capsule 100 mg  100 mg Oral BID  isosorbide mononitrate ER (IMDUR) tablet 30 mg  30 mg Oral DAILY  traMADol (ULTRAM) tablet 50 mg  50 mg Oral Q12H PRN  
 venlafaxine (EFFEXOR) tablet 37.5 mg  37.5 mg Oral DAILY  melatonin tablet 3 mg  3 mg Oral QHS  acetaminophen (TYLENOL) tablet 650 mg  650 mg Oral Q4H PRN  
  ondansetron (ZOFRAN) injection 4 mg  4 mg IntraVENous Q4H PRN  
 lactobac ac& pc-s.therm-b.anim (MICHAEL Q/RISAQUAD)  1 Cap Oral DAILY  piperacillin-tazobactam (ZOSYN) 3.375 g in 0.9% sodium chloride (MBP/ADV) 100 mL  3.375 g IntraVENous Q12H Objective:  
  
Visit Vitals /51 Pulse 91 Temp 98.5 °F (36.9 °C) Resp 18 Ht 5' 4\" (1.626 m) Wt 162 lb 9.6 oz (73.8 kg) SpO2 99% BMI 27.91 kg/m² Physical Exam: Abdomen: soft, non-tender Extremities: extremities normal 
Heart: regular rate and rhythm Chest: left subclavian and subxiphoid dressings dry and intact Lungs: clear to auscultation bilaterally Pulses: 2+ and symmetric Data Review:  
Labs:   
Recent Labs 04/09/19 
0413 WBC 15.3* HGB 7.3* HCT 22.4*  
* Recent Labs 04/08/19 
9291 *  
K 5.0  
CL 95* CO2 22 GLU 95 BUN 58* CREA 9.04* CA 7.0* No results for input(s): TROIQ, CPK, CKMB in the last 72 hours. Intake/Output Summary (Last 24 hours) at 4/10/2019 4381 Last data filed at 4/10/2019 0828 Gross per 24 hour Intake 655 ml Output 700 ml Net -45 ml Telemetry: sinus rhythm Assessment:  
 
Active Problems: 
  ICD (implantable cardioverter-defibrillator) infection ([facility] [address] (4/3/2019) Plan:  
 
Rut Gomez is s/p sub Q ICD removal 4/4/19 and successful ICD (L subclavian) 4/5/19. She had bleeding from her ICD implant site and was injected with epi/lido 4/8 and 4/9 with pressure dressings. This morning her ICD dressing was dry and intact. Hematoma noted. Will continue to hold eliquis - may resume after discharge. She will follow up with Dr Jaylon Jolley. JENNA Hwang Patient seen and examined by me with nurse practitioner. I personally performed all components of the history, physical, and medical decision making and agree with the assessment and plan with minor modifications as noted. Incision are no longer oozing.  Pressure dressings are removed. Hold oac. Can resume 1 week after dc. F/u with Dr Clarisa Tolentino. Will sign off.  
 
Renuka Juan MD, [facility] 
 
 
 
4/10/2019 
9:39 AM

## 2019-04-10 NOTE — PROGRESS NOTES
Spoke with Dr Estela Oliver regarding pt plan. Advised waiting WBC to decrease. STAT CBC sent to determine WBC for today. Advised can transfer to Medical Tele floor for continued treatment. PT may need SNF placement

## 2019-04-10 NOTE — PROGRESS NOTES
1930 - Bedside report from RN. No pain or complaints. NSr w BBB, occ PVC's. 
 
2200 - Pt is declining eliquis, \"they're not giving it to me because of the bleeding. \"  Pt has been in NSR. Tramadol PO for HS comfort. No current complaints of pain. 0430 - Complete CHG bathing w wipes, ostomy appliance change by pt. L Lateral chest wound is draining thin brownish tint serosanguinous fluid from dressing, reinforced w 4x4 guaze and tape. Pressure dressing to L upper chest remains intact and surrounding skin is soft and supple. L sub breast dressings remain unchanged w scant old drainage. Pt assisted to recliner chair. Bed, pt care furniture and equipment and general room sanitized w bleach wipes. Pt needs reminder of safety cues regarding use of L arm. 
 
0700 - Bedside report to RN. NSR w BBB, occ pvc's.

## 2019-04-11 NOTE — PROGRESS NOTES
TRANSFER - IN REPORT: 
 
Verbal report received from Evelyn reynolds Irina Dugan  being received from [facility] for ordered procedure Report consisted of patients Situation, Background, Assessment and  
Recommendations(SBAR). Information from the following report(s) Kardex was reviewed with the receiving nurse. Opportunity for questions and clarification was provided. Assessment completed upon patients arrival to unit and care assumed.

## 2019-04-11 NOTE — PROGRESS NOTES
Pt off unit for dialysis. Night shift nurse gave transfer report to dialysis nurse. Pt left unit via bed & transport staff.

## 2019-04-11 NOTE — PROCEDURES
[facility] Dialysis Team [place]  [phone] Vitals   Pre   Post   Assessment   Pre   Post    
Temp  Temp: 98.4 °F (36.9 °C) (04/11/19 0800)  98.2 LOC  A & O x 4 A & O x 4 HR   Pulse (Heart Rate): 75 (04/11/19 0815) 76 Lungs   Clear  Clear B/P   BP: 102/53 (04/11/19 0815) 143/64 Cardiac   Normal sinus rhythm S1 S2  Normal sinus rhythm S1 S2 Resp   Resp Rate: 16 (04/11/19 0815) 18 Skin   Warm & dry  Warm & dry Pain level  Pain Intensity 1: 0 (04/11/19 0723) 0 Edema  None noted None noted Orders: HEMODIALYSIS INPATIENT Duration (hr): 3.25; Dialyzer: Revaclear; Dialysate Bath:  K: 2; Dialysate Bath:  CA: 2.5; Dialysate Bath: Bicarb: 35; Sodium Profiling/Modeling: No; Profile (Beginning / mEq/L): 140; Weight Loss (kg): 2; Target Fluid Remova. .. Duration:   Start:    0800 End:    1115 Total:   3.15 Dialyzer:   Dialyzer/Set Up Inspection: Paulina Didi (04/11/19 0800) K Bath:   Dialysate K (mEq/L): 2 (04/11/19 0800) Ca Bath:   Dialysate CA (mEq/L): 2.5 (04/11/19 0800) Na/Bicarb:   Dialysate NA (mEq/L): 140 (04/11/19 0800) Target Fluid Removal:   Goal/Amount of Fluid to Remove (mL): 2000 mL (04/11/19 0800) Access  AVG Type & Location:   Right upper AVG Labs Obtained/Reviewed Critical Results Called   Date when labs were drawn- 
Hgb-   
HGB Date Value Ref Range Status 04/11/2019 8.0 (L) 11.5 - 16.0 g/dL Final  
 
K-   
Potassium Date Value Ref Range Status 04/08/2019 5.0 3.5 - 5.1 mmol/L Final  
 
Ca-  
Calcium Date Value Ref Range Status 04/08/2019 7.0 (L) 8.5 - 10.1 MG/DL Final  
 
Bun-  
BUN Date Value Ref Range Status 04/08/2019 58 (H) 6 - 20 MG/DL Final  
  Comment:  
  INVESTIGATED PER DELTA CHECK PROTOCOL Creat-  
Creatinine Date Value Ref Range Status 04/08/2019 9.04 (H) 0.55 - 1.02 MG/DL Final  
  Comment:  
  INVESTIGATED PER DELTA CHECK PROTOCOL Medications/ Blood Products Given Name   Dose   Route and Time None Blood Volume Processed (BVP):    79.0 Net Fluid Removed:  1000ml Comments Time Out Done: 2328 Primary Nurse Rpt Pre: Helena Merino RN 
Primary Nurse Rpt Post: Jase Lamas RN 
Pt Education: Access care Care Plan: Continue HD Tx Summary: ALEXANDER AVF: skin CDI. No s/s of infection. No issues with cannulation or hemostasis. Running well at . Pt arrived to HD [address]. Consent signed & on file. SBAR received from Primary RN. 0800: Pt cannulated with 72U needles per policy & without issue. Labs drawn per request/ order. VSS. Dialysis Tx initiated. 0815: Pt. Resting quietly, lines secure and visible 0830: Pt. Stable, lines secure and visible 0845: Pt. Stable, voiced no concerns, lines secure and visible 
0900: Pt resting quietly. Lines secure and visible, Dr. Bouchra Reeves rounding ordered down UF goal 1000ml 0915: Pt. Stable, charly hd well lines secure and visible 0930: Pt. Stable, lines secure and visible 0945: Pt. Stable, lines secure and visible 
1000: Pt. Stable. Lines secure and visible 1015: Pt. Stable. Lines secure and visible 1030: Pt. Stable, lines secure and visible. 1045: Pt. Stable, no s/s of distress. Lines secure and visible 
1100: Pt. Stable, no s/s of distress lines secure and visble 1115: Tx ended. VSS. All possible blood returned to patient. Hemostasis achieved without issue. Bed locked and in the lowest position, call bell and belongings in reach. SBAR given to Primary, RN. Patient is stable at time of their/ my departure. Access completed by Dacia Grande RN All Dialysis related medications have been reviewed. Admiting Diagnosis: Heart failure Pt's previous clinic- [facility] Consent signed - Informed Consent Verified: Yes (04/11/19 0800) Maynor Consent - Yes Hepatitis Status-  Negative Machine #- Machine Number: [id number] (04/11/19 0800) Telemetry status- Yes Pre-dialysis wt. - Pre-Dialysis Weight: 68.9 kg (152 lb) (04/11/19 0800)

## 2019-04-11 NOTE — PROGRESS NOTES
Chart review. RADHA Anticipate discharge to SNF. 3 referrals submitted. Results pending. Dialysis patient TTS at [address]. IVabx - [facility] to [facility]. They can provide. Medicare/Medicaid. Patient can travel in a [place]. Room Air. Referrals submitted to SNF on 4/10/2019. No response. CM contacted [place] [facility] for follow up and spoke with Dickson Mohs. Referral received. They have yet to run benefits. Per attending patient will be receiving IV abx after dialysis. [facility] CM called [facility] to confirm receipt of referral. 
They will review. Spoke with [facility]. CM confirmed receipt of IV Abx Orders and lab orders with Bill Frausto at [facility]. CM confirmed with patient that she wants to maintain 6:00am chair time. Lelo informed. Lauren YOUNG reviewed discharge planning with PT. Patient has inquired to [facility]. It is thought [facility] program would be too intense for patient at this time. CM reviewed with patient and asked if there were any other facilities she would be willing to go to. After further discussion patient will call [facility] to inquire to other facilities nearby. Bill Frausto at [facility] informed [address] is not that far. Referral submitted to [facility] 1514 - Dickson Mohs from [facility] will update CM on 4/12/2019 regarding bed availability. 1531 - Tima Leggett from [facility] called to request notes? CM returned call and reviewed availability of notes in cclink. Tima Leggett will review and inform CM. 
 
[address] [facility] cannot accept CM reviewed with team member for other facilities near [place]. Multiple referrals submitted. CM will continue to follow for discharge planning. Melvin Aburto RN CM Ext [phone]

## 2019-04-11 NOTE — PROGRESS NOTES
TRANSFER - OUT REPORT: 
 
Verbal report given to Darlene Sheikh RN on varinode  being transferred to [facility] for routine post - op Report consisted of patients Situation, Background, Assessment and  
Recommendations(SBAR). Information from the following report(s) Kardex was reviewed with the receiving nurse. Opportunity for questions and clarification was provided. Patient transported with: 
 [place]

## 2019-04-11 NOTE — PROGRESS NOTES
Hospitalist Progress Note NAME: Lin Rater :  1940 MRN:  [id number] Assessment / Plan: 
ICD site infection Lactic acidosis, resolved No sepsis/SIRs criteria; + leukocytosis ICD removed on 19 S/p ICD placement, subclavian L on 19 C/w broad spectrum AB: Vanco + zosyn, now switched to IV cefazolin Blood cx NGTD Wound cx negative ECHO showed EF 21 - 25%. Visually measured ejection fraction. Left ventricular cavity size is mildly dilated. Left ventricular global hypokinesis. Left ventricular diastolic dysfunction. Appreciate ID input/help Cardiology signed off on 19 and recommended op followup in 2 weeks. 
  
Possible pathologic fractures/ tumor L knee pain started ~ 4-5 weeks ago Seen by ortho OP at [facility] and refer [facility] for biopsy --> Ortho consulted. ? If connected to above infection. PT/OT  
CT 3/8/2019  
2.4 x 2.1 x 2.7 cm lytic lesion at medial and posteromedial proximal 
tibial epiphysis and metaphysis with pathologic fracture. Additional findings as 
above consistent with renal osteodystrophy. Diagnostic considerations for the 
lesion include brown tumor, metastatic disease and bone, and less likely primary 
bone neoplasm. Brown tumors favored Orthopedic Recommended pt referral to either Dr Yepez/Dr Yancy Barbosa at [facility] orthopedics or [facility] ortho Pain management 
  
Ischemic Cardiomyopathy NYHA class II EF 10% CAD / HTN / Atrial fibrillation No signs of fluid overload; BP at 110s; HR stable; cxray clear Continue BB and imdur at lower dose Not on ACE/ARBs, will let cardiology decide as an OP Eliquis on held by cardiology for couple of weeks 
  
ESRD  
HD TThSa Renal consulted for HD. Renally dose Rx as appropriate. Code Status: full code d/w pt in ED Surrogate Decision Maker: sister and dtr DVT Prophylaxis: Eliquis. GI Prophylaxis: not indicated Disposition:TBD Baseline: lives with one of her sons; ambulating with walker; she has 4 children Subjective: pt seen and examined at bedside. Feels the same, denies any new complaints. Overnight events d/w RN Chief Complaint / Reason for Physician Visit: f/u \"ICD infection\" Review of Systems: 
Symptom Y/N Comments  Symptom Y/N Comments Fever/Chills n   Chest Pain n   
Poor Appetite n   Edema n   
Cough n   Abdominal Pain Sputum n   Joint Pain y Left knee. SOB/FRAUSTO n   Pruritis/Rash Nausea/vomit n   Tolerating PT/OT Diarrhea    Tolerating Diet Constipation    Other Could NOT obtain due to:   
 
Objective: VITALS:  
Last 24hrs VS reviewed since prior progress note. Most recent are: 
Patient Vitals for the past 24 hrs: 
 Temp Pulse Resp BP SpO2  
04/11/19 1419 98.1 °F (36.7 °C) 99 18 112/49   
04/11/19 1220 98.1 °F (36.7 °C) 85 18 129/88   
04/11/19 1115 98.2 °F (36.8 °C) 69 18 147/69 99 % 04/11/19 1100  74 18 128/64 99 % 04/11/19 1045  66 18 140/61 99 % 04/11/19 1030  69 16 127/62 98 % 04/11/19 1015  71 16 118/62 98 % 04/11/19 1000  67 16 127/50 98 % 04/11/19 0945  73 16 127/53 98 % 04/11/19 0930  74 16 128/55 99 % 04/11/19 0915  77 16 124/63 98 % 04/11/19 0901  68 16 115/58   
04/11/19 0830  70 16 121/70   
04/11/19 0815  75 16 102/53   
04/11/19 0800 98.4 °F (36.9 °C) 80 15 124/58   
04/11/19 0723 98.5 °F (36.9 °C) 80 16 92/49 94 % 04/11/19 0430 99.2 °F (37.3 °C) 76 16 115/47 96 % 04/10/19 2307 98.5 °F (36.9 °C) 79 16 102/51 97 % 04/10/19 1900 98.6 °F (37 °C) 79 16 105/54 97 % Intake/Output Summary (Last 24 hours) at 4/11/2019 1709 Last data filed at 4/11/2019 1115 Gross per 24 hour Intake 240 ml Output 1000 ml Net -760 ml PHYSICAL EXAM: 
General: WD, WN. Alert, cooperative, no acute distress   
EENT:  EOMI. Anicteric sclerae. MMM Resp:  CTA bilaterally, no wheezing or rales. No accessory muscle use CV:  Regular  rhythm,  No edema GI:  Soft, Non distended, Non tender.  +Bowel sounds Neurologic:  Alert and oriented X 3, normal speech. Extr:                 Tender left knee to palpation,warm. Psych:   Good insight. Not anxious nor agitated Skin:  Dressing left side of chest s/p removal of ICD Reviewed most current lab test results and cultures  YES Reviewed most current radiology test results   YES Review and summation of old records today    NO Reviewed patient's current orders and MAR    YES 
PMH/SH reviewed - no change compared to H&P 
________________________________________________________________________ Care Plan discussed with: 
  Comments Patient X Family RN X   
Care Manager Consultant Multidiciplinary team rounds were held today with , nursing, pharmacist and clinical coordinator. Patient's plan of care was discussed; medications were reviewed and discharge planning was addressed. ________________________________________________________________________ Total NON critical care TIME: 25 Minutes Total CRITICAL CARE TIME Spent:   Minutes non procedure based Comments >50% of visit spent in counseling and coordination of care    
________________________________________________________________________ Archie Schulte MD  
 
Procedures: see electronic medical records for all procedures/Xrays and details which were not copied into this note but were reviewed prior to creation of Plan. LABS: 
I reviewed today's most current labs and imaging studies. Pertinent labs include: 
Recent Labs 04/11/19 
9770 04/10/19 
1054 04/09/19 
0413 WBC 16.6* 13.9* 15.3* HGB 8.0* 7.6* 7.3* HCT 24.0* 23.7* 22.4*  
 117* 129* No results for input(s): NA, K, CL, CO2, GLU, BUN, CREA, CA, MG, PHOS, ALB, TBIL, TBILI, SGOT, ALT, INR in the last 72 hours. No lab exists for component: INREXT, INREXT Signed: Archie Schulte MD

## 2019-04-11 NOTE — PROGRESS NOTES
Followed up with patient this afternoon. Introduced myself and reminded her of previous visit. She started complaining about food and wanting to make phone calls. Explained how to dial out. Patient then dialed number and started phone conversation. Session aborted.

## 2019-04-11 NOTE — PROGRESS NOTES
Nephrology Progress Note Antolin Blanco  
 
[facility]. [facility]                  Phone - [phone] Patient: Ani Farias Date- 4/11/2019 Admit Date: 4/3/2019 YOB: 1940 CC: Follow up for esrd Subjective: Interval History: -  Seen on hd Na low Hb stable Reports poor po intake No c/o sob, No c/o chest pain, No c/o nausea or vomiting No c/o  fever. ROS:- as above Assessment:  
· ESRD on a TTS schedule at [facility], followed by Dr. Angie Aguilar. ·   ANEMIA OF CKD · ICD (implantable cardioverter-defibrillator) infection. S/p removal of the ICD on 4/4/19. New one placed on 4/5. ·  Ischemic cardiomyopathy.  EF of 10-15%. ·  Sec. hyperpara ·  Chronic atrial fibrillation. Plan:  
· Seen on hd today · Remove 1 kg · Na will improve with fluid removal on hd · epogen TTS · We will give abx with hd as out pt Physical Exam:  
GEN: NAD NECK- no mass Left chest dressing + RESP: clear b/l,  no wheezing, CVS: s1,s2,rrr ABDO: soft , non tender EXT: no edema Upper arm av graft + NEURO: normal speech, non focal 
 
Care Plan discussed with: pt  
Objective:  
Patient Vitals for the past 24 hrs: 
 Temp Pulse Resp BP SpO2  
04/11/19 0901  68 16 115/58   
04/11/19 0830  70 16 121/70   
04/11/19 0815  75 16 102/53   
04/11/19 0800 98.4 °F (36.9 °C) 80 15 124/58   
04/11/19 0723 98.5 °F (36.9 °C) 80 16 92/49 94 % 04/11/19 0430 99.2 °F (37.3 °C) 76 16 115/47 96 % 04/10/19 2307 98.5 °F (36.9 °C) 79 16 102/51 97 % 04/10/19 1900 98.6 °F (37 °C) 79 16 105/54 97 % 04/10/19 1514 98.3 °F (36.8 °C) 89 18 109/52 93 % 04/10/19 1221 98.9 °F (37.2 °C) 81 18 102/40 93 % Last 3 Recorded Weights in this Encounter 04/10/19 1029 04/11/19 0301 04/11/19 2250 Weight: 71.3 kg (157 lb 3.2 oz) 70.5 kg (155 lb 8 oz) 69.5 kg (153 lb 3.5 oz) 04/09 1901 - 04/11 0700 In: 18 [P.O.:410; I.V.:100] Out: 0 Chart reviewed. Pertinent Notes reviewed. Medication list  reviewed Current Facility-Administered Medications Medication  ceFAZolin (ANCEF) 1 g in 0.9% sodium chloride (MBP/ADV) 50 mL  0.9% sodium chloride infusion 250 mL  carvedilol (COREG) tablet 6.25 mg  
 albumin human 25% (BUMINATE) solution 12.5 g  
 HYDROcodone-acetaminophen (NORCO) 5-325 mg per tablet 1 Tab  sodium chloride (NS) flush 5-40 mL  sodium chloride (NS) flush 5-40 mL  naloxone (NARCAN) injection 0.4 mg  
 calcium carbonate (TUMS) chewable tablet 600 mg [elemental]  gabapentin (NEURONTIN) capsule 100 mg  
 isosorbide mononitrate ER (IMDUR) tablet 30 mg  
 traMADol (ULTRAM) tablet 50 mg  
 venlafaxine (EFFEXOR) tablet 37.5 mg  
 melatonin tablet 3 mg  acetaminophen (TYLENOL) tablet 650 mg  
 ondansetron (ZOFRAN) injection 4 mg  lactobac ac& pc-s.therm-b.anim (MICHAEL Q/RISAQUAD) Data Review : 
Recent Labs 04/11/19 
8058 04/10/19 
1054 04/09/19 
0413 WBC 16.6* 13.9* 15.3* HGB 8.0* 7.6* 7.3*  
 117* 129* ANEU 12.2* 10.4* 11.2* Lab Results Component Value Date/Time Culture result: NO GROWTH 2 DAYS 04/04/2019 10:15 AM  
 Culture result: NO GROWTH 6 DAYS 04/03/2019 07:04 PM  
 Culture result: NO GROWTH 6 DAYS 04/03/2019 07:04 PM  
 
No results found for: SDES No results for input(s): FE, TIBC, PSAT, FERR in the last 72 hours. No results found for: Jaycee Castro MD 
[facility] 
 [email or web address] [place] [facility] [employer] [address] , [address] [facility], [address] Phone - [phone] Fax - [phone]

## 2019-04-11 NOTE — PROGRESS NOTES
1930 - Bedside report from [facility]. NSR w L BBB, occ PVC's and PAC's. No pain or complaints. L upper chest w moderate resolving hematoma. Pt stating she has decided she will agree to go to rehab / snf at discharge. 0530 - VSS. L Lateral dressing saturated, leaking. Dressing change w gauze 4x4 and transparent dressing. Packing left in place. Complete hygiene w CHG wipes, gown and linen change and ostomy emptied. Room sanitized w bleach wipes. Up to recliner chair. No pain.    
 
0700 - Bedside report to RN. NSR occ pac's and pvc's, LBBB.

## 2019-04-11 NOTE — PROGRESS NOTES
Problem: Mobility Impaired (Adult and Pediatric) Goal: *Acute Goals and Plan of Care (Insert Text) Description Physical Therapy Goals Initiated 4/6/2019 1. Patient will move from supine to sit and sit to supine  in bed with supervision/set-up within 7 day(s). 2.  Patient will transfer from bed to chair and chair to bed with supervision/set-up using the least restrictive device within 7 day(s). 3.  Patient will perform sit to stand with supervision/set-up within 7 day(s). 4.  Patient will ambulate with minimal assistance/contact guard assist for 100 feet with the least restrictive device within 7 day(s). 5.  Patient will ascend/descend 3 stairs with  handrail(s) with minimal assistance/contact guard assist within 7 day(s). Outcome: Progressing Towards Goal 
 PHYSICAL THERAPY TREATMENT Patient: Ashley Silver (32 y.o. female) Date: 4/11/2019 Diagnosis: ICD (implantable cardioverter-defibrillator) infection ([facility] [address] [T82. 7XXA] <principal problem not specified> Procedure(s) (LRB): 
INSERT ICD SINGLE (N/A) 6 Days Post-Op Precautions:   
Chart, physical therapy assessment, plan of care and goals were reviewed. ASSESSMENT: 
Pt cleared by RN for PT session. Pt received lying in bed, agreeable to PT session. Pt reeducated about pacemaker precautions and reports she is trying to adhere to them. Pt demonstrates sup>sit with min a x 1. Once seated eob, verbal and visual instructions given for more effective scooting technique with avoidance of use of LUE. Pt with good return demo and able to perform with S.  Sit>stand required min a x 1, as well as cues to get and keep cog anterior to bereket. Gait more steady this visit, with HHA x 1 and second person holding on to gait belt. Pt instructed to keep L hand in pocket. Pt with less distance tolerance(32ft) due to fatigue but overall improved gait quality.   Pt with decreased BP initially in standing, but improved with  ambulation and remained wnl the remainder of the session. Pt returned to bed, requiring connie x 1. SNF remains appropriate at discharge. Progression toward goals: 
?    Improving appropriately and progressing toward goals ? Improving slowly and progressing toward goals ? Not making progress toward goals and plan of care will be adjusted PLAN: 
Patient continues to benefit from skilled intervention to address the above impairments. Continue treatment per established plan of care. Discharge Recommendations:  Moise Rayo Further Equipment Recommendations for Discharge:  tbd SUBJECTIVE:  
Patient stated ? I had dialysis this morning and I am tired. ? OBJECTIVE DATA SUMMARY:  
Critical Behavior: 
Neurologic State: Alert Orientation Level: Oriented X4 Cognition: Appropriate for age attention/concentration Safety/Judgement: Awareness of environment, Insight into deficits Functional Mobility Training: 
Bed Mobility: 
Rolling: Contact guard assistance Supine to Sit: Minimum assistance(vc to not use LUE) Scooting: Contact guard assistance(vc for more efficient technique) Transfers: 
Sit to Stand: Minimum assistance; Additional time Stand to Sit: Contact guard assistance;Assist x1 Bed to Chair: Contact guard assistance;Assist x1 Balance: 
Sitting: Intact; Without support Standing: Impaired Standing - Static: Good;Constant support Standing - Dynamic : Fair;Constant support Ambulation/Gait Training: 
Distance (ft): 32 Feet (ft) Assistive Device: Gait belt(HHA x 1,) Ambulation - Level of Assistance: Minimal assistance;Assist x1 Gait Abnormalities: Decreased step clearance; Path deviations Base of Support: Widened Speed/Maryanne: Slow Step Length: Left shortened;Right shortened Pain: 
Pain Scale 1: Numeric (0 - 10) Pain Intensity 1: 0 Activity Tolerance:  
Fair pt fatigued today Please refer to the flowsheet for vital signs taken during this treatment. After treatment:  
?    Patient left in no apparent distress sitting up in chair ? Patient left in no apparent distress in bed 
? Call bell left within reach ? Nursing notified ? Caregiver present ? Bed alarm activated COMMUNICATION/COLLABORATION:  
The patient?s plan of care was discussed with: Registered Nurse and  Elizabeth Chong, PT Time Calculation: 29 mins

## 2019-04-12 NOTE — PROGRESS NOTES
1426: called report to [facility]. at [facility]. Expected  time 1630.

## 2019-04-12 NOTE — DISCHARGE INSTRUCTIONS
We are holding Eliquis, should be resumed after 1 week on 4/19/2019 at dose 2.5mg PO BID        [address]  [phone]        ICD INSERTION DISCHARGE INSTRUCTIONS    Patient ID:  Bradley Thompson  [id number]  66 y.o.  1940    Admit Date: 4/3/2019    Discharge Date: 4/5/2019     Admitting Physician: Weston Cid MD     Discharge Physician: Weston Cid MD    Admission Diagnoses:   ICD (implantable cardioverter-defibrillator) infection ([address] Christine Case. 7XXA]    Discharge Diagnoses: Active Problems:    ICD (implantable cardioverter-defibrillator) infection ([address] (4/3/2019)        Discharge Condition: Good    Cardiology Procedures this Admission:  S/P ICD implantation. Disposition: home    Reference discharge instructions provided by nursing for diet and activity. Follow-up with ICD/PM clinic in 2 weeks. Call [phone] to make an appointment. Signed:  JENNA Phelps  4/5/2019  12:13 PM      DISCHARGE INSTRUCTIONS FOR PATIENTS WITH ICD'S    1. Remember to call for an appointment in 2-4 weeks [phone]. 2. Medic Alert Bracelets are available from your pharmacist to wear at all times if you choose to wear one. 3. Carry your ID card for your ICD with you at all times. This card will be given to you in the hospital or mailed to you. 4. The ICD will bulge slightly under your skin. The bulge will decrease in size over the next few weeks. Please notify the doctor's office if you notice any of the following around your ICD site:   A.  A bruise that does not go away. B.  Soreness or yellow, green, or brown drainage from the site. C. Any swelling from the site. D. If you have a fever of 100 degrees or higher that lasts for a few days. INCISION CARE       1.  Leave the dressing over your site until your follow up visit. 2.  You may not shower until after follow up visit. 3.  For comfort, wear loose fitting clothing.   4.  Ice pack to affected shoulder for first 24 hours, wear your sling for 2 days. 5.  Report any signs of infection, fever, pain, swelling, redness, oozing, or heat at site especially if these symptoms increase after the first 3 to 4 days. ACTIVITY PRECAUTIONS     1. Avoid rough contact with the implant site. 2. No driving for 14 days. 3. Avoid lifting your arm over your head, carrying anything on the affected side, or lifting over 10 pounds for 30 days. For the first 2 days only bend your arm at the elbow. 4. Any extreme activity such as golf, weight lifting or exercise biking should be restricted for 60 days. 5. Do not carry objects by holding them against your implant site. 6.  No shooting rifles or any type of gun with the affected shoulder permanently. 7.  Welding and chainsaws are prohibited. SPECIAL PRECAUTIONS     1. You should avoid all strong magnetic fields, such as arc welding, large transformers, large motors. Some ICD devices will beep if it detects a strong magnet. If this occurs, move out of the area. 2.  You may or may not have an MRI which uses a strong magnet to take pictures. 3.  Treatments or surgery that requires diathermy or electrocautery should be discussed with your doctor before scheduled. 4.  Avoid radio frequency transmitters, including radar. 5.  Advise dentist or other medical personnel you see that you have an ICD. 6.  Cell phones and microwave oven use is okay. 7.  If you plan to move or take a trip to a new area, the doctor's office will give you a name of a doctor to contact for any problems. SPECIAL INSTRUCTIONS ON SHOCKS     1. Notify your doctor for any of the following:       A. Anytime a shock is received in a 24 hour period. An office visit is not usually required for a single shock. B.  Two or more shocks in a row. If you do not feel well, call the [facility], otherwise call your doctor. This may require an office visit.        C. Two or more shocks spaced apart by several hours. This may require an office visit. 2.  Keep a record of events. Include date, time, symptoms and activity at that time. ANTIBIOTIC THERAPY    During the first 8 weeks after your ICD insertion, you may need antibiotics before any dental work or certain tests or operations. Let the dentist or doctor who is caring for you know that you have had an implanted device.       Diet: Renal Diet

## 2019-04-12 NOTE — PROGRESS NOTES
Brigette Paiz RN from [facility] [place] requested update on discharge planning. CM informed her we are presently attempting discharge to SNF. Will keep [facility] updated. 1120am 
CM spoke with Feliciano Huerta at [facility]. They can accept this afternoon. Feliciano Huerta has asked that several days ostomy supplies be provided due to weekend. Attending informed. Patient informed of discharge plan. Lluvia Luz RN informed of discharge for today. Reviewed transport with nursing. Recommendation is Medical transport at this time. CM confirmed with Feliciano Huerta at [facility] that they will set up transportation for dialysis. Reviewed chairtime TTS at 6am. 
Patient IV abx will be given at time of dialysis. CM offered to inform patient's family of discharge plan. Patient refused, stating they were out of town. Elkin Harris RN CM Ext [phone]

## 2019-04-12 NOTE — PROGRESS NOTES
Nutrition Assessment: 
 
INTERVENTIONS/RECOMMENDATIONS:  
· Continue current diet · Continue ensure clear · Encourage PO intake ASSESSMENT:  
Chart reviewed. Pt reports fair appetite and consuming ~50% of meals. This is confirmed by flowsheet documentation. She enjoys ensure clear. Diet Order: Renal 
% Eaten:   
Patient Vitals for the past 72 hrs: 
 % Diet Eaten 04/10/19 1900 50 % 04/09/19 1900 50 % Pertinent Medications: [x]Reviewed: tums, lactoabac, Pertinent Labs: [x]Reviewed:  
Food Allergies: [x]NKFA  []Other Last BM:  4/11 Edema:  N/a  []RUE   []LUE   []RLE   []LLE Pressure Ulcer: n/a  [] Stage I   [] Stage II   [] Stage III   [] Stage IV Anthropometrics: Height: 5' 4\" (162.6 cm) Weight: 69.2 kg (152 lb 8.9 oz) IBW (%IBW):   ( ) UBW (%UBW):   (  %) BMI: Body mass index is 26.19 kg/m². This BMI is indicative of: 
[]Underweight   []Normal   [x]Overweight   [] Obesity   [] Extreme Obesity (BMI>40) Last Weight Metrics: 
Weight Loss Metrics 4/12/2019 4/3/2019 2/12/2019 2/7/2019 1/30/2019 10/31/2018 9/28/2018 Today's Wt 152 lb 8.9 oz - 143 lb 4.8 oz 143 lb 4.8 oz 144 lb 6.4 oz 136 lb 6.4 oz 138 lb 14.2 oz  
BMI - 26.19 kg/m2 26.21 kg/m2 26.21 kg/m2 24.79 kg/m2 23.41 kg/m2 23.84 kg/m2 Estimated Nutrition Needs (Based on): 1425 Kcals/day(BMR: 1200 x 1.2) , 95 g(1.3 g/kg) Protein Carbohydrate: At Least 130 g/day  Fluids: 1425 mL/day or per primary team 
 
NUTRITION DIAGNOSES:  
Problem:  Increased nutrient needs(protein) Etiology: related to protein losses from HD Signs/Symptoms: as evidenced by ESRD-HD Previous Nutrition Dx:  [] Resolved  [] Unresolved           [x] Progressing NUTRITION INTERVENTIONS: 
Meals/Snacks: General/healthful diet GOAL:  
consume >50% of meals in 3-5 days NUTRITION MONITORING AND EVALUATION Food/Nutrient Intake Outcomes: Total energy intake Physical Signs/Symptoms Outcomes: Weight/weight change, Electrolyte and renal profile, Glucose profile, GI 
 
Previous Goal Met: 
 [] Met              [x] Progressing Towards Goal              [] Not Progressing Towards Goal  
Previous Recommendations: 
 [x] Implemented          [] Not Implemented          [] Not Applicable LEARNING NEEDS (Diet, Food/Nutrient-Drug Interaction):  
 [x] None Identified 
 [] Identified and Education Provided/Documented 
 [] Identified and Pt declined/was not appropriate Cultural, Sikhism, OR Ethnic Dietary Needs:  
 [x] None Identified 
 [] Identified and Addressed 
 
 [x] Interdisciplinary Care Plan Reviewed/Documented  
 [x] Discharge Planning: Renal friendly diet 
 [] Participated in Interdisciplinary Rounds NUTRITION RISK:  
 [] High              [] Moderate           [x]  Low  []  Minimal/Uncompromised Angélica Anderson, RDN Pager [phone] Weekend Pager [phone]

## 2019-04-12 NOTE — DISCHARGE SUMMARY
Hospitalist Discharge Summary Patient ID: 
Beatrice Smitha [id number] 
30 y.o. 
1940 PCP on record: José Miguel Childers MD 
 
Admit date: 4/3/2019 Discharge date and time: 4/12/2019 DISCHARGE DIAGNOSIS: 
ICD site infection Lactic acidosis Possible pathologic fractures/ tumor Ischemic Cardiomyopathy NYHA class II EF 10% CAD / HTN / Atrial fibrillation ESRD  
 
CONSULTATIONS: 
IP CONSULT TO CARDIOLOGY 
IP CONSULT TO INFECTIOUS DISEASES 
IP CONSULT TO NEPHROLOGY 
IP CONSULT TO ORTHOPEDIC SURGERY Excerpted HPI from H&P of Goyo Lovelace MD: 
Charlie Ramírez is a 78 y.o.  female who presents with above complaint. Pt with h/o ischemic cardiomyopathy with ICD placed 09/2018 at [facility]. Procedure was complicated by ICD pocket hematoma and infection for which she was admitted to the hospital in 10/2018. At her follow up on 10/31/2018, her incision had healed well and her follow ups were to continue with Dr. Simone Hughes. She was seen for device check in clinic and reported drainage at that time. She was offered appt and declined. Dr. Simone Hughes office followed up a few weeks later, and scheduled an office visit which she then cancelled. She called today to Dr Simone Hughes office with reports of continued drainage. At the appointment she was found with an opening to the medial portion of the incision, through which the ICD generator is visible. Pt denies fever or chills at home. She reports the drainage mainly occurs when she is lying down. She c/o pain at ICD site especially with palpation. Off note: pt started with L knee/leg pain ~ 4-5 weeks ago. She was seen by PCP. She had CT done on 3/8 with findings of possible fractures and tumor. She already saw ortho OP and was refer to [facility] for biopsy.  
  
  
We were asked to admit for work up and evaluation of the above problems. ______________________________________________________________________ DISCHARGE SUMMARY/HOSPITAL COURSE:  for full details see H&P, daily progress notes, labs, consult notes. ICD site infection & Hematoma Lactic acidosis, resolved No sepsis/SIRs criteria; + leukocytosis ICD removed on 4/4/19 S/p ICD placement, subclavian L on 4/5/19 C/w broad spectrum AB: Vanco + zosyn, now switched to IV cefazolin to complete coarse ending 4/18 as per ID recommendation Blood cx NGTD Wound cx negative ECHO showed EF 21 - 25%. Visually measured ejection fraction. Left ventricular cavity size is mildly dilated. Left ventricular global hypokinesis. Left ventricular diastolic dysfunction. Appreciate ID input/help Cardiology signed off on 4/5/19 and recommended op followup in 2 weeks. Cardiology recommended to hold anticoagulation 1 week post discharge as per discussion with Dr Dimitrios William, will ask to switch dose of HD pt 2.5mg BID afterwards 
  
Possible pathologic fractures/ tumor L knee pain started ~ 4-5 weeks ago Seen by ortho OP at [facility] and refer [facility] for biopsy --> Ortho consulted. ? If connected to above infection. PT/OT  
CT 3/8/2019  
2.4 x 2.1 x 2.7 cm lytic lesion at medial and posteromedial proximal 
tibial epiphysis and metaphysis with pathologic fracture. Additional findings as 
above consistent with renal osteodystrophy. Diagnostic considerations for the 
lesion include brown tumor, metastatic disease and bone, and less likely primary 
bone neoplasm. Brown tumors favored Orthopedic Recommended pt referral to either Dr Yepez/Dr Chisholm at [address] Pain management 
  
Ischemic Cardiomyopathy NYHA class II EF 10% CAD / HTN / Atrial fibrillation No signs of fluid overload; BP at 110s; HR stable; cxray clear Continue BB and imdur at lower dose Not on ACE/ARBs, will let cardiology decide as an OP Eliquis on held by cardiology for couple of weeks 
  
ESRD  
HD TThSa  
Renal consulted for HD.  Renally dose Rx as appropriate. 
  
 _______________________________________________________________________ Patient seen and examined by me on discharge day. Pertinent Findings: 
Gen:    Not in distress Chest: Clear lungs CVS:   Regular rhythm. No edema Abd:  Soft, not distended, not tender Neuro:  Alert, non focal 
_______________________________________________________________________ DISCHARGE MEDICATIONS:  
Current Discharge Medication List  
  
START taking these medications Details L. acidoph & paracasei- S therm- Bifido (MICHAEL-Q/RISAQUAD) 8 billion cell cap cap Take 1 Cap by mouth daily. Qty: 30 Cap, Refills: 0  
  
melatonin 3 mg tablet Take 1 Tab by mouth nightly. Qty: 30 Tab, Refills: 0  
  
ceFAZolin, ADDaptor 1 Device 2gm,2gm,3gm IV with HD on TTS Qty: 3 Dose, Refills: 0 CONTINUE these medications which have CHANGED Details  
apixaban (ELIQUIS) 2.5 mg tablet We are holding it for 1 week then can be resume in 1 week at 2.5mg PO BID on 4/19/2019 Qty: 60 Tab, Refills: 0  
  
carvedilol (COREG) 6.25 mg tablet Take 1 Tab by mouth two (2) times daily (with meals). Qty: 60 Tab, Refills: 0  
  
isosorbide mononitrate ER (IMDUR) 30 mg tablet Take 1 Tab by mouth daily. Half-tab Qty: 30 Tab, Refills: 0  
  
acetaminophen (TYLENOL EXTRA STRENGTH) 500 mg tablet Take 3 Tabs by mouth every six (6) hours as needed for Pain. Qty: 30 Tab, Refills: 0 CONTINUE these medications which have NOT CHANGED Details  
b complex-vitamin c-folic acid (DIALYVITE) 100-1 mg tab tablet Take 1 Tab by mouth daily. gabapentin (NEURONTIN) 100 mg capsule Take 1 Cap by mouth two (2) times a day. Refills: 3  
  
venlafaxine (EFFEXOR) 37.5 mg tablet Take 37.5 mg by mouth daily. vitamin E (AQUA GEMS) 400 unit capsule Take 400 Units by mouth daily. calcium carbonate (TUMS) 200 mg calcium (500 mg) chew Take 3 Tabs by mouth two (2) times daily (with meals). STOP taking these medications zolpidem (AMBIEN) 5 mg tablet Comments:  
Reason for Stopping: My Recommended Diet, Activity, Wound Care, and follow-up labs are listed in the patient's Discharge Insturctions which I have personally completed and reviewed. ______________________________________________________________________ Risk of deterioration: moderate Condition at Discharge:  Stable 
______________________________________________________________________ Disposition SNF 
______________________________________________________________________ Care Plan discussed with:  
Patient, RN, Care Manager, Consultant 
 
______________________________________________________________________ Code Status: Full 
______________________________________________________________________ Follow-up Information Follow up With Specialties Details Why Contact Info [facility] [address] [address] [address] [phone] William Landaverde MD Family Practice  PCP - follow up after you are discharged from [facility] [address] [facility] 
[phone] Kiki Law MD Cardiology Schedule an appointment as soon as possible for a visit Cardiology - ICD placed 4/5/2019. Follow up  [address] [phone] Goldie Alex   Dialysis - TTS at 6:00am [address] [place] [address] 
[phone] Juan Manuel Perez MD Nephrology  Nephrology - follow up at [address] [place] [address] [facility] [address] [phone] Orthopedic Recommended pt referral to either Dr Yepez/Dr Chisholm at [address] Total time in minutes spent coordinating this discharge (includes going over instructions, follow-up, prescriptions, and preparing report for sign off to her PCP) : 35 minutes Signed: Rasheeda Gibson MD

## 2019-04-12 NOTE — PROGRESS NOTES
RADHA 
 
Patient is to discharged to SNF. [facility] has accepted. Room ass - [address] [place] [address] RN to call report to:  [phone] [facility] to provide medical transport. S Transport time confirmed 1630. PCS, H&P and Facesheet on chart. Lucretia Discharge Summary faxed to: 
[facility]  [phone] [facility] [phone] Radu Bardales, TUCKERCM Ext [phone]

## 2019-04-12 NOTE — PROGRESS NOTES
1930 - Bedside report from [address] [place] [address] Bebo. R upper eye pain, squinting, unknown origin. tylenol po. NSR, BBB, occ PVC PAC's. No other complaints. L upper chest w firm swelling at device area. L Lateral wound dsg and L sub breast dressing CDI. 2330 - Tramadol PO for R upper eye pain. Pt has been sleeping. Warm compresses to eye. 
 
0700 - Bedside report to RN. NSR, BBB, Occ pvc's and pac's.

## 2019-04-18 NOTE — PROGRESS NOTES
Community Care Team documentation for patient in [facility] Initial Follow Up Patient was discharged to [facility]. Information included in this progress note has been provided to SNF. Hospital Admission and Diagnosis: MRM 4/3-4/12 ICD site infection   RRAT Score: 28    Advance Care Planning: Not on file PCP : Lacy Holt MD 
Nurse Navigator in PCP office: Lilo Reyes Note routed to Nurse Navigator team. 
 
Spoke with SNF team. Ensured patient arrived to SNF safely with admission packet in order. Provided needed hospital discharge follow up appointments:   Destiny García MD Cardiology - ICD placed 4/5/2019 need Follow-up with ICD/PM clinic in 1 week, Call [phone] to make an appointment (can resume anticoagulation after 1 week f/u with Dr. uSly Mathews per Dr Che Rao); Kelly Fajardo MD Nephrology - follow up at [address] [place] [address]; Orthopedic Recommended pt referral to either Dr Yepez/Dr Apolonia Ledezma at [facility] or [facility] ortho. PT/OT providing skilled therapy in SNF. Currently contact guard assist with transfers. Ambulating 20 ft with rolling walker and contact guard assist. Setup with self feeding. Contact guard assist with lower body bathing and dressing. Patient refusing to wear knee brace. SNF contacting ortho to discuss. IV antibiotics were scheduled through 4/18 for administration with hemodialysis. Per inpatient CM notes, prior to admission patient lives with son and other adult family members in her home. Previously used [employer]. Care aide from [address] - Friday. Disposition/LOS TBD. Community Care Team will follow up weekly with [facility] until discharge. Medications were not reconciled and general patient assessment was not completed during this [facility] outreach. Darcus Leventhal May, MSN, RN, ACNS-BC, [facility] Nurse Navigator, [address] [facility] [phone]

## 2019-04-23 NOTE — ED PROVIDER NOTES
HPI  
 
  66y F with hx of pacemaker (just replaced in the past 2 weeks due to infection), ESRD on HD here with bleeding from the pacemaker site. No injury or trauma. Hadn't noticed any problems yesterday. Went to HD today and noted to have bleeding from the site. No chest pain. No fever. No trouble breathing. Did not have dialysis today and instead sent to the ED. Past Medical History:  
Diagnosis Date  Arthritis  Chronic kidney disease Dr Elva Meade  ESRD on hemodialysis ([facility] HD Tu,Thu,Sat   
 History of creation of ostomy ([facility] [address] [address] Left abdomen  Hypertension  Ill-defined condition Dialysis Rissa Lopez Obese 9/14/2017 Past Surgical History:  
Procedure Laterality Date  HX COLOSTOMY    
 left abdomen  HX PACEMAKER  09/13/2018  
 placed in left abdomen.  HX VASCULAR ACCESS    
 left upper arm fistula  MI INSJ/RPLCMT PERM DFB W/TRNSVNS LDS 1/DUAL CHMBR N/A 4/5/2019 INSERT ICD SINGLE performed by Inderjit Horton MD at [address] CARDIAC CATH LAB  [place] RMVL OF SUBQ IMPLANTABLE DEFIBRILLATOR ELECTRODE N/A 4/4/2019 REMOVE SUBQ ICD performed by Inderjit Horton MD at [address] CARDIAC CATH LAB Family History:  
Problem Relation Age of Onset  Hypertension Other   
     multiple family members Social History Socioeconomic History  Marital status:  Spouse name: Not on file  Number of children: Not on file  Years of education: Not on file  Highest education level: Not on file Occupational History  Not on file Social Needs  Financial resource strain: Not on file  Food insecurity:  
  Worry: Not on file Inability: Not on file  Transportation needs:  
  Medical: Not on file Non-medical: Not on file Tobacco Use  Smoking status: Current Every Day Smoker Packs/day: 0.50  Smokeless tobacco: Never Used Substance and Sexual Activity  Alcohol use: No  
 Drug use:  No  
  Sexual activity: Not on file Lifestyle  Physical activity:  
  Days per week: Not on file Minutes per session: Not on file  Stress: Not on file Relationships  Social connections:  
  Talks on phone: Not on file Gets together: Not on file Attends Jain service: Not on file Active member of club or organization: Not on file Attends meetings of clubs or organizations: Not on file Relationship status: Not on file  Intimate partner violence:  
  Fear of current or ex partner: Not on file Emotionally abused: Not on file Physically abused: Not on file Forced sexual activity: Not on file Other Topics Concern  Not on file Social History Narrative  Not on file ALLERGIES: Iron Review of Systems Review of Systems Constitutional: (-) weight loss. HEENT: (-) stiff neck Eyes: (-) discharge. Respiratory: (-) cough. Cardiovascular: (-) syncope. Gastrointestinal: (-) blood in stool. Genitourinary: (-) hematuria. Musculoskeletal: (-) myalgias. Neurological: (-) seizure. Skin: (-) petechiae Lymph/Immunologic: (-) enlarged lymph nodes All other systems reviewed and are negative. Vitals:  
 04/23/19 1300 BP: 128/63 Pulse: 86 Resp: 17 Temp: 97.8 °F (36.6 °C) SpO2: 96% Physical Exam Nursing note and vitals reviewed. Constitutional: oriented to person, place, and time. appears well-developed and well-nourished. No distress. Head: Normocephalic and atraumatic. Sclera anicteric Nose: No rhinorrhea Mouth/Throat: Oropharynx is clear and moist. Pharynx normal 
Eyes: Conjunctivae are normal. Pupils are equal, round, and reactive to light. Right eye exhibits no discharge. Left eye exhibits no discharge. Neck: Painless normal range of motion. Neck supple. No LAD. Cardiovascular: Normal rate, regular rhythm, normal heart sounds and intact distal pulses. Exam reveals no gallop and no friction rub.   No murmur heard. Pulmonary/Chest:  No respiratory distress. No wheezes. No rales. No rhonchi. No increased work of breathing. No accessory muscle use. Good air exchange throughout. Blood noted around pacemaker site. Entire area is surrounding by what feels like fluid under the skin. No obvious erythema or warmth. Abdominal: soft, non-tender, no rebound or guarding. No hepatosplenomegaly. Normal bowel sounds throughout. Back: no tenderness to palpation, no deformities, no CVA tenderness Extremities/Musculoskeletal: Normal range of motion. no tenderness. No edema. Distal extremities are neurovasc intact. Lymphadenopathy:   No adenopathy. Neurological:  Alert and oriented to person, place, and time. Coordination normal. CN 2-12 intact. Motor and sensory function intact. Skin: Skin is warm and dry. No rash noted. No pallor. MDM 66y F here with bleeding from her pacemaker site. Will check labs, CXR, and d/w cards. Procedures 2:33 PM 
Seen by Dr. Ana Jiménez - site dressed and repaired. Wants to send her home on Keflex 500mg BID. Labs look ok. Spoke with Dr. Sky Muir (her nephrologist) who will arrange for her to get HD tomorrow. Pt agrees with the plan. Has appt for Fri with Dr. Ana Jiménez.

## 2019-04-23 NOTE — PROGRESS NOTES
Care Management - Received consult to arrange HD for tomorrow since patient missed her dialysis today. Date of previous inpatient admission/ ED visit? ICD infection What brought the patient back to ED? HD sent patient to ED as her site where her pacemaker was inserted was bleeding. Did patient decline recommended services during last admission/ ED visit (if yes, what)? PT and OT recommended SNF. Patient preferred to return home with Has patient seen a provider since their last inpatient admission/ED visit (if yes, when)? Yes. Patient is in a SNF. CM Interventions: 
From previous inpatient admission/ED visit:SNF From current inpatient admission/ED visit: SNF with extra HD tomorrow. Dialysis Center: [facility] (phone [phone]; fax [phone]) Tu, Th, Sat at 6:00 AM 
DME: wheelchair ADLs: Needs assist with bathing, dressing, meal prep Pharmacy: [facility] Medicaid Personal Care: [address] [place] [address] ([phone]); Monday to Friday, 5 hours a day Previous HH: [facility] Previous SNF/rehab: [facility] Insurance: Medicare, [facility] Contacts:  
Sister: Sarah Azevedo ([phone]) Sister: Mallorie Casillas ([phone]) Spoke with Amy Gale at [facility] ([phone]). They do not have an opening at their center, but there is an opening at their sister facility. Patient will be going to [facility] at [address] [place] [address]. Phone is [phone]. Chair time is 12:30 PM. Patient should arrive around 12:00 PM. She will keep her regular scheduled dialysis appointment for Thursday 4-25-19. Met with patient in the room. She confirmed her address, phone number, emergency contacts, and all of the above information. Her son Monik Overton lives with her. Her sisters live on the same property. Updated her on dialysis place and time for tomorrow. Patient in agreement.  Patient normally goes to and from dialysis in a wheelchair van. She does not have her wheelchair with her. Medicaid does not provide wheelchair for patients/  
 
Set up ambulance transport with [facility] at 3:31 PM for patient to return to . ETA is within the hour. Called patient's [facility] transportation [facility] [employer] ([phone]) and spoke with Knute Schaumann. Set up wheelchair Darrin Farm to pick her up at [facility] and take her to [facility] dialysis to arrive by 12:00 PM on 4-24-19. Confirmation number is [id number]. The wheelchair Darrin Farm will be through Yahir Garcia at [phone]. Patient's ED nurse will need to call report to [facility] at [phone]. KERLINE Trotter  
 
Called nursing home to update them on transportation for tomorrow. Spoke with Sara Fitzpatrick LPN. She said Ashli Vee had already set the transportation up through her Medicaid with [facility] with the reference number [id number]. Called [employer] back and spoke with Mary Tejeda. She canceled the trip this  arranged. She confirmed that the trip number [id number] is still in place to pick the patient up at [facility] at 8:40 AM for her 9:40 AM cardiology appointment with Dr. Baldo Armendariz at [address] [place] [address], [address]. Then they will take her to dialysis at [facility] and then return her to [facility]. Octavio Vela RN at [facility].   
 
KERLINE Trotter

## 2019-04-23 NOTE — ED NOTES
Patient discharged home. Educated patient on discharge instructions. Provided her with written copies of discharge instructions and prescriptions. Opportunity for questions to be answered. Discharged via EMS

## 2019-04-23 NOTE — DISCHARGE INSTRUCTIONS
Patient Education     HOLD YOUR BLOOD THINNERS UNTIL SEEN BY DR. GARDNER ON FRIDAY     Heart and Pacemaker: Anatomy Sketch    Current as of: July 22, 2018  Content Version: 11.9  © 3078-1327 [employer]. Care instructions adapted under license by [employer] (which disclaims liability or warranty for this information). If you have questions about a medical condition or this instruction, always ask your healthcare professional. [place] [address] any warranty or liability for your use of this information.

## 2019-04-23 NOTE — CONSULTS
Brief Note: 
 
Site redressed. If labs ok, follow up as OP Friday at [facility].  clinic. DC eliquis. Keflex 500 mg bid x 5 days Caleen Meigs, MD

## 2019-04-24 NOTE — PROGRESS NOTES
SSED/CM consult received and appreciated. Assessment and Interventions by [facility].

## 2019-04-24 NOTE — PROGRESS NOTES
Patient presents for wound check post-device implantation. Pressure dressing was applied yesterday during ER visit, it remains intact. She continues to hold Eliquis and is taking antibiotic as prescribed. Denies pain, fevers, discharge. Plan:    Follow up in 2 days for dressing removal and wound inspection.        Devan Tobar NP

## 2019-04-24 NOTE — PROGRESS NOTES
Room # 1    [facility] [place] ER yesterday for bleeding from ICD site    Taking antibiotic as prescribed     Dialysis today @ 12 noon

## 2019-04-25 NOTE — PROGRESS NOTES
Community Care Team Documentation for Patient in [facility] Subsequent Follow up Patient remains at [facility] ([facility]). See previous [facility] Team notes. PCP : Raymond Duran MD 
Nurse Navigator in PCP office: Odalis Morales Spoke with SNF team.  Patient attended cardiology follow up with Dr. Oralia Ho for ICD placed on 4/5, and is to return in 2 days. Patient has appointment with donny Arroyo. PT/OT continue. Currently contact guard assist with transfers. Stand by assist with meal prep at wheelchair level. Ambulating 50 ft with rolling walker and contact guard assist. Setup to supervision with ADLs. Barrier - pain 8-9/10 to left knee. Patient has been asking to go home. Possible discharge 5/6. Plan for dishcarge to home with son and other adult family members with caregivers from [address] [place] [address] - Fri from 7 AM to 2:30 PM. Has a lot of family support. Previously used [employer]. PCP follow up 5/7 at 10:30 AM. Medications were not reconciled and general patient assessment was not completed during this skilled nursing facility outreach. Maria Esther Paul, MSN, RN, ACNS-BC, [facility] Nurse Navigator, [address] [place] [address] [phone]

## 2019-04-26 NOTE — PROGRESS NOTES
Room # 1    Check incision left upper chest (s/p 4/5/19: Amina Martines 80 implanted left subclavian per Dr. Sienna Jaeger)     Check site left side (s/p removal of subQ ICD generator and lead 4/4/19)

## 2019-04-29 NOTE — PROGRESS NOTES
Patient presents for wound check after being seen in ER recently where a zipline was placed with pressure bandage. The site is healing well though still with hematoma that is stable in size. A new zipline was placed with an aquacell overlying. Plan:    Continue follow up in device clinic as planned.        Tariq Kelly MD

## 2019-05-02 NOTE — PROGRESS NOTES
Community Care Team Documentation for Patient in [facility] Subsequent Follow up Patient remains at [facility] ([facility]). See previous [facility] Team notes. PCP : Moe Daly MD 
Nurse Navigator in PCP office: Mackenzie Block Spoke with SNF team.  PT/OT continue. Currently doing well. Plan for discharge 5/6 to home with son and other adult family members with caregivers and home health through [address]. Caregivers Mon - Fri from 7 AM to 2:30 PM. Has a lot of family support. PCP follow up 5/7 at 10:30 AM. Medications were not reconciled and general patient assessment was not completed during this skilled nursing facility outreach. Roxy Paul, MSN, RN, ACNS-BC, [facility] Nurse Navigator, [address] [place] [address] [phone]

## 2019-05-09 NOTE — PROGRESS NOTES
Community Care Team Documentation for Patient in [facility] Discharge Note Patient has been discharged from [id number] [facility] ([facility]). See previous [facility] Team notes. PCP : Joseph Castaneda MD 
Nurse Navigator in PCP office: Jazlyn Polanco Note routed to Nurse Navigator team. 
 
Spoke with SNF team.  Confirmed patient discharged 5/6 to home with son and other adult family members with caregivers and home health through [address]. Caregivers Mon - Fri from 7 AM to 2:30 PM. Has a lot of family support. Community Care Team will sign off at this time. Medications were not reconciled and general patient assessment was not completed during this skilled nursing facility outreach.

## 2019-05-09 NOTE — Clinical Note
Confirmed patient discharged 5/6 to home with son and other adult family members with caregivers and home health through [address]. Caregivers Mon - Fri from 7 AM to 2:30 PM. Has a lot of family support.

## 2019-05-13 NOTE — TELEPHONE ENCOUNTER
Received call from Liane Evangelista, patient's home health nurse. Patient ID verified using two patient identifiers. Liane Evangelista states she changed dressing at patient's ICD implant site due to dressing being saturated with bloody drainage, notes a pin hole size open area at incision. Patient has no fever or chills. She is scheduled to see Dr. Elvie Montalvo on Wednesday 5/15/19 @ 10:40 am , patient wants to reschedule due to another appointment that am. Liane Evangelista and I advised patient that she needs to keep her appointment with Dr. Elvie Montalvo due to problems with incision. Appointment rescheduled to 3 pm on Wednesday and she will call to  reschedule if unable to make it. I will forward this information to Dr. Elvie Montalvo for nay further recommendations.

## 2019-05-15 NOTE — PROGRESS NOTES
HISTORY OF PRESENTING ILLNESS Michael Rosenberg is a 78 y.o. female with history of subcutaneous ICD infection status post removal followed by single-chamber ICD implantation with slow wound healing/hematoma. She presents for follow-up of her hematoma/ICD. Her hematoma has dramatically improved however remains present. There remains a slow healing area along the middle of her incision. The area is not erythematous. Steri-Strips were placed on the site. She denies fevers or discharge. ACTIVE PROBLEM LIST Patient Active Problem List  
 Diagnosis Date Noted  Heart failure ([address] 09/14/2018 Priority: 1 - One  
 ICD (implantable cardioverter-defibrillator) infection ([address] 04/03/2019  ESRD on hemodialysis ([address]  Arthritis  Hypertension  History of creation of ostomy ([address]  Severe left ventricular systolic dysfunction 62/65/0803  Atrial fibrillation with rapid ventricular response ([address] 07/21/2018  Pulmonary edema 07/21/2018  Leukocytosis 09/14/2017  Obese 09/14/2017 PAST MEDICAL HISTORY Past Medical History:  
Diagnosis Date  Arthritis  Chronic kidney disease Dr Jazzmine Moise  ESRD on hemodialysis ([address] HD Tu,Thu,Sat   
 History of creation of ostomy ([address] Left abdomen  Hypertension  Ill-defined condition Dialysis [facility] Obese 9/14/2017 PAST SURGICAL HISTORY Past Surgical History:  
Procedure Laterality Date  HX COLOSTOMY    
 left abdomen  HX PACEMAKER  09/13/2018  
 placed in left abdomen.  HX VASCULAR ACCESS    
 left upper arm fistula  CT INSJ/RPLCMT PERM DFB W/TRNSVNS LDS 1/DUAL CHMBR N/A 4/5/2019 INSERT ICD SINGLE performed by Didi Sandoval MD at [facility]  CT RMVL OF SUBQ IMPLANTABLE DEFIBRILLATOR ELECTRODE N/A 4/4/2019 REMOVE SUBQ ICD performed by Didi Sandoval MD at [facility] ALLERGIES Allergies Allergen Reactions  Iron Anaphylaxis Per pt, to PO formulation only, can tolerate IV formulation Allergy to excipient? FAMILY HISTORY Family History Problem Relation Age of Onset  Hypertension Other   
     multiple family members  
 negative for cardiac disease SOCIAL HISTORY Social History Socioeconomic History  Marital status:  Spouse name: Not on file  Number of children: Not on file  Years of education: Not on file  Highest education level: Not on file Tobacco Use  Smoking status: Current Every Day Smoker Packs/day: 0.50  Smokeless tobacco: Never Used Substance and Sexual Activity  Alcohol use: No  
 Drug use: No  
 
 
 
MEDICATIONS Current Outpatient Medications Medication Sig  
 apixaban (ELIQUIS) 2.5 mg tablet We are holding it for 1 week then can be resume in 1 week at 2.5mg PO BID on 4/19/2019  carvedilol (COREG) 6.25 mg tablet Take 1 Tab by mouth two (2) times daily (with meals).  isosorbide mononitrate ER (IMDUR) 30 mg tablet Take 1 Tab by mouth daily. Half-tab  
 L. acidoph & paracasei- S therm- Bifido (MCIHAEL-Q/RISAQUAD) 8 billion cell cap cap Take 1 Cap by mouth daily.  melatonin 3 mg tablet Take 1 Tab by mouth nightly.  acetaminophen (TYLENOL EXTRA STRENGTH) 500 mg tablet Take 3 Tabs by mouth every six (6) hours as needed for Pain.  b complex-vitamin c-folic acid (DIALYVITE) 100-1 mg tab tablet Take 1 Tab by mouth daily.  gabapentin (NEURONTIN) 100 mg capsule Take 1 Cap by mouth two (2) times a day.  venlafaxine (EFFEXOR) 37.5 mg tablet Take 37.5 mg by mouth daily.  vitamin E (AQUA GEMS) 400 unit capsule Take 400 Units by mouth daily.  calcium carbonate (TUMS) 200 mg calcium (500 mg) chew Take 3 Tabs by mouth two (2) times daily (with meals). No current facility-administered medications for this visit.    
 
 
I have reviewed the nurses notes, vitals, problem list, allergy list, medical history, family, social history and medications. REVIEW OF SYMPTOMS General: Pt denies excessive weight gain or loss. Pt is able to conduct ADL's HEENT: Denies blurred vision, headaches, hearing loss, epistaxis and difficulty swallowing. Respiratory: Denies cough, congestion, shortness of breath, FRAUSTO, wheezing or stridor. Cardiovascular: Denies precordial pain, palpitations, edema or PND Gastrointestinal: Denies poor appetite, indigestion, abdominal pain or blood in stool Genitourinary: Denies hematuria, dysuria, increased urinary frequency Musculoskeletal: Denies joint pain or swelling from muscles or joints Neurologic: Denies tremor, paresthesias, headache, or sensory motor disturbance Psychiatric: Denies confusion, insomnia, depression Integumentray: Denies rash, itching or ulcers. Hematologic: Denies easy bruising, bleeding PHYSICAL EXAMINATION There were no vitals filed for this visit. General: Well developed, in no acute distress. HEENT: No jaundice, oral mucosa moist, no oral ulcers Neck: Supple, no stiffness, no lymphadenopathy, supple Heart:  Normal S1/S2 negative S3 or S4. Regular, no murmur, gallop or rub, no jugular venous distention Respiratory: Clear bilaterally x 4, no wheezing or rales Abdomen:   Soft, non-tender, bowel sounds are active.  
Extremities:  No edema, normal cap refill, no cyanosis. Musculoskeletal: No clubbing, no deformities Neuro: A&Ox3, speech clear, gait stable, cooperative, no focal neurologic deficits Skin: Skin color is normal. No rashes or lesions. Non diaphoretic, moist. 
Vascular: 2+ pulses symmetric in all extremities DIAGNOSTIC DATA EKG:  
 
 
 LABORATORY DATA Lab Results Component Value Date/Time WBC 7.8 04/23/2019 01:29 PM  
 HGB 9.6 (L) 04/23/2019 01:29 PM  
 HCT 32.0 (L) 04/23/2019 01:29 PM  
 PLATELET 664 (L) 97/34/9454 01:29 PM  
 .3 (H) 04/23/2019 01:29 PM  
  
Lab Results Component Value Date/Time Sodium 136 04/23/2019 01:29 PM  
 Potassium 4.1 04/23/2019 01:29 PM  
 Chloride 103 04/23/2019 01:29 PM  
 CO2 25 04/23/2019 01:29 PM  
 Anion gap 8 04/23/2019 01:29 PM  
 Glucose 97 04/23/2019 01:29 PM  
 BUN 36 (H) 04/23/2019 01:29 PM  
 Creatinine 7.47 (H) 04/23/2019 01:29 PM  
 BUN/Creatinine ratio 5 (L) 04/23/2019 01:29 PM  
 GFR est AA 6 (L) 04/23/2019 01:29 PM  
 GFR est non-AA 5 (L) 04/23/2019 01:29 PM  
 Calcium 8.6 04/23/2019 01:29 PM  
 Bilirubin, total 0.7 04/23/2019 01:29 PM  
 AST (SGOT) 30 04/23/2019 01:29 PM  
 Alk. phosphatase 169 (H) 04/23/2019 01:29 PM  
 Protein, total 8.2 04/23/2019 01:29 PM  
 Albumin 2.8 (L) 04/23/2019 01:29 PM  
 Globulin 5.4 (H) 04/23/2019 01:29 PM  
 A-G Ratio 0.5 (L) 04/23/2019 01:29 PM  
 ALT (SGPT) <6 (L) 04/23/2019 01:29 PM  
  
 
 
 ASSESSMENT 1.  Cardiomyopathy 
                      P. Ischemic 
                      B. LV ejection fraction 10-15%                       C. NYHA class II 2.  Coronary artery disease, native 3.  End-stage renal disease on dialysis 4.  Hypertension 5.  Atrial fibrillation 
                      A. Permanent 6. ICD A. Subcutaneous ICD - infection now s/p removal 
 B. Single chamber [employer] C. Left-sided PLAN Continue monitoring for signs and symptoms of infection. Continue wound care. FOLLOW-UP  
 
1 month with Ben Avalos Thank you, Em Brothers MD for allowing me to participate in the care of this extraordinarily pleasant female. Please do not hesitate to contact me for further questions/concerns. Jerry Maradiaga MD 
Cardiac Electrophysiology / Cardiology 9 [address], [address] [facility], [address] Nas Bolanos [address] [place] [address] 
[phone] / [phone] Fax   [phone] / [phone] Fax

## 2019-05-15 NOTE — PROGRESS NOTES
Room # 3 Palpitations Visit Vitals /54 (BP 1 Location: Left arm, BP Patient Position: Sitting) Pulse 88 Resp 20 Ht 5' 4\" (1.626 m) Wt 143 lb 4.8 oz (65 kg) SpO2 99% BMI 24.60 kg/m²

## 2019-05-20 NOTE — PROGRESS NOTES
Subjective:      Bradley Thompson is a 78 y.o. female here for hospital follow up. Admited to [address] [place] [address] 4/3/19 - 4/12/19 for ICD site infection, lactic acidosis. ICD removed 4/4/19 and new ICD placed on 4/5/19. Treated with IV Cefazolin which she has completed. Transferred to [facility] for rehabilitation and discharged 5/6/19. She has HH and family support. Reports that she has been doing well since being home. Denies fever, chills, nausea, vomiting. No infection noted at site of new ICD and she has been seen by Cardiology. She requests refill of Ambien which she takes for insomnia. Current Outpatient Medications   Medication Sig Dispense Refill    isosorbide mononitrate ER (IMDUR) 60 mg CR tablet Take 0.5 Tabs by mouth daily.  zolpidem (AMBIEN) 5 mg tablet Take 1 Tab by mouth daily.  carvedilol (COREG) 12.5 mg tablet Take 1 Tab by mouth two (2) times a day.  omeprazole (PRILOSEC) 20 mg capsule Take 1 Cap by mouth two (2) times a day. 5    ELIQUIS 5 mg tablet Take 1 Tab by mouth two (2) times a day. 11    calcium acetate (PHOSLO) 667 mg cap Take 1 Cap by mouth three (3) times daily. 3    acetaminophen (TYLENOL EXTRA STRENGTH) 500 mg tablet Take 3 Tabs by mouth every six (6) hours as needed for Pain. 30 Tab 0    b complex-vitamin c-folic acid (DIALYVITE) 100-1 mg tab tablet Take 1 Tab by mouth daily.  gabapentin (NEURONTIN) 100 mg capsule Take 1 Cap by mouth two (2) times a day. 3    venlafaxine (EFFEXOR) 37.5 mg tablet Take 37.5 mg by mouth daily. Allergies   Allergen Reactions    Iron Anaphylaxis     Per pt, to PO formulation only, can tolerate IV formulation  Allergy to excipient?        Past Medical History:   Diagnosis Date    Arthritis     Chronic kidney disease     Dr Silva aZrco ESRD on hemodialysis [facility])     HD Tu,Thu,Sat     History of creation of ostomy ([address]     Left abdomen    Hypertension     Ill-defined condition     Dialysis T, Th, S    Obese 9/14/2017       Social History     Tobacco Use    Smoking status: Current Every Day Smoker     Packs/day: 0.50    Smokeless tobacco: Never Used    Tobacco comment: cigarettes   Substance Use Topics    Alcohol use: No        Review of Systems  Pertinent items are noted in HPI. Objective:     Visit Vitals  /62 (BP 1 Location: Right arm, BP Patient Position: Sitting) Comment: Man[place]   Pulse 80   Temp 97.1 °F (36.2 °C) (Oral) Comment: .   Resp 18   Ht 5' 4\" (1.626 m)   Wt 146 lb (66.2 kg)   SpO2 97%   BMI 25.06 kg/m²      General appearance - alert, well appearing, and in no distress  Chest - clear to auscultation, no wheezes, rales or rhonchi, symmetric air entry, no tachypnea, retractions or cyanosis  Heart - normal rate, regular rhythm, normal S1, S2, no murmurs, rubs, clicks or gallops    Assessment/Plan:   Jonna Anderson is a 78 y.o. female seen for:     1. Insomnia, unspecified type  - zolpidem (AMBIEN) 5 mg tablet; Take 1 Tab by mouth daily. Max Daily Amount: 5 mg. Dispense: 30 Tab; Refill: 2    2. Hospital discharge follow-up: doing well. I have discussed the diagnosis with the patient and the intended plan as seen in the above orders. The patient has received an after-visit summary and questions were answered concerning future plans. I have discussed medication side effects and warnings with the patient as well. Patient verbalizes understanding of plan of care and denies further questions or concerns at this time. Informed patient to return to the office if symptoms worsen or if new symptoms arise.

## 2019-05-20 NOTE — PROGRESS NOTES
Identified pt with two pt identifiers(name and ). Chief Complaint   Patient presents with    Medication Evaluation     Patient would like to know a cheaper alternative to Ambein and Daily-Vit        Health Maintenance Due   Topic    Shingrix Vaccine Age 50> (1 of 2)    GLAUCOMA SCREENING Q2Y     Bone Densitometry (Dexa) Screening     Pneumococcal 65+ years (1 of 2 - PCV13)    MEDICARE YEARLY EXAM        Wt Readings from Last 3 Encounters:   19 146 lb (66.2 kg)   05/15/19 143 lb 4.8 oz (65 kg)   19 152 lb 1.9 oz (69 kg)     Temp Readings from Last 3 Encounters:   19 97.1 °F (36.2 °C) (Oral)   19 97.6 °F (36.4 °C)   19 98 °F (36.7 °C)     BP Readings from Last 3 Encounters:   19 102/62   05/15/19 110/54   19 134/66     Pulse Readings from Last 3 Encounters:   19 80   05/15/19 88   19 86         Learning Assessment:  :     Learning Assessment 2019   PRIMARY LEARNER Patient   PRIMARY LANGUAGE ENGLISH   LEARNER PREFERENCE PRIMARY DEMONSTRATION   ANSWERED BY self   RELATIONSHIP SELF       Depression Screening:  :     3 most recent PHQ Screens 2019   Little interest or pleasure in doing things Not at all   Feeling down, depressed, irritable, or hopeless Not at all   Total Score PHQ 2 0       Fall Risk Assessment:  :     Fall Risk Assessment, last 12 mths 5/15/2019   Able to walk? Yes   Fall in past 12 months? No       Abuse Screening:  :     Abuse Screening Questionnaire 2019   Do you ever feel afraid of your partner? N   Are you in a relationship with someone who physically or mentally threatens you? N   Is it safe for you to go home?  Y       Coordination of Care Questionnaire:  :     1) Have you been to an emergency room, urgent care clinic since your last visit? no   Hospitalized since your last visit? no             2) Have you seen or consulted any other health care providers outside of [address] [place] [address] since your last visit? no (Include any pap smears or colon screenings in this section.)    3) Do you have an Advance Directive on file? no  Are you interested in receiving information about Advance Directives? no    Patient is accompanied by nursing attendant I have received verbal consent from [address] [place] [address] to discuss any/all medical information while they are present in the room. Reviewed record in preparation for visit and have obtained necessary documentation. Medication reconciliation up to date and corrected with patient at this time.

## 2019-05-29 NOTE — TELEPHONE ENCOUNTER
----- Message from Corin Juarez sent at 5/28/2019  5:36 PM EDT -----  Regarding: Dr. Brigette Shelton, with [facility], calling to advise he missed his scheduled visit for last week. Best contact number [phone].

## 2019-05-31 NOTE — TELEPHONE ENCOUNTER
Jazlyn Jordan NP from [facility] is calling to get a clearance from Dr. Werner Carmona for patient to hold Eliquis two days prior to a biopsy for a mass on her leg on 6/5/19.    Fax: [phone]  Phone: [phone]

## 2019-05-31 NOTE — TELEPHONE ENCOUNTER
Faxed recommendations to [phone] per AUSTIN Morales NP Ok to hold Eliquis 2 days prior to biopsy. Confirmation received.

## 2019-06-21 NOTE — TELEPHONE ENCOUNTER
Nurse from [facility] home health needs a call back because pt has had a cold for over two weeks and needs a call to see if medication can be ordered  Call Moise Ferreira at [phone]

## 2019-06-24 NOTE — PROGRESS NOTES
Subjective:  
  
Samara Brunson is a 78 y.o. female here with complaint of cough and cold. Onset was approximately 3 weeks and has been worsening. No fever, nausea, vomiting. Endorses feeling more winded both at rest and with exertion. Cough is productive of thick yellow sputum and is worse at night. She has been told that she is wheezing at dialysis treatments. No known sick contacts. Evaluation to date: none. Treatment to date: Z-mindi (about 2 weeks ago), Robitussin, cough drops, albuterol nebulizer with no improvement noted. Current 0.5 ppd smoker. Current Outpatient Medications Medication Sig Dispense Refill  isosorbide mononitrate ER (IMDUR) 60 mg CR tablet Take 0.5 Tabs by mouth daily.  carvedilol (COREG) 12.5 mg tablet Take 1 Tab by mouth two (2) times a day.  omeprazole (PRILOSEC) 20 mg capsule Take 1 Cap by mouth two (2) times a day. 5  
 ELIQUIS 5 mg tablet Take 1 Tab by mouth two (2) times a day. 11  
 calcium acetate (PHOSLO) 667 mg cap Take 1 Cap by mouth three (3) times daily. 3  
 zolpidem (AMBIEN) 5 mg tablet Take 1 Tab by mouth daily. Max Daily Amount: 5 mg. 30 Tab 2  
 acetaminophen (TYLENOL EXTRA STRENGTH) 500 mg tablet Take 3 Tabs by mouth every six (6) hours as needed for Pain. 30 Tab 0  
 b complex-vitamin c-folic acid (DIALYVITE) 100-1 mg tab tablet Take 1 Tab by mouth daily.  gabapentin (NEURONTIN) 100 mg capsule Take 1 Cap by mouth two (2) times a day. 3  
 venlafaxine (EFFEXOR) 37.5 mg tablet Take 37.5 mg by mouth daily. Allergies Allergen Reactions  Iron Anaphylaxis Per pt, to PO formulation only, can tolerate IV formulation Allergy to excipient? Past Medical History:  
Diagnosis Date  Arthritis  Chronic kidney disease Dr Moe Muller  ESRD on hemodialysis ([facility] [address] HD Tu,Thu,Sat   
 History of creation of ostomy ([facility] [address] Left abdomen  Hypertension  Ill-defined condition  Dialysis T, Th, S  
  Obese 9/14/2017 Social History Tobacco Use  Smoking status: Current Every Day Smoker Packs/day: 0.50  Smokeless tobacco: Never Used  Tobacco comment: cigarettes Substance Use Topics  Alcohol use: No  
  
 
Review of Systems Pertinent items are noted in HPI. Objective:  
 
Visit Vitals /70 (BP 1 Location: Right arm, BP Patient Position: Sitting) Comment: Manual  
Pulse 68 Temp 97.8 °F (36.6 °C) (Oral) Comment: . Resp 18 Ht 5' 4\" (1.626 m) Wt 147 lb (66.7 kg) SpO2 99% BMI 25.23 kg/m² General appearance - alert, well appearing, and in no distress Chest - expiratory wheezing and rales noted throughout but worse in RUL and RML, fair air movement, respirations unlabored. After Duo-neb treatment, air movement has improved. Scattered expiratory wheezing and rales still noted. Heart - normal rate, regular rhythm, normal S1, S2, no murmurs, rubs, clicks or gallops Assessment/Plan:  
Maribell Haas is a 78 y.o. female seen for: 1. Bronchitis with bronchospasm: non-toxic in appearance. Improvement noted after Duo-neb treatment. Will treat as below. Cefdinir renally dosed. Consider CXR if no improvement noted within 48 hours of below treatments. Order for nebulizer provided. - ALBUTEROL IPRATROP NON-COMP 
- TX PRESSURIZED/NONPRESSURIZED INHALATION TREATMENT 
- albuterol-ipratropium (DUO-NEB) 2.5 mg-0.5 mg/3 ml nebu; 3 mL by Nebulization route now for 1 dose. Dispense: 1 Nebule; Refill: 0 
- predniSONE (DELTASONE) 20 mg tablet; Take 40 mg by mouth daily (with breakfast) for 7 days. Dispense: 14 Tab; Refill: 0 
- cefdinir (OMNICEF) 300 mg capsule; Take 1 Cap by mouth every other day for 10 days. Dispense: 5 Cap; Refill: 0 
- guaiFENesin-codeine (ROBITUSSIN AC) 100-10 mg/5 mL solution; Take 2.5 mL by mouth three (3) times daily as needed for Cough for up to 7 days. Max Daily Amount: 7.5 mL.   Dispense: 118 mL; Refill: 0 
 - albuterol-ipratropium (DUO-NEB) 2.5 mg-0.5 mg/3 ml nebu; 3 mL by Nebulization route every four (4) hours as needed (1). Dispense: 30 Nebule; Refill: 1 
- Nebulizer & Compressor machine; Use every 4-6 hours as directed for cough, wheezing. Dispense: 1 Each; Refill: 0 
 
2. Medicare annual wellness visit, subsequent I have discussed the diagnosis with the patient and the intended plan as seen in the above orders. The patient has received an after-visit summary and questions were answered concerning future plans. I have discussed medication side effects and warnings with the patient as well. Patient verbalizes understanding of plan of care and denies further questions or concerns at this time. Informed patient to return to the office if symptoms worsen or if new symptoms arise. Follow-up and Dispositions · Return if symptoms worsen or fail to improve.

## 2019-06-24 NOTE — PROGRESS NOTES
This is the Subsequent Medicare Annual Wellness Exam, performed 12 months or more after the Initial AWV or the last Subsequent AWV I have reviewed the patient's medical history in detail and updated the computerized patient record. History Past Medical History:  
Diagnosis Date  Arthritis  Chronic kidney disease Dr Lawrence Hu  ESRD on hemodialysis ([facility] [address] HD Tu,Thu,Sat   
 History of creation of ostomy ([facility] [address] Left abdomen  Hypertension  Ill-defined condition Dialysis Gama Gosselin Obese 9/14/2017 Past Surgical History:  
Procedure Laterality Date  HX COLOSTOMY    
 left abdomen  HX PACEMAKER  09/13/2018  
 placed in left abdomen.  HX VASCULAR ACCESS    
 left upper arm fistula  CT INSJ/RPLCMT PERM DFB W/TRNSVNS LDS 1/DUAL CHMBR N/A 4/5/2019 INSERT ICD SINGLE performed by Ellen Snider MD at [facility]  CT RMVL OF SUBQ IMPLANTABLE DEFIBRILLATOR ELECTRODE N/A 4/4/2019 REMOVE SUBQ ICD performed by Ellen Snider MD at [facility] Current Outpatient Medications Medication Sig Dispense Refill  isosorbide mononitrate ER (IMDUR) 60 mg CR tablet Take 0.5 Tabs by mouth daily.  carvedilol (COREG) 12.5 mg tablet Take 1 Tab by mouth two (2) times a day.  omeprazole (PRILOSEC) 20 mg capsule Take 1 Cap by mouth two (2) times a day. 5  
 ELIQUIS 5 mg tablet Take 1 Tab by mouth two (2) times a day. 11  
 calcium acetate (PHOSLO) 667 mg cap Take 1 Cap by mouth three (3) times daily. 3  
 zolpidem (AMBIEN) 5 mg tablet Take 1 Tab by mouth daily. Max Daily Amount: 5 mg. 30 Tab 2  
 acetaminophen (TYLENOL EXTRA STRENGTH) 500 mg tablet Take 3 Tabs by mouth every six (6) hours as needed for Pain. 30 Tab 0  
 b complex-vitamin c-folic acid (DIALYVITE) 100-1 mg tab tablet Take 1 Tab by mouth daily.  gabapentin (NEURONTIN) 100 mg capsule Take 1 Cap by mouth two (2) times a day.   3  
  venlafaxine (EFFEXOR) 37.5 mg tablet Take 37.5 mg by mouth daily. Allergies Allergen Reactions  Iron Anaphylaxis Per pt, to PO formulation only, can tolerate IV formulation Allergy to excipient? Family History Problem Relation Age of Onset  Hypertension Other   
     multiple family members Social History Tobacco Use  Smoking status: Current Every Day Smoker Packs/day: 0.50  Smokeless tobacco: Never Used  Tobacco comment: cigarettes Substance Use Topics  Alcohol use: No  
 
Patient Active Problem List  
Diagnosis Code  Leukocytosis D72.829  Obese E66.9  Atrial fibrillation with rapid ventricular response (HCC) I48.91  
 Pulmonary edema J81.1  Severe left ventricular systolic dysfunction R70.6  Heart failure ([facility]) I50.9  ESRD on hemodialysis ([facility]) N18.6, Z99.2  Arthritis M19.90  
 Hypertension I10  
 History of creation of ostomy ([facility] [address] Z93.9  ICD (implantable cardioverter-defibrillator) infection ([facility] [address] T82. 7XXA Depression Risk Factor Screening:  
 
3 most recent PHQ Screens 6/24/2019 Little interest or pleasure in doing things Not at all Feeling down, depressed, irritable, or hopeless Not at all Total Score PHQ 2 0 Alcohol Risk Factor Screening: You do not drink alcohol or very rarely. Functional Ability and Level of Safety:  
Hearing Loss Hearing is good. Activities of Daily Living The home contains: no safety equipment. ADL Assessment 6/24/2019 Feeding yourself No Help Needed Getting from bed to chair No Help Needed Getting dressed No Help Needed Bathing or showering No Help Needed Walk across the room (includes cane/walker) No Help Needed Using the telphone No Help Needed Taking your medications No Help Needed Preparing meals Help Needed Managing money (expenses/bills) Help Needed Moderately strenuous housework (laundry) Help Needed Shopping for personal items (toiletries/medicines) Help Needed Shopping for groceries Help Needed Driving Help Needed Climbing a flight of stairs Help Needed Getting to places beyond walking distances Help Needed Fall Risk Fall Risk Assessment, last 12 mths 6/24/2019 Able to walk? Yes Fall in past 12 months? No  
 
 
Abuse Screen Abuse Screening Questionnaire 6/24/2019 Do you ever feel afraid of your partner? Lance Garcia Are you in a relationship with someone who physically or mentally threatens you? Lance Garcia Is it safe for you to go home? Cathy Karen Cognitive Screening Evaluation of Cognitive Function: 
Has your family/caregiver stated any concerns about your memory: no 
Normal 
 
Patient Care Team  
Patient Care Team: 
Yuri Diaz MD as PCP - [facility]) Ifeanyi Mederos MD (Cardiology) Geno Guzman MD (Nephrology) James Meadows MD (Cardiology) Keisha Singh as Care Manager Los Cabrera RN as Transitional Nurse Navigator Assessment/Plan Education and counseling provided: 
Pneumococcal Vaccine - reports received at her dialysis center Bone mass measurement (DEXA) Screening for glaucoma Diagnoses and all orders for this visit: 1. Bronchitis with bronchospasm -     ALBUTEROL IPRATROP NON-COMP 
-     AL PRESSURIZED/NONPRESSURIZED INHALATION TREATMENT 
-     albuterol-ipratropium (DUO-NEB) 2.5 mg-0.5 mg/3 ml nebu; 3 mL by Nebulization route now for 1 dose. -     predniSONE (DELTASONE) 20 mg tablet; Take 40 mg by mouth daily (with breakfast) for 7 days. -     cefdinir (OMNICEF) 300 mg capsule; Take 1 Cap by mouth every other day for 10 days. 
-     guaiFENesin-codeine (ROBITUSSIN AC) 100-10 mg/5 mL solution; Take 2.5 mL by mouth three (3) times daily as needed for Cough for up to 7 days. Max Daily Amount: 7.5 mL. -     albuterol-ipratropium (DUO-NEB) 2.5 mg-0.5 mg/3 ml nebu; 3 mL by Nebulization route every four (4) hours as needed (1). -     Nebulizer & Compressor machine; Use every 4-6 hours as directed for cough, wheezing. 2. Medicare annual wellness visit, subsequent Health Maintenance Due Topic Date Due  Shingrix Vaccine Age 50> (1 of 2) 03/20/1990  GLAUCOMA SCREENING Q2Y  03/20/2005  Bone Densitometry (Dexa) Screening  03/20/2005  Pneumococcal 65+ years (1 of 2 - PCV13) 03/20/2005  MEDICARE YEARLY EXAM  03/20/2018

## 2019-06-24 NOTE — PATIENT INSTRUCTIONS
Cough: Care Instructions Your Care Instructions A cough is your body's response to something that bothers your throat or airways. Many things can cause a cough. You might cough because of a cold or the flu, bronchitis, or asthma. Smoking, postnasal drip, allergies, and stomach acid that backs up into your throat also can cause coughs. A cough is a symptom, not a disease. Most coughs stop when the cause, such as a cold, goes away. You can take a few steps at home to cough less and feel better. Follow-up care is a key part of your treatment and safety. Be sure to make and go to all appointments, and call your doctor if you are having problems. It's also a good idea to know your test results and keep a list of the medicines you take. How can you care for yourself at home? · Drink lots of water and other fluids. This helps thin the mucus and soothes a dry or sore throat. Honey or lemon juice in hot water or tea may ease a dry cough. · Take cough medicine as directed by your doctor. · Prop up your head on pillows to help you breathe and ease a dry cough. · Try cough drops to soothe a dry or sore throat. Cough drops don't stop a cough. Medicine-flavored cough drops are no better than candy-flavored drops or hard candy. · Do not smoke. Avoid secondhand smoke. If you need help quitting, talk to your doctor about stop-smoking programs and medicines. These can increase your chances of quitting for good. When should you call for help? Call 911 anytime you think you may need emergency care.  For example, call if: 
  · You have severe trouble breathing.  
 Call your doctor now or seek immediate medical care if: 
  · You cough up blood.  
  · You have new or worse trouble breathing.  
  · You have a new or higher fever.  
  · You have a new rash.  
 Watch closely for changes in your health, and be sure to contact your doctor if: 
  · You cough more deeply or more often, especially if you notice more mucus or a change in the color of your mucus.  
  · You have new symptoms, such as a sore throat, an earache, or sinus pain.  
  · You do not get better as expected. Where can you learn more? Go to http://daron-rizwana.info/. Enter D279 in the search box to learn more about \"Cough: Care Instructions. \" Current as of: September 5, 2018 Content Version: 11.9 © 3230-4780 [employer], [employer]. Care instructions adapted under license by [employer] (which disclaims liability or warranty for this information). If you have questions about a medical condition or this instruction, always ask your healthcare professional. [place] [address] any warranty or liability for your use of this information.

## 2019-06-24 NOTE — ACP (ADVANCE CARE PLANNING)
Advance Care Planning (ACP) Provider [facility] Date of ACP Conversation: 06/24/19 Persons included in Conversation:  patient Length of ACP Conversation in minutes:  <16 minutes (Non-Billable) Authorized Decision Maker (if patient is incapable of making informed decisions): This person is: Other Legally Authorized Decision Maker (e.g. Next of Kin) [facility] - daughter For Patients with Decision Making Capacity:  
Values/Goals: Exploration of values, goals, and preferences if recovery is not expected, even with continued medical treatment in the event of:  Imminent death Severe, permanent brain injury \"In these circumstances, what matters most to you? \"  Care focused more on comfort or quality of life. Conversation Outcomes / Follow-Up Plan:  
Recommended completion of Advance Directive form after review of ACP materials and conversation with prospective healthcare agent

## 2019-06-24 NOTE — PROGRESS NOTES
Identified pt with two pt identifiers(name and ). Chief Complaint Patient presents with  Cough Symptoms began about 3 weeks ago. Patient would like to have a new nebulizer Prabhakar.Semaj Annual Wellness Visit [address]  Nasal Congestion Health Maintenance Due Topic  Shingrix Vaccine Age 50> (1 of 2)  GLAUCOMA SCREENING Q2Y  Bone Densitometry (Dexa) Screening  Pneumococcal 65+ years (1 of 2 - PCV13)  MEDICARE YEARLY EXAM   
 
 
Wt Readings from Last 3 Encounters:  
19 147 lb (66.7 kg) 19 146 lb (66.2 kg) 05/15/19 143 lb 4.8 oz (65 kg) Temp Readings from Last 3 Encounters:  
19 97.8 °F (36.6 °C) (Oral) 19 97.1 °F (36.2 °C) (Oral) 19 97.6 °F (36.4 °C) BP Readings from Last 3 Encounters:  
19 130/70  
19 102/62  
05/15/19 110/54 Pulse Readings from Last 3 Encounters:  
19 68  
19 80  
05/15/19 88 Learning Assessment: 
:  
 
Learning Assessment 2019 PRIMARY LEARNER Patient PRIMARY LANGUAGE ENGLISH  
LEARNER PREFERENCE PRIMARY DEMONSTRATION  
ANSWERED BY self RELATIONSHIP SELF Depression Screening: 
:  
 
3 most recent PHQ Screens 2019 Little interest or pleasure in doing things Not at all Feeling down, depressed, irritable, or hopeless Not at all Total Score PHQ 2 0 Fall Risk Assessment: 
:  
 
Fall Risk Assessment, last 12 mths 2019 Able to walk? Yes Fall in past 12 months? No  
 
 
Abuse Screening: 
:  
 
Abuse Screening Questionnaire 2019 Do you ever feel afraid of your partner? Mauro Shultz Are you in a relationship with someone who physically or mentally threatens you? Mauro Shultz Is it safe for you to go home? Jane Chahal Coordination of Care Questionnaire: 
:  
 
1) Have you been to an emergency room, urgent care clinic since your last visit? no  
Hospitalized since your last visit? no          
 
 2) Have you seen or consulted any other health care providers outside of [address] [place] [address] since your last visit? no  (Include any pap smears or colon screenings in this section.) 3) Do you have an Advance Directive on file? no 
Are you interested in receiving information about Advance Directives? no 
 
Reviewed record in preparation for visit and have obtained necessary documentation. Medication reconciliation up to date and corrected with patient at this time.

## 2019-06-26 NOTE — TELEPHONE ENCOUNTER
Forwarded from call center. .. Pt wants to let pcp know that her coughing has ceased but she still has a hoarseness in her voice. Pt says overall she feels much better.  Pt best contact is [phone]

## 2019-06-27 NOTE — TELEPHONE ENCOUNTER
Glad to hear that she is feeling better. Hoarseness should resolve and is likely secondary to the cough.

## 2019-07-09 NOTE — TELEPHONE ENCOUNTER
Per HH, patient still coughing and does not wish to return to office or go to imaging facility. CXR ordered and faxed to [facility] with confirmation received.

## 2019-07-09 NOTE — TELEPHONE ENCOUNTER
Forwarded from the call center;    Chivo Gresham from [facility] requesting an order for chest X-ray be sent to [facility], Fax: [phone].  Caller best contact is [phone]

## 2019-07-11 NOTE — TELEPHONE ENCOUNTER
CXR report received and is negative (no infiltrate, effusion or acute findings). If she is still having symptoms, would recommend reevaluation in the office.

## 2019-07-11 NOTE — TELEPHONE ENCOUNTER
Pt called stating that she had chest x-ray done at home and is inquiring about results?     Best contact: [phone]

## 2019-07-17 NOTE — PROGRESS NOTES
Room # 2    Check ICD site left upper chest and previous SQ ICD site    Device checked 5/15/19    Denies any cardiac complaints at this time.     Visit Vitals  /62 (BP 1 Location: Left arm, BP Patient Position: Sitting)   Pulse 76   Resp 18   Ht 5' 4\" (1.626 m)   Wt 151 lb (68.5 kg)   SpO2 97%   BMI 25.92 kg/m²

## 2019-07-17 NOTE — PROGRESS NOTES
HISTORY OF PRESENTING ILLNESS      Malina Martinez is a 78 y.o. female with history of subcutaneous ICD infection status post removal followed by single-chamber ICD implantation with slow wound healing/hematoma. Her hematoma has dramatically improved and incisions have healed well. ACTIVE PROBLEM LIST     Patient Active Problem List    Diagnosis Date Noted    Heart failure ([address] 09/14/2018     Priority: 1 - One    ICD (implantable cardioverter-defibrillator) infection ([address] 04/03/2019    ESRD on hemodialysis ([address]     Arthritis     Hypertension     History of creation of ostomy ([address]     Severe left ventricular systolic dysfunction 55/95/0776    Atrial fibrillation with rapid ventricular response ([address] 07/21/2018    Pulmonary edema 07/21/2018    Leukocytosis 09/14/2017    Obese 09/14/2017           PAST MEDICAL HISTORY     Past Medical History:   Diagnosis Date    Arthritis     Chronic kidney disease     Dr Rodolfo Miller ESRD on hemodialysis ([address]     HD Tu,Thu,Sat     History of creation of ostomy ([address]     Left abdomen    Hypertension     Ill-defined condition     Dialysis T, Th, S    Obese 9/14/2017           PAST SURGICAL HISTORY     Past Surgical History:   Procedure Laterality Date    HX COLOSTOMY      left abdomen    HX PACEMAKER  09/13/2018    placed in left abdomen.  HX VASCULAR ACCESS      left upper arm fistula    AL INSJ/RPLCMT PERM DFB W/TRNSVNS LDS 1/DUAL CHMBR N/A 4/5/2019    INSERT ICD SINGLE performed by Margarita Morrison MD at [facility] CARDIAC CATH LAB    AL RMVL OF SUBQ IMPLANTABLE DEFIBRILLATOR ELECTRODE N/A 4/4/2019    REMOVE SUBQ ICD performed by Margarita Morrison MD at [facility] CARDIAC CATH LAB          ALLERGIES     Allergies   Allergen Reactions    Iron Anaphylaxis     Per pt, to PO formulation only, can tolerate IV formulation  Allergy to excipient?           FAMILY HISTORY     Family History   Problem Relation Age of Onset    Hypertension Other multiple family members    negative for cardiac disease       SOCIAL HISTORY     Social History     Socioeconomic History    Marital status:      Spouse name: Not on file    Number of children: Not on file    Years of education: Not on file    Highest education level: Not on file   Tobacco Use    Smoking status: Current Every Day Smoker     Packs/day: 0.50    Smokeless tobacco: Never Used    Tobacco comment: cigarettes   Substance and Sexual Activity    Alcohol use: No    Drug use: No         MEDICATIONS     Current Outpatient Medications   Medication Sig    albuterol-ipratropium (DUO-NEB) 2.5 mg-0.5 mg/3 ml nebu 3 mL by Nebulization route every four (4) hours as needed (1).  Nebulizer & Compressor machine Use every 4-6 hours as directed for cough, wheezing.  isosorbide mononitrate ER (IMDUR) 60 mg CR tablet Take 0.5 Tabs by mouth daily.  carvedilol (COREG) 12.5 mg tablet Take 1 Tab by mouth two (2) times a day.  omeprazole (PRILOSEC) 20 mg capsule Take 1 Cap by mouth two (2) times a day.  ELIQUIS 5 mg tablet Take 1 Tab by mouth two (2) times a day.  calcium acetate (PHOSLO) 667 mg cap Take 1 Cap by mouth three (3) times daily.  zolpidem (AMBIEN) 5 mg tablet Take 1 Tab by mouth daily. Max Daily Amount: 5 mg.  acetaminophen (TYLENOL EXTRA STRENGTH) 500 mg tablet Take 3 Tabs by mouth every six (6) hours as needed for Pain.  b complex-vitamin c-folic acid (DIALYVITE) 100-1 mg tab tablet Take 1 Tab by mouth daily.  gabapentin (NEURONTIN) 100 mg capsule Take 1 Cap by mouth two (2) times a day.  venlafaxine (EFFEXOR) 37.5 mg tablet Take 37.5 mg by mouth daily. No current facility-administered medications for this visit. I have reviewed the nurses notes, vitals, problem list, allergy list, medical history, family, social history and medications. REVIEW OF SYMPTOMS      General: Pt denies excessive weight gain or loss.  Pt is able to conduct ADL's  HEENT: Denies blurred vision, headaches, hearing loss, epistaxis and difficulty swallowing. Respiratory: Denies cough, congestion, shortness of breath, FRAUSTO, wheezing or stridor. Cardiovascular: Denies precordial pain, palpitations, edema or PND  Gastrointestinal: Denies poor appetite, indigestion, abdominal pain or blood in stool  Genitourinary: Denies hematuria, dysuria, increased urinary frequency  Musculoskeletal: Denies joint pain or swelling from muscles or joints  Neurologic: Denies tremor, paresthesias, headache, or sensory motor disturbance  Psychiatric: Denies confusion, insomnia, depression  Integumentray: Denies rash, itching or ulcers. Hematologic: Denies easy bruising, bleeding       PHYSICAL EXAMINATION      There were no vitals filed for this visit. General: Well developed, in no acute distress. HEENT: No jaundice, oral mucosa moist, no oral ulcers  Neck: Supple, no stiffness, no lymphadenopathy, supple  Heart:  Normal S1/S2 negative S3 or S4. Regular, no murmur, gallop or rub, no jugular venous distention  Respiratory: Clear bilaterally x 4, no wheezing or rales  Abdomen:   Soft, non-tender, bowel sounds are active.   Extremities:  No edema, normal cap refill, no cyanosis. Musculoskeletal: No clubbing, no deformities  Neuro: A&Ox3, speech clear, gait stable, cooperative, no focal neurologic deficits  Skin: Skin color is normal. No rashes or lesions.  Non diaphoretic, moist.  Vascular: 2+ pulses symmetric in all extremities       DIAGNOSTIC DATA      EKG:        LABORATORY DATA      Lab Results   Component Value Date/Time    WBC 7.8 04/23/2019 01:29 PM    HGB 9.6 (L) 04/23/2019 01:29 PM    HCT 32.0 (L) 04/23/2019 01:29 PM    PLATELET 702 (L) 10/48/5490 01:29 PM    .3 (H) 04/23/2019 01:29 PM      Lab Results   Component Value Date/Time    Sodium 136 04/23/2019 01:29 PM    Potassium 4.1 04/23/2019 01:29 PM    Chloride 103 04/23/2019 01:29 PM    CO2 25 04/23/2019 01:29 PM    Anion gap 8 04/23/2019 01:29 PM    Glucose 97 04/23/2019 01:29 PM    BUN 36 (H) 04/23/2019 01:29 PM    Creatinine 7.47 (H) 04/23/2019 01:29 PM    BUN/Creatinine ratio 5 (L) 04/23/2019 01:29 PM    GFR est AA 6 (L) 04/23/2019 01:29 PM    GFR est non-AA 5 (L) 04/23/2019 01:29 PM    Calcium 8.6 04/23/2019 01:29 PM    Bilirubin, total 0.7 04/23/2019 01:29 PM    AST (SGOT) 30 04/23/2019 01:29 PM    Alk. phosphatase 169 (H) 04/23/2019 01:29 PM    Protein, total 8.2 04/23/2019 01:29 PM    Albumin 2.8 (L) 04/23/2019 01:29 PM    Globulin 5.4 (H) 04/23/2019 01:29 PM    A-G Ratio 0.5 (L) 04/23/2019 01:29 PM    ALT (SGPT) <6 (L) 04/23/2019 01:29 PM           ASSESSMENT      1.  Cardiomyopathy                        Z. Ischemic                        B. LV ejection fraction 10-15%                        C. NYHA class II  2.  Coronary artery disease, native  3.  End-stage renal disease on dialysis  4.  Hypertension   5.  Atrial fibrillation                        A. Permanent  6. ICD              A. Subcutaneous ICD - infection now s/p removal              B. Single chamber [employer]               C. Left-sided        PLAN     Will arrange for home monitoring and continue follow ups per device clinic. Follow up in one year. FOLLOW-UP     1 year    Thank you, Zack Ryan MD for allowing me to participate in the care of this extraordinarily pleasant female. Please do not hesitate to contact me for further questions/concerns. Maria Teresa Mckeon NP    Patient seen and examined by me with nurse practitioner. I personally performed all components of the history, physical, and medical decision making and agree with the assessment and plan with minor modifications as noted.        Abdirahman Dangelo MD  Cardiac Electrophysiology / Cardiology    [address], [address], [address]  [place], [address] [place] [address] [place]  [phone] / [phone] Fax   [phone] / [phone] Fax

## 2019-08-07 NOTE — PROGRESS NOTES
Identified pt with two pt identifiers(name and ). Chief Complaint Patient presents with  Cough  
  cough is continuing  New Order Nebulizer. Patient has not gotten yet  Medication Evaluation  
  would like to see about getting a \"Cheaper sleeping medication\" Health Maintenance Due Topic  Shingrix Vaccine Age 50> (1 of 2)  GLAUCOMA SCREENING Q2Y  Bone Densitometry (Dexa) Screening  Pneumococcal 65+ years (1 of 2 - PCV13)  Influenza Age 5 to Adult Wt Readings from Last 3 Encounters:  
19 155 lb (70.3 kg) 19 151 lb (68.5 kg) 19 147 lb (66.7 kg) Temp Readings from Last 3 Encounters:  
19 97.4 °F (36.3 °C) (Oral) 19 97.8 °F (36.6 °C) (Oral) 19 97.1 °F (36.2 °C) (Oral) BP Readings from Last 3 Encounters:  
19 112/78  
19 100/62  
19 130/70 Pulse Readings from Last 3 Encounters:  
19 78  
19 76  
19 68 Learning Assessment: 
:  
 
Learning Assessment 2019 PRIMARY LEARNER Patient PRIMARY LANGUAGE ENGLISH  
LEARNER PREFERENCE PRIMARY DEMONSTRATION  
ANSWERED BY self RELATIONSHIP SELF Depression Screening: 
:  
 
3 most recent PHQ Screens 2019 Little interest or pleasure in doing things Not at all Feeling down, depressed, irritable, or hopeless Not at all Total Score PHQ 2 0 Fall Risk Assessment: 
:  
 
Fall Risk Assessment, last 12 mths 2019 Able to walk? Yes Fall in past 12 months? No  
 
 
Abuse Screening: 
:  
 
Abuse Screening Questionnaire 2019 Do you ever feel afraid of your partner? Tad Shuttvalentín Are you in a relationship with someone who physically or mentally threatens you? Alvaro Hightower Is it safe for you to go home? Telma Gilman Coordination of Care Questionnaire: 
:  
 
1) Have you been to an emergency room, urgent care clinic since your last visit? no  
Hospitalized since your last visit? no          
 
 2) Have you seen or consulted any other health care providers outside of [address] [place] [address] since your last visit? no   (Include any pap smears or colon screenings in this section.) 3) Do you have an Advance Directive on file? no 
Are you interested in receiving information about Advance Directives? no 
 
Reviewed record in preparation for visit and have obtained necessary documentation. Medication reconciliation up to date and corrected with patient at this time.

## 2019-08-07 NOTE — PATIENT INSTRUCTIONS
Cough: Care Instructions Your Care Instructions A cough is your body's response to something that bothers your throat or airways. Many things can cause a cough. You might cough because of a cold or the flu, bronchitis, or asthma. Smoking, postnasal drip, allergies, and stomach acid that backs up into your throat also can cause coughs. A cough is a symptom, not a disease. Most coughs stop when the cause, such as a cold, goes away. You can take a few steps at home to cough less and feel better. Follow-up care is a key part of your treatment and safety. Be sure to make and go to all appointments, and call your doctor if you are having problems. It's also a good idea to know your test results and keep a list of the medicines you take. How can you care for yourself at home? · Drink lots of water and other fluids. This helps thin the mucus and soothes a dry or sore throat. Honey or lemon juice in hot water or tea may ease a dry cough. · Take cough medicine as directed by your doctor. · Prop up your head on pillows to help you breathe and ease a dry cough. · Try cough drops to soothe a dry or sore throat. Cough drops don't stop a cough. Medicine-flavored cough drops are no better than candy-flavored drops or hard candy. · Do not smoke. Avoid secondhand smoke. If you need help quitting, talk to your doctor about stop-smoking programs and medicines. These can increase your chances of quitting for good. When should you call for help? Call 911 anytime you think you may need emergency care.  For example, call if: 
  · You have severe trouble breathing.  
 Call your doctor now or seek immediate medical care if: 
  · You cough up blood.  
  · You have new or worse trouble breathing.  
  · You have a new or higher fever.  
  · You have a new rash.  
 Watch closely for changes in your health, and be sure to contact your doctor if: 
  · You cough more deeply or more often, especially if you notice more mucus or a change in the color of your mucus.  
  · You have new symptoms, such as a sore throat, an earache, or sinus pain.  
  · You do not get better as expected. Where can you learn more? Go to http://daron-rizwana.info/. Enter D279 in the search box to learn more about \"Cough: Care Instructions. \" Current as of: September 5, 2018 Content Version: 12.1 © 0281-3677 [employer]. Care instructions adapted under license by [employer] (which disclaims liability or warranty for this information). If you have questions about a medical condition or this instruction, always ask your healthcare professional. [place] [address] any warranty or liability for your use of this information.

## 2019-08-07 NOTE — PROGRESS NOTES
Subjective:  
  
Joselyn Jones is a 78 y.o. female here cough. Cough productive of heavy and white colored phlegm and occurs all day and night long. Had been prescribed steroid dose pack by her Nephrologist for cough about 3 weeks ago with no improvement noted. Denies fever, chills, vomiting, heartburn, nasal congestion. Associated with mild dyspnea with exertion, postnasal drainage. No known sick contacts. Current Outpatient Medications Medication Sig Dispense Refill  albuterol-ipratropium (DUO-NEB) 2.5 mg-0.5 mg/3 ml nebu 3 mL by Nebulization route every four (4) hours as needed (1). 30 Nebule 1  
 Nebulizer & Compressor machine Use every 4-6 hours as directed for cough, wheezing. 1 Each 0  
 isosorbide mononitrate ER (IMDUR) 60 mg CR tablet Take 0.5 Tabs by mouth daily.  carvedilol (COREG) 12.5 mg tablet Take 1 Tab by mouth two (2) times a day.  omeprazole (PRILOSEC) 20 mg capsule Take 1 Cap by mouth two (2) times a day. 5  
 ELIQUIS 5 mg tablet Take 1 Tab by mouth two (2) times a day. 11  
 calcium acetate (PHOSLO) 667 mg cap Take 1 Cap by mouth three (3) times daily. 3  
 zolpidem (AMBIEN) 5 mg tablet Take 1 Tab by mouth daily. Max Daily Amount: 5 mg. (Patient taking differently: Take 5 mg by mouth nightly.) 30 Tab 2  
 acetaminophen (TYLENOL EXTRA STRENGTH) 500 mg tablet Take 3 Tabs by mouth every six (6) hours as needed for Pain. 30 Tab 0  
 b complex-vitamin c-folic acid (DIALYVITE) 100-1 mg tab tablet Take 1 Tab by mouth daily.  gabapentin (NEURONTIN) 100 mg capsule Take 1 Cap by mouth two (2) times a day. 3  
 venlafaxine (EFFEXOR) 37.5 mg tablet Take 37.5 mg by mouth daily. Allergies Allergen Reactions  Iron Anaphylaxis Per pt, to PO formulation only, can tolerate IV formulation Allergy to excipient? Past Medical History:  
Diagnosis Date  Arthritis  Chronic kidney disease Dr Manasa Estevez  ESRD on hemod[facility] ([facility] HD Tu,Thu,Sat   
 History of creation of ostomy ([address] Left abdomen  Hypertension  Ill-defined condition Dialysis Micaela Hutson Obese 9/14/2017 Social History Tobacco Use  Smoking status: Current Every Day Smoker Packs/day: 0.25  Smokeless tobacco: Never Used  Tobacco comment: cigarettes Substance Use Topics  Alcohol use: No  
  
 
Review of Systems Pertinent items are noted in HPI. Objective:  
 
Visit Vitals /78 (BP 1 Location: Right arm, BP Patient Position: Sitting) Comment: Manual  
Pulse 78 Temp 97.4 °F (36.3 °C) (Oral) Comment: . Resp 18 Ht 5' 4\" (1.626 m) Wt 155 lb (70.3 kg) SpO2 98% BMI 26.61 kg/m² General appearance - alert, well appearing, and in no distress Oropharyngx - mucous membranes moist, pharynx normal without lesions Chest - clear to auscultation, rhonchi in right upper lung that clears with coughing, no wheezes, rales, symmetric air entry, no tachypnea, retractions or cyanosis Heart - normal rate, regular rhythm, normal S1, S2, no murmurs, rubs, clicks or gallops Assessment/Plan:  
Alin Hightower is a 78 y.o. female seen for: 1. Bronchitis - loratadine (CLARITIN) 10 mg tablet; Take 1 Tab by mouth every fourty-eight (48) hours. Dispense: 30 Tab; Refill: 2 
- benzonatate (TESSALON PERLES) 100 mg capsule; Take 1 Cap by mouth three (3) times daily as needed for Cough for up to 7 days. Dispense: 21 Cap; Refill: 0 
 
2. Insomnia, unspecified type: previously on Ambien which has not been covered by insurance as she has been on for >3 months. Will try trazodone and titrate dose as needed. - traZODone (DESYREL) 50 mg tablet; Take 1 Tab by mouth nightly. Dispense: 90 Tab; Refill: 1 3. BMI 26.0-26.9,adult: I have reviewed/discussed the above normal BMI with the patient. I have recommended the following interventions: dietary management education, guidance, and counseling and encourage exercise. I have discussed the diagnosis with the patient and the intended plan as seen in the above orders. The patient has received an after-visit summary and questions were answered concerning future plans. I have discussed medication side effects and warnings with the patient as well. Patient verbalizes understanding of plan of care and denies further questions or concerns at this time. Informed patient to return to the office if symptoms worsen or if new symptoms arise. Follow-up and Dispositions · Return if symptoms worsen or fail to improve.

## 2019-09-11 NOTE — TELEPHONE ENCOUNTER
Patient states her pacemaker monitor is not working. She states it is just \"blinking\"    She says a nurse already tried to help her get it to work, but it is still not working. Please advise.  She can be reached at [phone]

## 2019-09-11 NOTE — TELEPHONE ENCOUNTER
Patient's [facility] nurse calling to ask if someone can call patient and help her with her pacemaker monitor. She states she spoke to [employer] and they states patient had no been registered, yet.      Patient can be reached [phone]

## 2019-09-12 NOTE — TELEPHONE ENCOUNTER
Informed pt she needs to call [facility] pt support [phone] for help with her remote box.

## 2019-09-16 NOTE — TELEPHONE ENCOUNTER
Pts home health nurse Pepe Vale called stating that pt is having issues with her Latitude box. They called Marcelle العلي to get help & they were told to call office by the rep because pt wasn't registered correctly in the system. Told her I would call her back after speaking to them. Spoke to [employer] support to verify the issue with pts remote. She stated it looks like pt has 2 accounts in the system. She made sure that she is registered correctly with the ICD model & s/n and pts Latitude box mode & s/n. Problem is possibly coming from her old SubQ ICD she had explanted. Was informed to have pt try setup again & can call to get help at the pt support ph#.    I called Pepe aVle back & informed her of the status. She stated that makes since & when she goes to pts [place] on Wed she will help pt try to setup & call Marcelle [facility] for help.

## 2019-10-10 NOTE — PROGRESS NOTES
HISTORY OF PRESENTING ILLNESS      Malina Gastelum is a 78 y.o. female with ischemic cardiomyopathy, left ventricular ejection fraction 10-15%, NYHA class II, permanent atrial fibrillation, end-stage renal disease on dialysis who is been on optimal guideline directed medical therapy.  She was found to have severe RCA occlusion with collateral supply.  Coronary revascularization was not warranted.  She underwent subcutaneous ICD which resulted in hematoma and removal/pocket revision/ re-implantation of left subclavian single chamber ICD. She presents today with complaints of shortness of breath, congestion, night sweats, dizziness regardless of activity, as well as palpitations. These symptoms have been ongoing x 2-3 weeks. She continues to smoke and has not been seen by another provider. Denies fever. Device interrogation shows normal functioning with an episode of VT on August 23, 2019.       ACTIVE PROBLEM LIST     Patient Active Problem List    Diagnosis Date Noted    Heart failure ([address] 09/14/2018     Priority: 1 - One    ICD (implantable cardioverter-defibrillator) infection ([address] 04/03/2019    ESRD on hemodialysis ([address]     Arthritis     Hypertension     History of creation of ostomy ([address]     Severe left ventricular systolic dysfunction 07/09/4009    Atrial fibrillation with rapid ventricular response ([address] 07/21/2018    Pulmonary edema 07/21/2018    Leukocytosis 09/14/2017    Obese 09/14/2017           PAST MEDICAL HISTORY     Past Medical History:   Diagnosis Date    Arthritis     Chronic kidney disease     Dr Dionte Ramírez ESRD on hemodialysis ([address]     HD Tu,Thu,Sat     History of creation of ostomy ([address]     Left abdomen    Hypertension     Ill-defined condition     Dialysis T, Th, S    Obese 9/14/2017           PAST SURGICAL HISTORY     Past Surgical History:   Procedure Laterality Date    HX COLOSTOMY      left abdomen    HX PACEMAKER  09/13/2018    placed in left abdomen.  HX VASCULAR ACCESS      left upper arm fistula    CT INSJ/RPLCMT PERM DFB W/TRNSVNS LDS 1/DUAL CHMBR N/A 4/5/2019    INSERT ICD SINGLE performed by Tom Crespo MD at [facility] CARDIAC CATH LAB    CT RMVL OF SUBQ IMPLANTABLE DEFIBRILLATOR ELECTRODE N/A 4/4/2019    REMOVE SUBQ ICD performed by Tom Crespo MD at [facility] CARDIAC CATH LAB          ALLERGIES     Allergies   Allergen Reactions    Iron Anaphylaxis     Per pt, to PO formulation only, can tolerate IV formulation  Allergy to excipient? FAMILY HISTORY     Family History   Problem Relation Age of Onset    Hypertension Other         multiple family members    negative for cardiac disease       SOCIAL HISTORY     [unfilled]      MEDICATIONS     Current Outpatient Medications   Medication Sig    traZODone (DESYREL) 50 mg tablet Take 1 Tab by mouth nightly.  loratadine (CLARITIN) 10 mg tablet Take 1 Tab by mouth every fourty-eight (48) hours.  albuterol-ipratropium (DUO-NEB) 2.5 mg-0.5 mg/3 ml nebu 3 mL by Nebulization route every four (4) hours as needed (1).  Nebulizer & Compressor machine Use every 4-6 hours as directed for cough, wheezing.  isosorbide mononitrate ER (IMDUR) 60 mg CR tablet Take 0.5 Tabs by mouth daily.  carvedilol (COREG) 12.5 mg tablet Take 1 Tab by mouth two (2) times a day.  omeprazole (PRILOSEC) 20 mg capsule Take 1 Cap by mouth two (2) times a day.  ELIQUIS 5 mg tablet Take 1 Tab by mouth two (2) times a day.  calcium acetate (PHOSLO) 667 mg cap Take 1 Cap by mouth three (3) times daily.  zolpidem (AMBIEN) 5 mg tablet Take 1 Tab by mouth daily. Max Daily Amount: 5 mg. (Patient taking differently: Take 5 mg by mouth nightly.)    acetaminophen (TYLENOL EXTRA STRENGTH) 500 mg tablet Take 3 Tabs by mouth every six (6) hours as needed for Pain.  b complex-vitamin c-folic acid (DIALYVITE) 100-1 mg tab tablet Take 1 Tab by mouth daily.     gabapentin (NEURONTIN) 100 mg capsule Take 1 Cap by mouth two (2) times a day.  venlafaxine (EFFEXOR) 37.5 mg tablet Take 37.5 mg by mouth daily. No current facility-administered medications for this visit. I have reviewed the nurses notes, vitals, problem list, allergy list, medical history, family, social history and medications. REVIEW OF SYMPTOMS      General: Pt denies excessive weight gain or loss. Pt is able to conduct ADL's  HEENT: Denies blurred vision, headaches, hearing loss, epistaxis and difficulty swallowing. Respiratory: Denies cough, congestion, shortness of breath, FRAUSTO, wheezing or stridor. Cardiovascular: Denies precordial pain, palpitations, edema or PND  Gastrointestinal: Denies poor appetite, indigestion, abdominal pain or blood in stool  Genitourinary: Denies hematuria, dysuria, increased urinary frequency  Musculoskeletal: Denies joint pain or swelling from muscles or joints  Neurologic: Denies tremor, paresthesias, headache, or sensory motor disturbance  Psychiatric: Denies confusion, insomnia, depression  Integumentray: Denies rash, itching or ulcers. Hematologic: Denies easy bruising, bleeding       PHYSICAL EXAMINATION      There were no vitals filed for this visit. General: Well developed, in no acute distress. HEENT: No jaundice, oral mucosa moist, no oral ulcers  Neck: Supple, no stiffness, no lymphadenopathy, supple  Heart:  Normal S1/S2 negative S3 or S4. Regular, no murmur, gallop or rub, no jugular venous distention  Respiratory: Clear bilaterally x 4, no wheezing or rales  Abdomen:   Soft, non-tender, bowel sounds are active.   Extremities:  No edema, normal cap refill, no cyanosis. Musculoskeletal: No clubbing, no deformities  Neuro: A&Ox3, speech clear, gait stable, cooperative, no focal neurologic deficits  Skin: Skin color is normal. No rashes or lesions.  Non diaphoretic, moist.  Vascular: 2+ pulses symmetric in all extremities       DIAGNOSTIC DATA      EKG:        LABORATORY DATA      Lab Results Component Value Date/Time    WBC 7.8 04/23/2019 01:29 PM    HGB 9.6 (L) 04/23/2019 01:29 PM    HCT 32.0 (L) 04/23/2019 01:29 PM    PLATELET 703 (L) 38/29/5452 01:29 PM    .3 (H) 04/23/2019 01:29 PM      Lab Results   Component Value Date/Time    Sodium 136 04/23/2019 01:29 PM    Potassium 4.1 04/23/2019 01:29 PM    Chloride 103 04/23/2019 01:29 PM    CO2 25 04/23/2019 01:29 PM    Anion gap 8 04/23/2019 01:29 PM    Glucose 97 04/23/2019 01:29 PM    BUN 36 (H) 04/23/2019 01:29 PM    Creatinine 7.47 (H) 04/23/2019 01:29 PM    BUN/Creatinine ratio 5 (L) 04/23/2019 01:29 PM    GFR est AA 6 (L) 04/23/2019 01:29 PM    GFR est non-AA 5 (L) 04/23/2019 01:29 PM    Calcium 8.6 04/23/2019 01:29 PM    Bilirubin, total 0.7 04/23/2019 01:29 PM    AST (SGOT) 30 04/23/2019 01:29 PM    Alk. phosphatase 169 (H) 04/23/2019 01:29 PM    Protein, total 8.2 04/23/2019 01:29 PM    Albumin 2.8 (L) 04/23/2019 01:29 PM    Globulin 5.4 (H) 04/23/2019 01:29 PM    A-G Ratio 0.5 (L) 04/23/2019 01:29 PM    ALT (SGPT) <6 (L) 04/23/2019 01:29 PM           ASSESSMENT      1.  Cardiomyopathy                        O. Ischemic                        B. LV ejection fraction 10-15%                        C. NYHA class II  2.  Coronary artery disease, native  3.  End-stage renal disease on dialysis  4.  Hypertension   5.  Atrial fibrillation                        A. Permanent  6.  ICD              A. Subcutaneous ICD - infection now s/p removal              B. Single chamber [employer]               C. Left-sided        PLAN     She has not taken her medications today, and has stopped her Eliquis x 1 week to see if she feels better. Recommend she resume her medications and advised her not to stop these without direction. Will obtain chest xray and labwork, refer to PCP if abnormal.        FOLLOW-UP       Thank you, Shay Pham MD and Dr. Celestina Garsia for allowing me to participate in the care of this extraordinarily pleasant female. Please do not hesitate to contact me for further questions/concerns.        Javi Manrique NP

## 2019-10-11 NOTE — TELEPHONE ENCOUNTER
----- Message from Nano Carrillo NP sent at 10/11/2019  1:14 PM EDT -----  Please let Ms. Fiona Paz know that her chest xray is normal.

## 2019-10-11 NOTE — PROGRESS NOTES
ROOM # 3    Congested cough with clear sputum, night sweats, SOB, dizziness while sitting, and palpitations    Dialysis Tues, Thurs and Sat    Visit Vitals  /60 (BP 1 Location: Left arm, BP Patient Position: Sitting)   Pulse 76   Resp 16   Ht 5' 4\" (1.626 m)   Wt 147 lb 11.3 oz (67 kg) Comment: patient reported   SpO2 97%   BMI 25.35 kg/m²

## 2019-10-11 NOTE — TELEPHONE ENCOUNTER
Called patient, ID verified using two patient identifiers. Informed patient of normal chest xray results per AUSTIN Wong NP. Patient verbalizes understanding.

## 2019-10-14 NOTE — PROGRESS NOTES
Her labwork is acceptable, better than April labs. Nothing further at this time, recommend she follow up with Dr. Katie Mock- suspect URI.

## 2019-11-12 PROBLEM — A41.9 SEVERE SEPSIS (HCC): Status: ACTIVE | Noted: 2019-01-01

## 2019-11-12 PROBLEM — R65.20 SEVERE SEPSIS (HCC): Status: ACTIVE | Noted: 2019-01-01

## 2019-11-12 NOTE — DISCHARGE SUMMARY
[address]  
Death Summary Patient: Zoie Louis MRN: [id number] YOB: 1940 Age: 78 y.o. Date of admission:  11/11/2019 Date of death:  11/12/2019 Admitting provider:  Dr. Luz Bal MD 
 
Discharging provider: Paul Clinton DO  
 
Primary care provider:  Mark Bonilla MD  
 
Consultations IP CONSULT TO NEPHROLOGY Admission diagnoses · Severe sepsis ([address] [A41.9, R65.20] · Pulmonary edema · ESRD 
· Ischemic cardiomyopathy · Hypoglycemia Please refer to the admission history and physical for details on the presenting problem. Final diagnoses and brief hospital course: Pt presented for acute hypoxic respiratory failure in setting of pulmonary edema, sepsis 2/2 PNA, missed last HD. Cardiac arrest: Pulse lost 248 am on 11/12 subsequently  regained pulse after 8 minutes of CPR. Epi x2, sodium bicarb x2. Taken to ICU, tele showed bigemony that led to pt's pulse being lost again at 338. Unfortunately after 13 minutes of CPR and Epi x5, Bicarb x2, pt was pronounced time of death was 351 am 11/12.  
-Family was updated throughout the critical events. Noted pt Full code and wished all measures to be taken. Pt's family was updated regarding loss of their loved one via phone and in-person. All questions answered. AHRF 2/2 to Ischemic Cardiomyopathy and missing HD: arrived to ED via EMS. Started BiPAP. Nephrology consulted and set up HD emergently. Pt was intubated to secure airway during initial code blue. Repeat CXR noted worsening pulmonary edema. HD was never started prior to time of death. Severe Sepsis 2/2 PNA: Pt initially ordered to receive Levaquin, stopped shortly after being started. Given pulmonary edema in setting of ICM (s/p ICD) and ESRD, 500 cc bolus given in ED. vanc and cefepime ordered but never started given time of code being started. ESRD on HD: Missed last HD session. Nephrology consulted. HD never began. Hypoglycemia: POA 56. D10% infusion ordered however incomplete as pt's pulse was lost. Required D50Wx3. QTc prolongation: . Pt was given Zofran 8 mg and Levaquin in ED. Last vital signs recorded Visit Vitals BP (!) 87/61 Pulse (!) 110 Temp 97.3 °F (36.3 °C) Resp 25 Ht 5' 4\" (1.626 m) Wt 150 lb (68 kg) SpO2 97% BMI 25.75 kg/m² No response to verbal and tactile stimuli. No respiratory effort. Absent heart sounds and pulses. Pupils fixed and dilated. Pertinent imaging studies 11/11 FINDINGS: 
AP portable upright view of the chest demonstrates a stable  cardiopericardial 
silhouette. The lungs are adequately expanded. Interval parenchymal opacity and 
interstitial opacity most predominant in the left upper lobe. Cardiac pacer in 
place. . The osseous structures are unremarkable. Patient is on a cardiac 
monitor.  
  
IMPRESSION IMPRESSION: 
Left upper lobe airspace opacity with increased interstitial opacity as well. Multifocal pneumonia versus interstitial and parenchymal pulmonary edema 11/12 FINDINGS: 
AP portable upright view of the chest demonstrates a stable enlarged 
cardiopericardial silhouette. The lungs are adequately expanded. ET tube 
approximately 4.5 cm above the jonathan. Interstitial and parenchymal opacities 
increased compared to prior exam. Cardiac pacer unchanged. The osseous 
structures are unremarkable. Patient is on a cardiac monitor.  
  
IMPRESSION IMPRESSION: 
ET tube in appropriate position. 
  
Interstitial and parenchymal opacity is increased compared to previous study. --------------------------------- Chronic Diagnoses Problem List as of 11/12/2019 Date Reviewed: 11/12/2019 Codes Class Noted - Resolved Heart failure ([facility] ICD-10-CM: I50.9 ICD-9-CM: 428.9  9/14/2018 - Present Severe sepsis (HCC) ICD-10-CM: A41.9, R65.20 ICD-9-CM: 038.9, 995.92  11/12/2019 - Present  
   
 ICD (implantable cardioverter-defibrillator) infection ([facility] ICD-10-CM: T82. 7XXA ICD-9-CM: 996.61  4/3/2019 - Present ESRD on hemodialysis ([facility] ICD-10-CM: N18.6, Z99.2 ICD-9-CM: 585.6, V45.11  Unknown - Present Overview Signed 1/30/2019  1:50 PM by Geronimo Plasencia MD  
  HD Suzanne Bride Arthritis ICD-10-CM: M19.90 ICD-9-CM: 716.90  Unknown - Present Hypertension ICD-10-CM: I10 
ICD-9-CM: 401.9  Unknown - Present History of creation of ostomy [facility]) ICD-10-CM: Z93.9 ICD-9-CM: V44.9  Unknown - Present Overview Signed 1/30/2019  1:51 PM by Geronimo Plasencia MD  
  Left abdomen Severe left ventricular systolic dysfunction QIL-89-JL: I51.9 ICD-9-CM: 429.9  7/22/2018 - Present Atrial fibrillation with rapid ventricular response (HCC) ICD-10-CM: I48.91 
ICD-9-CM: 427.31  7/21/2018 - Present Pulmonary edema ICD-10-CM: J81.1 ICD-9-CM: 915  7/21/2018 - Present Leukocytosis ICD-10-CM: P99.012 ICD-9-CM: 288.60  9/14/2017 - Present Obese (Chronic) ICD-10-CM: S22.1 ICD-9-CM: 278.00  9/14/2017 - Present RESOLVED: Hematoma ICD-10-CM: T14. Crispin Spikes ICD-9-CM: 924.9  9/28/2018 - 1/30/2019 RESOLVED: Abdominal abscess ICD-10-CM: GXC9817 ICD-9-CM: YMD2438  9/16/2017 - 1/30/2019 RESOLVED: Bacteremia ICD-10-CM: R78.81 ICD-9-CM: 790.7  9/15/2017 - 1/30/2019 RESOLVED: Abdominal wall abscess ICD-10-CM: L02.211 ICD-9-CM: 682.2  9/14/2017 - 1/30/2019 RESOLVED: ESRD (end stage renal disease) (HCC) (Chronic) ICD-10-CM: N18.6 ICD-9-CM: 585.6  9/14/2017 - 1/30/2019 RESOLVED: HTN (hypertension) (Chronic) ICD-10-CM: I10 
ICD-9-CM: 401.9  9/14/2017 - 1/30/2019 RESOLVED: Elevated lactic acid level ICD-10-CM: R79.89 ICD-9-CM: 276.2  9/14/2017 - 9/18/2017 Signed by: Slade Kiran DO Family Medicine Resident  November 12, 2019  
4:13 AM 
  
 
 Cc: Kyler Sahu MD  
 
 
[address] [facility] Medicine Residency Attending Addendum: I saw and evaluated the patient on the day of the encounter with Dr. Jeremy Olivo, performing the villa elements of the service. I discussed the findings, assessment and plan with the resident and agree with the resident's findings and plan as documented in the resident's note. Jose Alfredo Francis MD, CAQSM, RMSK

## 2019-11-12 NOTE — PROGRESS NOTES
Bedside and Verbal shift change report given to aNthan Maradiaga RN (oncoming nurse) by Doron Walker RN (offgoing nurse). Report included the following information SBAR. Pt is . Waiting on family to arrive to see body prior to sending body to [facility]. Night shift charge CARIDAD Headley States she completed the death paperwork and placed in chart. 0800- Charge nurse, Carla Mayer, RN states she completed post mortem care, has placed body in bag, tagged the pt and took out all IVs.  Security called to transport body.

## 2019-11-12 NOTE — PROGRESS NOTES
[address] [facility], [address] with [address] [facility] Attending Progress Note in Brief S:  When at bedside CPR was in progress. Patient in PEA. CPR in ICU started 30-95-88-86 and patient pronounced at 0351. This was her second round of CPR, which was done in the ER prior to coming ICU. O: 
Physical Examination:  
Patient unresponsive. Pupils fixed. Absent corneal reflex. No spontaneous respiration. No response to deep stimulation. No precordial heart tones. A/P: 
79 yo female with PMH below: 
Past Medical History:  
Diagnosis Date  Arthritis  Chronic kidney disease Dr Gino Jacobson  ESRD on hemodialysis ([facility] HD Tu,Thu,Sat   
 Heart failure ([facility]  History of creation of ostomy ([facility] Left abdomen  Hypertension  Ill-defined condition Dialysis Rexie Burkitt Obese 9/14/2017 Presented volume overloaded, hypoglycemic, septic, s/p 2 rounds of CPR performed and pronounced dead at 0351. Family notified. [place] at bedside. Appreciate ICU staff assistance with all care and efforts. Please refer to resident note for more details. Mary Barbour MD, CAQSM, RMSK Total Critical Care Time: 40 minutes Due to a high probability of clinically significant, life threatening deteriation, I personally spent this critical care time directly and personally managing the patient.

## 2019-11-12 NOTE — H&P
[address] [facility]  
Admission H&P Date of admission: 11/11/2019 Patient name: Vel Cano MRN: [id number] YOB: 1940 Age: 78 y.o. Primary care provider:  Reyna Love MD  
 
Source of Information: patient, medical records Chief complaint:  SOB History of Present Illness Vel Cano is a 78 y.o. female with a PMH of Afib, pulmonary edema, systolic heart failure, ESRD on HD, arthritis, HTN, s/p ostomy, s/p AICD who presents to the ER complaining of SOB. Patient was a poor historian due to being agitated with the BIPAP. Patient's son and daughter were called to provide additional information. Patient was only able to provide that she had SOB and non-productive cough for approximately 2-3 days. Patient's son states that she had no been eating well in the past several days and noticed that she has some difficulty breathing. He called EMS due to her breathing acutely worsening. He also states that patient was scheduled to go to dialysis on 11/9, but did not go because she was not feeling well. He states that the home health nurse provides most of the patient's medical care and could not provide any further details. Per his knowledge, patient has been compliant with her medications. Patient denies fever, chills, chest pain, abd pain. In the ER:  
Vitals: 97.3F, , BP 97/60, RR 30, 99% RA Labs: WBC 14.8, CO2 20, Cr 9.68, LA 5.7, Trop 0.12, BNP >35,000 Imaging: CXR Left upper lobe airspace opacity with increased interstitial opacity as well. Multifocal pneumonia versus interstitial and parenchymal pulmonary edema. EKG: Sinus tachycardia, QTc 500 Treatment: Albuterol x4, duoneb x1, solumedrol 125mg, NS 500cc bolus, levaquin x1. Home Medications Prior to Admission medications Medication Sig Start Date End Date Taking?  Authorizing Provider  
loratadine (CLARITIN) 10 mg tablet Take 1 Tab by mouth every fourty-eight (48) hours. Patient taking differently: Take 10 mg by mouth every fourty-eight (48) hours. Indications: as needed 8/7/19   Deeanna Lanes, MD  
albuterol-ipratropium (DUO-NEB) 2.5 mg-0.5 mg/3 ml nebu 3 mL by Nebulization route every four (4) hours as needed (1). 6/24/19   Deeanna Lanes, MD  
Nebulizer & Compressor machine Use every 4-6 hours as directed for cough, wheezing. 6/24/19   Deeanna Lanes, MD  
isosorbide mononitrate ER (IMDUR) 60 mg CR tablet Take 0.5 Tabs by mouth daily. 4/13/19   Provider, Historical  
carvedilol (COREG) 12.5 mg tablet Take 1 Tab by mouth two (2) times a day. 1/28/19   Provider, Historical  
omeprazole (PRILOSEC) 20 mg capsule Take 1 Cap by mouth two (2) times daily as needed. 5/14/19   Provider, Historical  
ELIQUIS 5 mg tablet Take 1 Tab by mouth two (2) times a day. 5/6/19   Provider, Historical  
calcium acetate (PHOSLO) 667 mg cap Take 1 Cap by mouth three (3) times daily. 3/15/19   Provider, Historical  
acetaminophen (TYLENOL EXTRA STRENGTH) 500 mg tablet Take 3 Tabs by mouth every six (6) hours as needed for Pain. 4/12/19   Yassine Jensen MD  
b complex-vitamin c-folic acid (DIALYVITE) 100-1 mg tab tablet Take 1 Tab by mouth daily. Provider, Historical  
gabapentin (NEURONTIN) 100 mg capsule Take 1 Cap by mouth two (2) times a day. 1/14/19   Provider, Historical  
venlafaxine (EFFEXOR) 37.5 mg tablet Take 37.5 mg by mouth daily. Provider, Historical  
 
 
 
Allergies Allergies Allergen Reactions  Iron Anaphylaxis Per pt, to PO formulation only, can tolerate IV formulation Allergy to excipient? Past Medical History:  
Diagnosis Date  Arthritis  Chronic kidney disease Dr Kari Mayorga  ESRD on hemodialysis ([facility] [address] HD Tu,Thu,Sat   
 Heart failure ([facility] [address]  History of creation of ostomy ([facility] [address] Left abdomen  Hypertension  Ill-defined condition Dialysis [facility] Obese 9/14/2017 Past Surgical History: Procedure Laterality Date  HX COLOSTOMY    
 left abdomen  HX PACEMAKER  09/13/2018  
 placed in left abdomen.  HX VASCULAR ACCESS    
 left upper arm fistula  TN INSJ/RPLCMT PERM DFB W/TRNSVNS LDS 1/DUAL CHMBR N/A 4/5/2019 INSERT ICD SINGLE performed by Theodore Rahman MD at [facility]  TN RMVL OF SUBQ IMPLANTABLE DEFIBRILLATOR ELECTRODE N/A 4/4/2019 REMOVE SUBQ ICD performed by Theodore Rahman MD at [facility] Family History Problem Relation Age of Onset  Hypertension Other   
     multiple family members Social History Patient resides Independently X  With family care Assisted living SNF Ambulates Independently With cane X  Assisted walker Alcohol history X  None Social  
  Chronic Smoking history None Former smoker X  Current smoker Social History Tobacco Use Smoking Status Current Every Day Smoker  Packs/day: 0.25 Smokeless Tobacco Never Used Tobacco Comment  
 cigarettes Drug history X  None Former drug user Current drug user Code status X  Full code DNR/DNI Partial   
Code status discussed with the patient/caregivers. Review of Systems Constitutional: Decreased appetite. Denies fever. Mouth: +dry mouth Respiratory: +SOB, +nonproductive cough Cardiac: Denies chest pain GI: +ostomy. Denies abd pain : Does not make urine Psych: +agitation Physical Exam 
Visit Vitals BP (!) 87/61 Pulse (!) 110 Temp 97.3 °F (36.3 °C) Resp 25 Ht 5' 4\" (1.626 m) Wt 150 lb (68 kg) SpO2 97% BMI 25.75 kg/m² General: Agitated. Head: Normocephalic. Atraumatic. Neck Right EJ. Eyes:  Conjunctiva pink. Sclera white. Nose:  Septum midline. Mucosa pink. No drainage. Throat: Dry mucous membranes Respiratory: +b/l rales. On BIPAP. Cardiovascular: Tachycardic. Normal S1,S2. No m/r/g. GI: + bowel sounds. Nontender. +left abd ostomy. Extremities: No edema. No palpable cord. No tenderness. Right arm fistula. Skin: Extremely dry skin especially extremities and around the mouth. Labratory Data Recent Results (from the past 24 hour(s)) SAMPLES BEING HELD Collection Time: 11/11/19 11:26 PM  
Result Value Ref Range SAMPLES BEING HELD 1RED,1BLU,1SST,2 BC BOTTLES   
 COMMENT Add-on orders for these samples will be processed based on acceptable specimen integrity and analyte stability, which may vary by analyte. CBC WITH AUTOMATED DIFF Collection Time: 11/11/19 11:26 PM  
Result Value Ref Range WBC 14.8 (H) 3.6 - 11.0 K/uL  
 RBC 3.73 (L) 3.80 - 5.20 M/uL  
 HGB 12.7 11.5 - 16.0 g/dL HCT 41.1 35.0 - 47.0 % .2 (H) 80.0 - 99.0 FL  
 MCH 34.0 26.0 - 34.0 PG  
 MCHC 30.9 30.0 - 36.5 g/dL  
 RDW 18.5 (H) 11.5 - 14.5 % PLATELET 947 (L) 841 - 400 K/uL MPV 11.7 8.9 - 12.9 FL  
 NRBC 0.6 (H) 0  WBC ABSOLUTE NRBC 0.09 (H) 0.00 - 0.01 K/uL NEUTROPHILS 75 32 - 75 % BAND NEUTROPHILS 11 (H) 0 - 6 % LYMPHOCYTES 5 (L) 12 - 49 % MONOCYTES 8 5 - 13 % EOSINOPHILS 0 0 - 7 % BASOPHILS 1 0 - 1 % IMMATURE GRANULOCYTES 0 %  
 ABS. NEUTROPHILS 12.8 (H) 1.8 - 8.0 K/UL  
 ABS. LYMPHOCYTES 0.7 (L) 0.8 - 3.5 K/UL  
 ABS. MONOCYTES 1.2 (H) 0.0 - 1.0 K/UL  
 ABS. EOSINOPHILS 0.0 0.0 - 0.4 K/UL  
 ABS. BASOPHILS 0.1 0.0 - 0.1 K/UL  
 ABS. IMM. GRANS. 0.0 K/UL  
 DF MANUAL    
 RBC COMMENTS ANISOCYTOSIS 1+ 
    
 RBC COMMENTS MACROCYTOSIS 1+ 
    
 RBC COMMENTS HYPOCHROMIA 1+ WBC COMMENTS VACUOLATED POLYS METABOLIC PANEL, COMPREHENSIVE Collection Time: 11/11/19 11:26 PM  
Result Value Ref Range Sodium 138 136 - 145 mmol/L Potassium 4.3 3.5 - 5.1 mmol/L Chloride 103 97 - 108 mmol/L  
 CO2 20 (L) 21 - 32 mmol/L Anion gap 15 5 - 15 mmol/L Glucose 56 (L) 65 - 100 mg/dL  BUN 42 (H) 6 - 20 MG/DL  
 Creatinine 9.68 (H) 0.55 - 1.02 MG/DL  
 BUN/Creatinine ratio 4 (L) 12 - 20 GFR est AA 5 (L) >60 ml/min/1.73m2 GFR est non-AA 4 (L) >60 ml/min/1.73m2 Calcium 7.7 (L) 8.5 - 10.1 MG/DL Bilirubin, total 1.6 (H) 0.2 - 1.0 MG/DL  
 ALT (SGPT) 13 12 - 78 U/L  
 AST (SGOT) 44 (H) 15 - 37 U/L Alk. phosphatase 96 45 - 117 U/L Protein, total 7.7 6.4 - 8.2 g/dL Albumin 3.0 (L) 3.5 - 5.0 g/dL Globulin 4.7 (H) 2.0 - 4.0 g/dL A-G Ratio 0.6 (L) 1.1 - 2.2 NT-PRO BNP Collection Time: 11/11/19 11:26 PM  
Result Value Ref Range NT pro-BNP >35,000 (H) <450 PG/ML  
TROPONIN I Collection Time: 11/11/19 11:26 PM  
Result Value Ref Range Troponin-I, Qt. 0.12 (H) <0.05 ng/mL BLOOD GAS, ARTERIAL Collection Time: 11/12/19 12:00 AM  
Result Value Ref Range pH 7.44 7.35 - 7.45    
 PCO2 19 (L) 35.0 - 45.0 mmHg PO2 224 (H) 80 - 100 mmHg O2  (H) 92 - 97 % BICARBONATE 13 (L) 22 - 26 mmol/L  
 BASE DEFICIT 8.8 mmol/L  
 O2 METHOD BIPAP    
 FIO2 100 % MODE BIPAP    
 PRESSURE SUPPORT 10.0 IPAP/PIP 16    
 EPAP/CPAP/PEEP 6.0 Sample source ARTERIAL    
 SITE RIGHT RADIAL ANIBAL'S TEST N/A    
LACTIC ACID Collection Time: 11/12/19 12:05 AM  
Result Value Ref Range Lactic acid 5.7 (HH) 0.4 - 2.0 MMOL/L  
GLUCOSE, POC Collection Time: 11/12/19  2:31 AM  
Result Value Ref Range Glucose (POC) 50 (L) 65 - 100 mg/dL Performed by Fanta Phillips, POC Collection Time: 11/12/19  2:55 AM  
Result Value Ref Range Glucose (POC) 39 (LL) 65 - 100 mg/dL Performed by Joan Rock GLUCOSE, POC Collection Time: 11/12/19  3:11 AM  
Result Value Ref Range Glucose (POC) 424 (H) 65 - 100 mg/dL Performed by [facility]: CXR Left upper lobe airspace opacity with increased interstitial opacity as well. Multifocal pneumonia versus interstitial and parenchymal pulmonary edema. EKG:  Sinus tachycardia. QTc 500 Assessment and Plan Aletha Chatterjee is a 78 y.o. female with a PMH of Afib, pulmonary edema, systolic heart failure, ESRD on HD, arthritis, HTN, s/p ostomy, s/p AICD who is admitted for Severe sepsis. Severe Sepsis: 2/2 PNA. , BP 97/60, RR 30, WBC 14.8, LA 5.7. CXR w/ Left upper lobe airspace opacity with increased interstitial opacity as well. S/p levaquin x1. 
- Admitted to stepdown - Discontinue levaquin due to prolonged QTc (500) - Start IV antibiotics: cefepime and vancomycin. 
- Continue MIVF to maintain a MAP >65, consider pressors if MAP drops below goal.  
- Follow serial chest xrays. 
- Repeat ABG 
- Trend lactic acid q4H - Blood cultures pending - Obtain RVP, sputum cultures 
- CBC, CMP daily Acute Hypoxic Respiratory Failure: 2/2 PNA vs CHF exacerbation vs COPD. ABG pH  
- Continuous BIPAP to maintain sats >90%. Wean as tolerated. - Duoneb q4H, brovana, pulmicort - Solumedrol IV, wean as tolerated - Repeat ABG 
- Consult pulm CHF exacerbation; s/p AICD. ECHO on 4/4/19 reveals LVEF 21-25%. BNP is >35,000. Trop 0.12. Followed by Dr. Caren Cota.  
- Strict In's and Out's - Daily weights, patient's weight upon admission is 150lbs. - Obtain ECHO 
- Trend troponins q6H 
- CBC, CMP daily 
- Consult cardio Atrial Fibrillation: EKG sinus tach. - Home eliquis 5mg BID (hold while on BIPAP) - Home coreg 12.5mg BID (hold due to hypotension) - CBC, CMP, Mg, phos daily QTC prolongation: EKG w/ QTc 500 - Avoid Qtc prolonging agents. ESRD on HD: admission Cr 9.68. HD T/Th/S. No longer produces urine Patient skipped HD on 11/9.  
- Strict I&O 
- CMP daily  
- Consult nephrology for HD. CAD w/ angina: 
- Hold imdur ER 30mg daily while on BIPAP Hypertension: On admission BP was 97/60. 
- Holding home coreg 12.5 BID due to hypotension - Will continue to monitor at this time and readjust as BP's trend. GERD:  
- Protonix 40mg. Omeprazole 20mg BID at home.   
 
FEN/GI - NPO 
 Activity - Ambulate with assistance DVT prophylaxis - Eliquis GI prophylaxis -  protonix Disposition - Plan to d/c to TBD. PT/OT. CODE STATUS:  Full code Patient discussed with Dr. Pablo Holloway DO Family Medicine Resident Hospital Problems Hospital Problems  Date Reviewed: 10/11/2019 Codes Class Noted POA Severe sepsis (HCC) ICD-10-CM: A41.9, R65.20 ICD-9-CM: 038.9, 995.92  11/12/2019 Unknown

## 2019-11-12 NOTE — PROGRESS NOTES
Code started at [id number] 5 Epi given see documentation. 2 amps of Mela given. At . Compressions continued throughout the code with pulse checks where indicated. No shocks given. Code called at [phone].

## 2019-11-12 NOTE — PROGRESS NOTES
Discussed pt with with ED for admission 130 am. Nephrology contacted is on board, will set up HD tonight. Pt is a 79 yo AAF with PMHx of ESRD on HD, Afib, pulmonary edema, ischemic cardiomyopathy s/p AICD, arthritis, HTN, s/p ostomy. Missed HD Saturday. Presented to ED via EMS for AHRF. Placed on BiPAP. Never has she been intubated. CXR c/w edema and PNA and had elevated WBC and LA. ED has been attempting to obtain proper BP as they have been fluctuant given b/l fistulas. EJ also placed for access. Pt was evaluated and admits orders placed at 2 am. 
-Zofran given 
-500 cc Fluid bolus running 
-Levaquin running 
-Glucose of 56 on metabolic panel, noted to be repleted. Ordered POC glucose 
-Reviewed EKG with elevated QTc of 500. Verbal order to stop levaquin to nurse and pharmacy as unable to discontinue in system. Pt already received zofran and started levaquin. Placed ordered for zosyn, vanc, doxy. 2:19 am- called attending. Switch abx with vanc and cefepime. Renally dosed. Plan discussed. Family updated 2:22 am- HD is prepping in stepdown bed. 
 
2:30 am ED provider discussed adding a low dose of morphine to pt given her discomfort. Shared my concern but agreed to give 2 dose morphine. 2:48 am Responded to code blue. Pt was intubated. CPR started, nurse note pt went to PEA. POC glucose 39. No morphine given. Bolus completed. Epi x2, sodium bicarb x2, D50Wx3, Pulse found at 256. Family updated. 258: Took pt ICU. HD to change rooms. NG tube placed. Family updated. 338: DENIS Fernandes called. Pt noted to have once again to lost pulse. CPR started. Epi x5, Bicarb x2. POC glucose 400. After 10 minutes of CPR, and no signs of improvements. Family updated. Attending present. Pronounced time of death at 351 am. 
 
Discussed case with attending Met with family alongside DO [facility] Residency Attending Addendum: I saw and evaluated the patient on the day of the encounter with Dr. De Alvarez, performing the key elements of the service. I discussed the findings, assessment and plan with the resident and agree with the resident's findings and plan as documented in the resident's note. Nikhil Herrera MD, CAQSM, RMSK

## 2019-11-12 NOTE — ED PROVIDER NOTES
HPI  
 
54-year-old female with history of end-stage renal disease on dialysis Tuesday Thursday Saturday, hypertension, bronchitis, presents to the ED with shortness of breath. Patient states she is been short of breath all day. She states she had normal dialysis on Saturday. She denies fever or chest pain. On EMS arrival they have her on 10 L nasal cannula with sats in the upper 90s. She did not tolerate CPAP for them. Patient states she is never been intubated. Past Medical History:  
Diagnosis Date  Arthritis  Chronic kidney disease Dr Roger Cueva  ESRD on hemodialysis ([facility] [address] HD Tu,Thu,Sat   
 History of creation of ostomy ([facility] [address] Left abdomen  Hypertension  Ill-defined condition Dialysis [facility] Obese 9/14/2017 Past Surgical History:  
Procedure Laterality Date  HX COLOSTOMY    
 left abdomen  HX PACEMAKER  09/13/2018  
 placed in left abdomen.  HX VASCULAR ACCESS    
 left upper arm fistula  TX INSJ/RPLCMT PERM DFB W/TRNSVNS LDS 1/DUAL CHMBR N/A 4/5/2019 INSERT ICD SINGLE performed by Pina Garcia MD at [facility]  [place] RMVL OF SUBQ IMPLANTABLE DEFIBRILLATOR ELECTRODE N/A 4/4/2019 REMOVE SUBQ ICD performed by Pina Garcia MD at [facility] Family History:  
Problem Relation Age of Onset  Hypertension Other   
     multiple family members Social History Socioeconomic History  Marital status:  Spouse name: Not on file  Number of children: Not on file  Years of education: Not on file  Highest education level: Not on file Occupational History  Not on file Social Needs  Financial resource strain: Not on file  Food insecurity:  
  Worry: Not on file Inability: Not on file  Transportation needs:  
  Medical: Not on file Non-medical: Not on file Tobacco Use  Smoking status: Current Every Day Smoker   Packs/day: 0.25  
  Smokeless tobacco: Never Used  Tobacco comment: cigarettes Substance and Sexual Activity  Alcohol use: No  
 Drug use: No  
 Sexual activity: Not on file Lifestyle  Physical activity:  
  Days per week: Not on file Minutes per session: Not on file  Stress: Not on file Relationships  Social connections:  
  Talks on phone: Not on file Gets together: Not on file Attends Bahai service: Not on file Active member of club or organization: Not on file Attends meetings of clubs or organizations: Not on file Relationship status: Not on file  Intimate partner violence:  
  Fear of current or ex partner: Not on file Emotionally abused: Not on file Physically abused: Not on file Forced sexual activity: Not on file Other Topics Concern  Not on file Social History Narrative  Not on file ALLERGIES: Iron Review of Systems Unable to perform ROS: Acuity of condition Constitutional: Negative for fever. Respiratory: Positive for shortness of breath. Negative for chest tightness. Cardiovascular: Negative for chest pain. Vitals:  
 11/11/19 2313 BP: 97/60 Pulse: (!) 109 Resp: 30 Temp: 97.3 °F (36.3 °C) SpO2: 99% Weight: 68 kg (150 lb) Height: 5' 4\" (1.626 m) Physical Exam  
Constitutional: She is oriented to person, place, and time. She appears well-developed and well-nourished. She appears distressed. HENT:  
Head: Normocephalic and atraumatic. Mouth/Throat: No oropharyngeal exudate. Eyes: Pupils are equal, round, and reactive to light. Right eye exhibits no discharge. Left eye exhibits no discharge. No scleral icterus. Neck: Normal range of motion. Neck supple. No JVD present. Cardiovascular: Normal rate, regular rhythm and normal heart sounds. No murmur heard. Pulmonary/Chest: No stridor. She is in respiratory distress. She has wheezes. She has rales. She exhibits no tenderness. Abdominal: Soft. Bowel sounds are normal. She exhibits no distension and no mass. There is no tenderness. There is no rebound and no guarding. Musculoskeletal: Normal range of motion. Neurological: She is oriented to person, place, and time. Skin: Skin is warm and dry. Capillary refill takes less than 2 seconds. No rash noted. Psychiatric: She has a normal mood and affect. Her behavior is normal. Judgment and thought content normal.  
  
 
MDM Number of Diagnoses or Management Options Critical Care Total time providing critical care: 30-74 minutes 60 minutes Intubation Date/Time: 11/12/2019 3:04 AM 
Performed by: Gretel Rdz MD 
Authorized by: Gretel Rdz MD  
 
Consent:  
  Consent obtained:  Emergent situation Alternatives discussed:  No treatment Pre-procedure details:  
  Patient status:  Unresponsive Pretreatment medications:  None Paralytics:  None Procedure details:  
  Preoxygenation:  Bag valve mask CPR in progress: yes Intubation method:  Oral 
  Laryngoscope blade: Mac 4 Tube size (mm):  7.5 Tube type:  Cuffed Number of attempts:  1 Cricoid pressure: no   
  Tube visualized through cords: yes Placement assessment:  
  Tube secured with: Adhesive tape Breath sounds:  Equal 
  Placement verification: chest rise Post-procedure details:  
  Patient tolerance of procedure: Tolerated well, no immediate complications ED EKG interpretation: 
Rhythm: sinus tachycardia; and regular . Rate (approx.): 111; Axis: left axis deviation; P wave: normal; QRS interval: prolonged; ST/T wave: non-specific changes;. This EKG was interpreted by Lizzy Luna MD,ED Provider. WBC elevated. CXR c/w edema and pneumonia. lactate elevated. BP has been difficult to obtain due to dialysis shunt on right and old shunt on left. Readings have been high and low.   Her heart rate is improving on bipap, she is resting more comfortably on bipap although still tachypnic. Right sided EJ placed by myself. Fluid bolus and Levaquin ordered. Spoke with Dr. Elaina Sanchez, he will arrange for dialysis tonight. [facility] [place] [facility]. Patient asking for something to calm her. \"I'm sorry i'm such a pain\"  Spoke with resident about ordering another dose of morphine. BP improved with fluid bolus. Dialysis team in ICU waiting for patient. Prior to transport patient became bradycardic and went into PEA arrest. CPR was immediately initiated. She was intubated with a 7.5 tube. She received 3 rounds of EPI and 1amp of Bicarb with return of circulation. She also received several amps of D50 for low blood sugar. She was immediately transported up to ICU for further management.

## 2019-11-12 NOTE — ED TRIAGE NOTES
EMS called to residence for patient with shortness of breath. Patient with hx of CHF, could not tolerate CPAP, room air sat for EMS was 78%. Was placed on 10L NRB, with improvement to 94%. Patient ST on monitor. Last dialysis was Sat.   Is T/TH/Sat

## 2019-11-12 NOTE — PROGRESS NOTES
responded to a code blue and then death of patient, Malian, in the ICU. Family was not present at this time, but per staff, are on their way. Many of family members are traveling several hours to get to the hospital.  provided silent support at bedside.  was informed by staff that Malina's granddaughter, Nazia Conti, had arrived to the hospital.  accompanied Dr. Karl Berg to inform Susan of her grandmother's death. Provided emotional support and comforting touch as Susan shared her grief through tears.  escorted Susan to her grandmother's room and continued to provide emotional support as Susan shared stories and memories of her time with her grandmother and the [address] has left behind. Susan thanked the  for her presence and is aware of the 's availability.  will follow up as able and/or needed Altagracia Shaver. Lucretia Olivo.  Paging Service: Felicia-CALISTA [phone])

## 2019-11-12 NOTE — ED NOTES
Pt Throughput: Charge Nurse on ICU  made aware of patient's bed assignment, room 3006. Carrillo Saenz, RN Emergency Dept Charge RN.

## 2019-11-12 NOTE — PROGRESS NOTES
8058 Nurse responded to code blue in Er, patient was stablelized and transferred by 4 RNs and RT, to ICU 13, placed on vent, patient had large amount of pink frothy sputum, suctioned vigorously. Donita Garcesuel here to do HD, rechecked blood glucose  424, patient is unresponsive. 9818 patient became PEA and compressions started at this time, see code blue doc 
4012 code ended and time of death called Sonal Ryan at bedside

## 2019-11-12 NOTE — PROGRESS NOTES
0240: TRANSFER - IN REPORT: 
Verbal report received from [facility] OF GIO MEDHAT RN(name) on [facility]  being received from ED(unit) for change in patient condition(BIPAP) Report consisted of patients Situation, Background, Assessment and  
Recommendations(SBAR). Information from the following report(s) SBAR, Kardex, Procedure Summary, Intake/Output, MAR, Recent Results, Med Rec Status and Cardiac Rhythm ST  was reviewed with the receiving nurse. Opportunity for questions and clarification was provided. Assessment completed upon patients arrival to unit and care assumed.  
 
7444: Patient arrived to unit after Code Blue called in ED. Patient has paplable carotid pulses, now intubated. /95  . Patient unresponsive to any stimuli. Pupils are very sluggish. OGT placed. Bloody secretions from ETT. Family Practice at bedside. 4220: Unable to find pulse, CPR stated, CODE BLUE called, see Code Blue charting.

## 2019-11-12 NOTE — PROGRESS NOTES
[address] Dosing Services: Antimicrobial Stewardship Progress Note Consult for antibiotic dosing of Vancomycin by Dr. Vinicius Kirkpatrick Pharmacist reviewed antibiotic appropriateness for 78year old , female  for indication of HAP, in a dialysis pt Day of Therapy 1 Plan: 
Vancomycin therapy: 
Start Vancomycin therapy, with loading dose of 1000 (mg) at 0400 11/12/19. Follow with maintenance dose of :500 (mg) after each HD session. Dose calculated to approximate a therapeutic trough of 15-20 mcg/mL. Last trough level / Plan for level:  
Pharmacy to follow daily and will make changes to dose and/or frequency based on clinical status. Date Dose & Interval Measured (mcg/mL) Extrapolated (mcg/mL) ? ? ? ?  
? ? ? ?  
? ? ? ? Non-Kinetic Antimicrobial Dosing:  
Current Regimen:  Cefepime 1 gram IV q24h (to be given after dialysis on days of HD) Recommendation:  
Other Antimicrobial 
(not dosed by pharmacist) Cultures Serum Creatinine Lab Results Component Value Date/Time Creatinine 9.68 (H) 11/11/2019 11:26 PM  
 
   
Creatinine Clearance Estimated Creatinine Clearance: 4.5 mL/min (A) (based on SCr of 9.68 mg/dL (H)). Procalcitonin  No results found for: PCT Temp  
97.3 °F (36.3 °C) WBC Lab Results Component Value Date/Time WBC 14.8 (H) 11/11/2019 11:26 PM  
 
   
H/H Lab Results Component Value Date/Time HGB 12.7 11/11/2019 11:26 PM  
 
  
Platelets Lab Results Component Value Date/Time PLATELET 115 (L) 62/70/5608 11:26 PM  
 
 
  
 
 
Pharmacist: Simon Baez Contact [phone]: [phone]

## 2019-11-12 NOTE — PROGRESS NOTES
Spiritual Care Assessment/Progress Note [address] 
 
 
NAME: Fang Day      MRN: [id number] AGE: 78 y.o. SEX: female Advent Affiliation: Evangelical  
Language: English  
 
11/12/2019     Total Time (in minutes): 14 Spiritual Assessment begun in [facility] 3 INTENSIVE CARE through conversation with: 
  
    []Patient        [] Family    [] Friend(s) Reason for Consult: Code Blue/99 Spiritual beliefs: (Please include comment if needed) 
   [] Identifies with a concetta tradition:     
   [] Supported by a concetta community:        
   [] Claims no spiritual orientation:       
   [] Seeking spiritual identity:            
   [] Adheres to an individual form of spirituality:       
   [x] Not able to assess:                   
 
    
Identified resources for coping:  
   [] Prayer                           
   [] Music                  [] Guided Imagery 
   [] Family/friends                 [] Pet visits [] Devotional reading                         [x] Unknown 
   [] Other:                                        
 
 
Interventions offered during this visit: (See comments for more details) Patient Interventions: Other (comment)(Unable to assess) Plan of Care: 
 
 [] Support spiritual and/or cultural needs  
 [] Support AMD and/or advance care planning process    
 [] Support grieving process 
 [] Coordinate Rites and/or Rituals  
 [] Coordination with community clergy [] No spiritual needs identified at this time 
 [] Detailed Plan of Care below (See Comments)  [] Make referral to Music Therapy 
[] Make referral to Pet Therapy    
[] Make referral to Addiction services 
[] Make referral to [facility] 
[] Make referral to Spiritual Care Partner 
[] No future visits requested       
[x] Follow up visits as needed Comments: 
 
 responded to a code blue for patient Malina, in the ER.  Once  arrived to the hospital,  was informed Malina had been transferred to the ICU. Several staff members where working with Malina when the  came into the room. Family were not present at this time but staff noted she was on the way to the hospital. Advised of 's availability.  will follow up as able and/or needed Altagracia Shaver. Shawna Patel.  Paging Service: [phone])

## 2019-11-12 NOTE — PROGRESS NOTES
Called to examine patient who has . No response to verbal and tactile stimuli. No respiratory effort. Absent heart sounds and pulses. Pupils fixed and dilated. Patient pronounced dead at 0351 AM hours. Lissette Shah DO Family Medicine Resident  [address] [facility] Residency Attending Addendum: I saw and evaluated the patient on the day of the encounter with Dr. Osman Adan, performing the villa elements of the service. I discussed the findings, assessment and plan with the resident and agree with the resident's findings and plan as documented in the resident's note. Virginia Earl MD, CAQSM, RMSK

## 2019-11-14 LAB
BACTERIA SPEC CULT: ABNORMAL
SERVICE CMNT-IMP: ABNORMAL

## 2019-11-14 NOTE — CDMP QUERY
1) Pt with ESRD admitted with fluid overload/pulmonary edema  with noted hx of CHF. If possible, please document in progress notes and discharge summary if you are treating and/or evaluating the following: 
 
=> Volume overload/Acute Pulmonary edema secondary to Acute Systolic CHF with ESRD 
=> Volume overload/ Noncardiogenic Acute Pulmonary edema unrelated to CHF 
=> Other, please specify _______________ 
=> Clinically unable to determine If volume overload/pulmonary edema is due to CHF with ESRD, please document the acuity (acute, chronic or decompensated) and type (systolic/HFrEF, diastolic/HFpEF or combined) of CHF in progress notes and discharge summary. The medical record reflects the following: 
 
   Risk Factors: 77 yo F admitted with sepsis 2/2 pna, ESRD, AHRF Clinical Indicators:  H&P states \" CHF exacerbation\"  DC summary states \"acute hypoxic respiratory failure in setting of pulmonary edema, sepsis 2/2 PNA, missed last HD\" \"BNP is >35,000. Trop 0.12. \" Treatment:  shortly after admissoin Thank you for your time [facility] RN/BSN, CCDS Desk:   [phone] Other:  [phone]

## 2019-11-22 ENCOUNTER — TELEPHONE (OUTPATIENT)
Dept: FAMILY MEDICINE CLINIC | Age: 79
End: 2019-11-22

## 2019-11-22 NOTE — TELEPHONE ENCOUNTER
Home sent patient's death certificate to be filled out by Dr. lJ Snow. This document is in Dr. El Armando folder at my desk pending his signature.

## 2019-11-26 ENCOUNTER — TELEPHONE (OUTPATIENT)
Dept: FAMILY MEDICINE CLINIC | Age: 79
End: 2019-11-26

## 2019-11-26 NOTE — TELEPHONE ENCOUNTER
Called  home yesterday evening, they were not sure as to whether or not they wanted us to send back death certificate via mail or if they wanted family to come pick it up. They will call back to notify. Until then, the death certificate is in Dr. Emily Brice folder on [address].

## 2019-11-26 NOTE — TELEPHONE ENCOUNTER
Minus Bodily and [address] [facility], [place],  [place] [phone]    She is calling to ask if the death certificate has been signed and mailed back. Also said she is calling here for the patient's daughter as she is asking them about this. This was mailed on NOV 18, Monday to our office per Lesly Blackmon. Please call.

## 2019-11-26 NOTE — TELEPHONE ENCOUNTER
Poornima Shall returned the call and said someone called her. She is letting the office know that someone will  the death certificate today.

## 2019-12-19 NOTE — Clinical Note
Consult Re Impotence Hypogonadism  Referring physician Dr Leonidas Zafar MD        CHIEF COMPLAINT:  Chief Complaint   Patient presents with   • Endocrine Problem     Impotence       HISTORY OF PRESENT ILLNESS:  Jose Angel Hodgson is a 70 year old male seen at the request of Leonidas Zafar MD for   Chief Complaint   Patient presents with   • Endocrine Problem     Impotence   .  He has a complicated medical history of morbid obesity history of pulmonary embolism, and  type 2 dm    His testosterone level has not been quite low when done in Jan 2019     His concerns have been low energy and impotence      The patient's pertinent reproductive history is as follows:  History of childhood mumps or measles?  No  History of Cryptorchidism?  No  Puberty?  Normal  History of children?  No  History of excessive exercise?  No  History of chronic pain management?  No  History of chronic illness?  No  History of chemotherapy or radiation?  No  History of brain trauma/injury?  No  History of testicular trauma?  No  History of congenital or chromosomal abnormalities?  no    Current symptoms:  fatigue    Patient denies:  Depression , no chest pain, no headaches      Current comorbid conditions:    Morbid obesity, pulmonary embolism    HISTORIES:  Patient Active Problem List   Diagnosis   • Other internal derangement of knee(717.89)   • BPH with urinary obstruction   • Gastroesophageal reflux disease   • Gout, unspecified   • Hypercholesterolemia   • Impotence   • Hyperlipidemia   • Gout attack   • Left wrist pain   • Hyperuricemia   • Arthritis of right hip   • Preop cardiovascular exam   • History of pulmonary embolus (PE)   • POTS (postural orthostatic tachycardia syndrome)   • Body mass index (BMI) 40.0-44.9, adult (CMS/[facility])   • Abnormal liver function tests   • Arthritis of right knee   • Diverticulosis of intestine without perforation or abscess without bleeding   • Internal hemorrhoids   • Polyp of large intestine   •  Sheath #all: Presents as clean, dry, & intact, no bleeding and no hematoma. 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   • Colonoscopy w/ polypectomy  09/06/2018    dr malagon   • Joint replacement      3/2018----right hip   • Knee scope,diagnostic      WORK COMP 2009, EMILIOWIG   • Repair rotator cuff,acute      2004, right       Family History   Problem Relation Age of Onset   • Cancer Father         bone   • Dementia/Alzheimers Mother    • Osteoarthritis Sister    • Cancer Brother        Social History     Tobacco Use   • Smoking status: Never Smoker   • Smokeless tobacco: Never Used   Substance Use Topics   • Alcohol use: Yes     Alcohol/week: 0.0 - 1.0 standard drinks     Frequency: Never     Drinks per session: 1 or 2     Binge frequency: Never     Comment: occasionally   • Drug use: No        EMPLOYER:      Allergies as of 12/19/2019   • (No Known Allergies)      Constitutional Problem(s):  excessive fatigue  Respiratory problem(s):  Negative  Cardiovascular problem(s):  Negative  Hematologic problem(s):  Negative  Immunologic Problem(s):  Negative  Gastrointestinal problem(s):  Negative  Genitourinary problem(s):  Negative  Musculoskeletal problem(s):  Weakness of arm or leg  Neurological problem(s):  Numbness/tingling in hands or feet and Balance problems  Endocrine problem(s):  Negative  Skin problem(s):  Negative      Visit Vitals  /84 (BP Location: RUE - Right upper extremity, Patient Position: Sitting, Cuff Size: Regular)   Pulse 102   Wt 133.4 kg   SpO2 97%   BMI 37.75 kg/m²       PHYSICAL EXAM  GENERAL:  This is a(n) Obese White male in no distress, who is alert, oriented to person with normal eye contact, normal affect, normal thought processes and clear speech.  He ambulates  normally without assistance.  Non labored respirations.  HEENT:  Pupils are equal and reactive to light and accommodation.  Extraocular muscles are intact.  There is no stare or proptosis.  Atraumatic.  NECK:  Supple.  Thyroid is palpable, non nodular, non tender and mobile.  LUNGS:  Bilaterally clear to auscultation.  Unlabored breathing.  HEART:  Regular rate and rhythm.  No S3 or murmur.   ABDOMEN:  Soft.  Bowel sounds present in all quadrants.  No hepatosplenomegaly.  No pathologic striae.  No tenderness.  Femoral pulses 2+ without bruits.  EXTREMITIES:  Skin and nail texture are normal.  Palms are warm and dry.  No upper extremity fine tremor.  No pedal edema.    NEUROLOGIC:  DTR's 2 + with a normal  return phase.  Ambulates without assistance.  SKIN:  No rash.  Normal.  Good turgor.  MUSCULOSKELETAL:  Normal gait, no clubbing or cyanosis.    LABORATORY DATA:  No results found for: TESTOSTERONE  Testosterone Male (ng/dL)   Date Value   01/09/2019 296.4     PSA, Total (ng/mL)   Date Value   10/28/2019 1.81   06/27/2018 1.48     Lab Results   Component Value Date    WBC 6.2 11/22/2019    WBC 7.5 11/13/2019    HCT 48.6 11/22/2019    HCT 43.3 11/13/2019    HGB 16.0 11/22/2019    HGB 14.0 11/13/2019     11/22/2019     11/13/2019     ASSESSMENT:    N52.9 Impotence  (primary encounter diagnosis)  Z68.41 Body mass index (BMI) 40.0-44.9, adult (CMS/[facility])  Z86.711 History of pulmonary embolus (PE)    We discussed in light of his PE, he is not a candidate for testosterone replacement  I dont recommend it for him even if his testosterone is low     He is 70, He has multiple risk factors and his testosterone is not low enough to treat.  He is at high risk for CVA and Cardiovascular events    Recommend not treating  If he loses more weight it will help with his energy and also improving his testosterone, free testosterone SHBG ratio     I had a pratima conversation that his problems are originating from his  obesity and lack of activity      PLAN:  He does not need a follow up with me  If erectile dysfunction is the problem he could see urology which may be helpful from a symptom perspective.      A copy of this note was sent to:  Leonidas Zafar MD

## 2020-01-01 NOTE — PROGRESS NOTES
Robbie Marshall ayaka [address] 79  [address] [facility], [address] [place] [address]  [phone]      Medical Progress Note      NAME: Melvina Fowler   :  1940  MRM:  [id number]    Date/Time: 2017  9:02 AM           Subjective:     Chief Complaint:  Drainage: from abdominal wound, hasn't had any since Wound Care placed ostomy bag over it    ROS:  (bold if positive, if negative)                        Tolerating PT  Tolerating Diet          Objective:       Vitals:          Last 24hrs VS reviewed since prior progress note.  Most recent are:    Visit Vitals    /56 (BP 1 Location: Left arm, BP Patient Position: At rest)    Pulse 88    Temp 98.4 °F (36.9 °C)    Resp 16    Ht 5' 3\" (1.6 m)    Wt 77.7 kg (171 lb 4.8 oz)    SpO2 95%    BMI 30.34 kg/m2     SpO2 Readings from Last 6 Encounters:   17 95%   16 99%   07/09/15 98%            Intake/Output Summary (Last 24 hours) at 17 0902  Last data filed at 17 0615   Gross per 24 hour   Intake              200 ml   Output                0 ml   Net              200 ml          Exam:     Physical Exam:    Gen:  Well-developed, obese, in no acute distress  HEENT:  Pink conjunctivae, PERRL, hearing intact to voice, moist mucous membranes  Neck:  Supple, without masses, thyroid non-tender  Resp:  No accessory muscle use, clear breath sounds without wheezes rales or rhonchi  Card:  No murmurs, normal S1, S2 without thrills, bruits or peripheral edema  Abd:  Soft, non-tender, non-distended, normoactive bowel sounds are present, no palpable organomegaly and no detectable hernias, abdominal scar with wound at inferior aspect with drainage  Lymph:  No cervical or inguinal adenopathy  Musc:  No cyanosis or clubbing  Skin:  No rashes or ulcers, skin turgor is good  Neuro:  Cranial nerves are grossly intact, no focal motor weakness, follows commands appropriately  Psych:  Good insight, oriented to person, place and time, Patient's first and last name, , procedure, and correct site confirmed prior to the start of procedure. alert    Medications Reviewed: (see below)    Lab Data Reviewed: (see below)    ______________________________________________________________________    Medications:     Current Facility-Administered Medications   Medication Dose Route Frequency    heparin (porcine) injection 5,000 Units  5,000 Units SubCUTAneous Q12H    oxyCODONE IR (ROXICODONE) tablet 5 mg  5 mg Oral Q6H PRN    sodium chloride (NS) flush 5-10 mL  5-10 mL IntraVENous PRN    sodium chloride (NS) flush 5-10 mL  5-10 mL IntraVENous Q8H    HYDROmorphone (PF) (DILAUDID) injection 0.5 mg  0.5 mg IntraVENous Q4H PRN    naloxone (NARCAN) injection 0.4 mg  0.4 mg IntraVENous PRN    ondansetron (ZOFRAN) injection 4 mg  4 mg IntraVENous Q4H PRN    diphenhydrAMINE (BENADRYL) injection 12.5 mg  12.5 mg IntraVENous Q4H PRN    docusate sodium (COLACE) capsule 100 mg  100 mg Oral BID    ALPRAZolam (XANAX) tablet 0.5 mg  0.5 mg Oral QID PRN    calcium acetate (PHOSLO) capsule 667 mg  1 Cap Oral TID WITH MEALS    hydrALAZINE (APRESOLINE) tablet 25 mg  25 mg Oral TID    isosorbide mononitrate ER (IMDUR) tablet 60 mg  60 mg Oral 7am    venlafaxine-SR (EFFEXOR-XR) capsule 37.5 mg  37.5 mg Oral DAILY    hydrALAZINE (APRESOLINE) 20 mg/mL injection 20 mg  20 mg IntraVENous Q6H PRN    piperacillin-tazobactam (ZOSYN) 3.375 g in 0.9% sodium chloride (MBP/ADV) 100 mL  3.375 g IntraVENous Q12H    vancomycin (VANCOCIN) 500 mg in 0.9% sodium chloride (MBP/ADV) 100 mL  500 mg IntraVENous Rx Dosing/Monitoring    And    Vancomycin reminder: Give Vancomycin after each HD session   Other DIALYSIS TUE, THU & SAT            Lab Review:     Recent Labs      09/16/17   0259  09/15/17   0222  09/14/17   1037   WBC  12.6*  12.3*  15.5*   HGB  11.4*  11.2*  12.2   HCT  35.7  35.3  36.3   PLT  220  212  228     Recent Labs      09/16/17   0259  09/15/17   0222  09/14/17   1037   NA  138  140  139   K  5.2*  4.2  3.7   CL  98  100  97   CO2  26  29  28   GLU  86  93  104* BUN  25*  17  11   CREA  7.93*  6.10*  4.25*   CA  8.1*  7.8*  8.4*   ALB   --    --   3.3*   TBILI   --    --   0.9   SGOT   --    --   23   ALT   --    --   14     No results found for: GLUCPOC  No results for input(s): PH, PCO2, PO2, HCO3, FIO2 in the last 72 hours. No results for input(s): INR in the last 72 hours. No lab exists for component: INREXT, INREXT  No results found for: SDES  Lab Results   Component Value Date/Time    Culture result: NO GROWTH 2 DAYS 09/15/2017 04:57 PM    Culture result: NO GROWTH 3 DAYS 09/14/2017 01:37 PM    Culture result:  09/14/2017 01:37 PM     STAPHYLOCOCCUS SPECIES, COAGULASE NEGATIVE GROWING IN 1 OF 2 BOTTLES DRAWN (NO SITE INDICATED) PLATES HELD 3 DAYS. CALL MICROBIOLOGY LAB IF SENSITIVITIES ARE NEEDED. Culture result: REMAINING BOTTLE(S) HAS/HAVE NO GROWTH SO FAR 09/14/2017 01:37 PM            Assessment:     Active Problems:    Abdominal wall abscess (9/14/2017)      ESRD (end stage renal disease) ([facility] (9/14/2017)      HTN (hypertension) (9/14/2017)      Leukocytosis (9/14/2017)      Elevated lactic acid level (9/14/2017)      Obese (9/14/2017)      Bacteremia (9/15/2017)      Ab[facility] (9/16/2017)           Plan:      Active Problems:    HTN (hypertension) (9/14/2017)   - BP okay, follow on home meds      ESRD (end stage renal disease) ([facility] (9/14/2017)   - Renal following, dialysis per them      Leukocytosis (9/14/2017)   - WBC down slightly overall, follow on antibiotics      Elevated lactic acid level (9/14/2017)   - mildly elevated, down a little without IV fluids   - NOT septic      Obese (9/14/2017)   - counseled on weight loss      Abdominal wall abscess (9/14/2017)   - management per Surgery   - 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   - on vancomycin and Zosyn currently, wound culture with GNRs   - initial blood culture with CNS, likely contaminant, repeat pending, doubt true bacteremia, repeat cultures NGTD   - ostomy bag placed over sinus tract and it is empty without drainage around, unclear if the adhesive has maybe sealed the sinus tract and fluid is accumulating subcutaneously or if the drainage has stopped      Total time spent with patient: 15 minutes                  Care Plan discussed with: Patient, Care Manager and Nursing Staff    Discussed:  Code Status, Care Plan and D/C Planning    Prophylaxis:  SCD's    Disposition:   PT, OT, RN           ___________________________________________________    Attending Physician: Uli Latham MD

## 2022-02-24 NOTE — ANESTHESIA POSTPROCEDURE EVALUATION
Post-Anesthesia Evaluation and Assessment Patient: Laureen White MRN: [id number]  SSN: [id number] YOB: 1940  Age: 66 y.o. Sex: female Cardiovascular Function/Vital Signs Visit Vitals  BP 93/40 (BP 1 Location: Right leg, BP Patient Position: At rest)  Pulse 66  Temp 36.6 °C (97.8 °F)  Resp 13  Wt 63 kg (138 lb 14.2 oz)  SpO2 96%  BMI 23.84 kg/m2 Patient is status post MAC anesthesia for * No procedures listed *. Nausea/Vomiting: None Postoperative hydration reviewed and adequate. Pain: 
Pain Scale 1: Numeric (0 - 10) (10/01/18 1415) Pain Intensity 1: 0 (10/01/18 0854) Managed Neurological Status: At baseline Mental Status and Level of Consciousness: Arousable Pulmonary Status:  
O2 Device: Room air (10/01/18 1430) Adequate oxygenation and airway patent Complications related to anesthesia: None Post-anesthesia assessment completed. No concerns Signed By: Frederick Crowe MD   
 October 1, 2018 No

## 2022-05-18 NOTE — DIALYSIS
[facility]                         [phone]  Vitals Pre Post Assessment Pre Post   /66 131/60 LOC A/Ox3 A/Ox3   HR 82 90 Lungs clear clear   Temp 97.6 98.3 Cardiac regular regular   Resp 18 20 Skin Warm/dry Warm/dry   Weight 78.5kg 77kg Edema none none      Pain denies denies     Orders   Duration: Start: 1300 End: 1630 Total: 3.5   Dialyzer: Revaclear   K Bath: 2   Ca Bath: 2.5   Na / Bicarb: 140/35   Target Fluid Removal: 1500ml     Access   Type & Location: EMIGDIO-AVG   Comments:                        Positive thrill/bruit. Canulated with 2x15G fistula needles without any problems. Labs   Hep B status / date: unknown   Obtained/Reviewed  Critical Results Called   unknown     Meds Given   Name Dose Route   none                 Total Liters Process: 85liters   Net Fluid Removed: 1500ml      Comments   Time Out Done: 3042   Primary Nurse Rpt Pre: TUCKER Dunaway   Primary Nurse Rpt Post: TUCKER Dunaway   Pt Education: n/a   Care Plan: BP remains stable throughout treatment   Tx Summary: Tolerated treatment well. BP remained stable throughout the treatment.  No excessive bleeding post needle removal. reports daily cannabis use since age 16

## 2023-03-23 NOTE — CONSULTS
#2 covid given without complications. Xena Olsen MA     88 [address]  YOB: 1940          Assessment & Plan:   1. ESRD  - HD TTS  - No HD today  - davita aware  2. HTN  - Hydralazine,Imdur  3. Abd wall abscess  - ct planned  - surgery following  4. Left Colostomy       Subjective:   CC:esrd  HPI: Patient seen for HD need  Pt of mine who gets HD TTS at [facility] yesterday, is here with abd wall abscess. Initial ct did show any findings,CT With po contrast is planned and she has been placed on Vanc/zosyn. Surgery is seeing the pt. ROS:no pain.  No cp/sob/n/v  Current Facility-Administered Medications   Medication Dose Route Frequency    sodium chloride (NS) flush 5-10 mL  5-10 mL IntraVENous PRN    sodium chloride (NS) flush 5-10 mL  5-10 mL IntraVENous Q8H    HYDROmorphone (PF) (DILAUDID) injection 0.5 mg  0.5 mg IntraVENous Q4H PRN    naloxone (NARCAN) injection 0.4 mg  0.4 mg IntraVENous PRN    ondansetron (ZOFRAN) injection 4 mg  4 mg IntraVENous Q4H PRN    diphenhydrAMINE (BENADRYL) injection 12.5 mg  12.5 mg IntraVENous Q4H PRN    docusate sodium (COLACE) capsule 100 mg  100 mg Oral BID    ALPRAZolam (XANAX) tablet 0.5 mg  0.5 mg Oral QID PRN    calcium acetate (PHOSLO) capsule 667 mg  1 Cap Oral TID WITH MEALS    hydrALAZINE (APRESOLINE) tablet 25 mg  25 mg Oral TID    isosorbide mononitrate ER (IMDUR) tablet 60 mg  60 mg Oral 7am    venlafaxine-SR (EFFEXOR-XR) capsule 37.5 mg  37.5 mg Oral DAILY    hydrALAZINE (APRESOLINE) 20 mg/mL injection 20 mg  20 mg IntraVENous Q6H PRN    piperacillin-tazobactam (ZOSYN) 3.375 g in 0.9% sodium chloride (MBP/ADV) 100 mL  3.375 g IntraVENous Q12H    [START ON 9/16/2017] vancomycin (VANCOCIN) 500 mg in 0.9% sodium chloride (MBP/ADV) 100 mL  500 mg IntraVENous Rx Dosing/Monitoring    And    Vancomycin reminder: Give Vancomycin after each HD session   Other DIALYSIS TUE, THU & SAT          Objective: Vitals:  Blood pressure 120/78, pulse 88, temperature 98 °F (36.7 °C), resp. rate 18, height 5' 3\" (1.6 m), weight 77.5 kg (170 lb 13.7 oz), SpO2 99 %. Temp (24hrs), Av.2 °F (36.8 °C), Min:98 °F (36.7 °C), Max:98.5 °F (36.9 °C)      Intake and Output:      1901 - 09/15 0700  In: 143.2 [I.V.:143.2]  Out: -     Physical Exam:                Patient is intubated:  no    Physical Examination:   GENERAL ASSESSMENT: NAD  HEENT:Nontraumatic   CHEST: CTA  HEART: S1S2  ABDOMEN: Soft,NT,left colostomy  :Castillo: n  EXTREMITY: EDEMA n  NEURO:Grossly non focal          ECG/rhythm:    Data Review      No results for input(s): TNIPOC in the last 72 hours. No lab exists for component: ITNL   No results for input(s): CPK, CKMB, TROIQ in the last 72 hours. Recent Labs      09/15/17   0222  17   1037   NA  140  139   K  4.2  3.7   CL  100  97   CO2  29  28   BUN  17  11   CREA  6.10*  4.25*   GLU  93  104*   CA  7.8*  8.4*   ALB   --   3.3*   WBC  12.3*  15.5*   HGB  11.2*  12.2   HCT  35.3  36.3   PLT  212  228      No results for input(s): INR, PTP, APTT in the last 72 hours. No lab exists for component: INREXT  Needs: urine analysis, urine sodium, protein and creatinine  No results found for: Zenia Segovia      Discussed with:  pt    : Natty Alvarez MD  9/15/2017        [facility]:  [email or web address]  [place] office:  [address] [place] [address], [address] [facility] [address]  [place], [address] [place] [address]  Phone: [phone]  Fax :     [phone]    [place] office:  [address]  Phone - [phone]  Fax - [phone]

## 2024-12-03 NOTE — ED TRIAGE NOTES
CC:  PATIENT PRESENTS FOR COMPLETE EXAM. STATES VISION HAS BEEN GOOD. WEARS CONTACT MONO VISION LENSES DAILY. NO OVERNIGHT WEAR. COMFORT IS GOOD.       POH:  Bilateral myopia  Type 2 diabetes mellitus without complication, unspecified whether long term insulin use (CMD)  Other age-related incipient cataract, bilateral        DROPS:  EYE WASH QAM    Medications and allergies reviewed  Denies known Latex allergy or symptoms of Latex sensitivity  Tobacco use reviewd    PCP Brisa Ambrosio MD             Triage: Patient arrives from dialysis with c/o bleeding from her pacemaker site. Patient states she has had this pacemaker for about 2 weeks. Patient denies injury to site but is on a blood thinner. Patient did not get dialysis today.

## (undated) DEVICE — SUTURE COAT VCRL SZ 4-0 L18IN ABSRB UD L19MM PS-2 1/2 CIR J496G

## (undated) DEVICE — SYSTEM INTRO 10.5FR L13CM STD WHT CAP HEMSTAT SPLITTABLE

## (undated) DEVICE — SUBCUTANEOUS IMPLANTABLE CARDIOVERTER DEFIBRILLATOR
Type: IMPLANTABLE DEVICE | Status: NON-FUNCTIONAL
Brand: EMBLEM™ MRI S-ICD
Removed: 2019-04-04

## (undated) DEVICE — SUT SLK 0 30IN SH BLK --

## (undated) DEVICE — HI-TORQUE VERSACORE FLOPPY GUIDE WIRE SYSTEM 145 CM: Brand: HI-TORQUE VERSACORE

## (undated) DEVICE — SLING ARM LIFETEC ORTH UNIV --

## (undated) DEVICE — PACK,UNIVERSAL,NO GOWNS: Brand: MEDLINE

## (undated) DEVICE — SUTURE VCRL SZ 2-0 L36IN ABSRB UD L40MM CT 1/2 CIR J957H

## (undated) DEVICE — COLLECTOR SPEC RAYON LIQ STUART DBL FOR THRT VAG SKIN WND

## (undated) DEVICE — ZIP 8I SURGICAL SKIN CLOSURE DEVICE: Brand: ZIP 8I SURGICAL SKIN CLOSURE DEVICE

## (undated) DEVICE — SUBCUTANEOUS ELECTRODE
Type: IMPLANTABLE DEVICE | Status: NON-FUNCTIONAL
Brand: EMBLEM™ S-ICD
Removed: 2019-04-04

## (undated) DEVICE — X-RAY SPONGES,16 PLY: Brand: DERMACEA

## (undated) DEVICE — PACEMAKER PACK: Brand: MEDLINE INDUSTRIES, INC.

## (undated) DEVICE — REM POLYHESIVE ADULT PATIENT RETURN ELECTRODE: Brand: VALLEYLAB

## (undated) DEVICE — PREP CHLORAPREP 10.5 ML ORG --

## (undated) DEVICE — 3M™ TEGADERM™ TRANSPARENT FILM DRESSING FRAME STYLE, 1626W, 4 IN X 4-3/4 IN (10 CM X 12 CM), 50/CT 4CT/CASE: Brand: 3M™ TEGADERM™

## (undated) DEVICE — (D)STRIP SKN CLSR 0.5X4IN WHT --

## (undated) DEVICE — INTENDED FOR TISSUE SEPARATION, AND OTHER PROCEDURES THAT REQUIRE A SHARP SURGICAL BLADE TO PUNCTURE OR CUT.: Brand: BARD-PARKER ®  SAFETY SCALPED

## (undated) DEVICE — MEDI-VAC NON-CONDUCTIVE SUCTION TUBING: Brand: CARDINAL HEALTH

## (undated) DEVICE — 3M™ IOBAN™ 2 ANTIMICROBIAL INCISE DRAPE 6650EZ: Brand: IOBAN™ 2

## (undated) DEVICE — MEDI-TRACE CADENCE ADULT, DEFIBRILLATION ELECTRODE -RTS  (10 PR/PK) - PHILIPS: Brand: MEDI-TRACE CADENCE

## (undated) DEVICE — IRRIGATION KT PIST SYR 60ML -- CONVERT TO ITEM 116415

## (undated) DEVICE — KIT APPL SPRY HEMSTAT PWDR -- F/HEMADERM FLEXTIP

## (undated) DEVICE — KIT ACCS INTRO 4FR L10CM NDL 21GA L7CM GWIRE L40CM

## (undated) DEVICE — SUTURE VCRL 2-0 L27IN ABSRB UD PS-2 L19MM 1/2 CIR J428H